# Patient Record
Sex: MALE | Race: WHITE | NOT HISPANIC OR LATINO | Employment: OTHER | URBAN - NONMETROPOLITAN AREA
[De-identification: names, ages, dates, MRNs, and addresses within clinical notes are randomized per-mention and may not be internally consistent; named-entity substitution may affect disease eponyms.]

---

## 2017-01-23 ENCOUNTER — TRANSCRIBE ORDERS (OUTPATIENT)
Dept: PHYSICAL THERAPY | Facility: HOSPITAL | Age: 62
End: 2017-01-23

## 2017-01-23 DIAGNOSIS — R13.10 DYSPHAGIA, UNSPECIFIED: Primary | ICD-10-CM

## 2017-03-14 ENCOUNTER — LAB (OUTPATIENT)
Dept: LAB | Facility: HOSPITAL | Age: 62
End: 2017-03-14

## 2017-03-14 ENCOUNTER — TRANSCRIBE ORDERS (OUTPATIENT)
Dept: ADMINISTRATIVE | Facility: HOSPITAL | Age: 62
End: 2017-03-14

## 2017-03-14 DIAGNOSIS — E78.5 HYPERLIPIDEMIA, UNSPECIFIED HYPERLIPIDEMIA TYPE: Primary | ICD-10-CM

## 2017-03-14 DIAGNOSIS — E78.5 HYPERLIPIDEMIA, UNSPECIFIED HYPERLIPIDEMIA TYPE: ICD-10-CM

## 2017-03-14 DIAGNOSIS — N18.30 CHRONIC KIDNEY DISEASE, STAGE III (MODERATE) (HCC): ICD-10-CM
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
## 2017-03-14 LAB
25(OH)D3 SERPL-MCNC: 9 NG/ML
ALBUMIN SERPL-MCNC: 4.2 G/DL (ref 3.4–4.8)
ALBUMIN UR-MCNC: 724.2 MG/L
ALBUMIN/GLOB SERPL: 1.1 G/DL (ref 1.5–2.5)
ALP SERPL-CCNC: 107 U/L (ref 40–129)
ALT SERPL W P-5'-P-CCNC: 17 U/L (ref 10–44)
ANION GAP SERPL CALCULATED.3IONS-SCNC: 8.6 MMOL/L (ref 3.6–11.2)
AST SERPL-CCNC: 37 U/L (ref 10–34)
BILIRUB SERPL-MCNC: 0.4 MG/DL (ref 0.2–1.8)
BILIRUB UR QL STRIP: NEGATIVE
BUN BLD-MCNC: 17 MG/DL (ref 7–21)
BUN/CREAT SERPL: 9.3 (ref 7–25)
CALCIUM SPEC-SCNC: 9.1 MG/DL (ref 7.7–10)
CHLORIDE SERPL-SCNC: 104 MMOL/L (ref 99–112)
CK SERPL-CCNC: 54 U/L (ref 24–204)
CLARITY UR: CLEAR
CO2 SERPL-SCNC: 21.4 MMOL/L (ref 24.3–31.9)
COLOR UR: YELLOW
CREAT BLD-MCNC: 1.82 MG/DL (ref 0.43–1.29)
CREAT UR-MCNC: 51.4 MG/DL
CREAT UR-MCNC: 51.4 MG/DL
GFR SERPL CREATININE-BSD FRML MDRD: 38 ML/MIN/1.73
GLOBULIN UR ELPH-MCNC: 3.7 GM/DL
GLUCOSE BLD-MCNC: 410 MG/DL (ref 70–110)
GLUCOSE UR STRIP-MCNC: ABNORMAL MG/DL
HGB UR QL STRIP.AUTO: NEGATIVE
KETONES UR QL STRIP: NEGATIVE
LEUKOCYTE ESTERASE UR QL STRIP.AUTO: NEGATIVE
MICROALBUMIN/CREAT UR: 1408.9 MG/G
NITRITE UR QL STRIP: NEGATIVE
OSMOLALITY SERPL CALC.SUM OF ELEC: 287.1 MOSM/KG (ref 273–305)
PH UR STRIP.AUTO: <=5 [PH] (ref 5–8)
PHOSPHATE SERPL-MCNC: 4.1 MG/DL (ref 2.7–4.5)
POTASSIUM BLD-SCNC: 4.5 MMOL/L (ref 3.5–5.3)
PROT SERPL-MCNC: 7.9 G/DL (ref 6–8)
PROT UR QL STRIP: ABNORMAL
PROT UR-MCNC: 104.2 MG/DL
PROT/CREAT UR: 2027.2 MG/G CREA (ref 0–200)
PTH-INTACT SERPL-MCNC: 81 PG/ML (ref 14–72)
SODIUM BLD-SCNC: 134 MMOL/L (ref 135–153)
SP GR UR STRIP: 1.02 (ref 1–1.03)
UROBILINOGEN UR QL STRIP: ABNORMAL

## 2017-03-14 PROCEDURE — 80053 COMPREHEN METABOLIC PANEL: CPT | Performed by: HOSPITALIST

## 2017-03-14 PROCEDURE — 82306 VITAMIN D 25 HYDROXY: CPT | Performed by: HOSPITALIST

## 2017-03-14 PROCEDURE — 82550 ASSAY OF CK (CPK): CPT | Performed by: HOSPITALIST

## 2017-03-14 PROCEDURE — 82570 ASSAY OF URINE CREATININE: CPT | Performed by: HOSPITALIST

## 2017-03-14 PROCEDURE — 84156 ASSAY OF PROTEIN URINE: CPT | Performed by: HOSPITALIST

## 2017-03-14 PROCEDURE — 83970 ASSAY OF PARATHORMONE: CPT | Performed by: HOSPITALIST

## 2017-03-14 PROCEDURE — 81003 URINALYSIS AUTO W/O SCOPE: CPT | Performed by: HOSPITALIST

## 2017-03-14 PROCEDURE — 82043 UR ALBUMIN QUANTITATIVE: CPT | Performed by: HOSPITALIST

## 2017-03-14 PROCEDURE — 84100 ASSAY OF PHOSPHORUS: CPT | Performed by: HOSPITALIST

## 2017-03-14 PROCEDURE — 36415 COLL VENOUS BLD VENIPUNCTURE: CPT

## 2017-03-21 RX ORDER — FENOFIBRATE 145 MG/1
TABLET, COATED ORAL
Qty: 15 TABLET | Refills: 2 | Status: SHIPPED | OUTPATIENT
Start: 2017-03-21 | End: 2018-07-02 | Stop reason: DRUGHIGH

## 2017-04-06 ENCOUNTER — LAB (OUTPATIENT)
Dept: LAB | Facility: HOSPITAL | Age: 62
End: 2017-04-06
Attending: INTERNAL MEDICINE

## 2017-04-06 ENCOUNTER — TRANSCRIBE ORDERS (OUTPATIENT)
Dept: GENERAL RADIOLOGY | Facility: HOSPITAL | Age: 62
End: 2017-04-06

## 2017-04-06 ENCOUNTER — OFFICE VISIT (OUTPATIENT)
Dept: CARDIOLOGY | Facility: CLINIC | Age: 62
End: 2017-04-06

## 2017-04-06 VITALS
RESPIRATION RATE: 18 BRPM | HEART RATE: 66 BPM | BODY MASS INDEX: 38.09 KG/M2 | DIASTOLIC BLOOD PRESSURE: 66 MMHG | SYSTOLIC BLOOD PRESSURE: 134 MMHG | WEIGHT: 207 LBS | HEIGHT: 62 IN

## 2017-04-06 DIAGNOSIS — E78.5 DYSLIPIDEMIA: Primary | ICD-10-CM

## 2017-04-06 DIAGNOSIS — N18.30 CHRONIC KIDNEY DISEASE, STAGE III (MODERATE) (HCC): ICD-10-CM

## 2017-04-06 DIAGNOSIS — R07.2 PRECORDIAL PAIN: Primary | ICD-10-CM

## 2017-04-06 DIAGNOSIS — E78.5 DYSLIPIDEMIA: ICD-10-CM

## 2017-04-06 LAB
25(OH)D3 SERPL-MCNC: 8 NG/ML
ALBUMIN SERPL-MCNC: 4.8 G/DL (ref 3.4–4.8)
ALBUMIN UR-MCNC: 1358.1 MG/L
ALBUMIN/GLOB SERPL: 1.5 G/DL (ref 1.5–2.5)
ALP SERPL-CCNC: 89 U/L (ref 40–129)
ALT SERPL W P-5'-P-CCNC: 16 U/L (ref 10–44)
ANION GAP SERPL CALCULATED.3IONS-SCNC: 10 MMOL/L (ref 3.6–11.2)
AST SERPL-CCNC: 33 U/L (ref 10–34)
BILIRUB SERPL-MCNC: 0.4 MG/DL (ref 0.2–1.8)
BILIRUB UR QL STRIP: NEGATIVE
BUN BLD-MCNC: 30 MG/DL (ref 7–21)
BUN/CREAT SERPL: 14.9 (ref 7–25)
CALCIUM SPEC-SCNC: 9.3 MG/DL (ref 7.7–10)
CALCIUM SPEC-SCNC: 9.5 MG/DL (ref 7.7–10)
CHLORIDE SERPL-SCNC: 103 MMOL/L (ref 99–112)
CK SERPL-CCNC: 38 U/L (ref 24–204)
CLARITY UR: CLEAR
CO2 SERPL-SCNC: 23 MMOL/L (ref 24.3–31.9)
COLOR UR: YELLOW
CREAT BLD-MCNC: 2.01 MG/DL (ref 0.43–1.29)
CREAT UR-MCNC: 83 MG/DL
CREAT UR-MCNC: 83 MG/DL
GFR SERPL CREATININE-BSD FRML MDRD: 34 ML/MIN/1.73
GLOBULIN UR ELPH-MCNC: 3.3 GM/DL
GLUCOSE BLD-MCNC: 391 MG/DL (ref 70–110)
GLUCOSE UR STRIP-MCNC: ABNORMAL MG/DL
HGB UR QL STRIP.AUTO: NEGATIVE
KETONES UR QL STRIP: NEGATIVE
LEUKOCYTE ESTERASE UR QL STRIP.AUTO: NEGATIVE
MICROALBUMIN/CREAT UR: 1636.3 MG/G
NITRITE UR QL STRIP: NEGATIVE
OSMOLALITY SERPL CALC.SUM OF ELEC: 294.4 MOSM/KG (ref 273–305)
PH UR STRIP.AUTO: <=5 [PH] (ref 5–8)
PHOSPHATE SERPL-MCNC: 3.7 MG/DL (ref 2.7–4.5)
POTASSIUM BLD-SCNC: 4 MMOL/L (ref 3.5–5.3)
PROT SERPL-MCNC: 8.1 G/DL (ref 6–8)
PROT UR QL STRIP: ABNORMAL
PROT UR-MCNC: 167 MG/DL
PROT/CREAT UR: 2012 MG/G CREA (ref 0–200)
PTH-INTACT SERPL-MCNC: 107.9 PG/ML (ref 14–72)
SODIUM BLD-SCNC: 136 MMOL/L (ref 135–153)
SP GR UR STRIP: 1.02 (ref 1–1.03)
UROBILINOGEN UR QL STRIP: ABNORMAL

## 2017-04-06 PROCEDURE — 84156 ASSAY OF PROTEIN URINE: CPT | Performed by: INTERNAL MEDICINE

## 2017-04-06 PROCEDURE — 36415 COLL VENOUS BLD VENIPUNCTURE: CPT

## 2017-04-06 PROCEDURE — 93000 ELECTROCARDIOGRAM COMPLETE: CPT | Performed by: INTERNAL MEDICINE

## 2017-04-06 PROCEDURE — 80053 COMPREHEN METABOLIC PANEL: CPT | Performed by: INTERNAL MEDICINE

## 2017-04-06 PROCEDURE — 81003 URINALYSIS AUTO W/O SCOPE: CPT | Performed by: INTERNAL MEDICINE

## 2017-04-06 PROCEDURE — 84100 ASSAY OF PHOSPHORUS: CPT | Performed by: INTERNAL MEDICINE

## 2017-04-06 PROCEDURE — 83970 ASSAY OF PARATHORMONE: CPT | Performed by: INTERNAL MEDICINE

## 2017-04-06 PROCEDURE — 82570 ASSAY OF URINE CREATININE: CPT | Performed by: INTERNAL MEDICINE

## 2017-04-06 PROCEDURE — 82550 ASSAY OF CK (CPK): CPT | Performed by: INTERNAL MEDICINE

## 2017-04-06 PROCEDURE — 82043 UR ALBUMIN QUANTITATIVE: CPT | Performed by: INTERNAL MEDICINE

## 2017-04-06 PROCEDURE — 82306 VITAMIN D 25 HYDROXY: CPT | Performed by: INTERNAL MEDICINE

## 2017-04-06 PROCEDURE — 99213 OFFICE O/P EST LOW 20 MIN: CPT | Performed by: INTERNAL MEDICINE

## 2017-04-06 RX ORDER — CARVEDILOL 3.12 MG/1
3.12 TABLET ORAL 2 TIMES DAILY WITH MEALS
COMMUNITY
End: 2017-10-30 | Stop reason: DRUGHIGH

## 2017-04-06 RX ORDER — IPRATROPIUM BROMIDE AND ALBUTEROL SULFATE 2.5; .5 MG/3ML; MG/3ML
3 SOLUTION RESPIRATORY (INHALATION) EVERY 4 HOURS PRN
COMMUNITY
End: 2018-07-02 | Stop reason: ALTCHOICE

## 2017-04-06 RX ORDER — RANITIDINE 300 MG/1
300 TABLET ORAL NIGHTLY
Status: ON HOLD | COMMUNITY
End: 2019-01-15 | Stop reason: SDUPTHER

## 2017-04-06 NOTE — PROGRESS NOTES
Eleno Chaney, MISHEL  Catarino Arvizu  1955      Patient Active Problem List   Diagnosis   • Chest pain   • Shortness of breath   • Type 2 diabetes mellitus   • Dyslipidemia   • History of poliomyelitis       Dear Eleno:    Subjective     Catarino Arvizu is a 61 y.o. male with the problems as listed above, presents for c/o chest pains in the left upper part of chest and SOA. Pt has been having some left arm numbness.       Chest Pain    Associated symptoms include abdominal pain (Intermittent in the lower part of abdomen.), a cough (with white sputum.), dizziness, headaches, nausea, palpitations, shortness of breath and syncope. Pertinent negatives include no fever, hemoptysis, irregular heartbeat or vomiting.   Palpitations    Associated symptoms include chest pain, coughing (with white sputum.), dizziness, nausea, shortness of breath and syncope. Pertinent negatives include no fever, irregular heartbeat or vomiting.   Shortness of Breath   Associated symptoms include abdominal pain (Intermittent in the lower part of abdomen.), chest pain, headaches, leg swelling, syncope and wheezing. Pertinent negatives include no fever, hemoptysis, sore throat or vomiting.   :He has some intermittent burning type of pains with shortness of breath. They come and go on their own.No relation to food.They are moderate.They are no worse than before.His recent ragadenosan sestamibi study on 8/10/16 was normal with no evidence of myocardial ischemia. He c/o epigastric pains with black stools. Has some nausea with no vomitings or hematemesis.He has no h/o PUD.   He has some sharp left upper chest pain. Which are reproducible by palpation.      No Known Allergies:      Current Outpatient Prescriptions:   •  amLODIPine (NORVASC) 10 MG tablet, Take 10 mg by mouth daily., Disp: , Rfl:   •  ASPIRIN LOW DOSE 81 MG EC tablet, TAKE 1 TABLET BY MOUTH EVERY DAY, Disp: 30 tablet, Rfl: 3  •  atorvastatin (LIPITOR) 80 MG tablet, Take 80 mg  by mouth daily., Disp: , Rfl:   •  baclofen (LIORESAL) 10 MG tablet, Take 10 mg by mouth 2 (two) times a day., Disp: , Rfl:   •  carvedilol (COREG) 3.125 MG tablet, Take 3.125 mg by mouth 2 (Two) Times a Day With Meals., Disp: , Rfl:   •  cetirizine (ZyrTEC) 10 MG tablet, Take 10 mg by mouth daily., Disp: , Rfl:   •  fenofibrate (TRICOR) 145 MG tablet, TAKE 1/2 TABLET BY MOUTH EVERY DAY., Disp: 15 tablet, Rfl: 2  •  ferrous sulfate 325 (65 FE) MG tablet, TAKE 1 TABLET ONCE DAILY WITH BREAKFAST, Disp: 30 tablet, Rfl: 3  •  fluticasone (FLONASE) 50 MCG/ACT nasal spray, 2 sprays into each nostril daily. Administer 1 spray in each nostril for each dose. , Disp: , Rfl:   •  glimepiride (AMARYL) 4 MG tablet, Take 4 mg by mouth 2 (two) times a day., Disp: , Rfl:   •  HYDROcodone-acetaminophen (NORCO) 7.5-325 MG per tablet, Take 1 tablet by mouth 2 (two) times a day as needed for moderate pain (4-6)., Disp: , Rfl:   •  ipratropium-albuterol (DUO-NEB) 0.5-2.5 mg/mL nebulizer, Take 3 mL by nebulization Every 4 (Four) Hours As Needed for Wheezing., Disp: , Rfl:   •  isosorbide mononitrate (IMDUR) 60 MG 24 hr tablet, Take 1 1/2 tablets QD, Disp: 45 tablet, Rfl: 5  •  levothyroxine (SYNTHROID, LEVOTHROID) 100 MCG tablet, Take 100 mcg by mouth daily., Disp: , Rfl:   •  nitroglycerin (NITROSTAT) 0.4 MG SL tablet, Place 0.4 mg under the tongue every 5 (five) minutes as needed for chest pain. Take no more than 3 doses in 15 minutes., Disp: , Rfl:   •  pantoprazole (PROTONIX) 40 MG EC tablet, Take 1 tablet by mouth 2 (Two) Times a Day With Meals., Disp: 60 tablet, Rfl: 3  •  raNITIdine (ZANTAC) 300 MG tablet, Take 300 mg by mouth Every Night., Disp: , Rfl:   •  sennosides-docusate sodium (SENOKOT-S) 8.6-50 MG tablet, Take 2 tablets by mouth 2 (two) times a day., Disp: , Rfl:   •  sitaGLIPtin (JANUVIA) 50 MG tablet, Take 1 tablet by mouth daily., Disp: 30 tablet, Rfl: 3  •  gabapentin (NEURONTIN) 300 MG capsule, Take 300 mg by mouth 3  "(three) times a day., Disp: , Rfl:   •  ondansetron (ZOFRAN) 4 MG tablet, Take 1 tablet PRN nausea every 4 hours., Disp: 5 tablet, Rfl: 0  •  polyethylene glycol (MIRALAX) powder, Take 17 g by mouth Daily. May take 1-4 times daily as needed for daily adequate bowel movement., Disp: 510 g, Rfl: 5  •  SUMAtriptan (IMITREX) 50 MG tablet, Take one tablet at onset of headache. May repeat dose one time in 2 hours if headache not relieved., Disp: , Rfl:       The following portions of the patient's history were reviewed and updated as appropriate: allergies, current medications, past family history, past medical history, past social history, past surgical history and problem list.    Social History   Substance Use Topics   • Smoking status: Never Smoker   • Smokeless tobacco: Current User     Types: Chew      Comment: Chewed tobacco since he was 4 yrs old.   • Alcohol use No       Review of Systems   Constitution: Positive for chills. Negative for fever.   HENT: Positive for headaches. Negative for nosebleeds and sore throat.    Cardiovascular: Positive for chest pain, leg swelling, palpitations and syncope. Negative for irregular heartbeat.   Respiratory: Positive for cough (with white sputum.), shortness of breath and wheezing. Negative for hemoptysis.    Gastrointestinal: Positive for abdominal pain (Intermittent in the lower part of abdomen.), melena (recently.) and nausea. Negative for hematemesis, hematochezia and vomiting.   Genitourinary: Positive for dysuria. Negative for hematuria.   Neurological: Positive for dizziness.       Objective   Vitals:    04/06/17 1351   BP: 134/66   Pulse: 66   Resp: 18   Weight: 207 lb (93.9 kg)   Height: 62\" (157.5 cm)     Body mass index is 37.86 kg/(m^2).    Physical Exam   Constitutional: He is oriented to person, place, and time. He appears well-developed and well-nourished.   HENT:   Mouth/Throat: Oropharynx is clear and moist.   Eyes: EOM are normal. Pupils are equal, round, " and reactive to light.   Neck: Neck supple. No JVD present. No tracheal deviation present. No thyromegaly present.   Cardiovascular: Normal rate, regular rhythm, S1 normal and S2 normal.  Exam reveals no gallop and no friction rub.    No murmur heard.  Pulmonary/Chest: Effort normal and breath sounds normal.   Abdominal: Soft. Bowel sounds are normal. He exhibits no mass. There is no tenderness.   Musculoskeletal: Normal range of motion. He exhibits no edema (1+ Edema in both legs. ).   Lymphadenopathy:     He has no cervical adenopathy.   Neurological: He is alert and oriented to person, place, and time.   Skin: Skin is warm and dry. No rash noted.   Psychiatric: He has a normal mood and affect.       Lab Results   Component Value Date     (L) 03/14/2017    K 4.5 03/14/2017     03/14/2017    CO2 21.4 (L) 03/14/2017    BUN 17 03/14/2017    CREATININE 1.82 (H) 03/14/2017    GLUCOSE 410 (H) 03/14/2017    CALCIUM 9.1 03/14/2017    AST 37 (H) 03/14/2017    ALT 17 03/14/2017    ALKPHOS 107 03/14/2017    LABIL2 1.1 (L) 03/14/2017     Lab Results   Component Value Date    CKTOTAL 54 03/14/2017     Lab Results   Component Value Date    WBC 4.39 (L) 10/07/2016    HGB 10.2 (L) 10/07/2016    HCT 31.2 (L) 10/07/2016     10/07/2016     No results found for: INR  No results found for: MG  Lab Results   Component Value Date    TSH 5.287 (H) 08/12/2016    TRIG 1214 (H) 08/09/2016    HDL 32 (L) 08/09/2016          Ragadenosan Sestamibi on 8/10/2016 revealed:  · Findings consistent with a normal ECG stress test.  · (Calculated EF = 68%).  · Myocardial perfusion imaging indicates a normal myocardial perfusion study with no evidence of ischemia.  · Impressions are consistent with a low risk study.  · There is no prior study available for comparison.      Echo-Doppler study on 8/10/2016 revealed:  · All left ventricular wall segments contract normally.  · Estimated EF = 65%.  · Left ventricular diastolic dysfunction  (grade I) consistent with impaired relaxation.  · Normal valvular echos  · There is no evidence of pericardial effusion        ECG 12 Lead  Date/Time: 4/6/2017 2:01 PM  Performed by: TREVON DEAL  Authorized by: TREVON DEAL   BPM: 65  Comments: QTC: 424          Assessment/Plan :  Catarino was seen today for rapid heart rate and follow-up.  Diagnoses and all orders for this visit:    Precordial pain, resolved  Dyslipidemia, on atorvastatin 80 mg daily  Essential hypertension, controlled  Abdominal pains with mild tenderness in epigastrium       Recommendations:  1. Continue current medication meds.   2.  Consider GI evaluation if recurrent abdominal pains.  2. Follow up in 4 months.           Return in about 4 months (around 8/6/2017) for Recheck, or sooner if needed.    As always, I appreciate very much the opportunity to participate in the cardiovascular care of your patients.      With Best Regards,    Trevon Deal MD, FACC

## 2017-04-10 RX ORDER — ISOSORBIDE MONONITRATE 60 MG/1
TABLET, EXTENDED RELEASE ORAL
Qty: 45 TABLET | Refills: 4 | Status: SHIPPED | OUTPATIENT
Start: 2017-04-10 | End: 2017-09-08 | Stop reason: SDUPTHER

## 2017-04-10 RX ORDER — ASPIRIN 81 MG/1
TABLET ORAL
Qty: 30 TABLET | Refills: 4 | Status: SHIPPED | OUTPATIENT
Start: 2017-04-10 | End: 2017-09-08 | Stop reason: SDUPTHER

## 2017-04-10 RX ORDER — FERROUS SULFATE 325(65) MG
TABLET ORAL
Qty: 30 TABLET | Refills: 4 | Status: SHIPPED | OUTPATIENT
Start: 2017-04-10 | End: 2017-09-08 | Stop reason: SDUPTHER

## 2017-05-01 ENCOUNTER — LAB (OUTPATIENT)
Dept: LAB | Facility: HOSPITAL | Age: 62
End: 2017-05-01
Attending: INTERNAL MEDICINE

## 2017-05-01 ENCOUNTER — TRANSCRIBE ORDERS (OUTPATIENT)
Dept: LAB | Facility: HOSPITAL | Age: 62
End: 2017-05-01

## 2017-05-01 DIAGNOSIS — R80.9 PROTEINURIA: ICD-10-CM

## 2017-05-01 DIAGNOSIS — R80.9 PROTEINURIA: Primary | ICD-10-CM

## 2017-05-01 LAB
ANION GAP SERPL CALCULATED.3IONS-SCNC: 11.3 MMOL/L (ref 3.6–11.2)
BUN BLD-MCNC: 15 MG/DL (ref 7–21)
BUN/CREAT SERPL: 7.9 (ref 7–25)
C3 SERPL-MCNC: 217 MG/DL (ref 90–170)
C4 SERPL-MCNC: 50.4 MG/DL (ref 12–36)
CALCIUM SPEC-SCNC: 9.1 MG/DL (ref 7.7–10)
CHLORIDE SERPL-SCNC: 109 MMOL/L (ref 99–112)
CO2 SERPL-SCNC: 19.7 MMOL/L (ref 24.3–31.9)
CREAT BLD-MCNC: 1.91 MG/DL (ref 0.43–1.29)
GFR SERPL CREATININE-BSD FRML MDRD: 36 ML/MIN/1.73
GLUCOSE BLD-MCNC: 409 MG/DL (ref 70–110)
OSMOLALITY SERPL CALC.SUM OF ELEC: 297.5 MOSM/KG (ref 273–305)
POTASSIUM BLD-SCNC: 4 MMOL/L (ref 3.5–5.3)
SODIUM BLD-SCNC: 140 MMOL/L (ref 135–153)

## 2017-05-01 PROCEDURE — 84165 PROTEIN E-PHORESIS SERUM: CPT | Performed by: INTERNAL MEDICINE

## 2017-05-01 PROCEDURE — 86038 ANTINUCLEAR ANTIBODIES: CPT | Performed by: INTERNAL MEDICINE

## 2017-05-01 PROCEDURE — 86334 IMMUNOFIX E-PHORESIS SERUM: CPT | Performed by: INTERNAL MEDICINE

## 2017-05-01 PROCEDURE — 83520 IMMUNOASSAY QUANT NOS NONAB: CPT | Performed by: INTERNAL MEDICINE

## 2017-05-01 PROCEDURE — 86256 FLUORESCENT ANTIBODY TITER: CPT | Performed by: INTERNAL MEDICINE

## 2017-05-01 PROCEDURE — 84155 ASSAY OF PROTEIN SERUM: CPT | Performed by: INTERNAL MEDICINE

## 2017-05-01 PROCEDURE — 80048 BASIC METABOLIC PNL TOTAL CA: CPT | Performed by: INTERNAL MEDICINE

## 2017-05-01 PROCEDURE — 36415 COLL VENOUS BLD VENIPUNCTURE: CPT

## 2017-05-01 PROCEDURE — 86225 DNA ANTIBODY NATIVE: CPT | Performed by: INTERNAL MEDICINE

## 2017-05-01 PROCEDURE — 86160 COMPLEMENT ANTIGEN: CPT | Performed by: INTERNAL MEDICINE

## 2017-05-02 LAB — DSDNA AB SER-ACNC: <1 IU/ML (ref 0–9)

## 2017-05-03 LAB
C-ANCA TITR SER IF: NORMAL TITER
MYELOPEROXIDASE AB SER-ACNC: <9 U/ML (ref 0–9)
P-ANCA ATYPICAL TITR SER IF: NORMAL TITER
P-ANCA TITR SER IF: NORMAL TITER
PROTEINASE3 AB SER IA-ACNC: <3.5 U/ML (ref 0–3.5)

## 2017-05-04 LAB
ALBUMIN SERPL-MCNC: 3.2 G/DL (ref 2.9–4.4)
ALBUMIN/GLOB SERPL: 0.9 {RATIO} (ref 0.7–1.7)
ALPHA1 GLOB FLD ELPH-MCNC: 0.3 G/DL (ref 0–0.4)
ALPHA2 GLOB SERPL ELPH-MCNC: 1.1 G/DL (ref 0.4–1)
B-GLOBULIN SERPL ELPH-MCNC: 1.4 G/DL (ref 0.7–1.3)
GAMMA GLOB SERPL ELPH-MCNC: 1 G/DL (ref 0.4–1.8)
GLOBULIN SER CALC-MCNC: 3.8 G/DL (ref 2.2–3.9)
IGA SERPL-MCNC: 267 MG/DL (ref 61–437)
IGG SERPL-MCNC: 985 MG/DL (ref 700–1600)
IGM SERPL-MCNC: 155 MG/DL (ref 20–172)
INTERPRETATION SERPL IEP-IMP: ABNORMAL
Lab: ABNORMAL
M-SPIKE: ABNORMAL G/DL
PROT SERPL-MCNC: 7 G/DL (ref 6–8.5)

## 2017-05-05 RX ORDER — CARVEDILOL 6.25 MG/1
TABLET ORAL
Qty: 60 TABLET | Refills: 5 | Status: SHIPPED | OUTPATIENT
Start: 2017-05-05 | End: 2018-07-02 | Stop reason: DRUGHIGH

## 2017-06-05 RX ORDER — POLYETHYLENE GLYCOL 3350 17 G/17G
POWDER, FOR SOLUTION ORAL
Refills: 5 | OUTPATIENT
Start: 2017-06-05

## 2017-06-05 NOTE — TELEPHONE ENCOUNTER
I was unsure if this should be refilled. Looks like he did not have procedure and cancelled his last office visit.

## 2017-06-06 RX ORDER — POLYETHYLENE GLYCOL 3350 17 G/17G
17 POWDER, FOR SOLUTION ORAL DAILY
Qty: 510 G | Refills: 5 | Status: SHIPPED | OUTPATIENT
Start: 2017-06-06 | End: 2018-07-02 | Stop reason: ALTCHOICE

## 2017-06-09 LAB — ANA SER QL: NORMAL

## 2017-08-02 ENCOUNTER — OFFICE VISIT (OUTPATIENT)
Dept: UROLOGY | Facility: CLINIC | Age: 62
End: 2017-08-02

## 2017-08-02 DIAGNOSIS — C61 PROSTATE CANCER (HCC): Primary | ICD-10-CM

## 2017-08-02 LAB — PSA SERPL-MCNC: 26.68 NG/ML (ref 0–4)

## 2017-08-02 PROCEDURE — 84153 ASSAY OF PSA TOTAL: CPT | Performed by: UROLOGY

## 2017-08-02 PROCEDURE — 99213 OFFICE O/P EST LOW 20 MIN: CPT | Performed by: UROLOGY

## 2017-08-02 NOTE — PROGRESS NOTES
Chief Complaint:          Chief Complaint   Patient presents with   • Elevated PSA       HPI:   62 y.o. male.    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  He has no weight loss.  He has chronic testalgia.  He gets up 4 times at night.  He then had a PSA for 6 months.  There is no other significant constitutional symptomatology.  He is obese.      Past Medical History:        Past Medical History:   Diagnosis Date   • Chest pain    • Chronic kidney disease    • Diabetes mellitus    • GERD (gastroesophageal reflux disease)    • Heart murmur    • Hyperlipidemia    • Hypertension    • Irregular heart beat    • Polio    • Prostate cancer 05/2016    Dr. Khalif Kohler MD- [place], [place]   • Shortness of breath          Current Meds:     Current Outpatient Prescriptions   Medication Sig Dispense Refill   • amLODIPine (NORVASC) 10 MG tablet Take 10 mg by mouth daily.     • ASPIR-LOW 81 MG EC tablet TAKE 1 TABLET EVERY DAY 30 tablet 4   • atorvastatin (LIPITOR) 80 MG tablet Take 80 mg by mouth daily.     • baclofen (LIORESAL) 10 MG tablet Take 10 mg by mouth 2 (two) times a day.     • carvedilol (COREG) 3.125 MG tablet Take 3.125 mg by mouth 2 (Two) Times a Day With Meals.     • carvedilol (COREG) 6.25 MG tablet TAKE 1 TABLET BY MOUTH TWICE A DAY 60 tablet 5   • cetirizine (ZyrTEC) 10 MG tablet Take 10 mg by mouth daily.     • fenofibrate (TRICOR) 145 MG tablet TAKE 1/2 TABLET BY MOUTH EVERY DAY. 15 tablet 2   • ferrous sulfate 325 (65 FE) MG tablet TAKE 1 TABLET BY MOUTH WITH BREAKFAST. 30 tablet 4   • fluticasone (FLONASE) 50 MCG/ACT nasal spray 2 sprays into each nostril daily. Administer 1 spray in each nostril for each dose.      • gabapentin (NEURONTIN) 300 MG capsule Take 300 mg by mouth 3 (three) times a day.     • glimepiride (AMARYL) 4 MG tablet Take 4 mg by mouth 2 (two) times a  day.     • HYDROcodone-acetaminophen (NORCO) 7.5-325 MG per tablet Take 1 tablet by mouth 2 (two) times a day as needed for moderate pain (4-6).     • ipratropium-albuterol (DUO-NEB) 0.5-2.5 mg/mL nebulizer Take 3 mL by nebulization Every 4 (Four) Hours As Needed for Wheezing.     • isosorbide mononitrate (IMDUR) 60 MG 24 hr tablet TAKE 1 AND 1/2 TABLETS BYMOUTH ONCE DAILY. 45 tablet 4   • levothyroxine (SYNTHROID, LEVOTHROID) 100 MCG tablet Take 100 mcg by mouth daily.     • nitroglycerin (NITROSTAT) 0.4 MG SL tablet Place 0.4 mg under the tongue every 5 (five) minutes as needed for chest pain. Take no more than 3 doses in 15 minutes.     • ondansetron (ZOFRAN) 4 MG tablet Take 1 tablet PRN nausea every 4 hours. 5 tablet 0   • pantoprazole (PROTONIX) 40 MG EC tablet Take 1 tablet by mouth 2 (Two) Times a Day With Meals. 60 tablet 3   • polyethylene glycol (MIRALAX) powder Take 17 g by mouth Daily. May take 1-4 times daily as needed for daily adequate bowel movement. 510 g 5   • raNITIdine (ZANTAC) 300 MG tablet Take 300 mg by mouth Every Night.     • sennosides-docusate sodium (SENOKOT-S) 8.6-50 MG tablet Take 2 tablets by mouth 2 (two) times a day.     • sitaGLIPtin (JANUVIA) 50 MG tablet Take 1 tablet by mouth daily. 30 tablet 3   • SUMAtriptan (IMITREX) 50 MG tablet Take one tablet at onset of headache. May repeat dose one time in 2 hours if headache not relieved.       No current facility-administered medications for this visit.         Allergies:      No Known Allergies     Past Surgical History:     Past Surgical History:   Procedure Laterality Date   • CARDIAC CATHETERIZATION  2008    50% diffuse LAD stenosis, 50% septal  branch   • COLONOSCOPY      Long time ago   • HEMORRHOIDECTOMY     • HERNIA REPAIR     • UPPER GASTROINTESTINAL ENDOSCOPY      Long time ago         Social History:     Social History     Social History   • Marital status:      Spouse name: N/A   • Number of children: N/A    • Years of education: N/A     Occupational History   • Not on file.     Social History Main Topics   • Smoking status: Never Smoker   • Smokeless tobacco: Current User     Types: Chew      Comment: Chewed tobacco since he was 4 yrs old.   • Alcohol use No   • Drug use: No   • Sexual activity: Not on file     Other Topics Concern   • Not on file     Social History Narrative       Family History:     Family History   Problem Relation Age of Onset   • Heart disease Brother    • Diabetes Brother    • Cancer Brother      not sure the kind   • Heart disease Brother    • Diabetes Brother    • Diabetes Sister    • Diabetes Daughter    • Heart disease Daughter    • Pancreatitis Daughter    • Crohn's disease Daughter    • Diabetes Son    • Heart disease Son        Review of Systems:     Review of Systems   Constitutional: Positive for fatigue.   Genitourinary: Positive for frequency, testicular pain and urgency.       Physical Exam:     Physical Exam   Constitutional: He is oriented to person, place, and time. He appears well-developed and well-nourished.   HENT:   Head: Normocephalic and atraumatic.   Eyes: Conjunctivae and EOM are normal. Pupils are equal, round, and reactive to light.   Neck: Normal range of motion.   Cardiovascular: Normal rate, regular rhythm, normal heart sounds and intact distal pulses.    Pulmonary/Chest: Effort normal and breath sounds normal.   Abdominal: Soft. Bowel sounds are normal.   Genitourinary: Rectum normal, prostate normal and penis normal.   Genitourinary Comments: Post polio syndrome.Soft nontender abdomen with no organomegaly, rigidity, or tenderness.  He has normal external genitalia and uncircumcised phallus with a freely movable foreskin bilaterally descended testes without masses there is no inguinal hernias adenopathy or abnormalities he had good rectal tone and a large smooth firm prostate.  There is no nodularity or any suspicious rectal abnormalities     Musculoskeletal:  Normal range of motion.   Neurological: He is alert and oriented to person, place, and time. He has normal reflexes.   Skin: Skin is warm and dry.   Psychiatric: He has a normal mood and affect. His behavior is normal. Judgment and thought content normal.   Nursing note and vitals reviewed.      Procedure:       Assessment:   No diagnosis found.  No orders of the defined types were placed in this encounter.      Plan:   Prostate cancer-e- assay is pending I'll await the results and then we'll basis treatment on the results the PSA is important to get his prior PSAs as well           This document has been electronically signed by CLAUDIO LEAL MD August 2, 2017 1:32 PM

## 2017-08-03 ENCOUNTER — TELEPHONE (OUTPATIENT)
Dept: UROLOGY | Facility: CLINIC | Age: 62
End: 2017-08-03

## 2017-08-03 PROBLEM — C61 PROSTATE CANCER (HCC): Status: ACTIVE | Noted: 2017-08-03

## 2017-08-03 NOTE — TELEPHONE ENCOUNTER
I CALLED THE PT AND TOLD HIM THAT HIS PSA WAS ELEVATED AND THAT DR. LEAL WANTS HIM TO COME TO [facility] ON 8/9/17 AT 1:30 PM TO GET A LUPRON INJECTION.

## 2017-08-09 ENCOUNTER — OFFICE VISIT (OUTPATIENT)
Dept: UROLOGY | Facility: CLINIC | Age: 62
End: 2017-08-09

## 2017-08-09 DIAGNOSIS — C61 PROSTATE CANCER (HCC): Primary | ICD-10-CM

## 2017-08-09 PROCEDURE — 99213 OFFICE O/P EST LOW 20 MIN: CPT | Performed by: UROLOGY

## 2017-08-09 PROCEDURE — 96402 CHEMO HORMON ANTINEOPL SQ/IM: CPT | Performed by: UROLOGY

## 2017-08-09 NOTE — PROGRESS NOTES
Chief Complaint:          Chief Complaint   Patient presents with   • Prostate Cancer       HPI:   62 y.o. male.       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  He has no weight loss.  He has chronic testalgia.  He gets up 4 times at night.  He then had a PSA for 6 months.  There is no other significant constitutional symptomatology.  He is obese.   His PSA as expected has dramatically rise.  I went ahead and gave him an additional 6 month Lupron today with the admonition of using these type medications and there are long-term effects of cardiovascular disease.  I'll see him back in a month to be sure I'm getting the appropriate drop in PSA as a expect.      Past Medical History:        Past Medical History:   Diagnosis Date   • Chest pain    • Chronic kidney disease    • Diabetes mellitus    • GERD (gastroesophageal reflux disease)    • Heart murmur    • Hyperlipidemia    • Hypertension    • Irregular heart beat    • Polio    • Prostate cancer 05/2016    Dr. Khalif Kohler MD- [place], [place]   • Shortness of breath          Current Meds:     Current Outpatient Prescriptions   Medication Sig Dispense Refill   • amLODIPine (NORVASC) 10 MG tablet Take 10 mg by mouth daily.     • ASPIR-LOW 81 MG EC tablet TAKE 1 TABLET EVERY DAY 30 tablet 4   • atorvastatin (LIPITOR) 80 MG tablet Take 80 mg by mouth daily.     • baclofen (LIORESAL) 10 MG tablet Take 10 mg by mouth 2 (two) times a day.     • carvedilol (COREG) 3.125 MG tablet Take 3.125 mg by mouth 2 (Two) Times a Day With Meals.     • carvedilol (COREG) 6.25 MG tablet TAKE 1 TABLET BY MOUTH TWICE A DAY 60 tablet 5   • cetirizine (ZyrTEC) 10 MG tablet Take 10 mg by mouth daily.     • fenofibrate (TRICOR) 145 MG tablet TAKE 1/2 TABLET BY MOUTH EVERY DAY. 15 tablet 2   • ferrous sulfate 325 (65 FE) MG tablet TAKE 1 TABLET BY MOUTH WITH  BREAKFAST. 30 tablet 4   • fluticasone (FLONASE) 50 MCG/ACT nasal spray 2 sprays into each nostril daily. Administer 1 spray in each nostril for each dose.      • gabapentin (NEURONTIN) 300 MG capsule Take 300 mg by mouth 3 (three) times a day.     • glimepiride (AMARYL) 4 MG tablet Take 4 mg by mouth 2 (two) times a day.     • HYDROcodone-acetaminophen (NORCO) 7.5-325 MG per tablet Take 1 tablet by mouth 2 (two) times a day as needed for moderate pain (4-6).     • ipratropium-albuterol (DUO-NEB) 0.5-2.5 mg/mL nebulizer Take 3 mL by nebulization Every 4 (Four) Hours As Needed for Wheezing.     • isosorbide mononitrate (IMDUR) 60 MG 24 hr tablet TAKE 1 AND 1/2 TABLETS BYMOUTH ONCE DAILY. 45 tablet 4   • levothyroxine (SYNTHROID, LEVOTHROID) 100 MCG tablet Take 100 mcg by mouth daily.     • nitroglycerin (NITROSTAT) 0.4 MG SL tablet Place 0.4 mg under the tongue every 5 (five) minutes as needed for chest pain. Take no more than 3 doses in 15 minutes.     • ondansetron (ZOFRAN) 4 MG tablet Take 1 tablet PRN nausea every 4 hours. 5 tablet 0   • pantoprazole (PROTONIX) 40 MG EC tablet Take 1 tablet by mouth 2 (Two) Times a Day With Meals. 60 tablet 3   • polyethylene glycol (MIRALAX) powder Take 17 g by mouth Daily. May take 1-4 times daily as needed for daily adequate bowel movement. 510 g 5   • raNITIdine (ZANTAC) 300 MG tablet Take 300 mg by mouth Every Night.     • sennosides-docusate sodium (SENOKOT-S) 8.6-50 MG tablet Take 2 tablets by mouth 2 (two) times a day.     • sitaGLIPtin (JANUVIA) 50 MG tablet Take 1 tablet by mouth daily. 30 tablet 3   • SUMAtriptan (IMITREX) 50 MG tablet Take one tablet at onset of headache. May repeat dose one time in 2 hours if headache not relieved.       No current facility-administered medications for this visit.         Allergies:      No Known Allergies     Past Surgical History:     Past Surgical History:   Procedure Laterality Date   • CARDIAC CATHETERIZATION  2008    50%  diffuse LAD stenosis, 50% septal  branch   • COLONOSCOPY      Long time ago   • HEMORRHOIDECTOMY     • HERNIA REPAIR     • UPPER GASTROINTESTINAL ENDOSCOPY      Long time ago         Social History:     Social History     Social History   • Marital status:      Spouse name: N/A   • Number of children: N/A   • Years of education: N/A     Occupational History   • Not on file.     Social History Main Topics   • Smoking status: Never Smoker   • Smokeless tobacco: Current User     Types: Chew      Comment: Chewed tobacco since he was 4 yrs old.   • Alcohol use No   • Drug use: No   • Sexual activity: Not on file     Other Topics Concern   • Not on file     Social History Narrative       Family History:     Family History   Problem Relation Age of Onset   • Heart disease Brother    • Diabetes Brother    • Cancer Brother      not sure the kind   • Heart disease Brother    • Diabetes Brother    • Diabetes Sister    • Diabetes Daughter    • Heart disease Daughter    • Pancreatitis Daughter    • Crohn's disease Daughter    • Diabetes Son    • Heart disease Son        Review of Systems:     Review of Systems   Constitutional: Negative.    HENT: Negative.    Eyes: Negative.    Respiratory: Negative.    Cardiovascular: Negative.    Gastrointestinal: Negative.    Endocrine: Negative.    Genitourinary: Positive for testicular pain.   Musculoskeletal: Positive for arthralgias, back pain and myalgias.   Allergic/Immunologic: Negative.    Neurological: Negative.    Hematological: Negative.    Psychiatric/Behavioral: Negative.        Physical Exam:     Physical Exam   Constitutional: He is oriented to person, place, and time. He appears well-developed and well-nourished.   HENT:   Head: Normocephalic and atraumatic.   Eyes: Conjunctivae and EOM are normal. Pupils are equal, round, and reactive to light.   Neck: Normal range of motion.   Cardiovascular: Normal rate, regular rhythm, normal heart sounds and intact distal  pulses.    Pulmonary/Chest: Effort normal and breath sounds normal.   Abdominal: Soft. Bowel sounds are normal.   Genitourinary:   Genitourinary Comments: Post polio syndrome   Musculoskeletal: Normal range of motion.   Neurological: He is alert and oriented to person, place, and time. He has normal reflexes.   Skin: Skin is warm and dry.   Psychiatric: He has a normal mood and affect. His behavior is normal. Judgment and thought content normal.   Nursing note and vitals reviewed.      Procedure:       Assessment:   No diagnosis found.  No orders of the defined types were placed in this encounter.      Plan:   Locally advanced carcinoma the prostate-administer Lupron today without complication.  We discussed the long-term side effects           This document has been electronically signed by CLAUDIO LEAL MD August 9, 2017 1:31 PM

## 2017-09-08 RX ORDER — FERROUS SULFATE 325(65) MG
TABLET ORAL
Qty: 30 TABLET | Refills: 4 | Status: SHIPPED | OUTPATIENT
Start: 2017-09-08 | End: 2018-02-03 | Stop reason: SDUPTHER

## 2017-09-08 RX ORDER — ASPIRIN 81 MG/1
TABLET ORAL
Qty: 30 TABLET | Refills: 4 | Status: SHIPPED | OUTPATIENT
Start: 2017-09-08 | End: 2018-02-03 | Stop reason: SDUPTHER

## 2017-09-08 RX ORDER — ISOSORBIDE MONONITRATE 60 MG/1
TABLET, EXTENDED RELEASE ORAL
Qty: 45 TABLET | Refills: 4 | Status: SHIPPED | OUTPATIENT
Start: 2017-09-08 | End: 2018-02-03 | Stop reason: SDUPTHER

## 2017-09-13 ENCOUNTER — OFFICE VISIT (OUTPATIENT)
Dept: UROLOGY | Facility: CLINIC | Age: 62
End: 2017-09-13

## 2017-09-13 VITALS
WEIGHT: 207 LBS | DIASTOLIC BLOOD PRESSURE: 66 MMHG | SYSTOLIC BLOOD PRESSURE: 134 MMHG | HEIGHT: 62 IN | HEART RATE: 66 BPM | BODY MASS INDEX: 38.09 KG/M2

## 2017-09-13 DIAGNOSIS — N40.1 BENIGN NON-NODULAR PROSTATIC HYPERPLASIA WITH LOWER URINARY TRACT SYMPTOMS: ICD-10-CM

## 2017-09-13 DIAGNOSIS — C61 PROSTATE CANCER (HCC): Primary | ICD-10-CM

## 2017-09-13 LAB
ANION GAP SERPL CALCULATED.3IONS-SCNC: 8.9 MMOL/L (ref 3.6–11.2)
BUN BLD-MCNC: 17 MG/DL (ref 7–21)
BUN/CREAT SERPL: 8.7 (ref 7–25)
CALCIUM SPEC-SCNC: 9 MG/DL (ref 7.7–10)
CHLORIDE SERPL-SCNC: 108 MMOL/L (ref 99–112)
CO2 SERPL-SCNC: 20.1 MMOL/L (ref 24.3–31.9)
CREAT BLD-MCNC: 1.95 MG/DL (ref 0.43–1.29)
GFR SERPL CREATININE-BSD FRML MDRD: 35 ML/MIN/1.73
GLUCOSE BLD-MCNC: 324 MG/DL (ref 70–110)
OSMOLALITY SERPL CALC.SUM OF ELEC: 287.9 MOSM/KG (ref 273–305)
POTASSIUM BLD-SCNC: 5.1 MMOL/L (ref 3.5–5.3)
PSA SERPL-MCNC: 10.13 NG/ML (ref 0–4)
SODIUM BLD-SCNC: 137 MMOL/L (ref 135–153)

## 2017-09-13 PROCEDURE — 80048 BASIC METABOLIC PNL TOTAL CA: CPT | Performed by: UROLOGY

## 2017-09-13 PROCEDURE — 99213 OFFICE O/P EST LOW 20 MIN: CPT | Performed by: UROLOGY

## 2017-09-13 PROCEDURE — 84153 ASSAY OF PSA TOTAL: CPT | Performed by: UROLOGY

## 2017-09-13 RX ORDER — TAMSULOSIN HYDROCHLORIDE 0.4 MG/1
1 CAPSULE ORAL NIGHTLY
Qty: 30 CAPSULE | Refills: 3 | Status: SHIPPED | OUTPATIENT
Start: 2017-09-13 | End: 2018-07-02 | Stop reason: ALTCHOICE

## 2017-09-13 NOTE — PROGRESS NOTES
Chief Complaint:          Chief Complaint   Patient presents with   • Prostate Cancer     1 MTH FOLLOW UP       HPI:   62 y.o. male.     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  He has no weight loss.  He has chronic testalgia.  He gets up 4 times at night.  He then had a PSA for 6 months.  There is no other significant constitutional symptomatology.  He is obese.   His PSA as expected has dramatically rise.  I went ahead and gave him an additional 6 month Lupron today with the admonition of using these type medications and there are long-term effects of cardiovascular disease.  I'll see him back in a month to be sure I'm getting the appropriate drop in PSA as a expect.  He returns today he still having significant frequency 8 times.  Going to make the addition of Flomax but I think he needs a lower tract investigation with cystoscopy he's get a PSA pending his eye was 26 he's been started on hormonal therapy I was seen was done from this standpoint I'll follow-up with him based on this.  He has nocturia, frequency, urgency, no back pain no skeletal related events and no constitutional symptomatology           Past Medical History:        Past Medical History:   Diagnosis Date   • Chest pain    • Chronic kidney disease    • Diabetes mellitus    • GERD (gastroesophageal reflux disease)    • Heart murmur    • Hyperlipidemia    • Hypertension    • Irregular heart beat    • Polio    • Prostate cancer 05/2016    Dr. Khalif Kohler MD- [place], [place]   • Shortness of breath          Current Meds:     Current Outpatient Prescriptions   Medication Sig Dispense Refill   • amLODIPine (NORVASC) 10 MG tablet Take 10 mg by mouth daily.     • ASPIR-LOW 81 MG EC tablet TAKE 1 TABLET EVERY DAY 30 tablet 4   • atorvastatin (LIPITOR) 80 MG tablet Take 80 mg by mouth daily.     • baclofen (LIORESAL)  10 MG tablet Take 10 mg by mouth 2 (two) times a day.     • carvedilol (COREG) 3.125 MG tablet Take 3.125 mg by mouth 2 (Two) Times a Day With Meals.     • carvedilol (COREG) 6.25 MG tablet TAKE 1 TABLET BY MOUTH TWICE A DAY 60 tablet 5   • cetirizine (ZyrTEC) 10 MG tablet Take 10 mg by mouth daily.     • fenofibrate (TRICOR) 145 MG tablet TAKE 1/2 TABLET BY MOUTH EVERY DAY. 15 tablet 2   • ferrous sulfate 325 (65 FE) MG tablet TAKE 1 TABLET BY MOUTH WITH BREAKFAST 30 tablet 4   • fluticasone (FLONASE) 50 MCG/ACT nasal spray 2 sprays into each nostril daily. Administer 1 spray in each nostril for each dose.      • gabapentin (NEURONTIN) 300 MG capsule Take 300 mg by mouth 3 (three) times a day.     • glimepiride (AMARYL) 4 MG tablet Take 4 mg by mouth 2 (two) times a day.     • HYDROcodone-acetaminophen (NORCO) 7.5-325 MG per tablet Take 1 tablet by mouth 2 (two) times a day as needed for moderate pain (4-6).     • ipratropium-albuterol (DUO-NEB) 0.5-2.5 mg/mL nebulizer Take 3 mL by nebulization Every 4 (Four) Hours As Needed for Wheezing.     • isosorbide mononitrate (IMDUR) 60 MG 24 hr tablet TAKE 1 AND 1/2 TABLETS BYMOUTH ONCE DAILY. 45 tablet 4   • levothyroxine (SYNTHROID, LEVOTHROID) 100 MCG tablet Take 100 mcg by mouth daily.     • nitroglycerin (NITROSTAT) 0.4 MG SL tablet Place 0.4 mg under the tongue every 5 (five) minutes as needed for chest pain. Take no more than 3 doses in 15 minutes.     • ondansetron (ZOFRAN) 4 MG tablet Take 1 tablet PRN nausea every 4 hours. 5 tablet 0   • pantoprazole (PROTONIX) 40 MG EC tablet Take 1 tablet by mouth 2 (Two) Times a Day With Meals. 60 tablet 3   • polyethylene glycol (MIRALAX) powder Take 17 g by mouth Daily. May take 1-4 times daily as needed for daily adequate bowel movement. 510 g 5   • raNITIdine (ZANTAC) 300 MG tablet Take 300 mg by mouth Every Night.     • sennosides-docusate sodium (SENOKOT-S) 8.6-50 MG tablet Take 2 tablets by mouth 2 (two) times a day.      • sitaGLIPtin (JANUVIA) 50 MG tablet Take 1 tablet by mouth daily. 30 tablet 3   • SUMAtriptan (IMITREX) 50 MG tablet Take one tablet at onset of headache. May repeat dose one time in 2 hours if headache not relieved.       No current facility-administered medications for this visit.         Allergies:      No Known Allergies     Past Surgical History:     Past Surgical History:   Procedure Laterality Date   • CARDIAC CATHETERIZATION  2008    50% diffuse LAD stenosis, 50% septal  branch   • COLONOSCOPY      Long time ago   • HEMORRHOIDECTOMY     • HERNIA REPAIR     • UPPER GASTROINTESTINAL ENDOSCOPY      Long time ago         Social History:     Social History     Social History   • Marital status:      Spouse name: N/A   • Number of children: N/A   • Years of education: N/A     Occupational History   • Not on file.     Social History Main Topics   • Smoking status: Never Smoker   • Smokeless tobacco: Current User     Types: Chew      Comment: Chewed tobacco since he was 4 yrs old.   • Alcohol use No   • Drug use: No   • Sexual activity: Not on file     Other Topics Concern   • Not on file     Social History Narrative       Family History:     Family History   Problem Relation Age of Onset   • Heart disease Brother    • Diabetes Brother    • Cancer Brother      not sure the kind   • Heart disease Brother    • Diabetes Brother    • Diabetes Sister    • Diabetes Daughter    • Heart disease Daughter    • Pancreatitis Daughter    • Crohn's disease Daughter    • Diabetes Son    • Heart disease Son        Review of Systems:     Review of Systems    Physical Exam:     Physical Exam   Constitutional:   Obese post polio syndrome no other changes   Vitals reviewed.      Procedure:       Assessment:   No diagnosis found.  No orders of the defined types were placed in this encounter.      Plan:   Prostate cancer-early undergoing a course of anti-antigen therapy but with the severe frequency I'm to set him up  for cystoscopy  BPH-1 make the addition of Flomax to his current regimen and follow-up with him based on this           This document has been electronically signed by CLAUDIO LEAL MD September 13, 2017 1:07 PM

## 2017-09-14 PROBLEM — N40.1 BENIGN NON-NODULAR PROSTATIC HYPERPLASIA WITH LOWER URINARY TRACT SYMPTOMS: Status: ACTIVE | Noted: 2017-09-14

## 2017-10-09 ENCOUNTER — PROCEDURE VISIT (OUTPATIENT)
Dept: UROLOGY | Facility: CLINIC | Age: 62
End: 2017-10-09

## 2017-10-09 DIAGNOSIS — N13.8 BPH WITH URINARY OBSTRUCTION: ICD-10-CM

## 2017-10-09 DIAGNOSIS — N40.1 BPH WITH URINARY OBSTRUCTION: ICD-10-CM

## 2017-10-09 DIAGNOSIS — N35.014 POST-TRAUMATIC MALE URETHRAL STRICTURE: ICD-10-CM

## 2017-10-09 DIAGNOSIS — C61 PROSTATE CANCER (HCC): ICD-10-CM

## 2017-10-09 DIAGNOSIS — Z48.816 SURGICAL AFTERCARE, GENITOURINARY SYSTEM: Primary | ICD-10-CM

## 2017-10-09 PROCEDURE — 96372 THER/PROPH/DIAG INJ SC/IM: CPT | Performed by: UROLOGY

## 2017-10-09 PROCEDURE — 52281 CYSTOSCOPY AND TREATMENT: CPT | Performed by: UROLOGY

## 2017-10-09 PROCEDURE — 99213 OFFICE O/P EST LOW 20 MIN: CPT | Performed by: UROLOGY

## 2017-10-09 RX ORDER — GENTAMICIN SULFATE 40 MG/ML
80 INJECTION, SOLUTION INTRAMUSCULAR; INTRAVENOUS ONCE
Status: COMPLETED | OUTPATIENT
Start: 2017-10-09 | End: 2017-10-09

## 2017-10-09 RX ADMIN — GENTAMICIN SULFATE 80 MG: 40 INJECTION, SOLUTION INTRAMUSCULAR; INTRAVENOUS at 09:35

## 2017-10-09 NOTE — PROGRESS NOTES
Chief Complaint:          Chief Complaint   Patient presents with   • Urinary Frequency       HPI:   62 y.o. male.  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  He has no weight loss.  He has chronic testalgia.  He gets up 4 times at night.  He then had a PSA for 6 months.  There is no other significant constitutional symptomatology.  He is obese.   His PSA as expected has dramatically rise.  I went ahead and gave him an additional 6 month Lupron today with the admonition of using these type medications and there are long-term effects of cardiovascular disease.  I'll see him back in a month to be sure I'm getting the appropriate drop in PSA as a expect.  He returns today he still having significant frequency 8 times.  Going to make the addition of Flomax but I think he needs a lower tract investigation with cystoscopy he's get a PSA pending his eye was 26 he's been started on hormonal therapy I was seen was done from this standpoint I'll follow-up with him based on this.  He has nocturia, frequency, urgency, no back pain no skeletal related events and no constitutional symptomatology   He is here after complete workup for gross hematuria after prep and drape in a sterile fashion I identified the problem he has meatal stenosis and a distal stricture I went ahead and dilated it without complication and a dramatically better the bladder was otherwise unremarkable        Past Medical History:        Past Medical History:   Diagnosis Date   • Chest pain    • Chronic kidney disease    • Diabetes mellitus    • GERD (gastroesophageal reflux disease)    • Heart murmur    • Hyperlipidemia    • Hypertension    • Irregular heart beat    • Polio    • Prostate cancer 05/2016    Dr. Khalif Kohler MD- [place]   • Shortness of breath          Current Meds:     Current Outpatient Prescriptions    Medication Sig Dispense Refill   • amLODIPine (NORVASC) 10 MG tablet Take 10 mg by mouth daily.     • ASPIR-LOW 81 MG EC tablet TAKE 1 TABLET EVERY DAY 30 tablet 4   • atorvastatin (LIPITOR) 80 MG tablet Take 80 mg by mouth daily.     • baclofen (LIORESAL) 10 MG tablet Take 10 mg by mouth 2 (two) times a day.     • carvedilol (COREG) 3.125 MG tablet Take 3.125 mg by mouth 2 (Two) Times a Day With Meals.     • carvedilol (COREG) 6.25 MG tablet TAKE 1 TABLET BY MOUTH TWICE A DAY 60 tablet 5   • cetirizine (ZyrTEC) 10 MG tablet Take 10 mg by mouth daily.     • fenofibrate (TRICOR) 145 MG tablet TAKE 1/2 TABLET BY MOUTH EVERY DAY. 15 tablet 2   • ferrous sulfate 325 (65 FE) MG tablet TAKE 1 TABLET BY MOUTH WITH BREAKFAST 30 tablet 4   • fluticasone (FLONASE) 50 MCG/ACT nasal spray 2 sprays into each nostril daily. Administer 1 spray in each nostril for each dose.      • gabapentin (NEURONTIN) 300 MG capsule Take 300 mg by mouth 3 (three) times a day.     • glimepiride (AMARYL) 4 MG tablet Take 4 mg by mouth 2 (two) times a day.     • HYDROcodone-acetaminophen (NORCO) 7.5-325 MG per tablet Take 1 tablet by mouth 2 (two) times a day as needed for moderate pain (4-6).     • ipratropium-albuterol (DUO-NEB) 0.5-2.5 mg/mL nebulizer Take 3 mL by nebulization Every 4 (Four) Hours As Needed for Wheezing.     • isosorbide mononitrate (IMDUR) 60 MG 24 hr tablet TAKE 1 AND 1/2 TABLETS BYMOUTH ONCE DAILY. 45 tablet 4   • levothyroxine (SYNTHROID, LEVOTHROID) 100 MCG tablet Take 100 mcg by mouth daily.     • nitroglycerin (NITROSTAT) 0.4 MG SL tablet Place 0.4 mg under the tongue every 5 (five) minutes as needed for chest pain. Take no more than 3 doses in 15 minutes.     • ondansetron (ZOFRAN) 4 MG tablet Take 1 tablet PRN nausea every 4 hours. 5 tablet 0   • pantoprazole (PROTONIX) 40 MG EC tablet Take 1 tablet by mouth 2 (Two) Times a Day With Meals. 60 tablet 3   • polyethylene glycol (MIRALAX) powder Take 17 g by mouth Daily.  May take 1-4 times daily as needed for daily adequate bowel movement. 510 g 5   • raNITIdine (ZANTAC) 300 MG tablet Take 300 mg by mouth Every Night.     • sennosides-docusate sodium (SENOKOT-S) 8.6-50 MG tablet Take 2 tablets by mouth 2 (two) times a day.     • sitaGLIPtin (JANUVIA) 50 MG tablet Take 1 tablet by mouth daily. 30 tablet 3   • SUMAtriptan (IMITREX) 50 MG tablet Take one tablet at onset of headache. May repeat dose one time in 2 hours if headache not relieved.     • tamsulosin (FLOMAX) 0.4 MG capsule 24 hr capsule Take 1 capsule by mouth Every Night. 30 capsule 3     No current facility-administered medications for this visit.         Allergies:      No Known Allergies     Past Surgical History:     Past Surgical History:   Procedure Laterality Date   • CARDIAC CATHETERIZATION  2008    50% diffuse LAD stenosis, 50% septal  branch   • COLONOSCOPY      Long time ago   • HEMORRHOIDECTOMY     • HERNIA REPAIR     • UPPER GASTROINTESTINAL ENDOSCOPY      Long time ago         Social History:     Social History     Social History   • Marital status:      Spouse name: N/A   • Number of children: N/A   • Years of education: N/A     Occupational History   • Not on file.     Social History Main Topics   • Smoking status: Never Smoker   • Smokeless tobacco: Current User     Types: Chew      Comment: Chewed tobacco since he was 4 yrs old.   • Alcohol use No   • Drug use: No   • Sexual activity: Not on file     Other Topics Concern   • Not on file     Social History Narrative       Family History:     Family History   Problem Relation Age of Onset   • Heart disease Brother    • Diabetes Brother    • Cancer Brother      not sure the kind   • Heart disease Brother    • Diabetes Brother    • Diabetes Sister    • Diabetes Daughter    • Heart disease Daughter    • Pancreatitis Daughter    • Crohn's disease Daughter    • Diabetes Son    • Heart disease Son        Review of Systems:     Review of Systems    HENT: Negative.    Eyes: Negative.    Respiratory: Negative.    Cardiovascular: Negative.    Gastrointestinal: Negative.    Endocrine: Negative.    Genitourinary: Positive for hematuria, penile pain and testicular pain.   Musculoskeletal: Negative.    Allergic/Immunologic: Negative.    Neurological: Positive for weakness and numbness.   Hematological: Negative.    Psychiatric/Behavioral: Negative.        Physical Exam:     Physical Exam   Constitutional: He is oriented to person, place, and time. He appears well-developed and well-nourished.   HENT:   Head: Normocephalic and atraumatic.   Eyes: Conjunctivae and EOM are normal. Pupils are equal, round, and reactive to light.   Neck: Normal range of motion.   Cardiovascular: Normal rate, regular rhythm, normal heart sounds and intact distal pulses.    Pulmonary/Chest: Effort normal and breath sounds normal.   Abdominal: Soft. Bowel sounds are normal.   Genitourinary:   Genitourinary Comments: Circumcised with significant urethral meatal stenosis and testalgia bilaterally.   Musculoskeletal: Normal range of motion.   Neurological: He is alert and oriented to person, place, and time. He has normal reflexes.   Skin: Skin is warm and dry.   Psychiatric: He has a normal mood and affect. His behavior is normal. Judgment and thought content normal.   Nursing note and vitals reviewed.      Procedure:   Cystoscopy:  Patient presents today for cystourethroscopy.  I went ahead and obtained an informed consent including the risk of anesthesia, bleeding, infection, etc.  After prep and drape in a sterile fashion in the low dorsal lithotomy position the urethra was gently anesthetized with 10 cc of 2% viscous Xylocaine jelly.  After an appropriate period of topical anesthesia.  He had an obvious tight meatal stenosis and I gently dilated from 12 Indian to 28 Indian I used the Olympus digital 14 [place] flexible cystoscope to examine the anterior urethra which was completely  normal, the ureteral orifices were visualized and normal in position and configuration there were no stones, tumors or foreign bodies.  The blue light was enabled and was negative allowing us to see small mucosal lesions. The patient was given 80 mg of gentamicin in an intramuscular fashion  as prophylaxis for the cystoscopy and released from the clinic.    Assessment:   No diagnosis found.  No orders of the defined types were placed in this encounter.      Plan:   Prostate cancer-continue hormonal blockade  Meatal stenosis status post cystoscopy and dilation  Post polio syndrome           This document has been electronically signed by CLAUDIO LEAL MD October 9, 2017 9:10 AM

## 2017-10-10 PROBLEM — N35.919 URETHRAL STRICTURE: Status: ACTIVE | Noted: 2017-10-10

## 2017-10-30 ENCOUNTER — OFFICE VISIT (OUTPATIENT)
Dept: CARDIOLOGY | Facility: CLINIC | Age: 62
End: 2017-10-30

## 2017-10-30 VITALS
BODY MASS INDEX: 37.13 KG/M2 | SYSTOLIC BLOOD PRESSURE: 123 MMHG | HEART RATE: 56 BPM | DIASTOLIC BLOOD PRESSURE: 70 MMHG | WEIGHT: 201.8 LBS | HEIGHT: 62 IN

## 2017-10-30 DIAGNOSIS — I10 ESSENTIAL HYPERTENSION: ICD-10-CM

## 2017-10-30 DIAGNOSIS — E11.9 TYPE 2 DIABETES MELLITUS WITHOUT COMPLICATION, WITHOUT LONG-TERM CURRENT USE OF INSULIN (HCC): ICD-10-CM

## 2017-10-30 DIAGNOSIS — R07.2 PRECORDIAL PAIN: Primary | ICD-10-CM

## 2017-10-30 DIAGNOSIS — R06.02 SHORTNESS OF BREATH: ICD-10-CM

## 2017-10-30 DIAGNOSIS — E78.5 DYSLIPIDEMIA: ICD-10-CM

## 2017-10-30 PROCEDURE — 99214 OFFICE O/P EST MOD 30 MIN: CPT | Performed by: INTERNAL MEDICINE

## 2017-10-30 RX ORDER — LISINOPRIL 10 MG/1
10 TABLET ORAL DAILY
COMMUNITY
End: 2018-07-02 | Stop reason: ALTCHOICE

## 2017-10-30 RX ORDER — LEVOTHYROXINE SODIUM 0.12 MG/1
125 TABLET ORAL DAILY
COMMUNITY
End: 2018-07-02 | Stop reason: ALTCHOICE

## 2017-10-30 RX ORDER — LOSARTAN POTASSIUM 100 MG/1
100 TABLET ORAL DAILY
COMMUNITY
End: 2018-07-02 | Stop reason: ALTCHOICE

## 2017-10-30 NOTE — PROGRESS NOTES
Eleno Chaney, MISHEL  Catarino Arvizu  1955  10/30/2017    Patient Active Problem List   Diagnosis   • Chest pain   • Shortness of breath   • Type 2 diabetes mellitus   • Dyslipidemia   • History of poliomyelitis   • Unstable angina   • Prostate cancer   • Benign non-nodular prostatic hyperplasia with lower urinary tract symptoms   • Urethral stricture   • Essential hypertension       Dear Eleno Chaney, APRN:    Subjective     Catarino Arvizu is a 62 y.o. male with the problems as listed above, presents      History of Present Illness:Mr. Edwards is a pleasant 59-year-old  male with history of hypertension, type 2 diabetes mellitus, dyslipidemia, and a strongly positive family history of premature coronary artery disease with his brother having had CABG in his 50s, presents with complaints of having intermittent chest pains which he describes as dull pains in the left upper part of his chest with radiation to the left arm and associated with shortness of breath.These pains would occur with and without exertion and usually are relieved when he sits down and rests.  His last nuclear stress test was in August 2016 which revealed no evidence of myocardial ischemia.  He has dyspnea with mild exertion with no PND, orthopnea or significant pedal edema.        No Known Allergies:      Current Outpatient Prescriptions:   •  amLODIPine (NORVASC) 10 MG tablet, Take 10 mg by mouth daily., Disp: , Rfl:   •  ASPIR-LOW 81 MG EC tablet, TAKE 1 TABLET EVERY DAY, Disp: 30 tablet, Rfl: 4  •  atorvastatin (LIPITOR) 80 MG tablet, Take 80 mg by mouth daily., Disp: , Rfl:   •  baclofen (LIORESAL) 10 MG tablet, Take 10 mg by mouth 2 (two) times a day., Disp: , Rfl:   •  carvedilol (COREG) 6.25 MG tablet, TAKE 1 TABLET BY MOUTH TWICE A DAY, Disp: 60 tablet, Rfl: 5  •  cetirizine (ZyrTEC) 10 MG tablet, Take 10 mg by mouth daily., Disp: , Rfl:   •  fenofibrate (TRICOR) 145 MG tablet, TAKE 1/2 TABLET BY MOUTH EVERY  DAY., Disp: 15 tablet, Rfl: 2  •  ferrous sulfate 325 (65 FE) MG tablet, TAKE 1 TABLET BY MOUTH WITH BREAKFAST, Disp: 30 tablet, Rfl: 4  •  fluticasone (FLONASE) 50 MCG/ACT nasal spray, 2 sprays into each nostril daily. Administer 1 spray in each nostril for each dose. , Disp: , Rfl:   •  glimepiride (AMARYL) 4 MG tablet, Take 4 mg by mouth 2 (two) times a day., Disp: , Rfl:   •  HYDROcodone-acetaminophen (NORCO) 7.5-325 MG per tablet, Take 1 tablet by mouth 2 (two) times a day as needed for moderate pain (4-6)., Disp: , Rfl:   •  ipratropium-albuterol (DUO-NEB) 0.5-2.5 mg/mL nebulizer, Take 3 mL by nebulization Every 4 (Four) Hours As Needed for Wheezing., Disp: , Rfl:   •  isosorbide mononitrate (IMDUR) 60 MG 24 hr tablet, TAKE 1 AND 1/2 TABLETS BYMOUTH ONCE DAILY., Disp: 45 tablet, Rfl: 4  •  levothyroxine (SYNTHROID, LEVOTHROID) 125 MCG tablet, Take 125 mcg by mouth Daily., Disp: , Rfl:   •  lisinopril (PRINIVIL,ZESTRIL) 10 MG tablet, Take 10 mg by mouth Daily., Disp: , Rfl:   •  losartan (COZAAR) 100 MG tablet, Take 100 mg by mouth Daily., Disp: , Rfl:   •  ondansetron (ZOFRAN) 4 MG tablet, Take 1 tablet PRN nausea every 4 hours., Disp: 5 tablet, Rfl: 0  •  pantoprazole (PROTONIX) 40 MG EC tablet, Take 1 tablet by mouth 2 (Two) Times a Day With Meals., Disp: 60 tablet, Rfl: 3  •  polyethylene glycol (MIRALAX) powder, Take 17 g by mouth Daily. May take 1-4 times daily as needed for daily adequate bowel movement., Disp: 510 g, Rfl: 5  •  raNITIdine (ZANTAC) 300 MG tablet, Take 300 mg by mouth Every Night., Disp: , Rfl:   •  sennosides-docusate sodium (SENOKOT-S) 8.6-50 MG tablet, Take 2 tablets by mouth 2 (two) times a day., Disp: , Rfl:   •  sitaGLIPtin (JANUVIA) 50 MG tablet, Take 1 tablet by mouth daily., Disp: 30 tablet, Rfl: 3  •  SUMAtriptan (IMITREX) 50 MG tablet, Take one tablet at onset of headache. May repeat dose one time in 2 hours if headache not relieved., Disp: , Rfl:   •  nitroglycerin (NITROSTAT)  "0.4 MG SL tablet, Place 0.4 mg under the tongue every 5 (five) minutes as needed for chest pain. Take no more than 3 doses in 15 minutes., Disp: , Rfl:   •  tamsulosin (FLOMAX) 0.4 MG capsule 24 hr capsule, Take 1 capsule by mouth Every Night., Disp: 30 capsule, Rfl: 3      The following portions of the patient's history were reviewed and updated as appropriate: allergies, current medications, past family history, past medical history, past social history, past surgical history and problem list.    Social History   Substance Use Topics   • Smoking status: Never Smoker   • Smokeless tobacco: Current User     Types: Chew      Comment: Chewed tobacco since he was 4 yrs old.   • Alcohol use No       Review of Systems   Constitution: Negative for chills and fever.   HENT: Negative for nosebleeds and sore throat.    Cardiovascular: Positive for chest pain (sharp, left sided), dyspnea on exertion, leg swelling (feet), orthopnea (3 pillows) and palpitations.   Respiratory: Positive for shortness of breath (daily). Negative for cough, hemoptysis and wheezing.    Gastrointestinal: Negative for abdominal pain, hematemesis, hematochezia, melena, nausea and vomiting.   Genitourinary: Negative for dysuria and hematuria.   Neurological: Positive for dizziness and light-headedness. Negative for headaches.       Objective   Vitals:    10/30/17 1315   BP: 123/70   BP Location: Right arm   Patient Position: Sitting   Cuff Size: Adult   Pulse: 56   Weight: 201 lb 12.8 oz (91.5 kg)   Height: 62\" (157.5 cm)     Body mass index is 36.91 kg/(m^2).  10/30/17 9/13/17 4/6/17    /70 134/66 134/66   Heart Rate 56 66 66   Resp   18   Weight 201 lb 12.8 oz (91.5 kg) 207 lb (93.9 kg) 207 lb (93.9 kg)   Height 62\" (157.5 cm) 62\" (157.5 cm) 62\" (157.5 cm)   BMI (Calculated) 36.9 37.9 37.9   BSA (Calculated - sq m) 1.92 sq meters 1.94 sq meters 1.94 sq meters     Physical Exam   Constitutional: He is oriented to person, place, and time. He " appears well-developed and well-nourished.   HENT:   Head: Normocephalic.   Eyes: Conjunctivae and EOM are normal.   Neck: Normal range of motion. Neck supple. No JVD present. No tracheal deviation present. No thyromegaly present.   Cardiovascular: Normal rate and regular rhythm.  Exam reveals no gallop and no friction rub.    No murmur heard.  Pulmonary/Chest: Breath sounds normal. No respiratory distress. He has no wheezes. He has no rales.   Abdominal: Soft. Bowel sounds are normal. He exhibits no mass. There is tenderness (in the epigastrium with no rebound tenderness.).   Musculoskeletal: He exhibits no edema.   Neurological: He is alert and oriented to person, place, and time. No cranial nerve deficit.   Skin: Skin is warm and dry.   Psychiatric: He has a normal mood and affect.       Lab Results   Component Value Date     09/13/2017    K 5.1 09/13/2017     09/13/2017    CO2 20.1 (L) 09/13/2017    BUN 17 09/13/2017    CREATININE 1.95 (H) 09/13/2017    GLUCOSE 324 (H) 09/13/2017    CALCIUM 9.0 09/13/2017    AST 33 04/06/2017    ALT 16 04/06/2017    ALKPHOS 89 04/06/2017    LABIL2 0.9 05/01/2017     Lab Results   Component Value Date    CKTOTAL 38 04/06/2017     Lab Results   Component Value Date    WBC 4.39 (L) 10/07/2016    HGB 10.2 (L) 10/07/2016    HCT 31.2 (L) 10/07/2016     10/07/2016     No results found for: INR  No results found for: MG  Lab Results   Component Value Date    TSH 5.287 (H) 08/12/2016    PSA 10.130 (H) 09/13/2017    TRIG 1214 (H) 08/09/2016    HDL 32 (L) 08/09/2016      Lab Results   Component Value Date    .0 (H) 08/10/2016     Echo   Lab Results   Component Value Date    ECHOEFEST 65 08/09/2016       ECG 12 Lead  Date/Time: 11/1/2017 9:26 PM  Performed by: LA DEAL  Authorized by: LA DEAL   Rhythm: sinus rhythm  Conduction: LAFB  Comments: Possible old anterior wall myocardial infarction.            Assessment/Plan :    1. Precordial pain     2.  Shortness of breath     3. Type 2 diabetes mellitus without complication, without long-term current use of insulin     4. Essential hypertension     5. Dyslipidemia          Recommendations:    1. We will evaluate his chest pains and dyspnea with a Lexiscan sestamibi study and echo Doppler study.  2. For now continue with low-dose aspirin, atorvastatin, carvedilol and Imdur as well as lisinopril.  3. Follow-up in 1-2 months or sooner if needed.       Return in about 2 months (around 12/30/2017).    As always, I appreciate very much the opportunity to participate in the cardiovascular care of your patients.      With Best Regards,    Trevon Conroy MD, FACC

## 2017-11-01 PROCEDURE — 93000 ELECTROCARDIOGRAM COMPLETE: CPT | Performed by: INTERNAL MEDICINE

## 2017-11-09 ENCOUNTER — OFFICE VISIT (OUTPATIENT)
Dept: UROLOGY | Facility: CLINIC | Age: 62
End: 2017-11-09

## 2017-11-09 DIAGNOSIS — R39.198 DIFFICULTY URINATING: Primary | ICD-10-CM

## 2017-11-09 DIAGNOSIS — N35.010 POST-TRAUMATIC MALE URETHRAL MEATAL STRICTURE: ICD-10-CM

## 2017-11-09 PROCEDURE — 53601 DILATE URETHRA STRICTURE: CPT | Performed by: UROLOGY

## 2017-11-09 PROCEDURE — 99212 OFFICE O/P EST SF 10 MIN: CPT | Performed by: UROLOGY

## 2017-11-09 PROCEDURE — 96372 THER/PROPH/DIAG INJ SC/IM: CPT | Performed by: UROLOGY

## 2017-11-09 RX ORDER — GENTAMICIN SULFATE 40 MG/ML
80 INJECTION, SOLUTION INTRAMUSCULAR; INTRAVENOUS ONCE
Status: COMPLETED | OUTPATIENT
Start: 2017-11-09 | End: 2017-11-09

## 2017-11-09 RX ADMIN — GENTAMICIN SULFATE 80 MG: 40 INJECTION, SOLUTION INTRAMUSCULAR; INTRAVENOUS at 10:14

## 2017-11-09 NOTE — PROGRESS NOTES
Chief Complaint:          Chief Complaint   Patient presents with   • Difficulty Urinating       HPI:   62 y.o. male.    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  He has no weight loss.  He has chronic testalgia.  He gets up 4 times at night.  He then had a PSA for 6 months.  There is no other significant constitutional symptomatology.  He is obese.   His PSA as expected has dramatically rise.  I went ahead and gave him an additional 6 month Lupron today with the admonition of using these type medications and there are long-term effects of cardiovascular disease.  I'll see him back in a month to be sure I'm getting the appropriate drop in PSA as a expect.  He returns today he still having significant frequency 8 times.  Going to make the addition of Flomax but I think he needs a lower tract investigation with cystoscopy he's get a PSA pending his eye was 26 he's been started on hormonal therapy I was seen was done from this standpoint I'll follow-up with him based on this.  He has nocturia, frequency, urgency, no back pain no skeletal related events and no constitutional symptomatology   He is here after complete workup for gross hematuria after prep and drape in a sterile fashion I identified the problem he has meatal stenosis and a distal stricture I went ahead and dilated it without complication and a dramatically better the bladder was otherwise unremarkable.He's back and he has significant meatal stenosis on the repeat a dilatation today            Past Medical History:        Past Medical History:   Diagnosis Date   • Chest pain    • Chronic kidney disease    • Diabetes mellitus    • GERD (gastroesophageal reflux disease)    • Heart murmur    • Hyperlipidemia    • Hypertension    • Irregular heart beat    • Polio    • Prostate cancer 05/2016    Dr. Khalif Kohler MD-  [place], [place]   • Shortness of breath          Current Meds:     Current Outpatient Prescriptions   Medication Sig Dispense Refill   • amLODIPine (NORVASC) 10 MG tablet Take 10 mg by mouth daily.     • ASPIR-LOW 81 MG EC tablet TAKE 1 TABLET EVERY DAY 30 tablet 4   • atorvastatin (LIPITOR) 80 MG tablet Take 80 mg by mouth daily.     • baclofen (LIORESAL) 10 MG tablet Take 10 mg by mouth 2 (two) times a day.     • carvedilol (COREG) 6.25 MG tablet TAKE 1 TABLET BY MOUTH TWICE A DAY 60 tablet 5   • cetirizine (ZyrTEC) 10 MG tablet Take 10 mg by mouth daily.     • fenofibrate (TRICOR) 145 MG tablet TAKE 1/2 TABLET BY MOUTH EVERY DAY. 15 tablet 2   • ferrous sulfate 325 (65 FE) MG tablet TAKE 1 TABLET BY MOUTH WITH BREAKFAST 30 tablet 4   • fluticasone (FLONASE) 50 MCG/ACT nasal spray 2 sprays into each nostril daily. Administer 1 spray in each nostril for each dose.      • glimepiride (AMARYL) 4 MG tablet Take 4 mg by mouth 2 (two) times a day.     • HYDROcodone-acetaminophen (NORCO) 7.5-325 MG per tablet Take 1 tablet by mouth 2 (two) times a day as needed for moderate pain (4-6).     • ipratropium-albuterol (DUO-NEB) 0.5-2.5 mg/mL nebulizer Take 3 mL by nebulization Every 4 (Four) Hours As Needed for Wheezing.     • isosorbide mononitrate (IMDUR) 60 MG 24 hr tablet TAKE 1 AND 1/2 TABLETS BYMOUTH ONCE DAILY. 45 tablet 4   • levothyroxine (SYNTHROID, LEVOTHROID) 125 MCG tablet Take 125 mcg by mouth Daily.     • lisinopril (PRINIVIL,ZESTRIL) 10 MG tablet Take 10 mg by mouth Daily.     • losartan (COZAAR) 100 MG tablet Take 100 mg by mouth Daily.     • nitroglycerin (NITROSTAT) 0.4 MG SL tablet Place 0.4 mg under the tongue every 5 (five) minutes as needed for chest pain. Take no more than 3 doses in 15 minutes.     • ondansetron (ZOFRAN) 4 MG tablet Take 1 tablet PRN nausea every 4 hours. 5 tablet 0   • pantoprazole (PROTONIX) 40 MG EC tablet Take 1 tablet by mouth 2 (Two) Times a Day With Meals. 60 tablet 3   •  polyethylene glycol (MIRALAX) powder Take 17 g by mouth Daily. May take 1-4 times daily as needed for daily adequate bowel movement. 510 g 5   • raNITIdine (ZANTAC) 300 MG tablet Take 300 mg by mouth Every Night.     • sennosides-docusate sodium (SENOKOT-S) 8.6-50 MG tablet Take 2 tablets by mouth 2 (two) times a day.     • sitaGLIPtin (JANUVIA) 50 MG tablet Take 1 tablet by mouth daily. 30 tablet 3   • SUMAtriptan (IMITREX) 50 MG tablet Take one tablet at onset of headache. May repeat dose one time in 2 hours if headache not relieved.     • tamsulosin (FLOMAX) 0.4 MG capsule 24 hr capsule Take 1 capsule by mouth Every Night. 30 capsule 3     No current facility-administered medications for this visit.         Allergies:      No Known Allergies     Past Surgical History:     Past Surgical History:   Procedure Laterality Date   • CARDIAC CATHETERIZATION  2008    50% diffuse LAD stenosis, 50% septal  branch   • COLONOSCOPY      Long time ago   • HEMORRHOIDECTOMY     • HERNIA REPAIR     • UPPER GASTROINTESTINAL ENDOSCOPY      Long time ago         Social History:     Social History     Social History   • Marital status:      Spouse name: N/A   • Number of children: N/A   • Years of education: N/A     Occupational History   • Not on file.     Social History Main Topics   • Smoking status: Never Smoker   • Smokeless tobacco: Current User     Types: Chew      Comment: Chewed tobacco since he was 4 yrs old.   • Alcohol use No   • Drug use: No   • Sexual activity: Not on file     Other Topics Concern   • Not on file     Social History Narrative       Family History:     Family History   Problem Relation Age of Onset   • Heart disease Brother    • Diabetes Brother    • Cancer Brother      not sure the kind   • Heart disease Brother    • Diabetes Brother    • Diabetes Sister    • Diabetes Daughter    • Heart disease Daughter    • Pancreatitis Daughter    • Crohn's disease Daughter    • Diabetes Son    • Heart  disease Son        Review of Systems:     Review of Systems    Physical Exam:     Physical Exam    Procedure:   After prep and drape in a sterile fashion I anesthetized the meatus with 10 cc of 2% viscous Xylocaine jelly and dilated to 26 [place] without complication    Assessment:   No diagnosis found.  No orders of the defined types were placed in this encounter.      Plan:   Meatal stenosis status post dilation will recheck in one month           This document has been electronically signed by CLAUDIO LEAL MD November 9, 2017 9:59 AM

## 2017-12-11 ENCOUNTER — TELEPHONE (OUTPATIENT)
Dept: UROLOGY | Facility: CLINIC | Age: 62
End: 2017-12-11

## 2017-12-11 ENCOUNTER — OFFICE VISIT (OUTPATIENT)
Dept: UROLOGY | Facility: CLINIC | Age: 62
End: 2017-12-11

## 2017-12-11 DIAGNOSIS — N35.010 POST-TRAUMATIC MALE URETHRAL MEATAL STRICTURE: Primary | ICD-10-CM

## 2017-12-11 DIAGNOSIS — C61 PROSTATE CANCER (HCC): ICD-10-CM

## 2017-12-11 PROCEDURE — 99213 OFFICE O/P EST LOW 20 MIN: CPT | Performed by: UROLOGY

## 2017-12-11 RX ORDER — POLYETHYLENE GLYCOL 3350 17 G/17G
POWDER, FOR SOLUTION ORAL
Qty: 527 G | Refills: 5 | OUTPATIENT
Start: 2017-12-11

## 2017-12-11 RX ORDER — GENTAMICIN SULFATE 80 MG/100ML
80 INJECTION, SOLUTION INTRAVENOUS ONCE
Status: CANCELLED | OUTPATIENT
Start: 2017-12-13 | End: 2017-12-11

## 2017-12-11 NOTE — TELEPHONE ENCOUNTER
I called the patient with PAT and surgery time.  I made the patient aware of not only the times, but also with instructions for the night before the procedure.

## 2017-12-11 NOTE — PROGRESS NOTES
Chief Complaint:          Chief Complaint   Patient presents with   • Prostate Cancer       HPI:   62 y.o. male.     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  He has no weight loss.  He has chronic testalgia.  He gets up 4 times at night.  He then had a PSA for 6 months.  There is no other significant constitutional symptomatology.  He is obese.   His PSA as expected has dramatically rise.  I went ahead and gave him an additional 6 month Lupron today with the admonition of using these type medications and there are long-term effects of cardiovascular disease.  I'll see him back in a month to be sure I'm getting the appropriate drop in PSA as a expect.  He returns today he still having significant frequency 8 times.  Going to make the addition of Flomax but I think he needs a lower tract investigation with cystoscopy he's get a PSA pending his eye was 26 he's been started on hormonal therapy I was seen was done from this standpoint I'll follow-up with him based on this.  He has nocturia, frequency, urgency, no back pain no skeletal related events and no constitutional symptomatology   He is here after complete workup for gross hematuria after prep and drape in a sterile fashion I identified the problem he has meatal stenosis and a distal stricture I went ahead and dilated it without complication and a dramatically better the bladder was otherwise unremarkable.He's back and he has significant meatal stenosis on the repeat a dilatation today.  Once the duodenum operating room to get the maximal dilation this is certainly reasonable also to set for this Wednesday per his request            Past Medical History:        Past Medical History:   Diagnosis Date   • Chest pain    • Chronic kidney disease    • Diabetes mellitus    • GERD (gastroesophageal reflux disease)    • Heart murmur    •  Hyperlipidemia    • Hypertension    • Irregular heart beat    • Polio    • Prostate cancer 05/2016    Dr. Khalif Kohler MD- [place], [place]   • Shortness of breath          Current Meds:     Current Outpatient Prescriptions   Medication Sig Dispense Refill   • amLODIPine (NORVASC) 10 MG tablet Take 10 mg by mouth daily.     • ASPIR-LOW 81 MG EC tablet TAKE 1 TABLET EVERY DAY 30 tablet 4   • atorvastatin (LIPITOR) 80 MG tablet Take 80 mg by mouth daily.     • baclofen (LIORESAL) 10 MG tablet Take 10 mg by mouth 2 (two) times a day.     • carvedilol (COREG) 6.25 MG tablet TAKE 1 TABLET BY MOUTH TWICE A DAY 60 tablet 5   • cetirizine (ZyrTEC) 10 MG tablet Take 10 mg by mouth daily.     • fenofibrate (TRICOR) 145 MG tablet TAKE 1/2 TABLET BY MOUTH EVERY DAY. 15 tablet 2   • ferrous sulfate 325 (65 FE) MG tablet TAKE 1 TABLET BY MOUTH WITH BREAKFAST 30 tablet 4   • fluticasone (FLONASE) 50 MCG/ACT nasal spray 2 sprays into each nostril daily. Administer 1 spray in each nostril for each dose.      • glimepiride (AMARYL) 4 MG tablet Take 4 mg by mouth 2 (two) times a day.     • HYDROcodone-acetaminophen (NORCO) 7.5-325 MG per tablet Take 1 tablet by mouth 2 (two) times a day as needed for moderate pain (4-6).     • ipratropium-albuterol (DUO-NEB) 0.5-2.5 mg/mL nebulizer Take 3 mL by nebulization Every 4 (Four) Hours As Needed for Wheezing.     • isosorbide mononitrate (IMDUR) 60 MG 24 hr tablet TAKE 1 AND 1/2 TABLETS BYMOUTH ONCE DAILY. 45 tablet 4   • levothyroxine (SYNTHROID, LEVOTHROID) 125 MCG tablet Take 125 mcg by mouth Daily.     • lisinopril (PRINIVIL,ZESTRIL) 10 MG tablet Take 10 mg by mouth Daily.     • losartan (COZAAR) 100 MG tablet Take 100 mg by mouth Daily.     • nitroglycerin (NITROSTAT) 0.4 MG SL tablet Place 0.4 mg under the tongue every 5 (five) minutes as needed for chest pain. Take no more than 3 doses in 15 minutes.     • ondansetron (ZOFRAN) 4 MG tablet Take 1 tablet PRN nausea every 4 hours. 5  tablet 0   • pantoprazole (PROTONIX) 40 MG EC tablet Take 1 tablet by mouth 2 (Two) Times a Day With Meals. 60 tablet 3   • polyethylene glycol (MIRALAX) powder Take 17 g by mouth Daily. May take 1-4 times daily as needed for daily adequate bowel movement. 510 g 5   • raNITIdine (ZANTAC) 300 MG tablet Take 300 mg by mouth Every Night.     • sennosides-docusate sodium (SENOKOT-S) 8.6-50 MG tablet Take 2 tablets by mouth 2 (two) times a day.     • sitaGLIPtin (JANUVIA) 50 MG tablet Take 1 tablet by mouth daily. 30 tablet 3   • SUMAtriptan (IMITREX) 50 MG tablet Take one tablet at onset of headache. May repeat dose one time in 2 hours if headache not relieved.     • tamsulosin (FLOMAX) 0.4 MG capsule 24 hr capsule Take 1 capsule by mouth Every Night. 30 capsule 3     No current facility-administered medications for this visit.         Allergies:      No Known Allergies     Past Surgical History:     Past Surgical History:   Procedure Laterality Date   • CARDIAC CATHETERIZATION  2008    50% diffuse LAD stenosis, 50% septal  branch   • COLONOSCOPY      Long time ago   • HEMORRHOIDECTOMY     • HERNIA REPAIR     • UPPER GASTROINTESTINAL ENDOSCOPY      Long time ago         Social History:     Social History     Social History   • Marital status:      Spouse name: N/A   • Number of children: N/A   • Years of education: N/A     Occupational History   • Not on file.     Social History Main Topics   • Smoking status: Never Smoker   • Smokeless tobacco: Current User     Types: Chew      Comment: Chewed tobacco since he was 4 yrs old.   • Alcohol use No   • Drug use: No   • Sexual activity: Not on file     Other Topics Concern   • Not on file     Social History Narrative       Family History:     Family History   Problem Relation Age of Onset   • Heart disease Brother    • Diabetes Brother    • Cancer Brother      not sure the kind   • Heart disease Brother    • Diabetes Brother    • Diabetes Sister    •  Diabetes Daughter    • Heart disease Daughter    • Pancreatitis Daughter    • Crohn's disease Daughter    • Diabetes Son    • Heart disease Son        Review of Systems:     Review of Systems   Constitutional: Negative.    HENT: Negative.    Eyes: Negative.    Respiratory: Negative.    Cardiovascular: Negative.    Gastrointestinal: Negative.    Endocrine: Negative.    Genitourinary: Positive for difficulty urinating and frequency.   Musculoskeletal: Negative.    Allergic/Immunologic: Negative.    Neurological: Negative.    Hematological: Negative.    Psychiatric/Behavioral: Negative.        Physical Exam:     Physical Exam   Constitutional: He is oriented to person, place, and time. He appears well-developed and well-nourished.   HENT:   Head: Normocephalic and atraumatic.   Eyes: Conjunctivae and EOM are normal. Pupils are equal, round, and reactive to light.   Neck: Normal range of motion.   Cardiovascular: Normal rate, regular rhythm, normal heart sounds and intact distal pulses.    Pulmonary/Chest: Effort normal and breath sounds normal.   Abdominal: Soft. Bowel sounds are normal.   Genitourinary:   Genitourinary Comments: Post polio syndrome   Musculoskeletal: Normal range of motion.   Neurological: He is alert and oriented to person, place, and time. He has normal reflexes.   Skin: Skin is warm and dry.   Psychiatric: He has a normal mood and affect. His behavior is normal. Judgment and thought content normal.   Nursing note and vitals reviewed.      Procedure:       Assessment:   No diagnosis found.  No orders of the defined types were placed in this encounter.      Plan:   Prostate cancer-currently in remission continue close observation of PSAs  Urethral stricture-for anesthetic dilation           This document has been electronically signed by CLAUDIO LEAL MD December 11, 2017 10:33 AM

## 2017-12-12 ENCOUNTER — APPOINTMENT (OUTPATIENT)
Dept: PREADMISSION TESTING | Facility: HOSPITAL | Age: 62
End: 2017-12-12

## 2017-12-12 LAB
ANION GAP SERPL CALCULATED.3IONS-SCNC: 8.5 MMOL/L (ref 3.6–11.2)
BUN BLD-MCNC: 22 MG/DL (ref 7–21)
BUN/CREAT SERPL: 8.6 (ref 7–25)
CALCIUM SPEC-SCNC: 9.2 MG/DL (ref 7.7–10)
CHLORIDE SERPL-SCNC: 107 MMOL/L (ref 99–112)
CO2 SERPL-SCNC: 19.5 MMOL/L (ref 24.3–31.9)
CREAT BLD-MCNC: 2.55 MG/DL (ref 0.43–1.29)
DEPRECATED RDW RBC AUTO: 40.8 FL (ref 37–54)
ERYTHROCYTE [DISTWIDTH] IN BLOOD BY AUTOMATED COUNT: 13 % (ref 11.5–14.5)
GFR SERPL CREATININE-BSD FRML MDRD: 26 ML/MIN/1.73
GLUCOSE BLD-MCNC: 265 MG/DL (ref 70–110)
HCT VFR BLD AUTO: 29.9 % (ref 42–52)
HGB BLD-MCNC: 9.4 G/DL (ref 14–18)
MCH RBC QN AUTO: 28.1 PG (ref 27–33)
MCHC RBC AUTO-ENTMCNC: 31.4 G/DL (ref 33–37)
MCV RBC AUTO: 89.5 FL (ref 80–94)
OSMOLALITY SERPL CALC.SUM OF ELEC: 282.7 MOSM/KG (ref 273–305)
PLATELET # BLD AUTO: 189 10*3/MM3 (ref 130–400)
PMV BLD AUTO: 11.2 FL (ref 6–10)
POTASSIUM BLD-SCNC: 5.1 MMOL/L (ref 3.5–5.3)
RBC # BLD AUTO: 3.34 10*6/MM3 (ref 4.7–6.1)
SODIUM BLD-SCNC: 135 MMOL/L (ref 135–153)
WBC NRBC COR # BLD: 4.71 10*3/MM3 (ref 4.5–12.5)

## 2017-12-12 PROCEDURE — 85027 COMPLETE CBC AUTOMATED: CPT | Performed by: UROLOGY

## 2017-12-12 PROCEDURE — 80048 BASIC METABOLIC PNL TOTAL CA: CPT | Performed by: UROLOGY

## 2017-12-12 PROCEDURE — 36415 COLL VENOUS BLD VENIPUNCTURE: CPT

## 2017-12-13 ENCOUNTER — HOSPITAL ENCOUNTER (OUTPATIENT)
Facility: HOSPITAL | Age: 62
Discharge: HOME | End: 2017-12-13
Attending: UROLOGY | Admitting: UROLOGY

## 2017-12-13 ENCOUNTER — ANESTHESIA (OUTPATIENT)
Dept: PERIOP | Facility: HOSPITAL | Age: 62
End: 2017-12-13

## 2017-12-13 ENCOUNTER — ANESTHESIA EVENT (OUTPATIENT)
Dept: PERIOP | Facility: HOSPITAL | Age: 62
End: 2017-12-13

## 2017-12-13 VITALS
HEIGHT: 62 IN | OXYGEN SATURATION: 99 % | SYSTOLIC BLOOD PRESSURE: 132 MMHG | TEMPERATURE: 98.6 F | WEIGHT: 216 LBS | RESPIRATION RATE: 16 BRPM | HEART RATE: 76 BPM | DIASTOLIC BLOOD PRESSURE: 68 MMHG | BODY MASS INDEX: 39.75 KG/M2

## 2017-12-13 DIAGNOSIS — N35.010 POST-TRAUMATIC MALE URETHRAL MEATAL STRICTURE: ICD-10-CM

## 2017-12-13 LAB — GLUCOSE BLDC GLUCOMTR-MCNC: 226 MG/DL (ref 70–130)

## 2017-12-13 PROCEDURE — 53605 DILATE URETHRA STRICTURE: CPT | Performed by: UROLOGY

## 2017-12-13 PROCEDURE — 25010000002 GENTAMICIN PER 80 MG: Performed by: UROLOGY

## 2017-12-13 PROCEDURE — 82962 GLUCOSE BLOOD TEST: CPT

## 2017-12-13 PROCEDURE — 25010000002 PROPOFOL 1000 MG/ML EMULSION: Performed by: NURSE ANESTHETIST, CERTIFIED REGISTERED

## 2017-12-13 PROCEDURE — 25010000002 FENTANYL CITRATE (PF) 100 MCG/2ML SOLUTION: Performed by: NURSE ANESTHETIST, CERTIFIED REGISTERED

## 2017-12-13 PROCEDURE — 25010000002 MIDAZOLAM PER 1 MG: Performed by: NURSE ANESTHETIST, CERTIFIED REGISTERED

## 2017-12-13 PROCEDURE — 25010000002 PROPOFOL 10 MG/ML EMULSION: Performed by: NURSE ANESTHETIST, CERTIFIED REGISTERED

## 2017-12-13 RX ORDER — MIDAZOLAM HYDROCHLORIDE 1 MG/ML
1 INJECTION INTRAMUSCULAR; INTRAVENOUS
Status: DISCONTINUED | OUTPATIENT
Start: 2017-12-13 | End: 2017-12-13 | Stop reason: HOSPADM

## 2017-12-13 RX ORDER — HYDROCODONE BITARTRATE AND ACETAMINOPHEN 10; 325 MG/1; MG/1
1 TABLET ORAL EVERY 4 HOURS PRN
Qty: 24 TABLET | Refills: 0 | Status: SHIPPED | OUTPATIENT
Start: 2017-12-13 | End: 2018-07-02 | Stop reason: DRUGHIGH

## 2017-12-13 RX ORDER — MIDAZOLAM HYDROCHLORIDE 1 MG/ML
INJECTION INTRAMUSCULAR; INTRAVENOUS AS NEEDED
Status: DISCONTINUED | OUTPATIENT
Start: 2017-12-13 | End: 2017-12-13 | Stop reason: SURG

## 2017-12-13 RX ORDER — PROPOFOL 10 MG/ML
VIAL (ML) INTRAVENOUS AS NEEDED
Status: DISCONTINUED | OUTPATIENT
Start: 2017-12-13 | End: 2017-12-13 | Stop reason: SURG

## 2017-12-13 RX ORDER — LIDOCAINE HYDROCHLORIDE 20 MG/ML
INJECTION, SOLUTION INFILTRATION; PERINEURAL AS NEEDED
Status: DISCONTINUED | OUTPATIENT
Start: 2017-12-13 | End: 2017-12-13 | Stop reason: SURG

## 2017-12-13 RX ORDER — ONDANSETRON 2 MG/ML
4 INJECTION INTRAMUSCULAR; INTRAVENOUS ONCE AS NEEDED
Status: DISCONTINUED | OUTPATIENT
Start: 2017-12-13 | End: 2017-12-13 | Stop reason: HOSPADM

## 2017-12-13 RX ORDER — GENTAMICIN SULFATE 80 MG/100ML
80 INJECTION, SOLUTION INTRAVENOUS ONCE
Status: COMPLETED | OUTPATIENT
Start: 2017-12-13 | End: 2017-12-13

## 2017-12-13 RX ORDER — ATROPA BELLADONNA AND OPIUM 16.2; 6 MG/1; MG/1
SUPPOSITORY RECTAL AS NEEDED
Status: DISCONTINUED | OUTPATIENT
Start: 2017-12-13 | End: 2017-12-13 | Stop reason: HOSPADM

## 2017-12-13 RX ORDER — MAGNESIUM HYDROXIDE 1200 MG/15ML
LIQUID ORAL AS NEEDED
Status: DISCONTINUED | OUTPATIENT
Start: 2017-12-13 | End: 2017-12-13 | Stop reason: HOSPADM

## 2017-12-13 RX ORDER — SODIUM CHLORIDE 0.9 % (FLUSH) 0.9 %
1-10 SYRINGE (ML) INJECTION AS NEEDED
Status: DISCONTINUED | OUTPATIENT
Start: 2017-12-13 | End: 2017-12-13 | Stop reason: HOSPADM

## 2017-12-13 RX ORDER — SODIUM CHLORIDE, SODIUM LACTATE, POTASSIUM CHLORIDE, CALCIUM CHLORIDE 600; 310; 30; 20 MG/100ML; MG/100ML; MG/100ML; MG/100ML
125 INJECTION, SOLUTION INTRAVENOUS CONTINUOUS
Status: DISCONTINUED | OUTPATIENT
Start: 2017-12-13 | End: 2017-12-13 | Stop reason: HOSPADM

## 2017-12-13 RX ORDER — MEPERIDINE HYDROCHLORIDE 25 MG/ML
12.5 INJECTION INTRAMUSCULAR; INTRAVENOUS; SUBCUTANEOUS
Status: DISCONTINUED | OUTPATIENT
Start: 2017-12-13 | End: 2017-12-13 | Stop reason: HOSPADM

## 2017-12-13 RX ORDER — MIDAZOLAM HYDROCHLORIDE 1 MG/ML
2 INJECTION INTRAMUSCULAR; INTRAVENOUS
Status: DISCONTINUED | OUTPATIENT
Start: 2017-12-13 | End: 2017-12-13 | Stop reason: HOSPADM

## 2017-12-13 RX ORDER — FENTANYL CITRATE 50 UG/ML
INJECTION, SOLUTION INTRAMUSCULAR; INTRAVENOUS AS NEEDED
Status: DISCONTINUED | OUTPATIENT
Start: 2017-12-13 | End: 2017-12-13 | Stop reason: SURG

## 2017-12-13 RX ORDER — IPRATROPIUM BROMIDE AND ALBUTEROL SULFATE 2.5; .5 MG/3ML; MG/3ML
3 SOLUTION RESPIRATORY (INHALATION) ONCE AS NEEDED
Status: DISCONTINUED | OUTPATIENT
Start: 2017-12-13 | End: 2017-12-13 | Stop reason: HOSPADM

## 2017-12-13 RX ORDER — FENTANYL CITRATE 50 UG/ML
50 INJECTION, SOLUTION INTRAMUSCULAR; INTRAVENOUS
Status: DISCONTINUED | OUTPATIENT
Start: 2017-12-13 | End: 2017-12-13 | Stop reason: HOSPADM

## 2017-12-13 RX ORDER — OXYCODONE HYDROCHLORIDE AND ACETAMINOPHEN 5; 325 MG/1; MG/1
1 TABLET ORAL ONCE AS NEEDED
Status: DISCONTINUED | OUTPATIENT
Start: 2017-12-13 | End: 2017-12-13 | Stop reason: HOSPADM

## 2017-12-13 RX ADMIN — MIDAZOLAM HYDROCHLORIDE 2 MG: 1 INJECTION, SOLUTION INTRAMUSCULAR; INTRAVENOUS at 13:35

## 2017-12-13 RX ADMIN — PROPOFOL 140 MCG/KG/MIN: 10 INJECTION, EMULSION INTRAVENOUS at 13:40

## 2017-12-13 RX ADMIN — FENTANYL CITRATE 100 MCG: 50 INJECTION INTRAMUSCULAR; INTRAVENOUS at 13:35

## 2017-12-13 RX ADMIN — PROPOFOL 20 MG: 10 INJECTION, EMULSION INTRAVENOUS at 13:40

## 2017-12-13 RX ADMIN — SODIUM CHLORIDE, POTASSIUM CHLORIDE, SODIUM LACTATE AND CALCIUM CHLORIDE 125 ML/HR: 600; 310; 30; 20 INJECTION, SOLUTION INTRAVENOUS at 13:27

## 2017-12-13 RX ADMIN — GENTAMICIN SULFATE 80 MG: 80 INJECTION, SOLUTION INTRAVENOUS at 13:35

## 2017-12-13 RX ADMIN — LIDOCAINE HYDROCHLORIDE 60 MG: 20 INJECTION, SOLUTION INFILTRATION; PERINEURAL at 13:40

## 2017-12-13 NOTE — ANESTHESIA PREPROCEDURE EVALUATION
Anesthesia Evaluation     no history of anesthetic complications:  NPO Solid Status: > 8 hours  NPO Liquid Status: > 8 hours     Airway   Mallampati: II  TM distance: >3 FB  Neck ROM: full  no difficulty expected  Dental    (+) edentulous    Pulmonary - normal exam   (+) shortness of breath, sleep apnea,   Cardiovascular - normal exam    (+) hypertension, valvular problems/murmurs, angina, hyperlipidemia      Neuro/Psych  GI/Hepatic/Renal/Endo    (+)  GERD, diabetes mellitus,     Musculoskeletal     Abdominal  - normal exam   Substance History      OB/GYN          Other                                        Anesthesia Plan    ASA 3     general     intravenous induction   Anesthetic plan and risks discussed with patient.

## 2017-12-13 NOTE — H&P (VIEW-ONLY)
Chief Complaint:          Chief Complaint   Patient presents with   • Prostate Cancer       HPI:   62 y.o. male.     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  He has no weight loss.  He has chronic testalgia.  He gets up 4 times at night.  He then had a PSA for 6 months.  There is no other significant constitutional symptomatology.  He is obese.   His PSA as expected has dramatically rise.  I went ahead and gave him an additional 6 month Lupron today with the admonition of using these type medications and there are long-term effects of cardiovascular disease.  I'll see him back in a month to be sure I'm getting the appropriate drop in PSA as a expect.  He returns today he still having significant frequency 8 times.  Going to make the addition of Flomax but I think he needs a lower tract investigation with cystoscopy he's get a PSA pending his eye was 26 he's been started on hormonal therapy I was seen was done from this standpoint I'll follow-up with him based on this.  He has nocturia, frequency, urgency, no back pain no skeletal related events and no constitutional symptomatology   He is here after complete workup for gross hematuria after prep and drape in a sterile fashion I identified the problem he has meatal stenosis and a distal stricture I went ahead and dilated it without complication and a dramatically better the bladder was otherwise unremarkable.He's back and he has significant meatal stenosis on the repeat a dilatation today.  Once the duodenum operating room to get the maximal dilation this is certainly reasonable also to set for this Wednesday per his request            Past Medical History:        Past Medical History:   Diagnosis Date   • Chest pain    • Chronic kidney disease    • Diabetes mellitus    • GERD (gastroesophageal reflux disease)    • Heart murmur    •  Hyperlipidemia    • Hypertension    • Irregular heart beat    • Polio    • Prostate cancer 05/2016    Dr. Khalif Kohler MD- [place], [place]   • Shortness of breath          Current Meds:     Current Outpatient Prescriptions   Medication Sig Dispense Refill   • amLODIPine (NORVASC) 10 MG tablet Take 10 mg by mouth daily.     • ASPIR-LOW 81 MG EC tablet TAKE 1 TABLET EVERY DAY 30 tablet 4   • atorvastatin (LIPITOR) 80 MG tablet Take 80 mg by mouth daily.     • baclofen (LIORESAL) 10 MG tablet Take 10 mg by mouth 2 (two) times a day.     • carvedilol (COREG) 6.25 MG tablet TAKE 1 TABLET BY MOUTH TWICE A DAY 60 tablet 5   • cetirizine (ZyrTEC) 10 MG tablet Take 10 mg by mouth daily.     • fenofibrate (TRICOR) 145 MG tablet TAKE 1/2 TABLET BY MOUTH EVERY DAY. 15 tablet 2   • ferrous sulfate 325 (65 FE) MG tablet TAKE 1 TABLET BY MOUTH WITH BREAKFAST 30 tablet 4   • fluticasone (FLONASE) 50 MCG/ACT nasal spray 2 sprays into each nostril daily. Administer 1 spray in each nostril for each dose.      • glimepiride (AMARYL) 4 MG tablet Take 4 mg by mouth 2 (two) times a day.     • HYDROcodone-acetaminophen (NORCO) 7.5-325 MG per tablet Take 1 tablet by mouth 2 (two) times a day as needed for moderate pain (4-6).     • ipratropium-albuterol (DUO-NEB) 0.5-2.5 mg/mL nebulizer Take 3 mL by nebulization Every 4 (Four) Hours As Needed for Wheezing.     • isosorbide mononitrate (IMDUR) 60 MG 24 hr tablet TAKE 1 AND 1/2 TABLETS BYMOUTH ONCE DAILY. 45 tablet 4   • levothyroxine (SYNTHROID, LEVOTHROID) 125 MCG tablet Take 125 mcg by mouth Daily.     • lisinopril (PRINIVIL,ZESTRIL) 10 MG tablet Take 10 mg by mouth Daily.     • losartan (COZAAR) 100 MG tablet Take 100 mg by mouth Daily.     • nitroglycerin (NITROSTAT) 0.4 MG SL tablet Place 0.4 mg under the tongue every 5 (five) minutes as needed for chest pain. Take no more than 3 doses in 15 minutes.     • ondansetron (ZOFRAN) 4 MG tablet Take 1 tablet PRN nausea every 4 hours. 5  tablet 0   • pantoprazole (PROTONIX) 40 MG EC tablet Take 1 tablet by mouth 2 (Two) Times a Day With Meals. 60 tablet 3   • polyethylene glycol (MIRALAX) powder Take 17 g by mouth Daily. May take 1-4 times daily as needed for daily adequate bowel movement. 510 g 5   • raNITIdine (ZANTAC) 300 MG tablet Take 300 mg by mouth Every Night.     • sennosides-docusate sodium (SENOKOT-S) 8.6-50 MG tablet Take 2 tablets by mouth 2 (two) times a day.     • sitaGLIPtin (JANUVIA) 50 MG tablet Take 1 tablet by mouth daily. 30 tablet 3   • SUMAtriptan (IMITREX) 50 MG tablet Take one tablet at onset of headache. May repeat dose one time in 2 hours if headache not relieved.     • tamsulosin (FLOMAX) 0.4 MG capsule 24 hr capsule Take 1 capsule by mouth Every Night. 30 capsule 3     No current facility-administered medications for this visit.         Allergies:      No Known Allergies     Past Surgical History:     Past Surgical History:   Procedure Laterality Date   • CARDIAC CATHETERIZATION  2008    50% diffuse LAD stenosis, 50% septal  branch   • COLONOSCOPY      Long time ago   • HEMORRHOIDECTOMY     • HERNIA REPAIR     • UPPER GASTROINTESTINAL ENDOSCOPY      Long time ago         Social History:     Social History     Social History   • Marital status:      Spouse name: N/A   • Number of children: N/A   • Years of education: N/A     Occupational History   • Not on file.     Social History Main Topics   • Smoking status: Never Smoker   • Smokeless tobacco: Current User     Types: Chew      Comment: Chewed tobacco since he was 4 yrs old.   • Alcohol use No   • Drug use: No   • Sexual activity: Not on file     Other Topics Concern   • Not on file     Social History Narrative       Family History:     Family History   Problem Relation Age of Onset   • Heart disease Brother    • Diabetes Brother    • Cancer Brother      not sure the kind   • Heart disease Brother    • Diabetes Brother    • Diabetes Sister    •  Diabetes Daughter    • Heart disease Daughter    • Pancreatitis Daughter    • Crohn's disease Daughter    • Diabetes Son    • Heart disease Son        Review of Systems:     Review of Systems   Constitutional: Negative.    HENT: Negative.    Eyes: Negative.    Respiratory: Negative.    Cardiovascular: Negative.    Gastrointestinal: Negative.    Endocrine: Negative.    Genitourinary: Positive for difficulty urinating and frequency.   Musculoskeletal: Negative.    Allergic/Immunologic: Negative.    Neurological: Negative.    Hematological: Negative.    Psychiatric/Behavioral: Negative.        Physical Exam:     Physical Exam   Constitutional: He is oriented to person, place, and time. He appears well-developed and well-nourished.   HENT:   Head: Normocephalic and atraumatic.   Eyes: Conjunctivae and EOM are normal. Pupils are equal, round, and reactive to light.   Neck: Normal range of motion.   Cardiovascular: Normal rate, regular rhythm, normal heart sounds and intact distal pulses.    Pulmonary/Chest: Effort normal and breath sounds normal.   Abdominal: Soft. Bowel sounds are normal.   Genitourinary:   Genitourinary Comments: Post polio syndrome   Musculoskeletal: Normal range of motion.   Neurological: He is alert and oriented to person, place, and time. He has normal reflexes.   Skin: Skin is warm and dry.   Psychiatric: He has a normal mood and affect. His behavior is normal. Judgment and thought content normal.   Nursing note and vitals reviewed.      Procedure:       Assessment:   No diagnosis found.  No orders of the defined types were placed in this encounter.      Plan:   Prostate cancer-currently in remission continue close observation of PSAs  Urethral stricture-for anesthetic dilation           This document has been electronically signed by CLAUDIO LEAL MD December 11, 2017 10:33 AM

## 2017-12-13 NOTE — OP NOTE
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 I went ahead and gently dilated to 30 [place] and a careful cystoscopy was otherwise completely normal my impression is that of a meatal stenosis secondary to balanitis xerotica obliterans endoscopy showed a normal bladder no stones tumors for bodies normal orifice season no other abnormalities    Procedure(s):  URETHRAL DILATATION    Surgeon(s):  Khalif Kohler MD    Anesthesia: General    Staff:   Circulator: Sp Lance RN  Scrub Person: Shannan Napier LPN; Elva Carvajal    Estimated Blood Loss: none  Urine Voided: * No values recorded between 12/13/2017  1:32 PM and 12/13/2017  1:53 PM *    Specimens:                None      Drains:           Findings: Meatal stenosis    Complications: None      Khalif Kohler MD     Date: 12/13/2017  Time: 1:58 PM

## 2018-02-05 ENCOUNTER — TELEPHONE (OUTPATIENT)
Dept: CARDIOLOGY | Facility: CLINIC | Age: 63
End: 2018-02-05

## 2018-02-05 RX ORDER — ISOSORBIDE MONONITRATE 60 MG/1
TABLET, EXTENDED RELEASE ORAL
Qty: 45 TABLET | Refills: 4 | Status: SHIPPED | OUTPATIENT
Start: 2018-02-05 | End: 2018-07-02 | Stop reason: SDUPTHER

## 2018-02-05 RX ORDER — ASPIRIN 81 MG/1
TABLET ORAL
Qty: 30 TABLET | Refills: 4 | Status: SHIPPED | OUTPATIENT
Start: 2018-02-05 | End: 2018-07-06 | Stop reason: SDUPTHER

## 2018-02-05 RX ORDER — FERROUS SULFATE 325(65) MG
TABLET ORAL
Qty: 30 TABLET | Refills: 4 | Status: SHIPPED | OUTPATIENT
Start: 2018-02-05 | End: 2018-07-06 | Stop reason: SDUPTHER

## 2018-02-05 NOTE — TELEPHONE ENCOUNTER
Since it has been so long ago, we will do the follow-up visit and decide on reordering his test if they are still necessary.

## 2018-02-05 NOTE — TELEPHONE ENCOUNTER
----- Message from Shanice Fuller MA sent at 2/5/2018  1:36 PM EST -----  Regarding: tests  I scheduled this patient for a follow up since requesting refills and noticed last ov in Oct looks like Dr. Conroy ordered an Echo and stress.  He never received a phone call for these.  Do these need to be ordered still?  I  Made him an appt for end of march.

## 2018-02-05 NOTE — TELEPHONE ENCOUNTER
Called patient to let him know the tests will be reordered the day of his next office visit if needed.

## 2018-03-26 ENCOUNTER — OFFICE VISIT (OUTPATIENT)
Dept: CARDIOLOGY | Facility: CLINIC | Age: 63
End: 2018-03-26

## 2018-03-26 VITALS
WEIGHT: 202 LBS | DIASTOLIC BLOOD PRESSURE: 73 MMHG | HEIGHT: 62 IN | RESPIRATION RATE: 16 BRPM | HEART RATE: 77 BPM | BODY MASS INDEX: 37.17 KG/M2 | SYSTOLIC BLOOD PRESSURE: 137 MMHG

## 2018-03-26 DIAGNOSIS — C61 PROSTATE CANCER (HCC): ICD-10-CM

## 2018-03-26 DIAGNOSIS — R07.2 PRECORDIAL PAIN: Primary | ICD-10-CM

## 2018-03-26 DIAGNOSIS — E78.5 DYSLIPIDEMIA: ICD-10-CM

## 2018-03-26 DIAGNOSIS — I10 ESSENTIAL HYPERTENSION: ICD-10-CM

## 2018-03-26 DIAGNOSIS — E11.9 TYPE 2 DIABETES MELLITUS WITHOUT COMPLICATION, WITHOUT LONG-TERM CURRENT USE OF INSULIN (HCC): ICD-10-CM

## 2018-03-26 PROCEDURE — 99214 OFFICE O/P EST MOD 30 MIN: CPT | Performed by: INTERNAL MEDICINE

## 2018-03-26 NOTE — PROGRESS NOTES
Eleno Chaney, MISHEL  Catarino Arvizu  1955  03/26/2018    Patient Active Problem List   Diagnosis   • Chest pain   • Shortness of breath   • Type 2 diabetes mellitus   • Dyslipidemia   • History of poliomyelitis   • Unstable angina   • Prostate cancer   • Benign non-nodular prostatic hyperplasia with lower urinary tract symptoms   • Urethral stricture   • Essential hypertension       Dear Eleno Chaney, APRN:    Subjective     Catarino Arvizu is a 62 y.o. male with the problems as listed above, presents    Chief complaint: Chest pains.    History of Present Illness:Violeta Arvizu is a pleasant 59-year-old  male with no history of known coronary artery disease but has multiple risk factors for same including hypertension, type 2 diabetes mellitus, dyslipidemia and a positive family history of premature coronary artery disease, presents with complaints of having recurrent left upper chest pains with radiation to the left arm over the last several months.  He has some ascitic shortness of breath but no sweating.  These pains would occur with no relation to exertion and relieved spontaneously.  There are moderate intensity.  He has dyspnea with the mild-to-moderate exertion with no PND, orthopnea or pedal edema.      No Known Allergies:      Current Outpatient Prescriptions:   •  amLODIPine (NORVASC) 10 MG tablet, Take 10 mg by mouth daily., Disp: , Rfl:   •  ASPIRIN ADULT LOW STRENGTH 81 MG EC tablet, TAKE 1 TABLET BY MOUTH EVERY DAY, Disp: 30 tablet, Rfl: 4  •  atorvastatin (LIPITOR) 80 MG tablet, Take 80 mg by mouth daily., Disp: , Rfl:   •  carvedilol (COREG) 6.25 MG tablet, TAKE 1 TABLET BY MOUTH TWICE A DAY, Disp: 60 tablet, Rfl: 5  •  cetirizine (ZyrTEC) 10 MG tablet, Take 10 mg by mouth daily., Disp: , Rfl:   •  fenofibrate (TRICOR) 145 MG tablet, TAKE 1/2 TABLET BY MOUTH EVERY DAY., Disp: 15 tablet, Rfl: 2  •  ferrous sulfate 325 (65 FE) MG tablet, TAKE 1 TABLET ONCE DAILY WITH BREAKFAST,  Disp: 30 tablet, Rfl: 4  •  fluticasone (FLONASE) 50 MCG/ACT nasal spray, 2 sprays into each nostril daily. Administer 1 spray in each nostril for each dose. , Disp: , Rfl:   •  glimepiride (AMARYL) 4 MG tablet, Take 4 mg by mouth 2 (two) times a day., Disp: , Rfl:   •  HYDROcodone-acetaminophen (NORCO)  MG per tablet, Take 1 tablet by mouth Every 4 (Four) Hours As Needed for Moderate Pain, Disp: 24 tablet, Rfl: 0  •  HYDROcodone-acetaminophen (NORCO) 7.5-325 MG per tablet, Take 1 tablet by mouth 2 (two) times a day as needed for moderate pain (4-6)., Disp: , Rfl:   •  ipratropium-albuterol (DUO-NEB) 0.5-2.5 mg/mL nebulizer, Take 3 mL by nebulization Every 4 (Four) Hours As Needed for Wheezing., Disp: , Rfl:   •  isosorbide mononitrate (IMDUR) 60 MG 24 hr tablet, TAKE 1 AND 1/2 TABLETS BYMOUTH ONCE DAILY., Disp: 45 tablet, Rfl: 4  •  levothyroxine (SYNTHROID, LEVOTHROID) 125 MCG tablet, Take 125 mcg by mouth Daily., Disp: , Rfl:   •  lisinopril (PRINIVIL,ZESTRIL) 10 MG tablet, Take 10 mg by mouth Daily., Disp: , Rfl:   •  losartan (COZAAR) 100 MG tablet, Take 100 mg by mouth Daily., Disp: , Rfl:   •  nitroglycerin (NITROSTAT) 0.4 MG SL tablet, Place 0.4 mg under the tongue every 5 (five) minutes as needed for chest pain. Take no more than 3 doses in 15 minutes., Disp: , Rfl:   •  ondansetron (ZOFRAN) 4 MG tablet, Take 1 tablet PRN nausea every 4 hours., Disp: 5 tablet, Rfl: 0  •  pantoprazole (PROTONIX) 40 MG EC tablet, Take 1 tablet by mouth 2 (Two) Times a Day With Meals., Disp: 60 tablet, Rfl: 3  •  polyethylene glycol (MIRALAX) powder, Take 17 g by mouth Daily. May take 1-4 times daily as needed for daily adequate bowel movement., Disp: 510 g, Rfl: 5  •  raNITIdine (ZANTAC) 300 MG tablet, Take 300 mg by mouth Every Night., Disp: , Rfl:   •  sennosides-docusate sodium (SENOKOT-S) 8.6-50 MG tablet, Take 2 tablets by mouth 2 (two) times a day., Disp: , Rfl:   •  sitaGLIPtin (JANUVIA) 50 MG tablet, Take 1  "tablet by mouth daily., Disp: 30 tablet, Rfl: 3  •  SUMAtriptan (IMITREX) 50 MG tablet, Take one tablet at onset of headache. May repeat dose one time in 2 hours if headache not relieved., Disp: , Rfl:   •  tamsulosin (FLOMAX) 0.4 MG capsule 24 hr capsule, Take 1 capsule by mouth Every Night., Disp: 30 capsule, Rfl: 3      The following portions of the patient's history were reviewed and updated as appropriate: allergies, current medications, past family history, past medical history, past social history, past surgical history and problem list.    Social History   Substance Use Topics   • Smoking status: Never Smoker   • Smokeless tobacco: Current User     Types: Chew      Comment: Chewed tobacco since he was 4 yrs old.   • Alcohol use No       Review of Systems   Constitution: Negative for chills and fever.   HENT: Negative for nosebleeds and sore throat.    Cardiovascular: Positive for chest pain, leg swelling and palpitations. Negative for syncope.   Respiratory: Positive for shortness of breath. Negative for cough, hemoptysis and wheezing.    Gastrointestinal: Negative for abdominal pain, hematemesis, hematochezia, melena, nausea and vomiting.   Genitourinary: Negative for dysuria and hematuria.   Neurological: Positive for dizziness. Negative for headaches.       Objective   Vitals:    03/26/18 1351   BP: 137/73   BP Location: Right arm   Pulse: 77   Resp: 16   Weight: 91.6 kg (202 lb)   Height: 157.5 cm (62.01\")     Body mass index is 36.94 kg/m².        Physical Exam   Constitutional: He is oriented to person, place, and time. He appears well-developed and well-nourished.   HENT:   Head: Normocephalic.   Eyes: Conjunctivae and EOM are normal.   Neck: Normal range of motion. Neck supple. No JVD present. No tracheal deviation present. No thyromegaly present.   Cardiovascular: Normal rate, regular rhythm, S1 normal and S2 normal.  Exam reveals no gallop, no S3, no S4 and no friction rub.    No murmur " heard.  Pulmonary/Chest: Breath sounds normal. No respiratory distress. He has no wheezes. He has no rales.   Abdominal: Soft. Bowel sounds are normal. He exhibits no mass. There is no tenderness.   Musculoskeletal: He exhibits no edema.   Neurological: He is alert and oriented to person, place, and time. No cranial nerve deficit.   Skin: Skin is warm and dry.   Psychiatric: He has a normal mood and affect.       Lab Results   Component Value Date     12/12/2017    K 5.1 12/12/2017     12/12/2017    CO2 19.5 (L) 12/12/2017    BUN 22 (H) 12/12/2017    CREATININE 2.55 (H) 12/12/2017    GLUCOSE 265 (H) 12/12/2017    CALCIUM 9.2 12/12/2017    AST 33 04/06/2017    ALT 16 04/06/2017    ALKPHOS 89 04/06/2017    LABIL2 0.9 05/01/2017     Lab Results   Component Value Date    CKTOTAL 38 04/06/2017     Lab Results   Component Value Date    WBC 4.71 12/12/2017    HGB 9.4 (L) 12/12/2017    HCT 29.9 (L) 12/12/2017     12/12/2017        Lab Results   Component Value Date    .0 (H) 08/10/2016     Echo   Lab Results   Component Value Date    ECHOEFEST 65 08/09/2016     Procedures    Assessment/Plan    Diagnosis Plan   1. Precordial pain  Stress Test With Myocardial Perfusion     Adult Transthoracic Echo Complete W/ Cont if Necessary Per Protocol   2. Essential hypertension     3. Prostate cancer     4. Dyslipidemia     5. Type 2 diabetes mellitus without complication, without long-term current use of insulin            Recommendations:  1. Continue with aspirin, atorvastatin, isosorbide mononitrate and carvedilol.  2. Evaluate further with Lexiscan sestamibi study and echo Doppler study.  3. Follow-up in 2-3 weeks.    Return in about 3 weeks (around 4/16/2018) for or sooner if needed.    As always, I appreciate very much the opportunity to participate in the cardiovascular care of your patients.      With Best Regards,    Trevon Conroy MD, FACC    Dragon disclaimer:  Much of this encounter note is an  electronic transcription/translation of spoken language to printed text. The electronic translation of spoken language may permit erroneous, or at times, nonsensical words or phrases to be inadvertently transcribed; Although I have reviewed the note for such errors, some may still exist.

## 2018-04-06 ENCOUNTER — OFFICE VISIT (OUTPATIENT)
Dept: UROLOGY | Facility: CLINIC | Age: 63
End: 2018-04-06

## 2018-04-06 VITALS — BODY MASS INDEX: 37.16 KG/M2 | WEIGHT: 201.94 LBS | HEIGHT: 62 IN

## 2018-04-06 DIAGNOSIS — N35.010 POST-TRAUMATIC MALE URETHRAL MEATAL STRICTURE: ICD-10-CM

## 2018-04-06 DIAGNOSIS — N42.9 DISORDER OF PROSTATE: Primary | ICD-10-CM

## 2018-04-06 DIAGNOSIS — K64.4 EXTERNAL HEMORRHOIDS: ICD-10-CM

## 2018-04-06 DIAGNOSIS — C61 PROSTATE CANCER (HCC): ICD-10-CM

## 2018-04-06 LAB — PSA SERPL-MCNC: 2.46 NG/ML (ref 0–4)

## 2018-04-06 PROCEDURE — 99214 OFFICE O/P EST MOD 30 MIN: CPT | Performed by: UROLOGY

## 2018-04-06 PROCEDURE — 84153 ASSAY OF PSA TOTAL: CPT | Performed by: UROLOGY

## 2018-04-06 NOTE — PROGRESS NOTES
Chief Complaint:          Chief Complaint   Patient presents with   • urethral stenosis     surgery follow up       HPI:   62 y.o. male.  62-year-old white male with post polio syndrome, prostate cancer responsive to androgen blockade and recent urethral dilatation after he voids now he has blood on the tissue at the head of his penis and some burning.  He has pain with bowel movements and has new hemorrhoids.  He would like to be redilated in the office.  He is a PSA pending to monitor his prostate cancer.  There is no constitutional symptomatology, skeletal related events symptomatology or significant voiding dysfunction other than the spraying and the blood at the meatus.    Past Medical History:        Past Medical History:   Diagnosis Date   • Chest pain    • Chronic kidney disease    • CPAP (continuous positive airway pressure) dependence    • Diabetes mellitus    • GERD (gastroesophageal reflux disease)    • Heart murmur    • Hyperlipidemia    • Hypertension    • Irregular heart beat    • Polio    • Prostate cancer 05/2016    Dr. Khalif Kohler MD- [place], [place]   • Shortness of breath    • Sleep apnea          Current Meds:     Current Outpatient Prescriptions   Medication Sig Dispense Refill   • amLODIPine (NORVASC) 10 MG tablet Take 10 mg by mouth daily.     • ASPIRIN ADULT LOW STRENGTH 81 MG EC tablet TAKE 1 TABLET BY MOUTH EVERY DAY 30 tablet 4   • atorvastatin (LIPITOR) 80 MG tablet Take 80 mg by mouth daily.     • carvedilol (COREG) 6.25 MG tablet TAKE 1 TABLET BY MOUTH TWICE A DAY 60 tablet 5   • cetirizine (ZyrTEC) 10 MG tablet Take 10 mg by mouth daily.     • fenofibrate (TRICOR) 145 MG tablet TAKE 1/2 TABLET BY MOUTH EVERY DAY. 15 tablet 2   • ferrous sulfate 325 (65 FE) MG tablet TAKE 1 TABLET ONCE DAILY WITH BREAKFAST 30 tablet 4   • fluticasone (FLONASE) 50 MCG/ACT nasal spray 2 sprays into each nostril daily. Administer 1 spray in each nostril for each dose.      • glimepiride (AMARYL) 4  MG tablet Take 4 mg by mouth 2 (two) times a day.     • HYDROcodone-acetaminophen (NORCO)  MG per tablet Take 1 tablet by mouth Every 4 (Four) Hours As Needed for Moderate Pain 24 tablet 0   • HYDROcodone-acetaminophen (NORCO) 7.5-325 MG per tablet Take 1 tablet by mouth 2 (two) times a day as needed for moderate pain (4-6).     • ipratropium-albuterol (DUO-NEB) 0.5-2.5 mg/mL nebulizer Take 3 mL by nebulization Every 4 (Four) Hours As Needed for Wheezing.     • isosorbide mononitrate (IMDUR) 60 MG 24 hr tablet TAKE 1 AND 1/2 TABLETS BYMOUTH ONCE DAILY. 45 tablet 4   • levothyroxine (SYNTHROID, LEVOTHROID) 125 MCG tablet Take 125 mcg by mouth Daily.     • lisinopril (PRINIVIL,ZESTRIL) 10 MG tablet Take 10 mg by mouth Daily.     • losartan (COZAAR) 100 MG tablet Take 100 mg by mouth Daily.     • nitroglycerin (NITROSTAT) 0.4 MG SL tablet Place 0.4 mg under the tongue every 5 (five) minutes as needed for chest pain. Take no more than 3 doses in 15 minutes.     • ondansetron (ZOFRAN) 4 MG tablet Take 1 tablet PRN nausea every 4 hours. 5 tablet 0   • pantoprazole (PROTONIX) 40 MG EC tablet Take 1 tablet by mouth 2 (Two) Times a Day With Meals. 60 tablet 3   • polyethylene glycol (MIRALAX) powder Take 17 g by mouth Daily. May take 1-4 times daily as needed for daily adequate bowel movement. 510 g 5   • raNITIdine (ZANTAC) 300 MG tablet Take 300 mg by mouth Every Night.     • sennosides-docusate sodium (SENOKOT-S) 8.6-50 MG tablet Take 2 tablets by mouth 2 (two) times a day.     • sitaGLIPtin (JANUVIA) 50 MG tablet Take 1 tablet by mouth daily. 30 tablet 3   • SUMAtriptan (IMITREX) 50 MG tablet Take one tablet at onset of headache. May repeat dose one time in 2 hours if headache not relieved.     • tamsulosin (FLOMAX) 0.4 MG capsule 24 hr capsule Take 1 capsule by mouth Every Night. 30 capsule 3     No current facility-administered medications for this visit.         Allergies:      No Known Allergies     Past  Surgical History:     Past Surgical History:   Procedure Laterality Date   • CARDIAC CATHETERIZATION  2008    50% diffuse LAD stenosis, 50% septal  branch   • COLONOSCOPY      Long time ago   • HEMORRHOIDECTOMY     • HERNIA REPAIR     • UPPER GASTROINTESTINAL ENDOSCOPY      Long time ago   • URETHRAL DILATATION N/A 12/13/2017    Procedure: URETHRAL DILATATION;  Surgeon: Khalif Kohler MD;  Location: [facility];  Service:          Social History:     Social History     Social History   • Marital status:      Spouse name: N/A   • Number of children: N/A   • Years of education: N/A     Occupational History   • Not on file.     Social History Main Topics   • Smoking status: Never Smoker   • Smokeless tobacco: Current User     Types: Chew      Comment: Chewed tobacco since he was 4 yrs old.   • Alcohol use No   • Drug use: No   • Sexual activity: Defer     Other Topics Concern   • Not on file     Social History Narrative   • No narrative on file       Family History:     Family History   Problem Relation Age of Onset   • Heart disease Brother    • Diabetes Brother    • Cancer Brother      not sure the kind   • Heart disease Brother    • Diabetes Brother    • Diabetes Sister    • Diabetes Daughter    • Heart disease Daughter    • Pancreatitis Daughter    • Crohn's disease Daughter    • Diabetes Son    • Heart disease Son        Review of Systems:     Review of Systems   Constitutional: Negative.    HENT: Negative.    Eyes: Negative.    Respiratory: Negative.    Cardiovascular: Negative.    Gastrointestinal: Positive for anal bleeding and constipation.   Endocrine: Negative.    Genitourinary: Positive for penile pain.   Musculoskeletal: Negative.    Allergic/Immunologic: Negative.    Neurological: Negative.    Hematological: Negative.    Psychiatric/Behavioral: Negative.        Physical Exam:     Physical Exam   Constitutional: He is oriented to person, place, and time. He appears well-developed and  well-nourished.   Post polio syndrome   HENT:   Head: Normocephalic and atraumatic.   Eyes: Conjunctivae and EOM are normal. Pupils are equal, round, and reactive to light.   Neck: Normal range of motion.   Cardiovascular: Normal rate, regular rhythm, normal heart sounds and intact distal pulses.    Pulmonary/Chest: Effort normal and breath sounds normal.   Abdominal: Soft. Bowel sounds are normal.   Musculoskeletal: Normal range of motion.   Neurological: He is alert and oriented to person, place, and time. He has normal reflexes.   Skin: Skin is warm and dry.   Psychiatric: He has a normal mood and affect. His behavior is normal. Judgment and thought content normal.   Nursing note and vitals reviewed.      I have reviewed the following portions of the patient's history: allergies, current medications, past family history, past medical history, past social history, past surgical history, problem list and ROS and confirm it's accurate.      Procedure:       Assessment/Plan:   Prostate cancer-currently on androgen blockade  Urethral meatal stenosis- for dilation  Hemorrhoids-recommend suppositories was given samples       Discussed the patient's BMI with him. BMI is above normal parameters. Follow-up plan includes:  educational material.          This document has been electronically signed by CLAUDIO LEAL MD April 6, 2018 10:13 AM

## 2018-04-12 ENCOUNTER — OFFICE VISIT (OUTPATIENT)
Dept: GASTROENTEROLOGY | Facility: CLINIC | Age: 63
End: 2018-04-12

## 2018-04-12 VITALS
DIASTOLIC BLOOD PRESSURE: 81 MMHG | OXYGEN SATURATION: 97 % | BODY MASS INDEX: 37.91 KG/M2 | SYSTOLIC BLOOD PRESSURE: 167 MMHG | HEART RATE: 86 BPM | WEIGHT: 206 LBS | HEIGHT: 62 IN

## 2018-04-12 DIAGNOSIS — K62.5 RECTAL BLEEDING: ICD-10-CM

## 2018-04-12 DIAGNOSIS — R13.10 DYSPHAGIA, UNSPECIFIED TYPE: ICD-10-CM

## 2018-04-12 DIAGNOSIS — R10.84 GENERALIZED ABDOMINAL PAIN: Primary | ICD-10-CM

## 2018-04-12 DIAGNOSIS — Z12.11 COLON CANCER SCREENING: ICD-10-CM

## 2018-04-12 PROCEDURE — 99244 OFF/OP CNSLTJ NEW/EST MOD 40: CPT | Performed by: INTERNAL MEDICINE

## 2018-04-12 NOTE — PROGRESS NOTES
Chief Complaint   Patient presents with   • Rectal Bleeding   • Abdominal Pain   • Nausea       Catarino Arvizu is a 63 y.o. male who presents to the office today for follow up appointment for Rectal Bleeding; Abdominal Pain; and Nausea  .    HPI  63-year-old white male presents with long (greater than no one year) history of recurrent generalized abdominal pain.  He cannot identify consistent precipitating or palliating factors.  He reports recurrent nausea and swallowing difficulty.  He reports recurrent rectal bleeding.  Denies recent significant change in weight.  EGD and colonoscopy were performed many years ago, but he does not recall the findings of those examinations.  Family history is negative for GI disease.        Review of Systems   Constitutional: Positive for chills and fatigue. Negative for fever.   HENT: Negative for trouble swallowing.    Eyes: Positive for visual disturbance.   Respiratory: Positive for shortness of breath.    Cardiovascular: Positive for chest pain.   Gastrointestinal: Positive for abdominal distention, abdominal pain, anal bleeding, blood in stool, nausea and rectal pain. Negative for constipation, diarrhea and vomiting.   Endocrine: Negative.    Genitourinary: Negative for difficulty urinating.   Musculoskeletal: Negative.    Skin: Negative.    Allergic/Immunologic: Negative.    Neurological: Positive for dizziness, light-headedness and headaches. Negative for syncope.   Hematological: Bruises/bleeds easily.   Psychiatric/Behavioral: Positive for sleep disturbance. The patient is not nervous/anxious.        ACTIVE PROBLEMS:   Specialty Problems     None          PAST MEDICAL HISTORY:  Past Medical History:   Diagnosis Date   • Chest pain    • Chronic kidney disease    • CPAP (continuous positive airway pressure) dependence    • Diabetes mellitus    • GERD (gastroesophageal reflux disease)    • Heart murmur    • Hyperlipidemia    • Hypertension    • Irregular heart beat    •  Polio    • Prostate cancer 05/2016    Dr. Khalif Kohler MD- [place], [place]   • Shortness of breath    • Sleep apnea        SURGICAL HISTORY:  Past Surgical History:   Procedure Laterality Date   • CARDIAC CATHETERIZATION  2008    50% diffuse LAD stenosis, 50% septal  branch   • COLONOSCOPY      Long time ago   • HEMORRHOIDECTOMY     • HERNIA REPAIR     • UPPER GASTROINTESTINAL ENDOSCOPY      Long time ago   • URETHRAL DILATATION N/A 12/13/2017    Procedure: URETHRAL DILATATION;  Surgeon: Khalif Kohler MD;  Location: [facility];  Service:        FAMILY HISTORY:  Family History   Problem Relation Age of Onset   • Heart disease Brother    • Diabetes Brother    • Cancer Brother      not sure the kind   • Heart disease Brother    • Diabetes Brother    • Diabetes Sister    • Diabetes Daughter    • Heart disease Daughter    • Pancreatitis Daughter    • Crohn's disease Daughter    • Diabetes Son    • Heart disease Son        SOCIAL HISTORY:  Social History   Substance Use Topics   • Smoking status: Never Smoker   • Smokeless tobacco: Current User     Types: Chew      Comment: Chewed tobacco since he was 4 yrs old.   • Alcohol use No       CURRENT MEDICATION:    Current Outpatient Prescriptions:   •  amLODIPine (NORVASC) 10 MG tablet, Take 10 mg by mouth daily., Disp: , Rfl:   •  ASPIRIN ADULT LOW STRENGTH 81 MG EC tablet, TAKE 1 TABLET BY MOUTH EVERY DAY, Disp: 30 tablet, Rfl: 4  •  atorvastatin (LIPITOR) 80 MG tablet, Take 80 mg by mouth daily., Disp: , Rfl:   •  carvedilol (COREG) 6.25 MG tablet, TAKE 1 TABLET BY MOUTH TWICE A DAY, Disp: 60 tablet, Rfl: 5  •  cetirizine (ZyrTEC) 10 MG tablet, Take 10 mg by mouth daily., Disp: , Rfl:   •  fenofibrate (TRICOR) 145 MG tablet, TAKE 1/2 TABLET BY MOUTH EVERY DAY., Disp: 15 tablet, Rfl: 2  •  ferrous sulfate 325 (65 FE) MG tablet, TAKE 1 TABLET ONCE DAILY WITH BREAKFAST, Disp: 30 tablet, Rfl: 4  •  fluticasone (FLONASE) 50 MCG/ACT nasal spray, 2 sprays  into each nostril daily. Administer 1 spray in each nostril for each dose. , Disp: , Rfl:   •  glimepiride (AMARYL) 4 MG tablet, Take 4 mg by mouth 2 (two) times a day., Disp: , Rfl:   •  HYDROcodone-acetaminophen (NORCO)  MG per tablet, Take 1 tablet by mouth Every 4 (Four) Hours As Needed for Moderate Pain, Disp: 24 tablet, Rfl: 0  •  HYDROcodone-acetaminophen (NORCO) 7.5-325 MG per tablet, Take 1 tablet by mouth 2 (two) times a day as needed for moderate pain (4-6)., Disp: , Rfl:   •  ipratropium-albuterol (DUO-NEB) 0.5-2.5 mg/mL nebulizer, Take 3 mL by nebulization Every 4 (Four) Hours As Needed for Wheezing., Disp: , Rfl:   •  isosorbide mononitrate (IMDUR) 60 MG 24 hr tablet, TAKE 1 AND 1/2 TABLETS BYMOUTH ONCE DAILY., Disp: 45 tablet, Rfl: 4  •  levothyroxine (SYNTHROID, LEVOTHROID) 125 MCG tablet, Take 125 mcg by mouth Daily., Disp: , Rfl:   •  lisinopril (PRINIVIL,ZESTRIL) 10 MG tablet, Take 10 mg by mouth Daily., Disp: , Rfl:   •  losartan (COZAAR) 100 MG tablet, Take 100 mg by mouth Daily., Disp: , Rfl:   •  nitroglycerin (NITROSTAT) 0.4 MG SL tablet, Place 0.4 mg under the tongue every 5 (five) minutes as needed for chest pain. Take no more than 3 doses in 15 minutes., Disp: , Rfl:   •  ondansetron (ZOFRAN) 4 MG tablet, Take 1 tablet PRN nausea every 4 hours., Disp: 5 tablet, Rfl: 0  •  pantoprazole (PROTONIX) 40 MG EC tablet, Take 1 tablet by mouth 2 (Two) Times a Day With Meals., Disp: 60 tablet, Rfl: 3  •  polyethylene glycol (MIRALAX) powder, Take 17 g by mouth Daily. May take 1-4 times daily as needed for daily adequate bowel movement., Disp: 510 g, Rfl: 5  •  raNITIdine (ZANTAC) 300 MG tablet, Take 300 mg by mouth Every Night., Disp: , Rfl:   •  sennosides-docusate sodium (SENOKOT-S) 8.6-50 MG tablet, Take 2 tablets by mouth 2 (two) times a day., Disp: , Rfl:   •  sitaGLIPtin (JANUVIA) 50 MG tablet, Take 1 tablet by mouth daily., Disp: 30 tablet, Rfl: 3  •  SUMAtriptan (IMITREX) 50 MG tablet,  "Take one tablet at onset of headache. May repeat dose one time in 2 hours if headache not relieved., Disp: , Rfl:   •  tamsulosin (FLOMAX) 0.4 MG capsule 24 hr capsule, Take 1 capsule by mouth Every Night., Disp: 30 capsule, Rfl: 3  •  polyethylene glycol (GoLYTELY) 236 g solution, Starting at 6 pm on day prior to procedure, drink 8 ounces every 15 minutes until all gone or stools are clear. May add flavor packet., Disp: 4000 mL, Rfl: 0    ALLERGIES:  Review of patient's allergies indicates no known allergies.    VISIT VITALS:  /81   Pulse 86   Ht 157.5 cm (62\")   Wt 93.4 kg (206 lb)   SpO2 97%   BMI 37.68 kg/m²     Physical Exam   Constitutional: He is oriented to person, place, and time. He appears well-developed and well-nourished.   HENT:   Head: Normocephalic and atraumatic.   Eyes: Conjunctivae and EOM are normal. Pupils are equal, round, and reactive to light.   Neck: Normal range of motion. Neck supple.   Cardiovascular: Normal rate, regular rhythm and normal heart sounds.    Pulmonary/Chest: Effort normal and breath sounds normal.   Abdominal: Soft. Bowel sounds are normal.   Musculoskeletal: Normal range of motion.   Neurological: He is alert and oriented to person, place, and time. He has normal reflexes.   Skin: Skin is warm and dry.   Psychiatric: He has a normal mood and affect. His behavior is normal.   Nursing note and vitals reviewed.      Assessment/Plan      Diagnosis Plan   1. Generalized abdominal pain  Case Request   2. Dysphagia, unspecified type  Case Request   3. Rectal bleeding  Case Request   4. Colon cancer screening  Case Request     REC  I recommended that he undergo both EGD and screening/surveillance colonoscopy for further evaluation.  He may perhaps benefit from esophageal dilation.  The procedures, benefits, risks and alternatives were explained to the patient.  I have recommended no changes in his medical treatment at this time.    Return if symptoms worsen or fail to " brenda.         Bob Mcguire III, MD

## 2018-04-13 PROBLEM — R13.10 DYSPHAGIA: Status: ACTIVE | Noted: 2018-04-13

## 2018-04-13 PROBLEM — Z12.11 COLON CANCER SCREENING: Status: ACTIVE | Noted: 2018-04-13

## 2018-04-13 PROBLEM — K62.5 RECTAL BLEEDING: Status: ACTIVE | Noted: 2018-04-13

## 2018-04-13 PROBLEM — R10.84 GENERALIZED ABDOMINAL PAIN: Status: ACTIVE | Noted: 2018-04-13

## 2018-04-16 ENCOUNTER — PROCEDURE VISIT (OUTPATIENT)
Dept: UROLOGY | Facility: CLINIC | Age: 63
End: 2018-04-16

## 2018-04-16 VITALS — HEIGHT: 62 IN | BODY MASS INDEX: 37.91 KG/M2 | WEIGHT: 206 LBS

## 2018-04-16 DIAGNOSIS — N48.0 BXO (BALANITIS XEROTICA OBLITERANS): ICD-10-CM

## 2018-04-16 DIAGNOSIS — Z48.816 SURGICAL AFTERCARE, GENITOURINARY SYSTEM: Primary | ICD-10-CM

## 2018-04-16 DIAGNOSIS — C61 PROSTATE CANCER (HCC): ICD-10-CM

## 2018-04-16 PROCEDURE — 99213 OFFICE O/P EST LOW 20 MIN: CPT | Performed by: UROLOGY

## 2018-04-16 PROCEDURE — 52281 CYSTOSCOPY AND TREATMENT: CPT | Performed by: UROLOGY

## 2018-04-16 PROCEDURE — 96372 THER/PROPH/DIAG INJ SC/IM: CPT | Performed by: UROLOGY

## 2018-04-16 RX ORDER — GENTAMICIN SULFATE 40 MG/ML
80 INJECTION, SOLUTION INTRAMUSCULAR; INTRAVENOUS ONCE
Status: COMPLETED | OUTPATIENT
Start: 2018-04-16 | End: 2018-04-16

## 2018-04-16 RX ADMIN — GENTAMICIN SULFATE 80 MG: 40 INJECTION, SOLUTION INTRAMUSCULAR; INTRAVENOUS at 14:12

## 2018-04-16 NOTE — PROGRESS NOTES
Chief Complaint:          Chief Complaint   Patient presents with   • Meatal Stenosis       HPI:   63 y.o. male.  63-year-old with prostate cancer currently in remission and severe meatal stenosis secondary to BXO.  He's here for urethral dilation    Past Medical History:        Past Medical History:   Diagnosis Date   • Chest pain    • Chronic kidney disease    • CPAP (continuous positive airway pressure) dependence    • Diabetes mellitus    • GERD (gastroesophageal reflux disease)    • Heart murmur    • Hyperlipidemia    • Hypertension    • Irregular heart beat    • Polio    • Prostate cancer 05/2016    Dr. Khalif Kohler MD- [place], [place]   • Shortness of breath    • Sleep apnea          Current Meds:     Current Outpatient Prescriptions   Medication Sig Dispense Refill   • amLODIPine (NORVASC) 10 MG tablet Take 10 mg by mouth daily.     • ASPIRIN ADULT LOW STRENGTH 81 MG EC tablet TAKE 1 TABLET BY MOUTH EVERY DAY 30 tablet 4   • atorvastatin (LIPITOR) 80 MG tablet Take 80 mg by mouth daily.     • carvedilol (COREG) 6.25 MG tablet TAKE 1 TABLET BY MOUTH TWICE A DAY 60 tablet 5   • cetirizine (ZyrTEC) 10 MG tablet Take 10 mg by mouth daily.     • fenofibrate (TRICOR) 145 MG tablet TAKE 1/2 TABLET BY MOUTH EVERY DAY. 15 tablet 2   • ferrous sulfate 325 (65 FE) MG tablet TAKE 1 TABLET ONCE DAILY WITH BREAKFAST 30 tablet 4   • fluticasone (FLONASE) 50 MCG/ACT nasal spray 2 sprays into each nostril daily. Administer 1 spray in each nostril for each dose.      • glimepiride (AMARYL) 4 MG tablet Take 4 mg by mouth 2 (two) times a day.     • HYDROcodone-acetaminophen (NORCO)  MG per tablet Take 1 tablet by mouth Every 4 (Four) Hours As Needed for Moderate Pain 24 tablet 0   • HYDROcodone-acetaminophen (NORCO) 7.5-325 MG per tablet Take 1 tablet by mouth 2 (two) times a day as needed for moderate pain (4-6).     • ipratropium-albuterol (DUO-NEB) 0.5-2.5 mg/mL nebulizer Take 3 mL by nebulization Every 4 (Four)  Hours As Needed for Wheezing.     • isosorbide mononitrate (IMDUR) 60 MG 24 hr tablet TAKE 1 AND 1/2 TABLETS BYMOUTH ONCE DAILY. 45 tablet 4   • levothyroxine (SYNTHROID, LEVOTHROID) 125 MCG tablet Take 125 mcg by mouth Daily.     • lisinopril (PRINIVIL,ZESTRIL) 10 MG tablet Take 10 mg by mouth Daily.     • losartan (COZAAR) 100 MG tablet Take 100 mg by mouth Daily.     • nitroglycerin (NITROSTAT) 0.4 MG SL tablet Place 0.4 mg under the tongue every 5 (five) minutes as needed for chest pain. Take no more than 3 doses in 15 minutes.     • ondansetron (ZOFRAN) 4 MG tablet Take 1 tablet PRN nausea every 4 hours. 5 tablet 0   • pantoprazole (PROTONIX) 40 MG EC tablet Take 1 tablet by mouth 2 (Two) Times a Day With Meals. 60 tablet 3   • polyethylene glycol (GoLYTELY) 236 g solution Starting at 6 pm on day prior to procedure, drink 8 ounces every 15 minutes until all gone or stools are clear. May add flavor packet. 4000 mL 0   • polyethylene glycol (MIRALAX) powder Take 17 g by mouth Daily. May take 1-4 times daily as needed for daily adequate bowel movement. 510 g 5   • raNITIdine (ZANTAC) 300 MG tablet Take 300 mg by mouth Every Night.     • sennosides-docusate sodium (SENOKOT-S) 8.6-50 MG tablet Take 2 tablets by mouth 2 (two) times a day.     • sitaGLIPtin (JANUVIA) 50 MG tablet Take 1 tablet by mouth daily. 30 tablet 3   • SUMAtriptan (IMITREX) 50 MG tablet Take one tablet at onset of headache. May repeat dose one time in 2 hours if headache not relieved.     • tamsulosin (FLOMAX) 0.4 MG capsule 24 hr capsule Take 1 capsule by mouth Every Night. 30 capsule 3     No current facility-administered medications for this visit.         Allergies:      No Known Allergies     Past Surgical History:     Past Surgical History:   Procedure Laterality Date   • CARDIAC CATHETERIZATION  2008    50% diffuse LAD stenosis, 50% septal  branch   • COLONOSCOPY      Long time ago   • HEMORRHOIDECTOMY     • HERNIA REPAIR     •  UPPER GASTROINTESTINAL ENDOSCOPY      Long time ago   • URETHRAL DILATATION N/A 12/13/2017    Procedure: URETHRAL DILATATION;  Surgeon: Khalif Kohler MD;  Location: [facility];  Service:          Social History:     Social History     Social History   • Marital status:      Spouse name: N/A   • Number of children: N/A   • Years of education: N/A     Occupational History   • Not on file.     Social History Main Topics   • Smoking status: Never Smoker   • Smokeless tobacco: Current User     Types: Chew      Comment: Chewed tobacco since he was 4 yrs old.   • Alcohol use No   • Drug use: No   • Sexual activity: Defer     Other Topics Concern   • Not on file     Social History Narrative   • No narrative on file       Family History:     Family History   Problem Relation Age of Onset   • Heart disease Brother    • Diabetes Brother    • Cancer Brother      not sure the kind   • Heart disease Brother    • Diabetes Brother    • Diabetes Sister    • Diabetes Daughter    • Heart disease Daughter    • Pancreatitis Daughter    • Crohn's disease Daughter    • Diabetes Son    • Heart disease Son        Review of Systems:     Review of Systems   Constitutional: Negative.    HENT: Negative.    Eyes: Negative.    Respiratory: Negative.    Cardiovascular: Negative.    Gastrointestinal: Negative.    Endocrine: Negative.    Genitourinary: Positive for difficulty urinating and penile pain.   Musculoskeletal: Positive for back pain.   Allergic/Immunologic: Negative.    Neurological: Negative.    Hematological: Negative.    Psychiatric/Behavioral: Negative.        Physical Exam:     Physical Exam   Constitutional: He is oriented to person, place, and time. He appears well-developed and well-nourished.   HENT:   Head: Normocephalic and atraumatic.   Eyes: Conjunctivae and EOM are normal. Pupils are equal, round, and reactive to light.   Neck: Normal range of motion.   Cardiovascular: Normal rate, regular rhythm, normal heart  sounds and intact distal pulses.    Pulmonary/Chest: Effort normal and breath sounds normal.   Abdominal: Soft. Bowel sounds are normal.   Genitourinary:   Genitourinary Comments: Severe meatal stenosis   Musculoskeletal: Normal range of motion.   Post polio syndrome   Neurological: He is alert and oriented to person, place, and time. He has normal reflexes.   Skin: Skin is warm and dry.   Psychiatric: He has a normal mood and affect. His behavior is normal. Judgment and thought content normal.   Nursing note and vitals reviewed.      I have reviewed the following portions of the patient's history: allergies, current medications, past family history, past medical history, past social history, past surgical history, problem list and ROS and confirm it's accurate.      Procedure:   Cystoscopy:  Patient presents today for cystourethroscopy.  I went ahead and obtained an informed consent including the risk of anesthesia, bleeding, infection, etc.  After prep and drape in a sterile fashion in the low dorsal lithotomy position the urethra was gently anesthetized with 10 cc of 2% viscous Xylocaine jelly.  After an appropriate period of topical anesthesia I used the Olympus digital 14 [place] flexible cystoscope to examine the anterior urethra which was completely normal, the ureteral orifices were visualized and normal in position and configuration there were no stones, tumors or foreign bodies.  Significant meatal stenosis  The blue light was enabled and was negative allowing us to see small mucosal lesions. The patient was given 80 mg of gentamicin in an intramuscular fashion  as prophylaxis for the cystoscopy and released from the clinic.  Cystoscopy and urethral dilation-after an appropriate informed consent the patient was brought to the procedure suite.  The urethra was gently anesthetized with 10 cc of 2% viscous Xylocaine jelly.  After an adequate period of topical anesthesia I went ahead and advanced the  cystoscope.  There was an obvious stenosis present.  The bladder was unremarkable there were no stones, tumors, foreign bodies or abnormalities.  The ureteric orifice easily normal in position and configuration.  The scope was removed and the urethra was gently anesthetized and dilated with [place] sounds from 16-30 [place] sequentially without complication the patient was given gentamicin as prophylaxis with 80 mg.  Was also given a Adjudica meatal dilator to self dilate.    Assessment/Plan:   Prostate cancer- in remission   BXO Urethral meatal stenosis-S/ post dilation     Patient's Body mass index is 37.67 kg/m². BMI is above normal parameters. Follow-up plan includes:  educational material.          This document has been electronically signed by CLAUDIO LEAL MD April 16, 2018 2:11 PM

## 2018-04-17 PROBLEM — N48.0 BXO (BALANITIS XEROTICA OBLITERANS): Status: ACTIVE | Noted: 2018-04-17

## 2018-05-07 ENCOUNTER — HOSPITAL ENCOUNTER (OUTPATIENT)
Dept: CARDIOLOGY | Facility: HOSPITAL | Age: 63
Discharge: HOME | End: 2018-05-07
Attending: INTERNAL MEDICINE

## 2018-05-07 ENCOUNTER — HOSPITAL ENCOUNTER (OUTPATIENT)
Dept: NUCLEAR MEDICINE | Facility: HOSPITAL | Age: 63
Discharge: HOME | End: 2018-05-07
Attending: INTERNAL MEDICINE

## 2018-05-07 DIAGNOSIS — R07.2 PRECORDIAL PAIN: ICD-10-CM

## 2018-05-07 PROCEDURE — 78452 HT MUSCLE IMAGE SPECT MULT: CPT | Performed by: INTERNAL MEDICINE

## 2018-05-07 PROCEDURE — 0 TECHNETIUM SESTAMIBI: Performed by: INTERNAL MEDICINE

## 2018-05-07 PROCEDURE — 93306 TTE W/DOPPLER COMPLETE: CPT | Performed by: INTERNAL MEDICINE

## 2018-05-07 PROCEDURE — 78452 HT MUSCLE IMAGE SPECT MULT: CPT

## 2018-05-07 PROCEDURE — 93306 TTE W/DOPPLER COMPLETE: CPT

## 2018-05-07 PROCEDURE — A9500 TC99M SESTAMIBI: HCPCS | Performed by: INTERNAL MEDICINE

## 2018-05-07 PROCEDURE — 93018 CV STRESS TEST I&R ONLY: CPT | Performed by: INTERNAL MEDICINE

## 2018-05-07 PROCEDURE — 93017 CV STRESS TEST TRACING ONLY: CPT

## 2018-05-07 PROCEDURE — 25010000002 REGADENOSON 0.4 MG/5ML SOLUTION: Performed by: INTERNAL MEDICINE

## 2018-05-07 RX ADMIN — TECHNETIUM TC 99M SESTAMIBI 1 DOSE: 1 INJECTION INTRAVENOUS at 08:50

## 2018-05-07 RX ADMIN — TECHNETIUM TC 99M SESTAMIBI 1 DOSE: 1 INJECTION INTRAVENOUS at 10:30

## 2018-05-07 RX ADMIN — REGADENOSON 0.4 MG: 0.08 INJECTION, SOLUTION INTRAVENOUS at 10:30

## 2018-05-08 LAB
BH CV NUCLEAR PRIOR STUDY: 3
BH CV STRESS BP STAGE 1: NORMAL
BH CV STRESS BP STAGE 2: NORMAL
BH CV STRESS COMMENTS STAGE 1: NORMAL
BH CV STRESS COMMENTS STAGE 2: NORMAL
BH CV STRESS DOSE REGADENOSON STAGE 1: 0.4
BH CV STRESS DURATION MIN STAGE 1: 0
BH CV STRESS DURATION MIN STAGE 2: 4
BH CV STRESS DURATION SEC STAGE 1: 10
BH CV STRESS DURATION SEC STAGE 2: 0
BH CV STRESS HR STAGE 1: 86
BH CV STRESS HR STAGE 2: 84
BH CV STRESS PROTOCOL 1: NORMAL
BH CV STRESS RECOVERY BP: NORMAL MMHG
BH CV STRESS RECOVERY HR: 84 BPM
BH CV STRESS STAGE 1: 1
BH CV STRESS STAGE 2: 2
LV EF NUC BP: 69 %
MAXIMAL PREDICTED HEART RATE: 156 BPM
PERCENT MAX PREDICTED HR: 55.13 %
STRESS BASELINE BP: NORMAL MMHG
STRESS BASELINE HR: 69 BPM
STRESS PERCENT HR: 65 %
STRESS POST PEAK BP: NORMAL MMHG
STRESS POST PEAK HR: 86 BPM
STRESS TARGET HR: 133 BPM

## 2018-05-09 ENCOUNTER — HOSPITAL ENCOUNTER (OUTPATIENT)
Facility: HOSPITAL | Age: 63
Setting detail: HOSPITAL OUTPATIENT SURGERY
Discharge: HOME | End: 2018-05-09
Attending: INTERNAL MEDICINE | Admitting: INTERNAL MEDICINE

## 2018-05-09 ENCOUNTER — ANESTHESIA (OUTPATIENT)
Dept: PERIOP | Facility: HOSPITAL | Age: 63
End: 2018-05-09

## 2018-05-09 ENCOUNTER — ANESTHESIA EVENT (OUTPATIENT)
Dept: PERIOP | Facility: HOSPITAL | Age: 63
End: 2018-05-09

## 2018-05-09 VITALS
WEIGHT: 216 LBS | SYSTOLIC BLOOD PRESSURE: 158 MMHG | BODY MASS INDEX: 39.75 KG/M2 | RESPIRATION RATE: 20 BRPM | DIASTOLIC BLOOD PRESSURE: 83 MMHG | HEIGHT: 62 IN | HEART RATE: 64 BPM | OXYGEN SATURATION: 100 % | TEMPERATURE: 98.2 F

## 2018-05-09 DIAGNOSIS — Z12.11 COLON CANCER SCREENING: ICD-10-CM

## 2018-05-09 DIAGNOSIS — R13.10 DYSPHAGIA, UNSPECIFIED TYPE: ICD-10-CM

## 2018-05-09 DIAGNOSIS — K62.5 RECTAL BLEEDING: ICD-10-CM

## 2018-05-09 DIAGNOSIS — R10.84 GENERALIZED ABDOMINAL PAIN: ICD-10-CM

## 2018-05-09 LAB — GLUCOSE BLDC GLUCOMTR-MCNC: 175 MG/DL (ref 70–130)

## 2018-05-09 PROCEDURE — 25010000002 PROPOFOL 10 MG/ML EMULSION: Performed by: NURSE ANESTHETIST, CERTIFIED REGISTERED

## 2018-05-09 PROCEDURE — 45378 DIAGNOSTIC COLONOSCOPY: CPT | Performed by: INTERNAL MEDICINE

## 2018-05-09 PROCEDURE — 43248 EGD GUIDE WIRE INSERTION: CPT | Performed by: INTERNAL MEDICINE

## 2018-05-09 PROCEDURE — 43239 EGD BIOPSY SINGLE/MULTIPLE: CPT | Performed by: INTERNAL MEDICINE

## 2018-05-09 PROCEDURE — 82962 GLUCOSE BLOOD TEST: CPT

## 2018-05-09 PROCEDURE — 25010000002 PROPOFOL 1000 MG/ML EMULSION: Performed by: NURSE ANESTHETIST, CERTIFIED REGISTERED

## 2018-05-09 RX ORDER — MEPERIDINE HYDROCHLORIDE 50 MG/ML
12.5 INJECTION INTRAMUSCULAR; INTRAVENOUS; SUBCUTANEOUS
Status: DISCONTINUED | OUTPATIENT
Start: 2018-05-09 | End: 2018-05-09 | Stop reason: HOSPADM

## 2018-05-09 RX ORDER — SODIUM CHLORIDE 0.9 % (FLUSH) 0.9 %
1-10 SYRINGE (ML) INJECTION AS NEEDED
Status: DISCONTINUED | OUTPATIENT
Start: 2018-05-09 | End: 2018-05-09 | Stop reason: HOSPADM

## 2018-05-09 RX ORDER — PROPOFOL 10 MG/ML
VIAL (ML) INTRAVENOUS AS NEEDED
Status: DISCONTINUED | OUTPATIENT
Start: 2018-05-09 | End: 2018-05-09 | Stop reason: SURG

## 2018-05-09 RX ORDER — IPRATROPIUM BROMIDE AND ALBUTEROL SULFATE 2.5; .5 MG/3ML; MG/3ML
3 SOLUTION RESPIRATORY (INHALATION) ONCE AS NEEDED
Status: DISCONTINUED | OUTPATIENT
Start: 2018-05-09 | End: 2018-05-09 | Stop reason: HOSPADM

## 2018-05-09 RX ORDER — FENTANYL CITRATE 50 UG/ML
50 INJECTION, SOLUTION INTRAMUSCULAR; INTRAVENOUS
Status: DISCONTINUED | OUTPATIENT
Start: 2018-05-09 | End: 2018-05-09 | Stop reason: HOSPADM

## 2018-05-09 RX ORDER — ONDANSETRON 2 MG/ML
4 INJECTION INTRAMUSCULAR; INTRAVENOUS ONCE AS NEEDED
Status: DISCONTINUED | OUTPATIENT
Start: 2018-05-09 | End: 2018-05-09 | Stop reason: HOSPADM

## 2018-05-09 RX ORDER — SODIUM CHLORIDE, SODIUM LACTATE, POTASSIUM CHLORIDE, CALCIUM CHLORIDE 600; 310; 30; 20 MG/100ML; MG/100ML; MG/100ML; MG/100ML
125 INJECTION, SOLUTION INTRAVENOUS CONTINUOUS
Status: DISCONTINUED | OUTPATIENT
Start: 2018-05-09 | End: 2018-05-09 | Stop reason: HOSPADM

## 2018-05-09 RX ORDER — OXYCODONE HYDROCHLORIDE AND ACETAMINOPHEN 5; 325 MG/1; MG/1
1 TABLET ORAL ONCE AS NEEDED
Status: DISCONTINUED | OUTPATIENT
Start: 2018-05-09 | End: 2018-05-09 | Stop reason: HOSPADM

## 2018-05-09 RX ADMIN — PROPOFOL 180 MCG/KG/MIN: 10 INJECTION, EMULSION INTRAVENOUS at 09:55

## 2018-05-09 RX ADMIN — PROPOFOL 40 MG: 10 INJECTION, EMULSION INTRAVENOUS at 09:55

## 2018-05-09 NOTE — H&P (VIEW-ONLY)
Chief Complaint   Patient presents with   • Rectal Bleeding   • Abdominal Pain   • Nausea       Catarino Arvizu is a 63 y.o. male who presents to the office today for follow up appointment for Rectal Bleeding; Abdominal Pain; and Nausea  .    HPI  63-year-old white male presents with long (greater than no one year) history of recurrent generalized abdominal pain.  He cannot identify consistent precipitating or palliating factors.  He reports recurrent nausea and swallowing difficulty.  He reports recurrent rectal bleeding.  Denies recent significant change in weight.  EGD and colonoscopy were performed many years ago, but he does not recall the findings of those examinations.  Family history is negative for GI disease.        Review of Systems   Constitutional: Positive for chills and fatigue. Negative for fever.   HENT: Negative for trouble swallowing.    Eyes: Positive for visual disturbance.   Respiratory: Positive for shortness of breath.    Cardiovascular: Positive for chest pain.   Gastrointestinal: Positive for abdominal distention, abdominal pain, anal bleeding, blood in stool, nausea and rectal pain. Negative for constipation, diarrhea and vomiting.   Endocrine: Negative.    Genitourinary: Negative for difficulty urinating.   Musculoskeletal: Negative.    Skin: Negative.    Allergic/Immunologic: Negative.    Neurological: Positive for dizziness, light-headedness and headaches. Negative for syncope.   Hematological: Bruises/bleeds easily.   Psychiatric/Behavioral: Positive for sleep disturbance. The patient is not nervous/anxious.        ACTIVE PROBLEMS:   Specialty Problems     None          PAST MEDICAL HISTORY:  Past Medical History:   Diagnosis Date   • Chest pain    • Chronic kidney disease    • CPAP (continuous positive airway pressure) dependence    • Diabetes mellitus    • GERD (gastroesophageal reflux disease)    • Heart murmur    • Hyperlipidemia    • Hypertension    • Irregular heart beat    •  Polio    • Prostate cancer 05/2016    Dr. Khalif Kohler MD- [place], [place]   • Shortness of breath    • Sleep apnea        SURGICAL HISTORY:  Past Surgical History:   Procedure Laterality Date   • CARDIAC CATHETERIZATION  2008    50% diffuse LAD stenosis, 50% septal  branch   • COLONOSCOPY      Long time ago   • HEMORRHOIDECTOMY     • HERNIA REPAIR     • UPPER GASTROINTESTINAL ENDOSCOPY      Long time ago   • URETHRAL DILATATION N/A 12/13/2017    Procedure: URETHRAL DILATATION;  Surgeon: Khalif Kohler MD;  Location: [facility];  Service:        FAMILY HISTORY:  Family History   Problem Relation Age of Onset   • Heart disease Brother    • Diabetes Brother    • Cancer Brother      not sure the kind   • Heart disease Brother    • Diabetes Brother    • Diabetes Sister    • Diabetes Daughter    • Heart disease Daughter    • Pancreatitis Daughter    • Crohn's disease Daughter    • Diabetes Son    • Heart disease Son        SOCIAL HISTORY:  Social History   Substance Use Topics   • Smoking status: Never Smoker   • Smokeless tobacco: Current User     Types: Chew      Comment: Chewed tobacco since he was 4 yrs old.   • Alcohol use No       CURRENT MEDICATION:    Current Outpatient Prescriptions:   •  amLODIPine (NORVASC) 10 MG tablet, Take 10 mg by mouth daily., Disp: , Rfl:   •  ASPIRIN ADULT LOW STRENGTH 81 MG EC tablet, TAKE 1 TABLET BY MOUTH EVERY DAY, Disp: 30 tablet, Rfl: 4  •  atorvastatin (LIPITOR) 80 MG tablet, Take 80 mg by mouth daily., Disp: , Rfl:   •  carvedilol (COREG) 6.25 MG tablet, TAKE 1 TABLET BY MOUTH TWICE A DAY, Disp: 60 tablet, Rfl: 5  •  cetirizine (ZyrTEC) 10 MG tablet, Take 10 mg by mouth daily., Disp: , Rfl:   •  fenofibrate (TRICOR) 145 MG tablet, TAKE 1/2 TABLET BY MOUTH EVERY DAY., Disp: 15 tablet, Rfl: 2  •  ferrous sulfate 325 (65 FE) MG tablet, TAKE 1 TABLET ONCE DAILY WITH BREAKFAST, Disp: 30 tablet, Rfl: 4  •  fluticasone (FLONASE) 50 MCG/ACT nasal spray, 2 sprays  into each nostril daily. Administer 1 spray in each nostril for each dose. , Disp: , Rfl:   •  glimepiride (AMARYL) 4 MG tablet, Take 4 mg by mouth 2 (two) times a day., Disp: , Rfl:   •  HYDROcodone-acetaminophen (NORCO)  MG per tablet, Take 1 tablet by mouth Every 4 (Four) Hours As Needed for Moderate Pain, Disp: 24 tablet, Rfl: 0  •  HYDROcodone-acetaminophen (NORCO) 7.5-325 MG per tablet, Take 1 tablet by mouth 2 (two) times a day as needed for moderate pain (4-6)., Disp: , Rfl:   •  ipratropium-albuterol (DUO-NEB) 0.5-2.5 mg/mL nebulizer, Take 3 mL by nebulization Every 4 (Four) Hours As Needed for Wheezing., Disp: , Rfl:   •  isosorbide mononitrate (IMDUR) 60 MG 24 hr tablet, TAKE 1 AND 1/2 TABLETS BYMOUTH ONCE DAILY., Disp: 45 tablet, Rfl: 4  •  levothyroxine (SYNTHROID, LEVOTHROID) 125 MCG tablet, Take 125 mcg by mouth Daily., Disp: , Rfl:   •  lisinopril (PRINIVIL,ZESTRIL) 10 MG tablet, Take 10 mg by mouth Daily., Disp: , Rfl:   •  losartan (COZAAR) 100 MG tablet, Take 100 mg by mouth Daily., Disp: , Rfl:   •  nitroglycerin (NITROSTAT) 0.4 MG SL tablet, Place 0.4 mg under the tongue every 5 (five) minutes as needed for chest pain. Take no more than 3 doses in 15 minutes., Disp: , Rfl:   •  ondansetron (ZOFRAN) 4 MG tablet, Take 1 tablet PRN nausea every 4 hours., Disp: 5 tablet, Rfl: 0  •  pantoprazole (PROTONIX) 40 MG EC tablet, Take 1 tablet by mouth 2 (Two) Times a Day With Meals., Disp: 60 tablet, Rfl: 3  •  polyethylene glycol (MIRALAX) powder, Take 17 g by mouth Daily. May take 1-4 times daily as needed for daily adequate bowel movement., Disp: 510 g, Rfl: 5  •  raNITIdine (ZANTAC) 300 MG tablet, Take 300 mg by mouth Every Night., Disp: , Rfl:   •  sennosides-docusate sodium (SENOKOT-S) 8.6-50 MG tablet, Take 2 tablets by mouth 2 (two) times a day., Disp: , Rfl:   •  sitaGLIPtin (JANUVIA) 50 MG tablet, Take 1 tablet by mouth daily., Disp: 30 tablet, Rfl: 3  •  SUMAtriptan (IMITREX) 50 MG tablet,  "Take one tablet at onset of headache. May repeat dose one time in 2 hours if headache not relieved., Disp: , Rfl:   •  tamsulosin (FLOMAX) 0.4 MG capsule 24 hr capsule, Take 1 capsule by mouth Every Night., Disp: 30 capsule, Rfl: 3  •  polyethylene glycol (GoLYTELY) 236 g solution, Starting at 6 pm on day prior to procedure, drink 8 ounces every 15 minutes until all gone or stools are clear. May add flavor packet., Disp: 4000 mL, Rfl: 0    ALLERGIES:  Review of patient's allergies indicates no known allergies.    VISIT VITALS:  /81   Pulse 86   Ht 157.5 cm (62\")   Wt 93.4 kg (206 lb)   SpO2 97%   BMI 37.68 kg/m²     Physical Exam   Constitutional: He is oriented to person, place, and time. He appears well-developed and well-nourished.   HENT:   Head: Normocephalic and atraumatic.   Eyes: Conjunctivae and EOM are normal. Pupils are equal, round, and reactive to light.   Neck: Normal range of motion. Neck supple.   Cardiovascular: Normal rate, regular rhythm and normal heart sounds.    Pulmonary/Chest: Effort normal and breath sounds normal.   Abdominal: Soft. Bowel sounds are normal.   Musculoskeletal: Normal range of motion.   Neurological: He is alert and oriented to person, place, and time. He has normal reflexes.   Skin: Skin is warm and dry.   Psychiatric: He has a normal mood and affect. His behavior is normal.   Nursing note and vitals reviewed.      Assessment/Plan      Diagnosis Plan   1. Generalized abdominal pain  Case Request   2. Dysphagia, unspecified type  Case Request   3. Rectal bleeding  Case Request   4. Colon cancer screening  Case Request     REC  I recommended that he undergo both EGD and screening/surveillance colonoscopy for further evaluation.  He may perhaps benefit from esophageal dilation.  The procedures, benefits, risks and alternatives were explained to the patient.  I have recommended no changes in his medical treatment at this time.    Return if symptoms worsen or fail to " brenda.         Bob Mcguire III, MD

## 2018-05-09 NOTE — ANESTHESIA PREPROCEDURE EVALUATION
Anesthesia Evaluation     Patient summary reviewed and Nursing notes reviewed   no history of anesthetic complications:  NPO Solid Status: > 8 hours  NPO Liquid Status: > 8 hours           Airway   Mallampati: II  TM distance: >3 FB  Neck ROM: full  no difficulty expected  Dental    (+) edentulous    Pulmonary - normal exam   (+) shortness of breath, sleep apnea on CPAP,   (-) not a smoker  Cardiovascular - normal exam  Exercise tolerance: good (4-7 METS)    NYHA Classification: II  Patient on routine beta blocker and Beta blocker given within 24 hours of surgery    (+) hypertension, valvular problems/murmurs, dysrhythmias, angina, hyperlipidemia,   (-) past MI      Neuro/Psych  (+) neuromuscular disease, headaches,     (-) seizures, CVA  GI/Hepatic/Renal/Endo    (+) obesity,  GERD, GI bleeding, renal disease, diabetes mellitus,     Musculoskeletal     (+) gait problem (uses cane),   Abdominal  - normal exam   Substance History - negative use     OB/GYN negative ob/gyn ROS         Other      history of cancer                      Anesthesia Plan    ASA 3     general     intravenous induction   Anesthetic plan and risks discussed with patient.  Use of blood products discussed with patient  Consented to blood products.

## 2018-05-09 NOTE — OP NOTE
05/09/18    ESOPHAGOGASTRODUODENOSCOPY WITH ESOPHAGEAL DILATION AND BIOPSY, COLONOSCOPY  Procedure Note    Catarino Arvizu  5/9/2018    Dr. Mcguire      Pre-op Diagnosis: Abdominal pain, nausea, dysphagia, rectal bleeding, colon cancer screening, date of last colonoscopy is unknown      Post-op Diagnosis:   -Normal EGD  -Normal colonoscopy (marginal bowel prep)        Anesthesia: Per Anesthesia service, general anesthesia      Estimated Blood Loss: Negligible      Findings: EGD performed with careful inspection of the esophagus, stomach, duodenum to the fourth portion.  All areas appeared normal.  Biopsies were taken randomly from the esophagus and gastric antrum.  The esophagus was dilated using a 48 [place] Savary dilator.  Reexamination of the esophagus, using the endoscope, revealed no injury to the esophagus.    Normal rectal examination.  Colonoscopy was performed to the cecum, confirmed by identification of typical cecal anatomy.  Bowel preparation was fair.  All areas were examined carefully.  The scope was retroflexed within the rectum.  No abnormality was seen in the colon.    He tolerated the procedures well and there were no complications.  He left the OR in stable and satisfactory condition.          Specimens: Esophagus, gastric antrum      Grafts/Implants: N/A      Complications: None      Recommendations:   Findings were discussed with the patient  Await biopsy findings  Repeat screening/surveillance colonoscopy in about 5 years (sooner if clinically indicated)        Bob Mcguire III, MD     Date: 5/9/2018  Time: 10:14 AM

## 2018-05-09 NOTE — ANESTHESIA POSTPROCEDURE EVALUATION
Patient: Catarino Arvizu    Procedure Summary     Date:  05/09/18 Room / Location:  [facility] OR [address] [place] [address] COR OR    Anesthesia Start:  0953 Anesthesia Stop:  1015    Procedures:       ESOPHAGOGASTRODUODENOSCOPY WITH BIOPSY  CPTCODE:52886 (N/A Esophagus)      COLONOSCOPY  CPTCODE:81824 (N/A ) Diagnosis:       Rectal bleeding      Generalized abdominal pain      Colon cancer screening      Dysphagia, unspecified type      (Rectal bleeding [K62.5])      (Generalized abdominal pain [R10.84])      (Colon cancer screening [Z12.11])      (Dysphagia, unspecified type [R13.10])    Surgeon:  Bob Mcguire III, MD Provider:  Serjio Perrin MD    Anesthesia Type:  general ASA Status:  3          Anesthesia Type: general  Last vitals  BP   166/74 (05/09/18 0833)   Temp   97.1 °F (36.2 °C) (05/09/18 0833)   Pulse   71 (05/09/18 0833)   Resp   20 (05/09/18 0833)     SpO2   98 % (05/09/18 0833)     Post Anesthesia Care and Evaluation    Patient location during evaluation: PHASE II  Patient participation: complete - patient participated  Level of consciousness: awake and alert  Pain score: 1  Pain management: adequate  Airway patency: patent  Anesthetic complications: No anesthetic complications  PONV Status: controlled  Cardiovascular status: acceptable  Respiratory status: acceptable  Hydration status: acceptable

## 2018-05-10 LAB
BH CV ECHO MEAS - % IVS THICK: -3.7 %
BH CV ECHO MEAS - % LVPW THICK: 13.5 %
BH CV ECHO MEAS - ACS: 1.9 CM
BH CV ECHO MEAS - AO MAX PG: 18.6 MMHG
BH CV ECHO MEAS - AO MEAN PG: 7.3 MMHG
BH CV ECHO MEAS - AO ROOT AREA (BSA CORRECTED): 1.6
BH CV ECHO MEAS - AO ROOT AREA: 7.3 CM^2
BH CV ECHO MEAS - AO ROOT DIAM: 3 CM
BH CV ECHO MEAS - AO V2 MAX: 215.8 CM/SEC
BH CV ECHO MEAS - AO V2 MEAN: 121.4 CM/SEC
BH CV ECHO MEAS - AO V2 VTI: 50.9 CM
BH CV ECHO MEAS - BSA(HAYCOCK): 2.1 M^2
BH CV ECHO MEAS - BSA: 1.9 M^2
BH CV ECHO MEAS - BZI_BMI: 37.7 KILOGRAMS/M^2
BH CV ECHO MEAS - BZI_METRIC_HEIGHT: 157.5 CM
BH CV ECHO MEAS - BZI_METRIC_WEIGHT: 93.4 KG
BH CV ECHO MEAS - CONTRAST EF 4CH: 68.1 ML/M^2
BH CV ECHO MEAS - EDV(CUBED): 75 ML
BH CV ECHO MEAS - EDV(MOD-SP4): 69 ML
BH CV ECHO MEAS - EDV(TEICH): 79.3 ML
BH CV ECHO MEAS - EF(CUBED): 66.7 %
BH CV ECHO MEAS - EF(MOD-SP4): 68.1 %
BH CV ECHO MEAS - EF(TEICH): 58.7 %
BH CV ECHO MEAS - ESV(CUBED): 24.9 ML
BH CV ECHO MEAS - ESV(MOD-SP4): 22 ML
BH CV ECHO MEAS - ESV(TEICH): 32.8 ML
BH CV ECHO MEAS - FS: 30.7 %
BH CV ECHO MEAS - IVS/LVPW: 0.98
BH CV ECHO MEAS - IVSD: 1.1 CM
BH CV ECHO MEAS - IVSS: 1.1 CM
BH CV ECHO MEAS - LA DIMENSION: 3.4 CM
BH CV ECHO MEAS - LA/AO: 1.1
BH CV ECHO MEAS - LV DIASTOLIC VOL/BSA (35-75): 35.6 ML/M^2
BH CV ECHO MEAS - LV MASS(C)D: 161.7 GRAMS
BH CV ECHO MEAS - LV MASS(C)DI: 83.5 GRAMS/M^2
BH CV ECHO MEAS - LV MASS(C)S: 101.5 GRAMS
BH CV ECHO MEAS - LV MASS(C)SI: 52.4 GRAMS/M^2
BH CV ECHO MEAS - LV SYSTOLIC VOL/BSA (12-30): 11.4 ML/M^2
BH CV ECHO MEAS - LVIDD: 4.2 CM
BH CV ECHO MEAS - LVIDS: 2.9 CM
BH CV ECHO MEAS - LVLD AP4: 7.9 CM
BH CV ECHO MEAS - LVLS AP4: 5.9 CM
BH CV ECHO MEAS - LVOT AREA (M): 3.1 CM^2
BH CV ECHO MEAS - LVOT AREA: 3.2 CM^2
BH CV ECHO MEAS - LVOT DIAM: 2 CM
BH CV ECHO MEAS - LVPWD: 1.1 CM
BH CV ECHO MEAS - LVPWS: 1.3 CM
BH CV ECHO MEAS - MV A MAX VEL: 91.8 CM/SEC
BH CV ECHO MEAS - MV E MAX VEL: 72.1 CM/SEC
BH CV ECHO MEAS - MV E/A: 0.78
BH CV ECHO MEAS - PA ACC SLOPE: 1568 CM/SEC^2
BH CV ECHO MEAS - PA ACC TIME: 0.11 SEC
BH CV ECHO MEAS - PA PR(ACCEL): 31.5 MMHG
BH CV ECHO MEAS - RVDD: 1.2 CM
BH CV ECHO MEAS - SI(AO): 191.1 ML/M^2
BH CV ECHO MEAS - SI(CUBED): 25.8 ML/M^2
BH CV ECHO MEAS - SI(MOD-SP4): 24.3 ML/M^2
BH CV ECHO MEAS - SI(TEICH): 24 ML/M^2
BH CV ECHO MEAS - SV(AO): 369.9 ML
BH CV ECHO MEAS - SV(CUBED): 50 ML
BH CV ECHO MEAS - SV(MOD-SP4): 47 ML
BH CV ECHO MEAS - SV(TEICH): 46.5 ML
LAB AP CASE REPORT: NORMAL
Lab: NORMAL
PATH REPORT.FINAL DX SPEC: NORMAL

## 2018-06-18 ENCOUNTER — OFFICE VISIT (OUTPATIENT)
Dept: UROLOGY | Facility: CLINIC | Age: 63
End: 2018-06-18

## 2018-06-18 VITALS — WEIGHT: 216 LBS | BODY MASS INDEX: 39.75 KG/M2 | HEIGHT: 62 IN

## 2018-06-18 DIAGNOSIS — N35.119 POSTINFECTIVE URETHRAL STRICTURE IN MALE: ICD-10-CM

## 2018-06-18 DIAGNOSIS — C61 PROSTATE CANCER (HCC): Primary | ICD-10-CM

## 2018-06-18 LAB — PSA SERPL-MCNC: 1.45 NG/ML (ref 0–4)

## 2018-06-18 PROCEDURE — 84153 ASSAY OF PSA TOTAL: CPT | Performed by: UROLOGY

## 2018-06-18 PROCEDURE — 99214 OFFICE O/P EST MOD 30 MIN: CPT | Performed by: UROLOGY

## 2018-06-18 NOTE — PROGRESS NOTES
Chief Complaint:          Chief Complaint   Patient presents with   • Meatal Stenosis       HPI:   63 y.o. male.  63-year-old white male prostate cancer on intermittent Lupron with significant meatal stenosis.  Since starting with the CleverSet meatal dilator he is now asymptomatic.  He has no burning, blood, discharge, decreased force of stream is prostate cancer seems to be stable.  He complains of itchiness to the scrotal sac.    Past Medical History:        Past Medical History:   Diagnosis Date   • Chest pain    • Chronic kidney disease    • CPAP (continuous positive airway pressure) dependence    • Diabetes mellitus    • Elevated cholesterol    • GERD (gastroesophageal reflux disease)    • Heart murmur    • Hyperlipidemia    • Hypertension    • Irregular heart beat    • Polio    • Prostate cancer 05/2016    Dr. Khalif Kohler MD- [place], [place]   • Shortness of breath    • Sleep apnea          Current Meds:     Current Outpatient Prescriptions   Medication Sig Dispense Refill   • amLODIPine (NORVASC) 10 MG tablet Take 10 mg by mouth daily.     • ASPIRIN ADULT LOW STRENGTH 81 MG EC tablet TAKE 1 TABLET BY MOUTH EVERY DAY 30 tablet 4   • atorvastatin (LIPITOR) 80 MG tablet Take 80 mg by mouth daily.     • carvedilol (COREG) 6.25 MG tablet TAKE 1 TABLET BY MOUTH TWICE A DAY 60 tablet 5   • cetirizine (ZyrTEC) 10 MG tablet Take 10 mg by mouth daily.     • fenofibrate (TRICOR) 145 MG tablet TAKE 1/2 TABLET BY MOUTH EVERY DAY. 15 tablet 2   • ferrous sulfate 325 (65 FE) MG tablet TAKE 1 TABLET ONCE DAILY WITH BREAKFAST 30 tablet 4   • fluticasone (FLONASE) 50 MCG/ACT nasal spray 2 sprays into each nostril daily. Administer 1 spray in each nostril for each dose.      • glimepiride (AMARYL) 4 MG tablet Take 4 mg by mouth 2 (two) times a day.     • HYDROcodone-acetaminophen (NORCO)  MG per tablet Take 1 tablet by mouth Every 4 (Four) Hours As Needed for Moderate Pain 24 tablet 0   • HYDROcodone-acetaminophen  (NORCO) 7.5-325 MG per tablet Take 1 tablet by mouth 2 (two) times a day as needed for moderate pain (4-6).     • ipratropium-albuterol (DUO-NEB) 0.5-2.5 mg/mL nebulizer Take 3 mL by nebulization Every 4 (Four) Hours As Needed for Wheezing.     • isosorbide mononitrate (IMDUR) 60 MG 24 hr tablet TAKE 1 AND 1/2 TABLETS BYMOUTH ONCE DAILY. 45 tablet 4   • levothyroxine (SYNTHROID, LEVOTHROID) 125 MCG tablet Take 125 mcg by mouth Daily.     • lisinopril (PRINIVIL,ZESTRIL) 10 MG tablet Take 10 mg by mouth Daily.     • losartan (COZAAR) 100 MG tablet Take 100 mg by mouth Daily.     • nitroglycerin (NITROSTAT) 0.4 MG SL tablet Place 0.4 mg under the tongue every 5 (five) minutes as needed for chest pain. Take no more than 3 doses in 15 minutes.     • ondansetron (ZOFRAN) 4 MG tablet Take 1 tablet PRN nausea every 4 hours. 5 tablet 0   • pantoprazole (PROTONIX) 40 MG EC tablet Take 1 tablet by mouth 2 (Two) Times a Day With Meals. 60 tablet 3   • polyethylene glycol (GoLYTELY) 236 g solution Starting at 6 pm on day prior to procedure, drink 8 ounces every 15 minutes until all gone or stools are clear. May add flavor packet. 4000 mL 0   • polyethylene glycol (MIRALAX) powder Take 17 g by mouth Daily. May take 1-4 times daily as needed for daily adequate bowel movement. 510 g 5   • raNITIdine (ZANTAC) 300 MG tablet Take 300 mg by mouth Every Night.     • sennosides-docusate sodium (SENOKOT-S) 8.6-50 MG tablet Take 2 tablets by mouth 2 (two) times a day.     • sitaGLIPtin (JANUVIA) 50 MG tablet Take 1 tablet by mouth daily. 30 tablet 3   • SUMAtriptan (IMITREX) 50 MG tablet Take one tablet at onset of headache. May repeat dose one time in 2 hours if headache not relieved.     • tamsulosin (FLOMAX) 0.4 MG capsule 24 hr capsule Take 1 capsule by mouth Every Night. 30 capsule 3     No current facility-administered medications for this visit.         Allergies:      No Known Allergies     Past Surgical History:     Past Surgical  History:   Procedure Laterality Date   • CARDIAC CATHETERIZATION  2008    50% diffuse LAD stenosis, 50% septal  branch   • COLONOSCOPY      Long time ago   • COLONOSCOPY N/A 5/9/2018    Procedure: COLONOSCOPY  CPTCODE:97435;  Surgeon: Bob Mcguire III, MD;  Location: [facility];  Service: Gastroenterology   • ENDOSCOPY N/A 5/9/2018    Procedure: ESOPHAGOGASTRODUODENOSCOPY WITH BIOPSY  CPTCODE:49460;  Surgeon: Bob Mcguire III, MD;  Location: [facility];  Service: Gastroenterology   • HEMORRHOIDECTOMY     • HERNIA REPAIR     • UPPER GASTROINTESTINAL ENDOSCOPY      Long time ago   • URETHRAL DILATATION N/A 12/13/2017    Procedure: URETHRAL DILATATION;  Surgeon: Khalif Kohler MD;  Location: [facility];  Service:          Social History:     Social History     Social History   • Marital status:      Spouse name: N/A   • Number of children: N/A   • Years of education: N/A     Occupational History   • Not on file.     Social History Main Topics   • Smoking status: Never Smoker   • Smokeless tobacco: Current User     Types: Chew      Comment: Chewed tobacco since he was 4 yrs old.   • Alcohol use No   • Drug use: No   • Sexual activity: Defer     Other Topics Concern   • Not on file     Social History Narrative   • No narrative on file       Family History:     Family History   Problem Relation Age of Onset   • Heart disease Brother    • Diabetes Brother    • Cancer Brother         not sure the kind   • Heart disease Brother    • Diabetes Brother    • Diabetes Sister    • Diabetes Daughter    • Heart disease Daughter    • Pancreatitis Daughter    • Crohn's disease Daughter    • Diabetes Son    • Heart disease Son        Review of Systems:     Review of Systems   Constitutional: Negative.    HENT: Negative.    Eyes: Negative.    Respiratory: Negative.    Cardiovascular: Negative.    Gastrointestinal: Negative.    Endocrine: Negative.    Musculoskeletal: Negative.    Allergic/Immunologic:  Negative.    Neurological: Negative.    Hematological: Negative.    Psychiatric/Behavioral: Negative.        Physical Exam:     Physical Exam   Constitutional: He is oriented to person, place, and time. He appears well-developed and well-nourished.   HENT:   Head: Normocephalic and atraumatic.   Eyes: Conjunctivae and EOM are normal. Pupils are equal, round, and reactive to light.   Neck: Normal range of motion.   Cardiovascular: Normal rate, regular rhythm, normal heart sounds and intact distal pulses.    Pulmonary/Chest: Effort normal and breath sounds normal.   Abdominal: Soft. Bowel sounds are normal.   Genitourinary: Penis normal.   Genitourinary Comments: Normal scrotal sac without irritation or erythema   Musculoskeletal: Normal range of motion.   Neurological: He is alert and oriented to person, place, and time. He has normal reflexes.   Skin: Skin is warm and dry.   Psychiatric: He has a normal mood and affect. His behavior is normal. Judgment and thought content normal.   Nursing note and vitals reviewed.      I have reviewed the following portions of the patient's history: allergies, current medications, past family history, past medical history, past social history, past surgical history, problem list and ROS and confirm it's accurate.      Procedure:       Assessment/Plan:   Prostate cancer:  He returns today status post biopsy for extensive discussion of prostate cancer.  We discussed staging, and grading of the disease.  I described with a Sanju system being from a 2-10 scale with the most common low-grade pattern being a [place] 6.  I discussed the staging workup including a total body bone scan and CT scan especially with a PSA is greater than 10.  We discussed the various options at length I discussed a radical retropubic prostatectomy done in the traditional fashion and using the robotic technique.  I then discussed radiation treatment both seed therapy and external beam therapy using Gold  fiduciary markers.  We talked about some of the other alternatives such as a cryosurgical ablation at high intensity focused ultrasound as being viable alternatives but not recommended at this time.  He focused on aggressive watchful waiting and explained that currently low literature it's been discovered that a lot of men can actually observe it especially with numerous other comorbidities cannot have problems from the cancer by itself.  Talked about hormonal ablation.  In the side effects of creation of insulin resistance.  Overall, the patient was given appropriate literature is going to think about it and I'm going to revisit the topic with them after her next office visit  Prostate cancer seems to be doing much better his PSAs of been castrate   Meatal stenosis-continue intermittent dilation  Patient's Body mass index is 39.5 kg/m². BMI is above normal parameters. Recommendations include: educational material.          This document has been electronically signed by CLAUDIO LEAL MD June 18, 2018 12:51 PM

## 2018-06-19 ENCOUNTER — TELEPHONE (OUTPATIENT)
Dept: UROLOGY | Facility: CLINIC | Age: 63
End: 2018-06-19

## 2018-07-02 ENCOUNTER — OFFICE VISIT (OUTPATIENT)
Dept: CARDIOLOGY | Facility: CLINIC | Age: 63
End: 2018-07-02

## 2018-07-02 ENCOUNTER — HOSPITAL ENCOUNTER (OUTPATIENT)
Dept: RESPIRATORY THERAPY | Facility: HOSPITAL | Age: 63
Discharge: HOME | End: 2018-07-02
Admitting: PHYSICIAN ASSISTANT

## 2018-07-02 VITALS
SYSTOLIC BLOOD PRESSURE: 145 MMHG | BODY MASS INDEX: 38.87 KG/M2 | OXYGEN SATURATION: 98 % | HEIGHT: 62 IN | WEIGHT: 211.2 LBS | DIASTOLIC BLOOD PRESSURE: 76 MMHG | HEART RATE: 72 BPM

## 2018-07-02 DIAGNOSIS — R00.2 PALPITATIONS: Primary | ICD-10-CM

## 2018-07-02 DIAGNOSIS — R42 DIZZINESS: ICD-10-CM

## 2018-07-02 DIAGNOSIS — R07.2 PRECORDIAL PAIN: ICD-10-CM

## 2018-07-02 DIAGNOSIS — I10 ESSENTIAL HYPERTENSION: ICD-10-CM

## 2018-07-02 DIAGNOSIS — R00.2 PALPITATIONS: ICD-10-CM

## 2018-07-02 PROCEDURE — 93000 ELECTROCARDIOGRAM COMPLETE: CPT | Performed by: PHYSICIAN ASSISTANT

## 2018-07-02 PROCEDURE — 93225 XTRNL ECG REC<48 HRS REC: CPT

## 2018-07-02 PROCEDURE — 93226 XTRNL ECG REC<48 HR SCAN A/R: CPT

## 2018-07-02 PROCEDURE — 99214 OFFICE O/P EST MOD 30 MIN: CPT | Performed by: PHYSICIAN ASSISTANT

## 2018-07-02 RX ORDER — ISOSORBIDE MONONITRATE 120 MG/1
120 TABLET, EXTENDED RELEASE ORAL DAILY
Qty: 30 TABLET | Refills: 5 | Status: SHIPPED | OUTPATIENT
Start: 2018-07-02 | End: 2018-12-19 | Stop reason: SDUPTHER

## 2018-07-02 RX ORDER — HYDROXYZINE PAMOATE 25 MG/1
25 CAPSULE ORAL EVERY 6 HOURS PRN
COMMUNITY

## 2018-07-02 RX ORDER — CYCLOBENZAPRINE HCL 10 MG
10 TABLET ORAL 3 TIMES DAILY PRN
Status: ON HOLD | COMMUNITY
End: 2018-11-13

## 2018-07-02 RX ORDER — INSULIN GLARGINE 100 [IU]/ML
60 INJECTION, SOLUTION SUBCUTANEOUS NIGHTLY
Status: ON HOLD | COMMUNITY
End: 2019-01-15 | Stop reason: SDUPTHER

## 2018-07-02 RX ORDER — CEPHALEXIN 500 MG/1
500 CAPSULE ORAL 2 TIMES DAILY
Status: ON HOLD | COMMUNITY
End: 2018-11-13

## 2018-07-02 RX ORDER — FENOFIBRATE 145 MG/1
145 TABLET, COATED ORAL DAILY
COMMUNITY
End: 2019-01-15 | Stop reason: HOSPADM

## 2018-07-02 RX ORDER — CARVEDILOL 6.25 MG/1
6.25 TABLET ORAL 2 TIMES DAILY WITH MEALS
Qty: 60 TABLET | Refills: 5 | Status: SHIPPED | OUTPATIENT
Start: 2018-07-02 | End: 2018-07-02 | Stop reason: SDUPTHER

## 2018-07-02 RX ORDER — CARVEDILOL 12.5 MG/1
TABLET ORAL
Qty: 90 TABLET | Refills: 5 | OUTPATIENT
Start: 2018-07-02 | End: 2018-07-16

## 2018-07-02 RX ORDER — CARVEDILOL 3.12 MG/1
3.12 TABLET ORAL 2 TIMES DAILY WITH MEALS
COMMUNITY
End: 2018-07-02 | Stop reason: SDUPTHER

## 2018-07-02 NOTE — PROGRESS NOTES
Eleno Chaney, MISHEL  Catarino Arvizu  1955  07/02/2018    Patient Active Problem List   Diagnosis   • Chest pain   • Shortness of breath   • Type 2 diabetes mellitus   • Dyslipidemia   • History of poliomyelitis   • Unstable angina (CMS/HCC)   • Prostate cancer (CMS/HCC)   • Benign non-nodular prostatic hyperplasia with lower urinary tract symptoms   • Urethral stricture   • Essential hypertension   • External hemorrhoids   • Rectal bleeding   • Generalized abdominal pain   • Colon cancer screening   • Dysphagia   • BXO (balanitis xerotica obliterans)     Dear Eleno Chaney, MISHEL:    Chief Complaint   Patient presents with   • Follow-up   • Med Management     Med list.    • Results     Stess and Echo   • Chest Pain   • Shortness of Breath   • Dizziness   • Palpitations     Fast     Subjective     Catarino Arvizu is a 63 y.o. male with a past medical history significant for hypertension, dyslipidemia, IDDM, and a history of polio with deformities of the bilateral lower extremities. He presents to the office today for a follow up visit regarding recent chest pains. He was evaluated further with a nuclear stress test which was negative for evidence of myocardial ischemia and over all consistent with a low risk study. He also had a transthoracic echocardiogram revealing a normal EF at 60-65%. There was diastolic dysfunction noted. He reports continued intermittent chest pains and shortness of breath with no improvement compared to his last visit. He also reports palpitations which occur daily and are associated with dizziness. No near syncope or actual syncopal episodes.       Current Outpatient Prescriptions:   •  amLODIPine (NORVASC) 10 MG tablet, Take 10 mg by mouth daily., Disp: , Rfl:   •  ASPIRIN ADULT LOW STRENGTH 81 MG EC tablet, TAKE 1 TABLET BY MOUTH EVERY DAY, Disp: 30 tablet, Rfl: 4  •  atorvastatin (LIPITOR) 80 MG tablet, Take 80 mg by mouth daily., Disp: , Rfl:   •  carvedilol (COREG)  6.25 MG tablet, Take 1 tablet by mouth 2 (Two) Times a Day With Meals., Disp: 60 tablet, Rfl: 5  •  cephalexin (KEFLEX) 500 MG capsule, Take 500 mg by mouth 2 (Two) Times a Day., Disp: , Rfl:   •  cetirizine (ZyrTEC) 10 MG tablet, Take 10 mg by mouth daily., Disp: , Rfl:   •  cyclobenzaprine (FLEXERIL) 10 MG tablet, Take 10 mg by mouth 3 (Three) Times a Day As Needed for Muscle Spasms., Disp: , Rfl:   •  fenofibrate (TRICOR) 145 MG tablet, Take 145 mg by mouth Daily., Disp: , Rfl:   •  ferrous sulfate 325 (65 FE) MG tablet, TAKE 1 TABLET ONCE DAILY WITH BREAKFAST, Disp: 30 tablet, Rfl: 4  •  glimepiride (AMARYL) 4 MG tablet, Take 4 mg by mouth 2 (two) times a day., Disp: , Rfl:   •  HYDROcodone-acetaminophen (NORCO) 7.5-325 MG per tablet, Take 1 tablet by mouth 2 (two) times a day as needed for moderate pain (4-6)., Disp: , Rfl:   •  hydrOXYzine (VISTARIL) 25 MG capsule, Take 25 mg by mouth 3 (Three) Times a Day As Needed for Itching., Disp: , Rfl:   •  Insulin Glargine (BASAGLAR KWIKPEN) 100 UNIT/ML injection pen, Inject  under the skin., Disp: , Rfl:   •  isosorbide mononitrate (IMDUR) 120 MG 24 hr tablet, Take 1 tablet by mouth Daily., Disp: 30 tablet, Rfl: 5  •  nitroglycerin (NITROSTAT) 0.4 MG SL tablet, Place 0.4 mg under the tongue every 5 (five) minutes as needed for chest pain. Take no more than 3 doses in 15 minutes., Disp: , Rfl:   •  pantoprazole (PROTONIX) 40 MG EC tablet, Take 1 tablet by mouth 2 (Two) Times a Day With Meals., Disp: 60 tablet, Rfl: 3  •  raNITIdine (ZANTAC) 300 MG tablet, Take 300 mg by mouth Every Night., Disp: , Rfl:   •  sennosides-docusate sodium (SENOKOT-S) 8.6-50 MG tablet, Take 2 tablets by mouth 2 (two) times a day., Disp: , Rfl:   •  sitaGLIPtin (JANUVIA) 50 MG tablet, Take 1 tablet by mouth daily., Disp: 30 tablet, Rfl: 3  •  triamcinolone (KENALOG) 0.1 % ointment, Apply  topically 2 (Two) Times a Day., Disp: , Rfl:     The following portions of the patient's history were  "reviewed and updated as appropriate: allergies, current medications, past family history, past medical history, past social history, past surgical history and problem list.    Social History     Social History   • Marital status:      Spouse name: N/A   • Number of children: N/A   • Years of education: N/A     Occupational History   • Not on file.     Social History Main Topics   • Smoking status: Never Smoker   • Smokeless tobacco: Current User     Types: Chew      Comment: Chewed tobacco since he was 4 yrs old.   • Alcohol use No   • Drug use: No   • Sexual activity: Defer     Other Topics Concern   • Not on file     Social History Narrative   • No narrative on file     Review of Systems   Cardiovascular: Positive for chest pain, leg swelling and palpitations. Negative for syncope.   Respiratory: Positive for shortness of breath.    Neurological: Positive for dizziness.     Objective   Blood pressure 145/76, pulse 72, height 157.5 cm (62\"), weight 95.8 kg (211 lb 3.2 oz), SpO2 98 %.  Body mass index is 38.63 kg/m².    Physical Exam   Constitutional: He is oriented to person, place, and time. He appears well-developed and well-nourished. No distress.   HENT:   Head: Normocephalic and atraumatic.   Eyes: Conjunctivae are normal. Right eye exhibits no discharge. Left eye exhibits no discharge.   Neck: Normal range of motion. Neck supple. Carotid bruit is not present.   Cardiovascular: Normal rate and regular rhythm.  Exam reveals no gallop and no friction rub.    Murmur (Soft systolic murmur left lower sternal border. 2/6.) heard.  Pulmonary/Chest: Effort normal and breath sounds normal. No respiratory distress. He has no wheezes. He has no rales. He exhibits no tenderness.   Musculoskeletal: Normal range of motion. He exhibits no edema.   Deformities noted bilateral lower extremities.    Neurological: He is alert and oriented to person, place, and time.   Skin: Skin is warm and dry. No rash noted. He is not " diaphoretic. No erythema. No pallor.   Psychiatric: He has a normal mood and affect. His behavior is normal.   Nursing note and vitals reviewed.      ECG 12 Lead  Date/Time: 7/2/2018 1:26 PM  Performed by: JET CELESTIN  Authorized by: JET CELESTIN   Comparison: compared with previous ECG   Rhythm: sinus rhythm  Rate: normal  BPM: 72  Conduction: LAFB  ST Depression: V5 and V6  T flattening: aVL  QRS axis: left  Other findings: PRWP  Clinical impression: non-specific ECG  Comments: Sinus rhythm at a rate of 72 bpm with a LAFB and PRWP. Chronic nonspecific ST/T abnormalities with no significant change compared to previous EKG. QTc 396ms.         Nuclear Stress Test 05/07/18  · Findings consistent with a normal ECG stress test.  · Myocardial perfusion imaging indicates a normal myocardial perfusion study with no evidence of ischemia.  · TID:1.29.  · Normal LV cavity size. Normal LV wall motion noted.  · Left ventricular ejection fraction is normal (Calculated EF = 69%).  · Impressions are consistent with a low risk study.     Transthoracic Echocardiogram 05/07/18  · Normal left ventricular cavity size and wall thickness noted.  · Left ventricular systolic function is normal.Estimated EF appears to be in the range of 61 - 65%.  · Left ventricular diastolic dysfunction (grade I) consistent with impaired relaxation.  · No significant valvular heart disease  · Trace aortic valve regurgitation is present. No aortic valve stenosis is present.  · There is no evidence of pericardial effusion.  Assessment:        Diagnosis Plan   1. Palpitations  Holter Monitor - 24 Hour   2. Precordial pain     3. Dizziness  Holter Monitor - 24 Hour   4. Essential hypertension          Plan:       1. I have reviewed the results of his stress test and echocardiogram with him.   2. Continue low dose aspirin and atorvastatin.   3. Increase imdur to 120mg daily.   4. Increase carvedilol to 6.25mg BID.   5. Will evaluate his  palpitations further with a 24 hour holter monitor and adjust therapy as needed.   6. If he has continued or increasing chest discomfort/shortness of breath despite advancing medical therapy, may consider left heart catheterization.   7. Follow up in 3-4 weeks or sooner if needed.       Addendum: His pharmacy called and states he has actually been taking the carvedilol 12.5mg BID. Hence, will increase to 18.75mg BID.     No Follow-up on file.    I appreciate the opportunity to participate in this patient's cardiovascular care.    Best Regards,    Sarai Skinner PA-C

## 2018-07-04 PROCEDURE — 93227 XTRNL ECG REC<48 HR R&I: CPT | Performed by: INTERNAL MEDICINE

## 2018-07-06 RX ORDER — ASPIRIN 81 MG
TABLET, DELAYED RELEASE (ENTERIC COATED) ORAL
Qty: 30 TABLET | Refills: 2 | Status: SHIPPED | OUTPATIENT
Start: 2018-07-06 | End: 2018-10-25 | Stop reason: SDUPTHER

## 2018-07-06 RX ORDER — FERROUS SULFATE 325(65) MG
TABLET ORAL
Qty: 30 TABLET | Refills: 2 | Status: SHIPPED | OUTPATIENT
Start: 2018-07-06 | End: 2018-10-25 | Stop reason: SDUPTHER

## 2018-07-09 ENCOUNTER — TELEPHONE (OUTPATIENT)
Dept: CARDIOLOGY | Facility: CLINIC | Age: 63
End: 2018-07-09

## 2018-07-09 DIAGNOSIS — R07.9 CHEST PAIN, UNSPECIFIED TYPE: Primary | ICD-10-CM

## 2018-07-09 NOTE — TELEPHONE ENCOUNTER
Spoke with patients daughter and stated he is in the bed resting. He has been c/o cp's.  I advised her he needs to be seen in ER if symptoms continue.  Also advised patient to get labs performed now.  She voiced understanding.

## 2018-07-09 NOTE — TELEPHONE ENCOUNTER
----- Message from KARLEE Herrmann sent at 7/6/2018  9:41 PM EDT -----  Occasional extra beats. Short runs of fast heart beat noted. Avoid caffeine. Drink plenty of water. Beta blocker recently adjusted. Check BMP, TSH, and Magnesium.

## 2018-07-10 NOTE — TELEPHONE ENCOUNTER
Yes I would advise patient to go to the ER if he is having chest pains.  And to get the blood work ASAP

## 2018-07-11 ENCOUNTER — LAB (OUTPATIENT)
Dept: LAB | Facility: HOSPITAL | Age: 63
End: 2018-07-11

## 2018-07-11 ENCOUNTER — TELEPHONE (OUTPATIENT)
Dept: CARDIOLOGY | Facility: CLINIC | Age: 63
End: 2018-07-11

## 2018-07-11 DIAGNOSIS — R07.9 CHEST PAIN, UNSPECIFIED TYPE: ICD-10-CM

## 2018-07-11 LAB
ANION GAP SERPL CALCULATED.3IONS-SCNC: 9.8 MMOL/L (ref 3.6–11.2)
BUN BLD-MCNC: 32 MG/DL (ref 7–21)
BUN/CREAT SERPL: 11.9 (ref 7–25)
CALCIUM SPEC-SCNC: 8.9 MG/DL (ref 7.7–10)
CHLORIDE SERPL-SCNC: 111 MMOL/L (ref 99–112)
CO2 SERPL-SCNC: 19.2 MMOL/L (ref 24.3–31.9)
CREAT BLD-MCNC: 2.69 MG/DL (ref 0.43–1.29)
GFR SERPL CREATININE-BSD FRML MDRD: 24 ML/MIN/1.73
GLUCOSE BLD-MCNC: 194 MG/DL (ref 70–110)
MAGNESIUM SERPL-MCNC: 2.3 MG/DL (ref 1.7–2.6)
OSMOLALITY SERPL CALC.SUM OF ELEC: 291.6 MOSM/KG (ref 273–305)
POTASSIUM BLD-SCNC: 4.8 MMOL/L (ref 3.5–5.3)
SODIUM BLD-SCNC: 140 MMOL/L (ref 135–153)
TSH SERPL DL<=0.05 MIU/L-ACNC: 1.83 MIU/ML (ref 0.55–4.78)

## 2018-07-11 PROCEDURE — 84443 ASSAY THYROID STIM HORMONE: CPT

## 2018-07-11 PROCEDURE — 36415 COLL VENOUS BLD VENIPUNCTURE: CPT

## 2018-07-11 PROCEDURE — 80048 BASIC METABOLIC PNL TOTAL CA: CPT

## 2018-07-11 PROCEDURE — 83735 ASSAY OF MAGNESIUM: CPT

## 2018-07-11 NOTE — TELEPHONE ENCOUNTER
----- Message from Burt Floyd PA-C sent at 7/11/2018  2:19 PM EDT -----  Advise patietn to drink plenty of water and ask if he sees a kidney doctor and tell him that he needs to try to keep his apointment on the 16th with us.

## 2018-07-11 NOTE — TELEPHONE ENCOUNTER
Spoke with Catarino and given instructions per Burt Floyd PA-C. He does have a F/U with Dr. Daniel aug 2nd.   He voiced understanding.

## 2018-07-16 ENCOUNTER — OFFICE VISIT (OUTPATIENT)
Dept: CARDIOLOGY | Facility: CLINIC | Age: 63
End: 2018-07-16

## 2018-07-16 VITALS
BODY MASS INDEX: 38.46 KG/M2 | WEIGHT: 209 LBS | OXYGEN SATURATION: 98 % | HEART RATE: 70 BPM | SYSTOLIC BLOOD PRESSURE: 165 MMHG | HEIGHT: 62 IN | DIASTOLIC BLOOD PRESSURE: 77 MMHG

## 2018-07-16 DIAGNOSIS — I10 ESSENTIAL HYPERTENSION: Primary | ICD-10-CM

## 2018-07-16 DIAGNOSIS — E78.5 DYSLIPIDEMIA: ICD-10-CM

## 2018-07-16 DIAGNOSIS — R07.2 PRECORDIAL PAIN: ICD-10-CM

## 2018-07-16 DIAGNOSIS — I20.0 UNSTABLE ANGINA (HCC): ICD-10-CM

## 2018-07-16 DIAGNOSIS — E11.9 TYPE 2 DIABETES MELLITUS WITHOUT COMPLICATION, WITHOUT LONG-TERM CURRENT USE OF INSULIN (HCC): ICD-10-CM

## 2018-07-16 DIAGNOSIS — Z86.12 HISTORY OF POLIOMYELITIS: ICD-10-CM

## 2018-07-16 DIAGNOSIS — R06.02 SHORTNESS OF BREATH: ICD-10-CM

## 2018-07-16 PROCEDURE — 99213 OFFICE O/P EST LOW 20 MIN: CPT | Performed by: PHYSICIAN ASSISTANT

## 2018-07-16 RX ORDER — CARVEDILOL 25 MG/1
25 TABLET ORAL 2 TIMES DAILY
Qty: 180 TABLET | Refills: 3 | Status: ON HOLD | OUTPATIENT
Start: 2018-07-16 | End: 2021-01-01

## 2018-07-16 NOTE — PROGRESS NOTES
MISHEL Retana  Catarino Arvizu  1955  07/16/2018    Patient Active Problem List   Diagnosis   • Chest pain   • Shortness of breath   • Type 2 diabetes mellitus (CMS/HCC)   • Dyslipidemia   • History of poliomyelitis   • Unstable angina (CMS/HCC)   • Prostate cancer (CMS/HCC)   • Benign non-nodular prostatic hyperplasia with lower urinary tract symptoms   • Urethral stricture   • Essential hypertension   • External hemorrhoids   • Rectal bleeding   • Generalized abdominal pain   • Colon cancer screening   • Dysphagia   • BXO (balanitis xerotica obliterans)       Dear MISHEL Retana:    Subjective       History of Present Illness:    Catarino Arvizu is a pleasant 63 y.o. male with a past medical history significant for  hypertension, dyslipidemia, IDDM, and a history of polio with deformities of the bilateral lower extremities. He presents to the office today for a follow up with regards to his risk for monitor.  I went over the results of his Holter that showed normal sinus rhythm with occasional PACs and multiple runs of SVT in 3-4 beats with a maximal rate of 120.  He does state that he is still having chest pain he states that it comes on as an ache in his chest and radiates down to his left arm.  He states that he gets significantly worse with any exertion or it is brought on by exertion.  He does state the chest pain comes on he gets diaphoretic and short of breath from his baseline.  He states the chest pain is an 8 out of 10 on the pain scale and is only relieved with rest.  He has recently undergone a nuclear stress test that showed normal myocardial perfusion with no signs of ischemia.  He also had a transthoracic echocardiogram that showed normal EF of 61-65% with grade 1 diastolic dysfunction consistent with impaired relaxation with no significant valvular disease.  He had a stress test on 5/8/2018 and he had a transthoracic echocardiogram on 5/10/2018.  He denies any tobacco  abuse nor any history of it.  He does state that his diabetes monitored by his primary.  He also states that his primary total he can stop his aspirin and only take it a couple times a week due to nonsignificant bleeding/bruising easily when he bumped his arm.  He also states he has to sleep with 3 pillows at night because he suffocate slain flat.  He denies increased swelling in his feet.      No Known Allergies:      Current Outpatient Prescriptions:   •  amLODIPine (NORVASC) 10 MG tablet, Take 10 mg by mouth daily., Disp: , Rfl:   •  ASPIRIN LOW DOSE 81 MG EC tablet, TAKE 1 TABLET BY MOUTH EVERY DAY, Disp: 30 tablet, Rfl: 2  •  atorvastatin (LIPITOR) 80 MG tablet, Take 80 mg by mouth daily., Disp: , Rfl:   •  cephalexin (KEFLEX) 500 MG capsule, Take 500 mg by mouth 2 (Two) Times a Day., Disp: , Rfl:   •  cetirizine (ZyrTEC) 10 MG tablet, Take 10 mg by mouth daily., Disp: , Rfl:   •  cyclobenzaprine (FLEXERIL) 10 MG tablet, Take 10 mg by mouth 3 (Three) Times a Day As Needed for Muscle Spasms., Disp: , Rfl:   •  fenofibrate (TRICOR) 145 MG tablet, Take 145 mg by mouth Daily., Disp: , Rfl:   •  ferrous sulfate 325 (65 FE) MG tablet, TAKE 1 TABLET DAILY WITH BREAKFAST, Disp: 30 tablet, Rfl: 2  •  glimepiride (AMARYL) 4 MG tablet, Take 4 mg by mouth 2 (two) times a day., Disp: , Rfl:   •  HYDROcodone-acetaminophen (NORCO) 7.5-325 MG per tablet, Take 1 tablet by mouth 2 (two) times a day as needed for moderate pain (4-6)., Disp: , Rfl:   •  hydrOXYzine (VISTARIL) 25 MG capsule, Take 25 mg by mouth 3 (Three) Times a Day As Needed for Itching., Disp: , Rfl:   •  Insulin Glargine (BASAGLAR KWIKPEN) 100 UNIT/ML injection pen, Inject  under the skin., Disp: , Rfl:   •  isosorbide mononitrate (IMDUR) 120 MG 24 hr tablet, Take 1 tablet by mouth Daily., Disp: 30 tablet, Rfl: 5  •  nitroglycerin (NITROSTAT) 0.4 MG SL tablet, Place 0.4 mg under the tongue every 5 (five) minutes as needed for chest pain. Take no more than 3  "doses in 15 minutes., Disp: , Rfl:   •  pantoprazole (PROTONIX) 40 MG EC tablet, Take 1 tablet by mouth 2 (Two) Times a Day With Meals., Disp: 60 tablet, Rfl: 3  •  raNITIdine (ZANTAC) 300 MG tablet, Take 300 mg by mouth Every Night., Disp: , Rfl:   •  sennosides-docusate sodium (SENOKOT-S) 8.6-50 MG tablet, Take 2 tablets by mouth 2 (two) times a day., Disp: , Rfl:   •  sitaGLIPtin (JANUVIA) 50 MG tablet, Take 1 tablet by mouth daily., Disp: 30 tablet, Rfl: 3  •  triamcinolone (KENALOG) 0.1 % ointment, Apply  topically 2 (Two) Times a Day., Disp: , Rfl:   •  carvedilol (COREG) 25 MG tablet, Take 1 tablet by mouth 2 (Two) Times a Day., Disp: 180 tablet, Rfl: 3      The following portions of the patient's history were reviewed and updated as appropriate: allergies, current medications, past family history, past medical history, past social history, past surgical history and problem list.    Social History   Substance Use Topics   • Smoking status: Never Smoker   • Smokeless tobacco: Current User     Types: Chew      Comment: Chewed tobacco since he was 4 yrs old.   • Alcohol use No       Review of Systems   Cardiovascular: Positive for chest pain, near-syncope and palpitations. Negative for leg swelling and syncope.   Respiratory: Positive for shortness of breath.    Neurological: Positive for dizziness.       Objective   Vitals:    07/16/18 1140   BP: 165/77   BP Location: Left arm   Patient Position: Sitting   Cuff Size: Adult   Pulse: 70   SpO2: 98%   Weight: 94.8 kg (209 lb)   Height: 157.5 cm (62\")     Body mass index is 38.23 kg/m².        Physical Exam   Constitutional: He is oriented to person, place, and time. He appears well-developed and well-nourished. No distress.   Cardiovascular: Normal rate, regular rhythm and normal heart sounds.  Exam reveals no gallop and no friction rub.    No murmur heard.  Pulmonary/Chest: Effort normal and breath sounds normal.   Abdominal: Soft. Bowel sounds are normal. "   Musculoskeletal: He exhibits no edema.   Neurological: He is alert and oriented to person, place, and time.   Skin: He is not diaphoretic.       Lab Results   Component Value Date     07/11/2018    K 4.8 07/11/2018     07/11/2018    CO2 19.2 (L) 07/11/2018    BUN 32 (H) 07/11/2018    CREATININE 2.69 (H) 07/11/2018    GLUCOSE 194 (H) 07/11/2018    CALCIUM 8.9 07/11/2018    AST 33 04/06/2017    ALT 16 04/06/2017    ALKPHOS 89 04/06/2017    LABIL2 0.9 05/01/2017     Lab Results   Component Value Date    CKTOTAL 38 04/06/2017     Lab Results   Component Value Date    WBC 4.71 12/12/2017    HGB 9.4 (L) 12/12/2017    HCT 29.9 (L) 12/12/2017     12/12/2017     No results found for: INR  Lab Results   Component Value Date    MG 2.3 07/11/2018     Lab Results   Component Value Date    TSH 1.835 07/11/2018    PSA 1.450 06/18/2018    TRIG 1,214 (H) 08/09/2016    HDL 32 (L) 08/09/2016    LDL  08/09/2016      Comment:      Unable to calculate      Lab Results   Component Value Date    .0 (H) 08/10/2016     Holter monitor 7/4/2018  Interpretation Summary   · The predominant rhythm noted during the testing period was sinus rhythm.  · Premature atrial contractions occured occasionally.  · Has multiple short runs of SVT ranging from 3-6 beats at a rate of about 100-120 bpm.  · Sinoatrial node conduction was normal. No atrioventricular block noted.  · Patient diary was not submitted.     Transthoracic echocardiogram on 5/10/2018  Interpretation Summary   · Normal left ventricular cavity size and wall thickness noted.  · Left ventricular systolic function is normal.Estimated EF appears to be in the range of 61 - 65%.  · Left ventricular diastolic dysfunction (grade I) consistent with impaired relaxation.  · No significant valvular heart disease  · Trace aortic valve regurgitation is present. No aortic valve stenosis is present.  · There is no evidence of pericardial effusion.     Stress test  5/8/2018  Interpretation Summary   · Findings consistent with a normal ECG stress test.  · Myocardial perfusion imaging indicates a normal myocardial perfusion study with no evidence of ischemia.  · TID:1.29.  · Normal LV cavity size. Normal LV wall motion noted.  · Left ventricular ejection fraction is normal (Calculated EF = 69%).  · Impressions are consistent with a low risk study.     EKG on 7/2/2018        During this visit the following were done:  Labs Reviewed []    Labs Ordered []    Radiology Reports Reviewed []    Radiology Ordered []    PCP Records Reviewed []    Referring Provider Records Reviewed []    ER Records Reviewed []    Hospital Records Reviewed []    History Obtained From Family []    Radiology Images Reviewed []    Other Reviewed []    Records Requested []      Procedures      Assessment/Plan    Diagnosis Plan   1. Essential hypertension     2. Dyslipidemia     3. History of poliomyelitis     4. Precordial pain     5. Type 2 diabetes mellitus without complication, without long-term current use of insulin (CMS/[facility])     6. Shortness of breath     7. Unstable angina (CMS/[facility])                  Recommendations:  1. Since patient's most recent stress test showed normal myocardial perfusion and the patient has chronic disease with most recent creatinine of 2.69, we will continue to maximize therapy by increasing his carvedilol to 25 mg twice a day.   2. I did explain the risk of a left heart catheterization specifically the chances of him possibly needing dialysis after heart catheter due to his chronic kidney disease and the importance of trying to avoid the left heart catheterization at this time.  3. I did strongly encourage him to continue to keep his appointment with his nephrologist which is in August 2  4. Follow-up in 3-4 weeks      Return in about 4 weeks (around 8/13/2018).    As always, I appreciate very much the opportunity to participate in the cardiovascular care of your  patients.      With Best Regards,    LILI Barron disclaimer:  Much of this encounter note is an electronic transcription/translation of spoken language to printed text. The electronic translation of spoken language may permit erroneous, or at times, nonsensical words or phrases to be inadvertently transcribed; Although I have reviewed the note for such errors, some may still exist.

## 2018-09-28 ENCOUNTER — TRANSCRIBE ORDERS (OUTPATIENT)
Dept: ADMINISTRATIVE | Facility: HOSPITAL | Age: 63
End: 2018-09-28

## 2018-09-28 DIAGNOSIS — M79.662 PAIN OF LEFT CALF: Primary | ICD-10-CM

## 2018-10-01 ENCOUNTER — HOSPITAL ENCOUNTER (OUTPATIENT)
Dept: CARDIOLOGY | Facility: HOSPITAL | Age: 63
Discharge: HOME | End: 2018-10-01
Admitting: NURSE PRACTITIONER

## 2018-10-01 DIAGNOSIS — M79.662 PAIN OF LEFT CALF: ICD-10-CM

## 2018-10-01 PROCEDURE — 93971 EXTREMITY STUDY: CPT

## 2018-10-01 PROCEDURE — 93971 EXTREMITY STUDY: CPT | Performed by: RADIOLOGY

## 2018-10-26 RX ORDER — ASPIRIN 81 MG
TABLET, DELAYED RELEASE (ENTERIC COATED) ORAL
Qty: 30 TABLET | Refills: 2 | Status: SHIPPED | OUTPATIENT
Start: 2018-10-26 | End: 2019-01-10

## 2018-10-26 RX ORDER — PNV NO.95/FERROUS FUM/FOLIC AC 28MG-0.8MG
TABLET ORAL
Qty: 30 TABLET | Refills: 2 | Status: SHIPPED | OUTPATIENT
Start: 2018-10-26 | End: 2019-01-10

## 2018-10-26 NOTE — TELEPHONE ENCOUNTER
Called pt and spoke with his wife (on [employer]) I advised her that Catarino had no showed his apt in August and that he will need to make another apt to receive refills. I made him an apt on 12/10/18 at 10:40am. Pt's wife expressed understanding. I will send in refills to do him til his next apt.

## 2018-10-29 DIAGNOSIS — M25.562 LEFT KNEE PAIN, UNSPECIFIED CHRONICITY: Primary | ICD-10-CM

## 2018-10-31 ENCOUNTER — APPOINTMENT (OUTPATIENT)
Dept: GENERAL RADIOLOGY | Facility: HOSPITAL | Age: 63
End: 2018-10-31
Attending: ORTHOPAEDIC SURGERY

## 2018-11-06 ENCOUNTER — OFFICE VISIT (OUTPATIENT)
Dept: UROLOGY | Facility: CLINIC | Age: 63
End: 2018-11-06

## 2018-11-06 VITALS — HEIGHT: 62 IN | BODY MASS INDEX: 42.33 KG/M2 | WEIGHT: 230 LBS

## 2018-11-06 DIAGNOSIS — R33.9 RETENTION OF URINE: Primary | ICD-10-CM

## 2018-11-06 DIAGNOSIS — C61 PROSTATE CANCER (HCC): ICD-10-CM

## 2018-11-06 DIAGNOSIS — Z85.46 H/O PROSTATE CANCER: ICD-10-CM

## 2018-11-06 DIAGNOSIS — N99.115 POSTPROCEDURAL MALE FOSSA NAVICULARIS URETHRAL STRICTURE: ICD-10-CM

## 2018-11-06 LAB — PSA SERPL-MCNC: 3.1 NG/ML (ref 0–4)

## 2018-11-06 PROCEDURE — 99213 OFFICE O/P EST LOW 20 MIN: CPT | Performed by: UROLOGY

## 2018-11-06 PROCEDURE — 51798 US URINE CAPACITY MEASURE: CPT | Performed by: UROLOGY

## 2018-11-06 PROCEDURE — 84153 ASSAY OF PSA TOTAL: CPT | Performed by: UROLOGY

## 2018-11-06 PROCEDURE — 87086 URINE CULTURE/COLONY COUNT: CPT | Performed by: UROLOGY

## 2018-11-06 NOTE — PROGRESS NOTES
Chief Complaint:          Chief Complaint   Patient presents with   • Prostate Cancer       HPI:   63 y.o. male.  63-year-old with prostate cancer on intermittent Lupron doing much better his meatal stenosis resolved he's having no complaints or problems I have a urine culture pending his postvoid residual was 12 cc.  There is no burning, blood, discharge, fevers chills or any other symptoms have a PSA pending at the time of this dictation his examination is entirely unremarkable    Past Medical History:        Past Medical History:   Diagnosis Date   • Chest pain    • Chronic kidney disease    • CPAP (continuous positive airway pressure) dependence    • Diabetes mellitus (CMS/[facility])    • Elevated cholesterol    • GERD (gastroesophageal reflux disease)    • Heart murmur    • Hyperlipidemia    • Hypertension    • Irregular heart beat    • Polio    • Prostate cancer (CMS/[facility]) 05/2016    Dr. Khalif Kohler MD- [place], [place]   • Shortness of breath    • Sleep apnea          Current Meds:     Current Outpatient Prescriptions   Medication Sig Dispense Refill   • amLODIPine (NORVASC) 10 MG tablet Take 10 mg by mouth daily.     • ASPIRIN LOW DOSE 81 MG EC tablet TAKE 1 TABLET EVERY DAY 30 tablet 2   • atorvastatin (LIPITOR) 80 MG tablet Take 80 mg by mouth daily.     • carvedilol (COREG) 25 MG tablet Take 1 tablet by mouth 2 (Two) Times a Day. 180 tablet 3   • cephalexin (KEFLEX) 500 MG capsule Take 500 mg by mouth 2 (Two) Times a Day.     • cetirizine (ZyrTEC) 10 MG tablet Take 10 mg by mouth daily.     • cyclobenzaprine (FLEXERIL) 10 MG tablet Take 10 mg by mouth 3 (Three) Times a Day As Needed for Muscle Spasms.     • fenofibrate (TRICOR) 145 MG tablet Take 145 mg by mouth Daily.     • Ferrous Sulfate (IRON) 325 (65 Fe) MG tablet TAKE 1 TABLET WITH BREAKFAST 30 tablet 2   • glimepiride (AMARYL) 4 MG tablet Take 4 mg by mouth 2 (two) times a day.     • HYDROcodone-acetaminophen (NORCO) 7.5-325 MG per tablet Take 1  tablet by mouth 2 (two) times a day as needed for moderate pain (4-6).     • hydrOXYzine (VISTARIL) 25 MG capsule Take 25 mg by mouth 3 (Three) Times a Day As Needed for Itching.     • Insulin Glargine (BASAGLAR KWIKPEN) 100 UNIT/ML injection pen Inject  under the skin.     • isosorbide mononitrate (IMDUR) 120 MG 24 hr tablet Take 1 tablet by mouth Daily. 30 tablet 5   • nitroglycerin (NITROSTAT) 0.4 MG SL tablet Place 0.4 mg under the tongue every 5 (five) minutes as needed for chest pain. Take no more than 3 doses in 15 minutes.     • pantoprazole (PROTONIX) 40 MG EC tablet Take 1 tablet by mouth 2 (Two) Times a Day With Meals. 60 tablet 3   • raNITIdine (ZANTAC) 300 MG tablet Take 300 mg by mouth Every Night.     • sennosides-docusate sodium (SENOKOT-S) 8.6-50 MG tablet Take 2 tablets by mouth 2 (two) times a day.     • sitaGLIPtin (JANUVIA) 50 MG tablet Take 1 tablet by mouth daily. 30 tablet 3   • triamcinolone (KENALOG) 0.1 % ointment Apply  topically 2 (Two) Times a Day.       No current facility-administered medications for this visit.         Allergies:      No Known Allergies     Past Surgical History:     Past Surgical History:   Procedure Laterality Date   • CARDIAC CATHETERIZATION  2008    50% diffuse LAD stenosis, 50% septal  branch   • COLONOSCOPY      Long time ago   • COLONOSCOPY N/A 5/9/2018    Procedure: COLONOSCOPY  CPTCODE:91062;  Surgeon: Bob Mcguire III, MD;  Location: [facility] OR;  Service: Gastroenterology   • ENDOSCOPY N/A 5/9/2018    Procedure: ESOPHAGOGASTRODUODENOSCOPY WITH BIOPSY  CPTCODE:56744;  Surgeon: Bob Mcguire III, MD;  Location: [facility] OR;  Service: Gastroenterology   • HEMORRHOIDECTOMY     • HERNIA REPAIR     • UPPER GASTROINTESTINAL ENDOSCOPY      Long time ago   • URETHRAL DILATATION N/A 12/13/2017    Procedure: URETHRAL DILATATION;  Surgeon: Khalif Kohler MD;  Location: [facility] OR;  Service:          Social History:     Social History      Social History   • Marital status:      Spouse name: N/A   • Number of children: N/A   • Years of education: N/A     Occupational History   • Not on file.     Social History Main Topics   • Smoking status: Never Smoker   • Smokeless tobacco: Current User     Types: Chew      Comment: Chewed tobacco since he was 4 yrs old.   • Alcohol use No   • Drug use: No   • Sexual activity: Defer     Other Topics Concern   • Not on file     Social History Narrative   • No narrative on file       Family History:     Family History   Problem Relation Age of Onset   • Heart disease Brother    • Diabetes Brother    • Cancer Brother         not sure the kind   • Heart disease Brother    • Diabetes Brother    • Diabetes Sister    • Diabetes Daughter    • Heart disease Daughter    • Pancreatitis Daughter    • Crohn's disease Daughter    • Diabetes Son    • Heart disease Son        Review of Systems:     Review of Systems   Constitutional: Negative.    HENT: Negative.    Eyes: Negative.    Respiratory: Negative.    Cardiovascular: Negative.    Gastrointestinal: Negative.    Endocrine: Negative.    Musculoskeletal: Negative.    Allergic/Immunologic: Negative.    Neurological: Negative.    Hematological: Negative.    Psychiatric/Behavioral: Negative.        Physical Exam:     Physical Exam   Constitutional: He is oriented to person, place, and time. He appears well-developed and well-nourished.   HENT:   Head: Normocephalic and atraumatic.   Eyes: Pupils are equal, round, and reactive to light. Conjunctivae and EOM are normal.   Neck: Normal range of motion.   Cardiovascular: Normal rate, regular rhythm, normal heart sounds and intact distal pulses.    Pulmonary/Chest: Effort normal and breath sounds normal.   Abdominal: Soft. Bowel sounds are normal.   Genitourinary:   Genitourinary Comments: Post polio syndrome with a normal meatus and a castrate prostate   Musculoskeletal: Normal range of motion.   Neurological: He is alert  and oriented to person, place, and time. He has normal reflexes.   Skin: Skin is warm and dry.   Psychiatric: He has a normal mood and affect. His behavior is normal. Judgment and thought content normal.   Nursing note and vitals reviewed.      I have reviewed the following portions of the patient's history: allergies, current medications, past family history, past medical history, past social history, past surgical history, problem list and ROS and confirm it's accurate.      Procedure:       Assessment/Plan:   Prostate cancer:  He returns today status post biopsy for extensive discussion of prostate cancer.  We discussed staging, and grading of the disease.  I described with a [place]ju system being from a 2-10 scale with the most common low-grade pattern being a [place] 6.  I discussed the staging workup including a total body bone scan and CT scan especially with a PSA is greater than 10.  We discussed the various options at length I discussed a radical retropubic prostatectomy done in the traditional fashion and using the robotic technique.  I then discussed radiation treatment both seed therapy and external beam therapy using Gold fiduciary markers.  We talked about some of the other alternatives such as a cryosurgical ablation at high intensity focused ultrasound as being viable alternatives but not recommended at this time.  He focused on aggressive watchful waiting and explained that currently low literature it's been discovered that a lot of men can actually observe it especially with numerous other comorbidities cannot have problems from the cancer by itself.  Talked about hormonal ablation.  In the side effects of creation of insulin resistance.  Overall, the patient was given appropriate literature is going to think about it and I'm going to revisit the topic with them after her next office visit  Continues intermittent hormone therapy  Meatal stenosis-doing well with intermittent dilation with a Cook  dilator     Patient's Body mass index is 42.07 kg/m². BMI is above normal parameters. Recommendations include: educational material.          This document has been electronically signed by CLAUDIO LEAL MD November 6, 2018 10:20 AM

## 2018-11-07 PROBLEM — N99.115 POSTPROCEDURAL MALE FOSSA NAVICULARIS URETHRAL STRICTURE: Status: ACTIVE | Noted: 2017-10-10

## 2018-11-08 LAB — BACTERIA SPEC AEROBE CULT: NO GROWTH

## 2018-11-12 ENCOUNTER — HOSPITAL ENCOUNTER (OUTPATIENT)
Facility: HOSPITAL | Age: 63
Setting detail: OBSERVATION
Discharge: HOME | End: 2018-11-14
Attending: FAMILY MEDICINE | Admitting: INTERNAL MEDICINE

## 2018-11-12 ENCOUNTER — APPOINTMENT (OUTPATIENT)
Dept: GENERAL RADIOLOGY | Facility: HOSPITAL | Age: 63
End: 2018-11-12

## 2018-11-12 DIAGNOSIS — N18.9: ICD-10-CM

## 2018-11-12 DIAGNOSIS — R06.02 SHORTNESS OF BREATH: ICD-10-CM

## 2018-11-12 DIAGNOSIS — R07.9 CHEST PAIN IN ADULT: Primary | ICD-10-CM

## 2018-11-12 LAB
ALBUMIN SERPL-MCNC: 4.2 G/DL (ref 3.4–4.8)
ALBUMIN/GLOB SERPL: 1.4 G/DL (ref 1.5–2.5)
ALP SERPL-CCNC: 71 U/L (ref 40–129)
ALT SERPL W P-5'-P-CCNC: 29 U/L (ref 10–44)
ANION GAP SERPL CALCULATED.3IONS-SCNC: 6.9 MMOL/L (ref 3.6–11.2)
AST SERPL-CCNC: 31 U/L (ref 10–34)
BASOPHILS # BLD AUTO: 0.03 10*3/MM3 (ref 0–0.3)
BASOPHILS NFR BLD AUTO: 0.6 % (ref 0–2)
BILIRUB SERPL-MCNC: 0.3 MG/DL (ref 0.2–1.8)
BNP SERPL-MCNC: 303 PG/ML (ref 0–100)
BUN BLD-MCNC: 34 MG/DL (ref 7–21)
BUN/CREAT SERPL: 11 (ref 7–25)
CALCIUM SPEC-SCNC: 8.5 MG/DL (ref 7.7–10)
CHLORIDE SERPL-SCNC: 112 MMOL/L (ref 99–112)
CO2 SERPL-SCNC: 20.1 MMOL/L (ref 24.3–31.9)
CREAT BLD-MCNC: 3.1 MG/DL (ref 0.43–1.29)
DEPRECATED RDW RBC AUTO: 47.4 FL (ref 37–54)
EOSINOPHIL # BLD AUTO: 0.17 10*3/MM3 (ref 0–0.7)
EOSINOPHIL NFR BLD AUTO: 3.4 % (ref 0–5)
ERYTHROCYTE [DISTWIDTH] IN BLOOD BY AUTOMATED COUNT: 14.2 % (ref 11.5–14.5)
GFR SERPL CREATININE-BSD FRML MDRD: 20 ML/MIN/1.73
GLOBULIN UR ELPH-MCNC: 2.9 GM/DL
GLUCOSE BLD-MCNC: 210 MG/DL (ref 70–110)
HCT VFR BLD AUTO: 25.3 % (ref 42–52)
HGB BLD-MCNC: 7.8 G/DL (ref 14–18)
HOLD SPECIMEN: NORMAL
HOLD SPECIMEN: NORMAL
IMM GRANULOCYTES # BLD: 0.01 10*3/MM3 (ref 0–0.03)
IMM GRANULOCYTES NFR BLD: 0.2 % (ref 0–0.5)
LYMPHOCYTES # BLD AUTO: 1.8 10*3/MM3 (ref 1–3)
LYMPHOCYTES NFR BLD AUTO: 35.8 % (ref 21–51)
MCH RBC QN AUTO: 28.1 PG (ref 27–33)
MCHC RBC AUTO-ENTMCNC: 30.8 G/DL (ref 33–37)
MCV RBC AUTO: 91 FL (ref 80–94)
MONOCYTES # BLD AUTO: 0.28 10*3/MM3 (ref 0.1–0.9)
MONOCYTES NFR BLD AUTO: 5.6 % (ref 0–10)
NEUTROPHILS # BLD AUTO: 2.74 10*3/MM3 (ref 1.4–6.5)
NEUTROPHILS NFR BLD AUTO: 54.4 % (ref 30–70)
OSMOLALITY SERPL CALC.SUM OF ELEC: 291.3 MOSM/KG (ref 273–305)
PLATELET # BLD AUTO: 194 10*3/MM3 (ref 130–400)
PMV BLD AUTO: 11.6 FL (ref 6–10)
POTASSIUM BLD-SCNC: 5.1 MMOL/L (ref 3.5–5.3)
PROT SERPL-MCNC: 7.1 G/DL (ref 6–8)
RBC # BLD AUTO: 2.78 10*6/MM3 (ref 4.7–6.1)
SODIUM BLD-SCNC: 139 MMOL/L (ref 135–153)
TROPONIN I SERPL-MCNC: 0.01 NG/ML
TROPONIN I SERPL-MCNC: 0.02 NG/ML
TROPONIN I SERPL-MCNC: 0.02 NG/ML
WBC NRBC COR # BLD: 5.03 10*3/MM3 (ref 4.5–12.5)
WHOLE BLOOD HOLD SPECIMEN: NORMAL
WHOLE BLOOD HOLD SPECIMEN: NORMAL

## 2018-11-12 PROCEDURE — 84484 ASSAY OF TROPONIN QUANT: CPT | Performed by: FAMILY MEDICINE

## 2018-11-12 PROCEDURE — 85025 COMPLETE CBC W/AUTO DIFF WBC: CPT | Performed by: FAMILY MEDICINE

## 2018-11-12 PROCEDURE — 36415 COLL VENOUS BLD VENIPUNCTURE: CPT

## 2018-11-12 PROCEDURE — G0378 HOSPITAL OBSERVATION PER HR: HCPCS

## 2018-11-12 PROCEDURE — 84484 ASSAY OF TROPONIN QUANT: CPT | Performed by: INTERNAL MEDICINE

## 2018-11-12 PROCEDURE — 71045 X-RAY EXAM CHEST 1 VIEW: CPT | Performed by: RADIOLOGY

## 2018-11-12 PROCEDURE — 93010 ELECTROCARDIOGRAM REPORT: CPT | Performed by: INTERNAL MEDICINE

## 2018-11-12 PROCEDURE — 94640 AIRWAY INHALATION TREATMENT: CPT

## 2018-11-12 PROCEDURE — 94799 UNLISTED PULMONARY SVC/PX: CPT

## 2018-11-12 PROCEDURE — 71045 X-RAY EXAM CHEST 1 VIEW: CPT

## 2018-11-12 PROCEDURE — 80053 COMPREHEN METABOLIC PANEL: CPT | Performed by: FAMILY MEDICINE

## 2018-11-12 PROCEDURE — 93005 ELECTROCARDIOGRAM TRACING: CPT | Performed by: FAMILY MEDICINE

## 2018-11-12 PROCEDURE — 99285 EMERGENCY DEPT VISIT HI MDM: CPT

## 2018-11-12 PROCEDURE — 83880 ASSAY OF NATRIURETIC PEPTIDE: CPT | Performed by: FAMILY MEDICINE

## 2018-11-12 RX ORDER — ASPIRIN 325 MG
325 TABLET ORAL ONCE
Status: COMPLETED | OUTPATIENT
Start: 2018-11-12 | End: 2018-11-12

## 2018-11-12 RX ORDER — NALOXONE HCL 0.4 MG/ML
0.4 VIAL (ML) INJECTION
Status: DISCONTINUED | OUTPATIENT
Start: 2018-11-12 | End: 2018-11-14 | Stop reason: HOSPADM

## 2018-11-12 RX ORDER — ALUMINA, MAGNESIA, AND SIMETHICONE 2400; 2400; 240 MG/30ML; MG/30ML; MG/30ML
15 SUSPENSION ORAL EVERY 6 HOURS PRN
Status: DISCONTINUED | OUTPATIENT
Start: 2018-11-12 | End: 2018-11-14 | Stop reason: HOSPADM

## 2018-11-12 RX ORDER — SODIUM CHLORIDE 0.9 % (FLUSH) 0.9 %
10 SYRINGE (ML) INJECTION AS NEEDED
Status: DISCONTINUED | OUTPATIENT
Start: 2018-11-12 | End: 2018-11-14 | Stop reason: HOSPADM

## 2018-11-12 RX ORDER — SODIUM CHLORIDE 0.9 % (FLUSH) 0.9 %
3-10 SYRINGE (ML) INJECTION AS NEEDED
Status: DISCONTINUED | OUTPATIENT
Start: 2018-11-12 | End: 2018-11-14 | Stop reason: HOSPADM

## 2018-11-12 RX ORDER — ONDANSETRON 4 MG/1
4 TABLET, FILM COATED ORAL EVERY 6 HOURS PRN
Status: DISCONTINUED | OUTPATIENT
Start: 2018-11-12 | End: 2018-11-14 | Stop reason: HOSPADM

## 2018-11-12 RX ORDER — HYDRALAZINE HYDROCHLORIDE 20 MG/ML
10 INJECTION INTRAMUSCULAR; INTRAVENOUS EVERY 6 HOURS PRN
Status: DISCONTINUED | OUTPATIENT
Start: 2018-11-12 | End: 2018-11-14 | Stop reason: HOSPADM

## 2018-11-12 RX ORDER — ONDANSETRON 4 MG/1
4 TABLET, ORALLY DISINTEGRATING ORAL EVERY 6 HOURS PRN
Status: DISCONTINUED | OUTPATIENT
Start: 2018-11-12 | End: 2018-11-14 | Stop reason: HOSPADM

## 2018-11-12 RX ORDER — SODIUM CHLORIDE 0.9 % (FLUSH) 0.9 %
3 SYRINGE (ML) INJECTION EVERY 12 HOURS SCHEDULED
Status: DISCONTINUED | OUTPATIENT
Start: 2018-11-12 | End: 2018-11-14 | Stop reason: HOSPADM

## 2018-11-12 RX ORDER — ONDANSETRON 2 MG/ML
4 INJECTION INTRAMUSCULAR; INTRAVENOUS EVERY 6 HOURS PRN
Status: DISCONTINUED | OUTPATIENT
Start: 2018-11-12 | End: 2018-11-14 | Stop reason: HOSPADM

## 2018-11-12 RX ORDER — MORPHINE SULFATE 2 MG/ML
2 INJECTION, SOLUTION INTRAMUSCULAR; INTRAVENOUS EVERY 4 HOURS PRN
Status: DISCONTINUED | OUTPATIENT
Start: 2018-11-12 | End: 2018-11-14 | Stop reason: HOSPADM

## 2018-11-12 RX ORDER — NITROGLYCERIN 0.4 MG/1
0.4 TABLET SUBLINGUAL
Status: DISCONTINUED | OUTPATIENT
Start: 2018-11-12 | End: 2018-11-14 | Stop reason: HOSPADM

## 2018-11-12 RX ORDER — IPRATROPIUM BROMIDE AND ALBUTEROL SULFATE 2.5; .5 MG/3ML; MG/3ML
3 SOLUTION RESPIRATORY (INHALATION) ONCE
Status: COMPLETED | OUTPATIENT
Start: 2018-11-12 | End: 2018-11-12

## 2018-11-12 RX ORDER — ACETAMINOPHEN 325 MG/1
650 TABLET ORAL EVERY 4 HOURS PRN
Status: DISCONTINUED | OUTPATIENT
Start: 2018-11-12 | End: 2018-11-14 | Stop reason: HOSPADM

## 2018-11-12 RX ADMIN — ASPIRIN 325 MG: 325 TABLET ORAL at 16:29

## 2018-11-12 RX ADMIN — SODIUM CHLORIDE 500 ML: 9 INJECTION, SOLUTION INTRAVENOUS at 20:09

## 2018-11-12 RX ADMIN — IPRATROPIUM BROMIDE AND ALBUTEROL SULFATE 3 ML: .5; 3 SOLUTION RESPIRATORY (INHALATION) at 20:25

## 2018-11-12 NOTE — ED PROVIDER NOTES
"Subjective   History of Present Illness  64 y/o M here w/ one week of substernal CP and SOB. Pt states that the CP and SOB are exacerbated by exertion and relieved by rest. CP is nonradiating and sharp in nature. No N/V/D. No swelling of lower ext. No h/o COPD or CHF per pt. Pt states he has not taken any medications to relieve symptoms. Pt has h/o CKD but is not on HD. Per records, pt had EGD and colonoscopy in May 2018 that were both negative for any abnormalities. Additionally, pt had a stress test in March 2018 that was c/w a \"low risk study\" and had ECHO around the same time was significant for an EF 61-65%.  Review of Systems   Constitutional: Negative for chills, fatigue and fever.   Eyes: Negative for photophobia and visual disturbance.   Respiratory: Positive for cough, shortness of breath and wheezing. Negative for chest tightness.    Cardiovascular: Positive for chest pain. Negative for palpitations and leg swelling.   Gastrointestinal: Negative for abdominal distention, abdominal pain, constipation, diarrhea, nausea and vomiting.   Genitourinary: Positive for difficulty urinating. Negative for dysuria, frequency and urgency.   Musculoskeletal: Negative for back pain.   Skin: Negative for color change and pallor.   Neurological: Negative for headaches.   Hematological: Does not bruise/bleed easily.   All other systems reviewed and are negative.      Past Medical History:   Diagnosis Date   • Chest pain    • Chronic kidney disease    • CPAP (continuous positive airway pressure) dependence    • Diabetes mellitus (CMS/[facility])    • Elevated cholesterol    • GERD (gastroesophageal reflux disease)    • Heart murmur    • Hyperlipidemia    • Hypertension    • Irregular heart beat    • Polio    • Prostate cancer (CMS/[facility]) 05/2016    Dr. Khalif Kohler MD- [place], [place]   • Shortness of breath    • Sleep apnea        No Known Allergies    Past Surgical History:   Procedure Laterality Date   • CARDIAC " CATHETERIZATION  2008    50% diffuse LAD stenosis, 50% septal  branch   • COLONOSCOPY      Long time ago   • HEMORRHOIDECTOMY     • HERNIA REPAIR     • UPPER GASTROINTESTINAL ENDOSCOPY      Long time ago       Family History   Problem Relation Age of Onset   • Heart disease Brother    • Diabetes Brother    • Cancer Brother         not sure the kind   • Heart disease Brother    • Diabetes Brother    • Diabetes Sister    • Diabetes Daughter    • Heart disease Daughter    • Pancreatitis Daughter    • Crohn's disease Daughter    • Diabetes Son    • Heart disease Son        Social History     Socioeconomic History   • Marital status:      Spouse name: Not on file   • Number of children: Not on file   • Years of education: Not on file   • Highest education level: Not on file   Tobacco Use   • Smoking status: Never Smoker   • Smokeless tobacco: Current User     Types: Chew   • Tobacco comment: Chewed tobacco since he was 4 yrs old.   Substance and Sexual Activity   • Alcohol use: No   • Drug use: No   • Sexual activity: Defer           Objective   Physical Exam   Constitutional: He is oriented to person, place, and time. He appears well-developed and well-nourished. He is active.   HENT:   Head: Normocephalic and atraumatic.   Right Ear: Hearing, external ear and ear canal normal.   Left Ear: Hearing, external ear and ear canal normal.   Nose: Nose normal.   Mouth/Throat: Uvula is midline, oropharynx is clear and moist and mucous membranes are normal.   Eyes: Conjunctivae, EOM and lids are normal. Pupils are equal, round, and reactive to light.   Neck: Trachea normal, normal range of motion, full passive range of motion without pain and phonation normal. Neck supple.   Cardiovascular: Normal rate, regular rhythm and normal heart sounds.   Pulmonary/Chest: Effort normal and breath sounds normal.   Abdominal: Normal appearance.   Neurological: He is alert and oriented to person, place, and time. GCS eye  subscore is 4. GCS verbal subscore is 5. GCS motor subscore is 6.   Skin: Skin is warm, dry and intact. Capillary refill takes less than 2 seconds.   Psychiatric: He has a normal mood and affect. His speech is normal and behavior is normal. Cognition and memory are normal.   Nursing note and vitals reviewed.      Procedures           ED Course  ED Course as of Nov 12 2017   Mon Nov 12, 2018   1711 NSR, 64 bpm, QTc 414ms. No ST segment abnormalities concerning for STEMI. No pathologic blocks or dysrhythmias.  ECG 12 Lead [BR]      ED Course User Index  [BR] Mihai Coleman MD      1720-Pt has h/o chronic anemia. EGD/Colonoscopy earlier this yr were negative for acute bleed.  2000-HEART score of 4, pt admitted for obs. Pt with normal stress test earlier this yr.            MDM  Number of Diagnoses or Management Options  Chest pain in adult: new and requires workup  Chronic kidney disease with symptom management only, unspecified CKD stage: new and requires workup  Shortness of breath: new and requires workup     Amount and/or Complexity of Data Reviewed  Clinical lab tests: reviewed and ordered  Tests in the radiology section of CPT®: reviewed and ordered  Tests in the medicine section of CPT®: reviewed and ordered  Decide to obtain previous medical records or to obtain history from someone other than the patient: yes  Discuss the patient with other providers: yes  Independent visualization of images, tracings, or specimens: yes    Risk of Complications, Morbidity, and/or Mortality  Presenting problems: high  Diagnostic procedures: high  Management options: high    Patient Progress  Patient progress: stable        Final diagnoses:   Chest pain in adult   Shortness of breath   Chronic kidney disease with symptom management only, unspecified CKD stage            Mihai Coleman MD  11/12/18 2017

## 2018-11-13 ENCOUNTER — APPOINTMENT (OUTPATIENT)
Dept: ULTRASOUND IMAGING | Facility: HOSPITAL | Age: 63
End: 2018-11-13

## 2018-11-13 LAB
ANION GAP SERPL CALCULATED.3IONS-SCNC: 10.7 MMOL/L (ref 3.6–11.2)
BACTERIA UR QL AUTO: NORMAL /HPF
BASOPHILS # BLD AUTO: 0.02 10*3/MM3 (ref 0–0.3)
BASOPHILS NFR BLD AUTO: 0.4 % (ref 0–2)
BILIRUB UR QL STRIP: NEGATIVE
BUN BLD-MCNC: 31 MG/DL (ref 7–21)
BUN/CREAT SERPL: 10.4 (ref 7–25)
CALCIUM SPEC-SCNC: 8.3 MG/DL (ref 7.7–10)
CHLORIDE SERPL-SCNC: 113 MMOL/L (ref 99–112)
CLARITY UR: CLEAR
CO2 SERPL-SCNC: 18.3 MMOL/L (ref 24.3–31.9)
COLOR UR: YELLOW
CREAT BLD-MCNC: 2.97 MG/DL (ref 0.43–1.29)
CREAT UR-MCNC: 65.9 MG/DL
CREAT UR-MCNC: 65.9 MG/DL
DEPRECATED RDW RBC AUTO: 45.5 FL (ref 37–54)
EOSINOPHIL # BLD AUTO: 0.22 10*3/MM3 (ref 0–0.7)
EOSINOPHIL NFR BLD AUTO: 4.1 % (ref 0–5)
ERYTHROCYTE [DISTWIDTH] IN BLOOD BY AUTOMATED COUNT: 13.9 % (ref 11.5–14.5)
GFR SERPL CREATININE-BSD FRML MDRD: 21 ML/MIN/1.73
GLUCOSE BLD-MCNC: 155 MG/DL (ref 70–110)
GLUCOSE BLDC GLUCOMTR-MCNC: 149 MG/DL (ref 70–130)
GLUCOSE BLDC GLUCOMTR-MCNC: 151 MG/DL (ref 70–130)
GLUCOSE BLDC GLUCOMTR-MCNC: 273 MG/DL (ref 70–130)
GLUCOSE BLDC GLUCOMTR-MCNC: 298 MG/DL (ref 70–130)
GLUCOSE BLDC GLUCOMTR-MCNC: 313 MG/DL (ref 70–130)
GLUCOSE UR STRIP-MCNC: ABNORMAL MG/DL
HBA1C MFR BLD: 8 % (ref 4.5–5.7)
HCT VFR BLD AUTO: 24.9 % (ref 42–52)
HGB BLD-MCNC: 7.3 G/DL (ref 14–18)
HGB UR QL STRIP.AUTO: NEGATIVE
HYALINE CASTS UR QL AUTO: NORMAL /LPF
IMM GRANULOCYTES # BLD: 0.02 10*3/MM3 (ref 0–0.03)
IMM GRANULOCYTES NFR BLD: 0.4 % (ref 0–0.5)
KETONES UR QL STRIP: NEGATIVE
LEUKOCYTE ESTERASE UR QL STRIP.AUTO: NEGATIVE
LYMPHOCYTES # BLD AUTO: 1.97 10*3/MM3 (ref 1–3)
LYMPHOCYTES NFR BLD AUTO: 37 % (ref 21–51)
MCH RBC QN AUTO: 28.4 PG (ref 27–33)
MCHC RBC AUTO-ENTMCNC: 29.3 G/DL (ref 33–37)
MCV RBC AUTO: 96.9 FL (ref 80–94)
MONOCYTES # BLD AUTO: 0.36 10*3/MM3 (ref 0.1–0.9)
MONOCYTES NFR BLD AUTO: 6.8 % (ref 0–10)
NEUTROPHILS # BLD AUTO: 2.74 10*3/MM3 (ref 1.4–6.5)
NEUTROPHILS NFR BLD AUTO: 51.3 % (ref 30–70)
NITRITE UR QL STRIP: NEGATIVE
OSMOLALITY SERPL CALC.SUM OF ELEC: 292.8 MOSM/KG (ref 273–305)
PH UR STRIP.AUTO: 5.5 [PH] (ref 5–8)
PLATELET # BLD AUTO: 187 10*3/MM3 (ref 130–400)
PMV BLD AUTO: 11.8 FL (ref 6–10)
POTASSIUM BLD-SCNC: 5 MMOL/L (ref 3.5–5.3)
PROT UR QL STRIP: ABNORMAL
PROT UR-MCNC: 57.9 MG/DL
PROT/CREAT UR: 878.6 MG/G CREA (ref 0–200)
RBC # BLD AUTO: 2.57 10*6/MM3 (ref 4.7–6.1)
RBC # UR: NORMAL /HPF
REF LAB TEST METHOD: NORMAL
SODIUM BLD-SCNC: 142 MMOL/L (ref 135–153)
SODIUM UR-SCNC: 87 MMOL/L
SP GR UR STRIP: 1.02 (ref 1–1.03)
SQUAMOUS #/AREA URNS HPF: NORMAL /HPF
TROPONIN I SERPL-MCNC: 0.01 NG/ML
TROPONIN I SERPL-MCNC: 0.01 NG/ML
UROBILINOGEN UR QL STRIP: ABNORMAL
WBC NRBC COR # BLD: 5.33 10*3/MM3 (ref 4.5–12.5)
WBC UR QL AUTO: NORMAL /HPF

## 2018-11-13 PROCEDURE — 81001 URINALYSIS AUTO W/SCOPE: CPT | Performed by: INTERNAL MEDICINE

## 2018-11-13 PROCEDURE — 84484 ASSAY OF TROPONIN QUANT: CPT | Performed by: INTERNAL MEDICINE

## 2018-11-13 PROCEDURE — 96372 THER/PROPH/DIAG INJ SC/IM: CPT

## 2018-11-13 PROCEDURE — 25010000002 ENOXAPARIN PER 10 MG: Performed by: INTERNAL MEDICINE

## 2018-11-13 PROCEDURE — 99214 OFFICE O/P EST MOD 30 MIN: CPT | Performed by: NURSE PRACTITIONER

## 2018-11-13 PROCEDURE — 82962 GLUCOSE BLOOD TEST: CPT

## 2018-11-13 PROCEDURE — 76775 US EXAM ABDO BACK WALL LIM: CPT

## 2018-11-13 PROCEDURE — 84300 ASSAY OF URINE SODIUM: CPT | Performed by: INTERNAL MEDICINE

## 2018-11-13 PROCEDURE — 80048 BASIC METABOLIC PNL TOTAL CA: CPT | Performed by: INTERNAL MEDICINE

## 2018-11-13 PROCEDURE — 94799 UNLISTED PULMONARY SVC/PX: CPT

## 2018-11-13 PROCEDURE — 63710000001 INSULIN DETEMIR PER 5 UNITS: Performed by: INTERNAL MEDICINE

## 2018-11-13 PROCEDURE — 76775 US EXAM ABDO BACK WALL LIM: CPT | Performed by: RADIOLOGY

## 2018-11-13 PROCEDURE — 84156 ASSAY OF PROTEIN URINE: CPT | Performed by: INTERNAL MEDICINE

## 2018-11-13 PROCEDURE — 63710000001 INSULIN ASPART PER 5 UNITS: Performed by: NURSE PRACTITIONER

## 2018-11-13 PROCEDURE — G0378 HOSPITAL OBSERVATION PER HR: HCPCS

## 2018-11-13 PROCEDURE — 85025 COMPLETE CBC W/AUTO DIFF WBC: CPT | Performed by: INTERNAL MEDICINE

## 2018-11-13 PROCEDURE — 83036 HEMOGLOBIN GLYCOSYLATED A1C: CPT | Performed by: INTERNAL MEDICINE

## 2018-11-13 PROCEDURE — 82570 ASSAY OF URINE CREATININE: CPT | Performed by: INTERNAL MEDICINE

## 2018-11-13 RX ORDER — NITROGLYCERIN 0.4 MG/1
0.4 TABLET SUBLINGUAL
Status: CANCELLED | OUTPATIENT
Start: 2018-11-13

## 2018-11-13 RX ORDER — AMOXICILLIN 250 MG
2 CAPSULE ORAL NIGHTLY
Status: ON HOLD | COMMUNITY
End: 2021-01-01

## 2018-11-13 RX ORDER — HYDROCODONE BITARTRATE AND ACETAMINOPHEN 7.5; 325 MG/1; MG/1
1 TABLET ORAL 2 TIMES DAILY PRN
Status: DISCONTINUED | OUTPATIENT
Start: 2018-11-13 | End: 2018-11-14 | Stop reason: HOSPADM

## 2018-11-13 RX ORDER — AMLODIPINE BESYLATE 5 MG/1
5 TABLET ORAL
Status: DISCONTINUED | OUTPATIENT
Start: 2018-11-13 | End: 2018-11-14 | Stop reason: HOSPADM

## 2018-11-13 RX ORDER — LOSARTAN POTASSIUM 100 MG/1
100 TABLET ORAL DAILY
Status: ON HOLD | COMMUNITY
End: 2019-01-10

## 2018-11-13 RX ORDER — CARVEDILOL 25 MG/1
25 TABLET ORAL 2 TIMES DAILY WITH MEALS
Status: DISCONTINUED | OUTPATIENT
Start: 2018-11-13 | End: 2018-11-14 | Stop reason: HOSPADM

## 2018-11-13 RX ORDER — TAMSULOSIN HYDROCHLORIDE 0.4 MG/1
1 CAPSULE ORAL NIGHTLY
COMMUNITY
End: 2019-05-03 | Stop reason: SDUPTHER

## 2018-11-13 RX ORDER — HYDRALAZINE HYDROCHLORIDE 25 MG/1
25 TABLET, FILM COATED ORAL 3 TIMES DAILY
Status: ON HOLD | COMMUNITY
End: 2019-01-15 | Stop reason: SDUPTHER

## 2018-11-13 RX ORDER — HYDROXYZINE HYDROCHLORIDE 25 MG/1
25 TABLET, FILM COATED ORAL EVERY 6 HOURS PRN
Status: DISCONTINUED | OUTPATIENT
Start: 2018-11-13 | End: 2018-11-14 | Stop reason: HOSPADM

## 2018-11-13 RX ORDER — NICOTINE POLACRILEX 4 MG
15 LOZENGE BUCCAL
Status: DISCONTINUED | OUTPATIENT
Start: 2018-11-13 | End: 2018-11-14 | Stop reason: HOSPADM

## 2018-11-13 RX ORDER — CETIRIZINE HYDROCHLORIDE 10 MG/1
5 TABLET ORAL DAILY
Status: DISCONTINUED | OUTPATIENT
Start: 2018-11-13 | End: 2018-11-14 | Stop reason: HOSPADM

## 2018-11-13 RX ORDER — DEXTROSE MONOHYDRATE 25 G/50ML
25 INJECTION, SOLUTION INTRAVENOUS
Status: DISCONTINUED | OUTPATIENT
Start: 2018-11-13 | End: 2018-11-14 | Stop reason: HOSPADM

## 2018-11-13 RX ORDER — PANTOPRAZOLE SODIUM 40 MG/1
40 TABLET, DELAYED RELEASE ORAL
Status: DISCONTINUED | OUTPATIENT
Start: 2018-11-13 | End: 2018-11-14 | Stop reason: HOSPADM

## 2018-11-13 RX ORDER — SENNA AND DOCUSATE SODIUM 50; 8.6 MG/1; MG/1
2 TABLET, FILM COATED ORAL NIGHTLY
Status: DISCONTINUED | OUTPATIENT
Start: 2018-11-13 | End: 2018-11-14 | Stop reason: HOSPADM

## 2018-11-13 RX ORDER — TAMSULOSIN HYDROCHLORIDE 0.4 MG/1
0.4 CAPSULE ORAL NIGHTLY
Status: DISCONTINUED | OUTPATIENT
Start: 2018-11-13 | End: 2018-11-14 | Stop reason: HOSPADM

## 2018-11-13 RX ORDER — ASPIRIN 81 MG/1
81 TABLET ORAL DAILY
Status: DISCONTINUED | OUTPATIENT
Start: 2018-11-13 | End: 2018-11-14 | Stop reason: HOSPADM

## 2018-11-13 RX ORDER — SODIUM CHLORIDE 0.9 % (FLUSH) 0.9 %
5 SYRINGE (ML) INJECTION AS NEEDED
Status: DISCONTINUED | OUTPATIENT
Start: 2018-11-13 | End: 2018-11-14 | Stop reason: HOSPADM

## 2018-11-13 RX ORDER — FAMOTIDINE 20 MG/1
40 TABLET, FILM COATED ORAL DAILY
Status: CANCELLED | OUTPATIENT
Start: 2018-11-13

## 2018-11-13 RX ORDER — LEVOTHYROXINE SODIUM 137 UG/1
137 TABLET ORAL DAILY
COMMUNITY

## 2018-11-13 RX ORDER — LOSARTAN POTASSIUM 50 MG/1
100 TABLET ORAL DAILY
Status: CANCELLED | OUTPATIENT
Start: 2018-11-13

## 2018-11-13 RX ORDER — GLIPIZIDE 5 MG/1
10 TABLET ORAL
Status: DISCONTINUED | OUTPATIENT
Start: 2018-11-14 | End: 2018-11-14 | Stop reason: HOSPADM

## 2018-11-13 RX ORDER — SODIUM BICARBONATE 650 MG/1
1300 TABLET ORAL 3 TIMES DAILY
Status: DISCONTINUED | OUTPATIENT
Start: 2018-11-13 | End: 2018-11-14 | Stop reason: HOSPADM

## 2018-11-13 RX ORDER — FERROUS SULFATE 325(65) MG
325 TABLET ORAL
Status: DISCONTINUED | OUTPATIENT
Start: 2018-11-13 | End: 2018-11-14

## 2018-11-13 RX ORDER — SODIUM CHLORIDE 0.9 % (FLUSH) 0.9 %
3 SYRINGE (ML) INJECTION EVERY 12 HOURS SCHEDULED
Status: DISCONTINUED | OUTPATIENT
Start: 2018-11-13 | End: 2018-11-14 | Stop reason: HOSPADM

## 2018-11-13 RX ORDER — ATORVASTATIN CALCIUM 40 MG/1
80 TABLET, FILM COATED ORAL DAILY
Status: DISCONTINUED | OUTPATIENT
Start: 2018-11-13 | End: 2018-11-14 | Stop reason: HOSPADM

## 2018-11-13 RX ORDER — ISOSORBIDE MONONITRATE 60 MG/1
120 TABLET, EXTENDED RELEASE ORAL DAILY
Status: DISCONTINUED | OUTPATIENT
Start: 2018-11-13 | End: 2018-11-14 | Stop reason: HOSPADM

## 2018-11-13 RX ORDER — FUROSEMIDE 40 MG/1
40 TABLET ORAL TAKE AS DIRECTED
Status: ON HOLD | COMMUNITY
End: 2021-01-01

## 2018-11-13 RX ORDER — HYDRALAZINE HYDROCHLORIDE 25 MG/1
25 TABLET, FILM COATED ORAL 3 TIMES DAILY
Status: DISCONTINUED | OUTPATIENT
Start: 2018-11-13 | End: 2018-11-14 | Stop reason: HOSPADM

## 2018-11-13 RX ORDER — FUROSEMIDE 40 MG/1
40 TABLET ORAL TAKE AS DIRECTED
Status: CANCELLED | OUTPATIENT
Start: 2018-11-13

## 2018-11-13 RX ADMIN — CETIRIZINE HYDROCHLORIDE 5 MG: 10 TABLET, FILM COATED ORAL at 12:22

## 2018-11-13 RX ADMIN — INSULIN ASPART 5 UNITS: 100 INJECTION, SOLUTION INTRAVENOUS; SUBCUTANEOUS at 16:58

## 2018-11-13 RX ADMIN — Medication 3 ML: at 01:24

## 2018-11-13 RX ADMIN — Medication 325 MG: at 12:22

## 2018-11-13 RX ADMIN — LEVOTHYROXINE SODIUM 137.5 MCG: 125 TABLET ORAL at 12:22

## 2018-11-13 RX ADMIN — SODIUM BICARBONATE TAB 650 MG 1300 MG: 650 TAB at 22:00

## 2018-11-13 RX ADMIN — INSULIN ASPART 4 UNITS: 100 INJECTION, SOLUTION INTRAVENOUS; SUBCUTANEOUS at 21:59

## 2018-11-13 RX ADMIN — LINAGLIPTIN 5 MG: 5 TABLET, FILM COATED ORAL at 12:22

## 2018-11-13 RX ADMIN — Medication 3 ML: at 22:01

## 2018-11-13 RX ADMIN — INSULIN ASPART 4 UNITS: 100 INJECTION, SOLUTION INTRAVENOUS; SUBCUTANEOUS at 12:22

## 2018-11-13 RX ADMIN — HYDRALAZINE HYDROCHLORIDE 25 MG: 25 TABLET ORAL at 16:48

## 2018-11-13 RX ADMIN — Medication 3 ML: at 08:55

## 2018-11-13 RX ADMIN — ISOSORBIDE MONONITRATE 120 MG: 60 TABLET, EXTENDED RELEASE ORAL at 12:23

## 2018-11-13 RX ADMIN — CARVEDILOL 25 MG: 25 TABLET, FILM COATED ORAL at 12:22

## 2018-11-13 RX ADMIN — SODIUM BICARBONATE TAB 650 MG 1300 MG: 650 TAB at 16:48

## 2018-11-13 RX ADMIN — INSULIN DETEMIR 65 UNITS: 100 INJECTION, SOLUTION SUBCUTANEOUS at 22:03

## 2018-11-13 RX ADMIN — HYDRALAZINE HYDROCHLORIDE 25 MG: 25 TABLET ORAL at 22:00

## 2018-11-13 RX ADMIN — ASPIRIN 81 MG: 81 TABLET ORAL at 12:22

## 2018-11-13 RX ADMIN — AMLODIPINE BESYLATE 5 MG: 5 TABLET ORAL at 16:48

## 2018-11-13 RX ADMIN — ATORVASTATIN CALCIUM 80 MG: 40 TABLET, FILM COATED ORAL at 12:23

## 2018-11-13 RX ADMIN — TAMSULOSIN HYDROCHLORIDE 0.4 MG: 0.4 CAPSULE ORAL at 22:00

## 2018-11-13 RX ADMIN — ENOXAPARIN SODIUM 30 MG: 30 INJECTION SUBCUTANEOUS at 08:55

## 2018-11-13 RX ADMIN — Medication 10 ML: at 16:58

## 2018-11-13 RX ADMIN — PANTOPRAZOLE SODIUM 40 MG: 40 TABLET, DELAYED RELEASE ORAL at 16:48

## 2018-11-13 NOTE — CONSULTS
Consults  Date of Admit: 11/12/2018  Date of Consult: 11/13/18  No ref. provider found  Catarino Arvizu  1955    Consulting Physician: Vitor Orr MD    Cardiology consultation    Reason for consultation: chest pain    Assessment:  1. Chest pain, typical and atypical features, with no acute ischemic changes on EKG, negative troponins and nuclear stress test May 2018 which was consistent with a low risk study, in a patient with  multiple risk factors for coronary artery disease.    2. Anemia, with a hemoglobin of 7.3.  3. Chronic kidney disease, with acute kidney injury, followed by nephrology  4. Hypertension  5. Hyperlipidemia  6. Diabetes  7. Chronic kidney disease  8. Tobacco use (chews)      Recommendations:  1. Will keep patient NPO after midnight.  If nephrology agrees, we will proceed with cardiac catheterization in am.        History of Present Illness    Subjective     Chief Complaint   Patient presents with   • Chest Pain       Catarino Arvizu is a 63 y.o. male with past medical history significant for hypertension, hyperlipidemia, diabetes mellitus, chronic kidney disease, sleep apnea, CPAP dependent, polio, history of prostate cancer and tobacco use.  He reports that over the last 2 weeks he has had ongoing difficulties with chest pain.  He presented to his PCP on 11/12/2018 and was told to go to the emergency room.  He presented to the ED with reports of substernal chest pain and worsening shortness of breath.  He stated that he cannot move around without experiencing chest pain.  He had been seen by cardiologist, Dr. Conroy, in March 2018 with complaints of chest pain as well.  Nuclear stress test and transthoracic echocardiogram were unremarkable.  He was admitted to observation for further evaluation and management.  Cardiology was consulted.      Cardiac risk factors:diabetes mellitus, hypercholesterolemia, hypertension, Sedentary life style, Obesity and age and sex    Last Echo:  5/10/2018    Interpretation Summary     · Normal left ventricular cavity size and wall thickness noted.  · Left ventricular systolic function is normal.Estimated EF appears to be in the range of 61 - 65%.  · Left ventricular diastolic dysfunction (grade I) consistent with impaired relaxation.  · No significant valvular heart disease  · Trace aortic valve regurgitation is present. No aortic valve stenosis is present.  · There is no evidence of pericardial effusion.     Last Stress: 5/7/2018    Interpretation Summary     · Findings consistent with a normal ECG stress test.  · Myocardial perfusion imaging indicates a normal myocardial perfusion study with no evidence of ischemia.  · TID:1.29.  · Normal LV cavity size. Normal LV wall motion noted.  · Left ventricular ejection fraction is normal (Calculated EF = 69%).  · Impressions are consistent with a low risk study.       Past Medical History:   Diagnosis Date   • Chest pain    • Chronic kidney disease    • CPAP (continuous positive airway pressure) dependence    • Diabetes mellitus (CMS/[facility])    • Elevated cholesterol    • GERD (gastroesophageal reflux disease)    • Heart murmur    • Hyperlipidemia    • Hypertension    • Irregular heart beat    • Polio    • Prostate cancer (CMS/[facility]) 05[place]2016    [place]   • Shortness of breath    • Sleep apnea      Past Surgical History:   Procedure Laterality Date   • CARDIAC CATHETERIZATION  2008    50% diffuse LAD stenosis, 50% septal  branch   • COLONOSCOPY      Long time ago   • HEMORRHOIDECTOMY     • HERNIA REPAIR     • UPPER GASTROINTESTINAL ENDOSCOPY      Long time ago     Family History   Problem Relation Age of Onset   • Heart disease Brother    • Diabetes Brother    • Cancer Brother         not sure the kind   • Heart disease Brother    • Diabetes Brother    • Diabetes Sister    • Diabetes Daughter    • Heart disease Daughter    • Pancreatitis Daughter    • Crohn's disease Daughter     • Diabetes Son    • Heart disease Son      Social History     Tobacco Use   • Smoking status: Never Smoker   • Smokeless tobacco: Current User     Types: Chew   • Tobacco comment: Chewed tobacco since he was 4 yrs old.   Substance Use Topics   • Alcohol use: No   • Drug use: No     Medications Prior to Admission   Medication Sig Dispense Refill Last Dose   • ASPIRIN LOW DOSE 81 MG EC tablet TAKE 1 TABLET EVERY DAY 30 tablet 2 11/12/2018 at 0600   • carvedilol (COREG) 25 MG tablet Take 1 tablet by mouth 2 (Two) Times a Day. 180 tablet 3 11/12/2018 at 0600   • cetirizine (ZyrTEC) 10 MG tablet Take 10 mg by mouth daily.   11/12/2018 at Unknown time   • fenofibrate (TRICOR) 145 MG tablet Take 145 mg by mouth Daily.   11/12/2018 at 0600   • Ferrous Sulfate (IRON) 325 (65 Fe) MG tablet TAKE 1 TABLET WITH BREAKFAST 30 tablet 2 11/12/2018 at 0600   • furosemide (LASIX) 40 MG tablet Take 40 mg by mouth Take As Directed. Prior to [facility] Admission, Patient was on: Take 1 tablet every day except Wednesday and Sunday 11/12/2018 at 0600   • glimepiride (AMARYL) 4 MG tablet Take 4 mg by mouth 2 (two) times a day.   11/12/2018 at 0600   • hydrALAZINE (APRESOLINE) 25 MG tablet Take 25 mg by mouth 3 (Three) Times a Day.   11/12/2018 at 0600   • hydrOXYzine (VISTARIL) 25 MG capsule Take 25 mg by mouth Every 6 (Six) Hours As Needed for Itching.   Past Week at Unknown time   • isosorbide mononitrate (IMDUR) 120 MG 24 hr tablet Take 1 tablet by mouth Daily. 30 tablet 5 11/12/2018 at 0600   • levothyroxine (SYNTHROID, LEVOTHROID) 137 MCG tablet Take 137 mcg by mouth Daily.   11/12/2018 at 0600   • losartan (COZAAR) 100 MG tablet Take 100 mg by mouth Daily.   11/12/2018 at 0600   • pantoprazole (PROTONIX) 40 MG EC tablet Take 1 tablet by mouth 2 (Two) Times a Day With Meals. 60 tablet 3 11/12/2018 at 0600   • senna-docusate (SENEXON-S) 8.6-50 MG per tablet Take 2 tablets by mouth Every Night.   11/11/2018 at Unknown time   •  sitaGLIPtin (JANUVIA) 50 MG tablet Take 1 tablet by mouth daily. 30 tablet 3 11/12/2018 at 0600   • tamsulosin (FLOMAX) 0.4 MG capsule 24 hr capsule Take 1 capsule by mouth Every Night.   11/11/2018   • atorvastatin (LIPITOR) 80 MG tablet Take 80 mg by mouth daily.   11/11/2018   • HYDROcodone-acetaminophen (NORCO) 7.5-325 MG per tablet Take 1 tablet by mouth 2 (two) times a day as needed for moderate pain (4-6).   11/11/2018   • Insulin Glargine (BASAGLAR KWIKPEN) 100 UNIT/ML injection pen Inject 65 Units under the skin into the appropriate area as directed Every Night.   11/11/2018   • nitroglycerin (NITROSTAT) 0.4 MG SL tablet Place 0.4 mg under the tongue every 5 (five) minutes as needed for chest pain. Take no more than 3 doses in 15 minutes.   Unknown at Unknown time   • raNITIdine (ZANTAC) 300 MG tablet Take 300 mg by mouth Every Night.   11/11/2018     Allergies:  Patient has no known allergies.    Review of Systems   Constitutional: Negative for fatigue.   Respiratory: Negative for shortness of breath.    Cardiovascular: Negative for chest pain, palpitations and leg swelling.   Gastrointestinal: Negative for blood in stool.   Neurological: Negative for dizziness, syncope, weakness and light-headedness.   Hematological: Does not bruise/bleed easily.         Objective      Vital Signs  Temp:  [96.6 °F (35.9 °C)-98.8 °F (37.1 °C)] 98.5 °F (36.9 °C)  Heart Rate:  [55-69] 55  Resp:  [18-24] 20  BP: (139-180)/(62-90) 180/77  Body mass index is 40.28 kg/m².    Intake/Output Summary (Last 24 hours) at 11/13/2018 1515  Last data filed at 11/13/2018 1300  Gross per 24 hour   Intake 600 ml   Output --   Net 600 ml       Physical Exam   Constitutional: He is oriented to person, place, and time. He appears well-developed and well-nourished.   HENT:   Head: Normocephalic and atraumatic.   Eyes: Pupils are equal, round, and reactive to light.   Neck: No JVD present.   Cardiovascular: Normal rate, regular rhythm and intact  distal pulses. Exam reveals no gallop and no friction rub.   No murmur heard.  Pulmonary/Chest: Effort normal and breath sounds normal. No respiratory distress. He has no wheezes. He has no rales.   Abdominal: Soft. He exhibits no mass. There is no tenderness. No hernia.   Neurological: He is alert and oriented to person, place, and time.   Skin: Skin is warm and dry.   Psychiatric: He has a normal mood and affect.   Vitals reviewed.      Results Review:   I reviewed the patient's new clinical results.  Results from last 7 days   Lab Units  11/13/18   1018  11/13/18   0326  11/12/18   2240  11/12/18   1810  11/12/18   1614   TROPONIN I ng/mL  0.008  0.008  0.018  0.019  0.012     Results from last 7 days   Lab Units  11/13/18   0326  11/12/18   1614   WBC 10*3/mm3  5.33  5.03   HEMOGLOBIN g/dL  7.3*  7.8*   PLATELETS 10*3/mm3  187  194     Results from last 7 days   Lab Units  11/13/18   0326  11/12/18   1614   SODIUM mmol/L  142  139   POTASSIUM mmol/L  5.0  5.1   CHLORIDE mmol/L  113*  112   CO2 mmol/L  18.3*  20.1*   BUN mg/dL  31*  34*   CREATININE mg/dL  2.97*  3.10*   CALCIUM mg/dL  8.3  8.5   GLUCOSE mg/dL  155*  210*   ALT (SGPT) U/L   --   29   AST (SGOT) U/L   --   31     No results found for: INR  Lab Results   Component Value Date    MG 2.3 07/11/2018     Lab Results   Component Value Date    TSH 1.835 07/11/2018    PSA 3.100 11/06/2018    TRIG 1,214 (H) 08/09/2016    HDL 32 (L) 08/09/2016    LDL  08/09/2016      Comment:      Unable to calculate      Lab Results   Component Value Date    .0 (H) 11/12/2018    .0 (H) 08/10/2016        EKG:           Imaging Results (last 72 hours)     Procedure Component Value Units Date/Time    XR Chest 1 View [[id number]] Collected:  11/12/18 1654     Updated:  11/12/18 1711    Narrative:       XR CHEST 1 VW-     CLINICAL INDICATION: Chest Pain triage protocol          COMPARISON: 4/14/2013      TECHNIQUE: Single frontal view of the chest.     FINDINGS:      There is no focal alveolar infiltrate or effusion.  The cardiac silhouette is normal. The pulmonary vasculature is  unremarkable.  There is no evidence of an acute osseous abnormality.   There are no suspicious-appearing parenchymal soft tissue nodules.            Impression:       No evidence of active or acute cardiopulmonary disease on today's chest  radiograph.         This report was finalized on 11/12/2018 4:54 PM by Dr. Marcelino Bruner MD.                Thank you very much for asking us to be involved in this patient's care.  We will follow along with you.    Lay Quiñones, APRN   11/13/18  3:15 PM

## 2018-11-13 NOTE — CONSULTS
"Pt sitting up in chair alert and oriented.  Pt states he has been a diabetic for 20 years.  He states he currently checks his blood sugar 2 to 3 times per day..  Pt states he does suffer from diabetic neuropathy.  Pt tells me, \" I eat a lot of gravy and biscuits.\"  Informed pt on importance of changing his diet to have better control of blood sugar.  Taught pt what carbs are and the effect they have on blood sugar.  Informed pt that high blood sugar can compromise wound healing and lead to heart disease and worsen neuropathy.  Encouraged pt to attend diabetes smart classes left handouts with my name and number will continue to monitor pt as needed  "

## 2018-11-13 NOTE — PLAN OF CARE
Problem: Patient Care Overview  Goal: Plan of Care Review  Outcome: Ongoing (interventions implemented as appropriate)      Problem: Fall Risk (Adult)  Goal: Identify Related Risk Factors and Signs and Symptoms  Outcome: Ongoing (interventions implemented as appropriate)    Goal: Absence of Fall  Outcome: Ongoing (interventions implemented as appropriate)      Problem: Pain, Chronic (Pediatric,NICU)  Goal: Identify Related Risk Factors and Signs and Symptoms  Outcome: Ongoing (interventions implemented as appropriate)    Goal: Acceptable Pain/Comfort/Activity Level  Outcome: Ongoing (interventions implemented as appropriate)      Problem: Diabetes, Type 2 (Adult)  Goal: Signs and Symptoms of Listed Potential Problems Will be Absent, Minimized or Managed (Diabetes, Type 2)  Outcome: Ongoing (interventions implemented as appropriate)

## 2018-11-13 NOTE — CONSULTS
Nephrology Consult Note    Referring Provider: Dr Torres  Reason for Consultation: HANNAH on CKD 3    Subjective       History of present illness:  Catarino Arvizu is a 63 y.o. male who presented to [facility] emergency department with chief complaint of retrosternal chest pain radiating to left arm associated with exertion and it improves with rest. He also has associated shortness of breath. He has Type 2 Dm and HTn for 20 yrs. DM has been uncontrolled. He has not seen ophthalmology for long time and is not aware of diabetic retinopathy. He has CKD stage 3 which was worsening outpatient and losartan was stopped on last nephrology visit but he misunderstood and has continued to take it. He has chronic leg edema and adits to eat excessive salt and eats lot of canned food and sea food. He lm nausea, vomiting or diarrhea. He has hemorrhoids and also c/o intermittent epigastric pain.  no Chronic NSAIDS use. Patient denies hematuria, dysuria, difficulty passing urine. No prior history of renal stones . No family history of renal disease    Acute Kidney Injury Risk Factors :    Medications : losartan  Contrast : no  NSAIDS : no  Volume depletion : no  Hemodynamic Instability : no    History  Past Medical History:   Diagnosis Date   • Chest pain    • Chronic kidney disease    • CPAP (continuous positive airway pressure) dependence    • Diabetes mellitus (CMS/[facility])    • Elevated cholesterol    • GERD (gastroesophageal reflux disease)    • Heart murmur    • Hyperlipidemia    • Hypertension    • Irregular heart beat    • Polio    • Prostate cancer (CMS/[facility]) 05/2016    Dr. Khalif Kohler MD- [place]   • Shortness of breath    • Sleep apnea    , Past Surgical History:   Procedure Laterality Date   • CARDIAC CATHETERIZATION  2008    50% diffuse LAD stenosis, 50% septal  branch   • COLONOSCOPY      Long time ago   • HEMORRHOIDECTOMY     • HERNIA REPAIR     • UPPER GASTROINTESTINAL ENDOSCOPY       Long time ago   , Family History   Problem Relation Age of Onset   • Heart disease Brother    • Diabetes Brother    • Cancer Brother         not sure the kind   • Heart disease Brother    • Diabetes Brother    • Diabetes Sister    • Diabetes Daughter    • Heart disease Daughter    • Pancreatitis Daughter    • Crohn's disease Daughter    • Diabetes Son    • Heart disease Son    , Social History     Tobacco Use   • Smoking status: Never Smoker   • Smokeless tobacco: Current User     Types: Chew   • Tobacco comment: Chewed tobacco since he was 4 yrs old.   Substance Use Topics   • Alcohol use: No   • Drug use: No   , Medications Prior to Admission   Medication Sig Dispense Refill Last Dose   • ASPIRIN LOW DOSE 81 MG EC tablet TAKE 1 TABLET EVERY DAY 30 tablet 2 11/12/2018 at 0600   • carvedilol (COREG) 25 MG tablet Take 1 tablet by mouth 2 (Two) Times a Day. 180 tablet 3 11/12/2018 at 0600   • cetirizine (ZyrTEC) 10 MG tablet Take 10 mg by mouth daily.   11/12/2018 at Unknown time   • fenofibrate (TRICOR) 145 MG tablet Take 145 mg by mouth Daily.   11/12/2018 at 0600   • Ferrous Sulfate (IRON) 325 (65 Fe) MG tablet TAKE 1 TABLET WITH BREAKFAST 30 tablet 2 11/12/2018 at 0600   • furosemide (LASIX) 40 MG tablet Take 40 mg by mouth Take As Directed. Prior to [facility] Admission, Patient was on: Take 1 tablet every day except Wednesday and Sunday 11/12/2018 at 0600   • glimepiride (AMARYL) 4 MG tablet Take 4 mg by mouth 2 (two) times a day.   11/12/2018 at 0600   • hydrALAZINE (APRESOLINE) 25 MG tablet Take 25 mg by mouth 3 (Three) Times a Day.   11/12/2018 at 0600   • hydrOXYzine (VISTARIL) 25 MG capsule Take 25 mg by mouth Every 6 (Six) Hours As Needed for Itching.   Past Week at Unknown time   • isosorbide mononitrate (IMDUR) 120 MG 24 hr tablet Take 1 tablet by mouth Daily. 30 tablet 5 11/12/2018 at 0600   • levothyroxine (SYNTHROID, LEVOTHROID) 137 MCG tablet Take 137 mcg by mouth Daily.   11/12/2018 at 0600   •  losartan (COZAAR) 100 MG tablet Take 100 mg by mouth Daily.   11/12/2018 at 0600   • pantoprazole (PROTONIX) 40 MG EC tablet Take 1 tablet by mouth 2 (Two) Times a Day With Meals. 60 tablet 3 11/12/2018 at 0600   • senna-docusate (SENEXON-S) 8.6-50 MG per tablet Take 2 tablets by mouth Every Night.   11/11/2018 at Unknown time   • sitaGLIPtin (JANUVIA) 50 MG tablet Take 1 tablet by mouth daily. 30 tablet 3 11/12/2018 at 0600   • tamsulosin (FLOMAX) 0.4 MG capsule 24 hr capsule Take 1 capsule by mouth Every Night.   11/11/2018   • atorvastatin (LIPITOR) 80 MG tablet Take 80 mg by mouth daily.   11/11/2018   • HYDROcodone-acetaminophen (NORCO) 7.5-325 MG per tablet Take 1 tablet by mouth 2 (two) times a day as needed for moderate pain (4-6).   11/11/2018   • Insulin Glargine (BASAGLAR KWIKPEN) 100 UNIT/ML injection pen Inject 65 Units under the skin into the appropriate area as directed Every Night.   11/11/2018   • nitroglycerin (NITROSTAT) 0.4 MG SL tablet Place 0.4 mg under the tongue every 5 (five) minutes as needed for chest pain. Take no more than 3 doses in 15 minutes.   Unknown at Unknown time   • raNITIdine (ZANTAC) 300 MG tablet Take 300 mg by mouth Every Night.   11/11/2018   , Scheduled Meds:    aspirin 81 mg Oral Daily   atorvastatin 80 mg Oral Daily   carvedilol 25 mg Oral BID With Meals   cetirizine 5 mg Oral Daily   enoxaparin 30 mg Subcutaneous Q24H   ferrous sulfate 325 mg Oral Daily With Breakfast   [START ON 11/14/2018] glipiZIDE 10 mg Oral QAM AC   hydrALAZINE 25 mg Oral TID   insulin aspart 0-7 Units Subcutaneous 4x Daily AC & at Bedtime   insulin detemir 65 Units Subcutaneous Nightly   isosorbide mononitrate 120 mg Oral Daily   levothyroxine 137.5 mcg Oral Q AM   linagliptin 5 mg Oral Daily   pantoprazole 40 mg Oral BID AC   sennosides-docusate sodium 2 tablet Oral Nightly   sodium chloride 3 mL Intravenous Q12H   sodium chloride 3 mL Intravenous Q12H   tamsulosin 0.4 mg Oral Nightly   ,  Continuous Infusions:   , PRN Meds:  •  acetaminophen  •  aluminum-magnesium hydroxide-simethicone  •  dextrose  •  dextrose  •  glucagon (human recombinant)  •  hydrALAZINE  •  HYDROcodone-acetaminophen  •  hydrOXYzine  •  Morphine **AND** naloxone  •  nitroglycerin  •  ondansetron **OR** ondansetron ODT **OR** ondansetron  •  sodium chloride  •  sodium chloride  •  sodium chloride and Allergies:  Patient has no known allergies.    Review of Systems  More than 10 point review of systems was done. Pertinent items are noted in HPI, all other systems reviewed and negative    Objective     Vital Signs  Temp:  [96.6 °F (35.9 °C)-98.8 °F (37.1 °C)] 98.5 °F (36.9 °C)  Heart Rate:  [55-69] 55  Resp:  [18-24] 20  BP: (139-180)/(62-90) 180/77    I/O this shift:  In: 240 [P.O.:240]  Out: -   No intake/output data recorded.    Physical Examination:    General Appearance : alert, appears stated age, cooperative and no distres  Head : normocephalic, without obvious abnormality and atraumatic  Eyes : conjunctivae and sclerae normal, no icterus, + pallor and PERRLA  Throat : oral mucosa moist  Neck: no adenopathy, suppple, no carotid bruit and no JVD  Lungs : clear to auscultation, respirations regular and unlabored  Heart : regular rhythm & normal rate, normal S1, S2, systolic murmur, no joaquin, no rub Abdomen :  normal bowel sounds, no masses and soft non-tender  Rectal : Deferred  Extremities : moves extremities well, no redness and 1+ edema  Pulses :  palpable and equal bilaterally  Skin : no bleeding, bruising or rash  Neurologic : orientated to person, place, time, grossly no focal deficitis    Laboratory Data :      WBC WBC   Date Value Ref Range Status   11/13/2018 5.33 4.50 - 12.50 10*3/mm3 Final   11/12/2018 5.03 4.50 - 12.50 10*3/mm3 Final      HGB Hemoglobin   Date Value Ref Range Status   11/13/2018 7.3 (L) 14.0 - 18.0 g/dL Final   11/12/2018 7.8 (L) 14.0 - 18.0 g/dL Final      HCT Hematocrit   Date Value Ref Range  Status   11/13/2018 24.9 (L) 42.0 - 52.0 % Final   11/12/2018 25.3 (L) 42.0 - 52.0 % Final      Platlets No results found for: LABPLAT   MCV MCV   Date Value Ref Range Status   11/13/2018 96.9 (H) 80.0 - 94.0 fL Final   11/12/2018 91.0 80.0 - 94.0 fL Final          Sodium Sodium   Date Value Ref Range Status   11/13/2018 142 135 - 153 mmol/L Final   11/12/2018 139 135 - 153 mmol/L Final      Potassium Potassium   Date Value Ref Range Status   11/13/2018 5.0 3.5 - 5.3 mmol/L Final   11/12/2018 5.1 3.5 - 5.3 mmol/L Final      Chloride Chloride   Date Value Ref Range Status   11/13/2018 113 (H) 99 - 112 mmol/L Final   11/12/2018 112 99 - 112 mmol/L Final      CO2 CO2   Date Value Ref Range Status   11/13/2018 18.3 (L) 24.3 - 31.9 mmol/L Final   11/12/2018 20.1 (L) 24.3 - 31.9 mmol/L Final      BUN BUN   Date Value Ref Range Status   11/13/2018 31 (H) 7 - 21 mg/dL Final   11/12/2018 34 (H) 7 - 21 mg/dL Final      Creatinine Creatinine   Date Value Ref Range Status   11/13/2018 2.97 (H) 0.43 - 1.29 mg/dL Final   11/12/2018 3.10 (H) 0.43 - 1.29 mg/dL Final      Calcium Calcium   Date Value Ref Range Status   11/13/2018 8.3 7.7 - 10.0 mg/dL Final   11/12/2018 8.5 7.7 - 10.0 mg/dL Final      PO4 No results found for: CAPO4   Albumin Albumin   Date Value Ref Range Status   11/12/2018 4.20 3.40 - 4.80 g/dL Final      Magnesium No results found for: MG   Uric Acid No results found for: URICACID     Radiology results :     Imaging Results (last 72 hours)     Procedure Component Value Units Date/Time    XR Chest 1 View [[id number]] Collected:  11/12/18 1654     Updated:  11/12/18 1711    Narrative:       XR CHEST 1 VW-     CLINICAL INDICATION: Chest Pain triage protocol          COMPARISON: 4/14/2013      TECHNIQUE: Single frontal view of the chest.     FINDINGS:     There is no focal alveolar infiltrate or effusion.  The cardiac silhouette is normal. The pulmonary vasculature is  unremarkable.  There is no evidence of an acute  osseous abnormality.   There are no suspicious-appearing parenchymal soft tissue nodules.            Impression:       No evidence of active or acute cardiopulmonary disease on today's chest  radiograph.         This report was finalized on 11/12/2018 4:54 PM by Dr. Marcelino Bruner MD.               Medications:        aspirin 81 mg Oral Daily   atorvastatin 80 mg Oral Daily   carvedilol 25 mg Oral BID With Meals   cetirizine 5 mg Oral Daily   enoxaparin 30 mg Subcutaneous Q24H   ferrous sulfate 325 mg Oral Daily With Breakfast   [START ON 11/14/2018] glipiZIDE 10 mg Oral QAM AC   hydrALAZINE 25 mg Oral TID   insulin aspart 0-7 Units Subcutaneous 4x Daily AC & at Bedtime   insulin detemir 65 Units Subcutaneous Nightly   isosorbide mononitrate 120 mg Oral Daily   levothyroxine 137.5 mcg Oral Q AM   linagliptin 5 mg Oral Daily   pantoprazole 40 mg Oral BID AC   sennosides-docusate sodium 2 tablet Oral Nightly   sodium chloride 3 mL Intravenous Q12H   sodium chloride 3 mL Intravenous Q12H   tamsulosin 0.4 mg Oral Nightly          Assessment/Plan       Chest pain in adult      1. HANNAH on CKD stage 3: his baseline creatinine is 2 and it was elevated at 2.8 on his last visit with Dr Verduzco in august 2018. Losartan was stopped but he was still taking it. His creatinine was 3.1 yesterday and is 2.9 today. Agree with holding losartan. Will not give IVF as he is not volume depleted and is edematous. Watch with po hydration. Will check UA , urine protein/creatinine and renal US. Avoid nephrotoxins    2. Anemia : check iron profile and stool for occult blood    3. Metabolic acidosis start sodium bicarbonate    4. Proteinuria : check    5. Type 2 DM stable    6. Diastolic CHF : educated on low Na diet    7. HTN : BP elevated, add norvasc    Thanks Dr Torres for the consult. We will follow with you.  I discussed the patient's findings and my recommendations with patient, family and nursing staff    Pablo Silveira MD  11/13/18  1:12  PM

## 2018-11-13 NOTE — PROGRESS NOTES
Would an alternative to norvasc be helpful in this case due to its propensity to cause peripheral edema ?

## 2018-11-13 NOTE — PLAN OF CARE
Problem: Patient Care Overview  Goal: Plan of Care Review  Outcome: Outcome(s) achieved Date Met: 11/13/18    Goal: Individualization and Mutuality  Outcome: Ongoing (interventions implemented as appropriate)    Goal: Discharge Needs Assessment  Outcome: Ongoing (interventions implemented as appropriate)    Goal: Interprofessional Rounds/Family Conf  Outcome: Ongoing (interventions implemented as appropriate)      Problem: Fall Risk (Adult)  Goal: Identify Related Risk Factors and Signs and Symptoms  Outcome: Ongoing (interventions implemented as appropriate)    Goal: Absence of Fall  Outcome: Ongoing (interventions implemented as appropriate)      Problem: Pain, Chronic (Pediatric,NICU)  Goal: Identify Related Risk Factors and Signs and Symptoms  Outcome: Ongoing (interventions implemented as appropriate)    Goal: Acceptable Pain/Comfort/Activity Level  Outcome: Ongoing (interventions implemented as appropriate)      Problem: Diabetes, Type 2 (Adult)  Goal: Signs and Symptoms of Listed Potential Problems Will be Absent, Minimized or Managed (Diabetes, Type 2)  Outcome: Ongoing (interventions implemented as appropriate)

## 2018-11-14 VITALS
DIASTOLIC BLOOD PRESSURE: 68 MMHG | RESPIRATION RATE: 18 BRPM | HEART RATE: 62 BPM | WEIGHT: 220.25 LBS | TEMPERATURE: 97.9 F | BODY MASS INDEX: 40.53 KG/M2 | SYSTOLIC BLOOD PRESSURE: 131 MMHG | OXYGEN SATURATION: 95 % | HEIGHT: 62 IN

## 2018-11-14 LAB
25(OH)D3 SERPL-MCNC: 10 NG/ML
ANION GAP SERPL CALCULATED.3IONS-SCNC: 10 MMOL/L (ref 3.6–11.2)
ARTERIAL PATENCY WRIST A: ABNORMAL
ATMOSPHERIC PRESS: 736 MMHG
BASE EXCESS BLDA CALC-SCNC: 4.9 MMOL/L
BDY SITE: ABNORMAL
BODY TEMPERATURE: 98.6 C
BUN BLD-MCNC: 41 MG/DL (ref 7–21)
BUN/CREAT SERPL: 14 (ref 7–25)
CALCIUM SPEC-SCNC: 8.1 MG/DL (ref 7.7–10)
CHLORIDE SERPL-SCNC: 116 MMOL/L (ref 99–112)
CO2 SERPL-SCNC: 16 MMOL/L (ref 24.3–31.9)
COHGB MFR BLD: 0.6 % (ref 0–5)
CREAT BLD-MCNC: 2.93 MG/DL (ref 0.43–1.29)
FERRITIN SERPL-MCNC: 95 NG/ML (ref 21.9–321.7)
GFR SERPL CREATININE-BSD FRML MDRD: 22 ML/MIN/1.73
GLUCOSE BLD-MCNC: 169 MG/DL (ref 70–110)
GLUCOSE BLDC GLUCOMTR-MCNC: 113 MG/DL (ref 70–130)
GLUCOSE BLDC GLUCOMTR-MCNC: 168 MG/DL (ref 70–130)
GLUCOSE BLDC GLUCOMTR-MCNC: 280 MG/DL (ref 70–130)
HCO3 BLDA-SCNC: 21.1 MMOL/L (ref 22–26)
HGB BLDA-MCNC: 8 G/DL (ref 12–16)
HOROWITZ INDEX BLD+IHG-RTO: 28 %
IRON 24H UR-MRATE: 50 MCG/DL (ref 53–167)
IRON SATN MFR SERPL: 15 % (ref 20–50)
METHGB BLD QL: 1.5 % (ref 0–3)
MODALITY: ABNORMAL
OSMOLALITY SERPL CALC.SUM OF ELEC: 297.2 MOSM/KG (ref 273–305)
OXYHGB MFR BLDV: 28.4 % (ref 85–100)
PCO2 BLDA: 43.2 MM HG (ref 35–45)
PH BLDA: 7.3 PH UNITS (ref 7.35–7.45)
PO2 BLDA: 22.5 MM HG (ref 80–100)
POTASSIUM BLD-SCNC: 5.3 MMOL/L (ref 3.5–5.3)
PTH-INTACT SERPL-MCNC: 227.5 PG/ML (ref 14–72)
SAO2 % BLDCOA: 29 % (ref 90–100)
SODIUM BLD-SCNC: 142 MMOL/L (ref 135–153)
TIBC SERPL-MCNC: 332 MCG/DL (ref 241–421)

## 2018-11-14 PROCEDURE — 80048 BASIC METABOLIC PNL TOTAL CA: CPT | Performed by: INTERNAL MEDICINE

## 2018-11-14 PROCEDURE — 99214 OFFICE O/P EST MOD 30 MIN: CPT | Performed by: INTERNAL MEDICINE

## 2018-11-14 PROCEDURE — 96366 THER/PROPH/DIAG IV INF ADDON: CPT

## 2018-11-14 PROCEDURE — G0378 HOSPITAL OBSERVATION PER HR: HCPCS

## 2018-11-14 PROCEDURE — 83050 HGB METHEMOGLOBIN QUAN: CPT | Performed by: INTERNAL MEDICINE

## 2018-11-14 PROCEDURE — 83970 ASSAY OF PARATHORMONE: CPT | Performed by: INTERNAL MEDICINE

## 2018-11-14 PROCEDURE — 25010000002 ENOXAPARIN PER 10 MG: Performed by: INTERNAL MEDICINE

## 2018-11-14 PROCEDURE — 25010000002 IRON SUCROSE PER 1 MG: Performed by: INTERNAL MEDICINE

## 2018-11-14 PROCEDURE — 83540 ASSAY OF IRON: CPT | Performed by: INTERNAL MEDICINE

## 2018-11-14 PROCEDURE — 94799 UNLISTED PULMONARY SVC/PX: CPT

## 2018-11-14 PROCEDURE — 96365 THER/PROPH/DIAG IV INF INIT: CPT

## 2018-11-14 PROCEDURE — 82805 BLOOD GASES W/O2 SATURATION: CPT | Performed by: INTERNAL MEDICINE

## 2018-11-14 PROCEDURE — 82962 GLUCOSE BLOOD TEST: CPT

## 2018-11-14 PROCEDURE — 36600 WITHDRAWAL OF ARTERIAL BLOOD: CPT | Performed by: INTERNAL MEDICINE

## 2018-11-14 PROCEDURE — 96372 THER/PROPH/DIAG INJ SC/IM: CPT

## 2018-11-14 PROCEDURE — 82306 VITAMIN D 25 HYDROXY: CPT | Performed by: INTERNAL MEDICINE

## 2018-11-14 PROCEDURE — 82375 ASSAY CARBOXYHB QUANT: CPT | Performed by: INTERNAL MEDICINE

## 2018-11-14 PROCEDURE — 82728 ASSAY OF FERRITIN: CPT | Performed by: INTERNAL MEDICINE

## 2018-11-14 PROCEDURE — 83550 IRON BINDING TEST: CPT | Performed by: INTERNAL MEDICINE

## 2018-11-14 RX ADMIN — SODIUM BICARBONATE TAB 650 MG 1300 MG: 650 TAB at 16:05

## 2018-11-14 RX ADMIN — IRON SUCROSE 300 MG: 20 INJECTION, SOLUTION INTRAVENOUS at 11:15

## 2018-11-14 RX ADMIN — CETIRIZINE HYDROCHLORIDE 5 MG: 10 TABLET, FILM COATED ORAL at 09:37

## 2018-11-14 RX ADMIN — CARVEDILOL 25 MG: 25 TABLET, FILM COATED ORAL at 09:37

## 2018-11-14 RX ADMIN — ENOXAPARIN SODIUM 30 MG: 30 INJECTION SUBCUTANEOUS at 09:38

## 2018-11-14 RX ADMIN — SODIUM BICARBONATE: 84 INJECTION, SOLUTION INTRAVENOUS at 16:04

## 2018-11-14 RX ADMIN — HYDRALAZINE HYDROCHLORIDE 25 MG: 25 TABLET ORAL at 16:05

## 2018-11-14 RX ADMIN — ASPIRIN 81 MG: 81 TABLET ORAL at 09:37

## 2018-11-14 RX ADMIN — LEVOTHYROXINE SODIUM 137.5 MCG: 125 TABLET ORAL at 09:36

## 2018-11-14 RX ADMIN — AMLODIPINE BESYLATE 5 MG: 5 TABLET ORAL at 09:38

## 2018-11-14 RX ADMIN — PANTOPRAZOLE SODIUM 40 MG: 40 TABLET, DELAYED RELEASE ORAL at 09:35

## 2018-11-14 RX ADMIN — Medication 3 ML: at 08:30

## 2018-11-14 RX ADMIN — ATORVASTATIN CALCIUM 80 MG: 40 TABLET, FILM COATED ORAL at 09:36

## 2018-11-14 RX ADMIN — LINAGLIPTIN 5 MG: 5 TABLET, FILM COATED ORAL at 09:35

## 2018-11-14 RX ADMIN — OFLOXACIN 50000 UNITS: 300 TABLET, COATED ORAL at 09:36

## 2018-11-14 RX ADMIN — SODIUM BICARBONATE TAB 650 MG 1300 MG: 650 TAB at 09:35

## 2018-11-14 NOTE — PROGRESS NOTES
PROGRESS NOTE         Patient Identification:  Name:  Catarino Arvizu  Age:  63 y.o.  Sex:  male  :  1955  MRN:  [id number]  Visit Number:  [id number]  Primary Care Provider:  Eleno Chaney APRN      ----------------------------------------------------------------------------------------------------------------------  Subjective       Chief Complaints:    Chest Pain        Interval History:      He denied chest pain this morning, but however with ambulation he gets short of breath and has chest pressure. Cardiology has seen him and is taking him to the cath lab this morning for symptomatic chest pain.     Review of Systems:    Constitutional: no fever, chills and night sweats. No appetite change or unexpected weight change. No fatigue.  Eyes: no eye drainage, itching or redness.  HEENT: no mouth sores, dysphagia or nose bleed.  Respiratory: no for shortness of breath, cough or production of sputum.  Cardiovascular: no chest pain, no palpitations, no orthopnea.  Gastrointestinal: no nausea, vomiting or diarrhea. No abdominal pain, hematemesis or rectal bleeding.  Genitourinary: no dysuria or polyuria.  Hematologic/lymphatic: no lymph node abnormalities, no easy bruising or easy bleeding.  Musculoskeletal: no muscle or joint pain.  Skin: No rash and no itching.  Neurological: no loss of consciousness, no seizure, no headache.  Behavioral/Psych: no depression or suicidal ideation.  Endocrine: no hot flashes.  Immunologic: negative.  ----------------------------------------------------------------------------------------------------------------------      Objective       Current [facility] Meds:    amLODIPine 5 mg Oral Q24H   aspirin 81 mg Oral Daily   atorvastatin 80 mg Oral Daily   carvedilol 25 mg Oral BID With Meals   cetirizine 5 mg Oral Daily   cholecalciferol 50,000 Units Oral Weekly   enoxaparin 30 mg Subcutaneous Q24H   glipiZIDE 10 mg Oral QAM AC   hydrALAZINE 25 mg Oral TID   insulin  aspart 0-7 Units Subcutaneous 4x Daily AC & at Bedtime   insulin detemir 65 Units Subcutaneous Nightly   iron sucrose (VENOFER) IVPB 300 mg Intravenous Q24H   isosorbide mononitrate 120 mg Oral Daily   levothyroxine 137.5 mcg Oral Q AM   linagliptin 5 mg Oral Daily   pantoprazole 40 mg Oral BID AC   sennosides-docusate sodium 2 tablet Oral Nightly   sodium bicarbonate 1,300 mg Oral TID   sodium chloride 3 mL Intravenous Q12H   sodium chloride 3 mL Intravenous Q12H   tamsulosin 0.4 mg Oral Nightly        ----------------------------------------------------------------------------------------------------------------------    Vital Signs:  Temp:  [98 °F (36.7 °C)-98.6 °F (37 °C)] 98.3 °F (36.8 °C)  Heart Rate:  [55-71] 64  Resp:  [18-20] 18  BP: (133-180)/(64-77) 133/67  Mean Arterial Pressure (Non-Invasive) for the past 24 hrs (Last 3 readings):   Noninvasive MAP (mmHg)   11/14/18 0420 83   11/14/18 0000 96   11/13/18 1934 121     SpO2 Percentage    11/13/18 2014 11/14/18 0000 11/14/18 0420   SpO2: 97% 97% 98%     SpO2:  [97 %-98 %] 98 %  on  Flow (L/min):  [2] 2;   Device (Oxygen Therapy): nasal cannula    Body mass index is 40.28 kg/m².  Wt Readings from Last 3 Encounters:   11/13/18 99.9 kg (220 lb 4 oz)   11/06/18 104 kg (230 lb)   07/16/18 94.8 kg (209 lb)        Intake/Output Summary (Last 24 hours) at 11/14/2018 0852  Last data filed at 11/13/2018 1300  Gross per 24 hour   Intake 360 ml   Output --   Net 360 ml     NPO Diet  ----------------------------------------------------------------------------------------------------------------------    Physical exam:    Constitutional:  Well-developed and well-nourished.  No respiratory distress.      HENT:  Head:  Normocephalic and atraumatic.  Mouth:  Moist mucous membranes.    Eyes:  Conjunctivae and EOM are normal.  No scleral icterus.    Neck:  Neck supple.  No JVD present.    Cardiovascular:  Normal rate, regular rhythm and normal heart sounds with no murmur. No  edema.  Pulmonary/Chest:  No respiratory distress, no wheezes, no crackles, with normal breath sounds and good air movement.  Abdominal:  Soft.  Bowel sounds are normal.  No distension and no tenderness.   Musculoskeletal:  No edema, no tenderness, and no deformity.  No red or swollen joints anywhere.    Neurological:  Alert and oriented to person, place, and time. No facial droop.  No slurred speech.   Skin:  Skin is warm and dry. No rash noted. No pallor.     ----------------------------------------------------------------------------------------------------------------------  I have personally reviewed the EKG/Telemetry strips   ----------------------------------------------------------------------------------------------------------------------  Results from last 7 days   Lab Units  11/13/18   1018  11/13/18   0326  11/12/18   2240   TROPONIN I ng/mL  0.008  0.008  0.018           Results from last 7 days   Lab Units  11/13/18   0326  11/12/18   1614   WBC 10*3/mm3  5.33  5.03   HEMOGLOBIN g/dL  7.3*  7.8*   HEMATOCRIT %  24.9*  25.3*   MCV fL  96.9*  91.0   MCHC g/dL  29.3*  30.8*   PLATELETS 10*3/mm3  187  194     Results from last 7 days   Lab Units  11/14/18   0158  11/13/18   0326  11/12/18   1614   SODIUM mmol/L  142  142  139   POTASSIUM mmol/L  5.3  5.0  5.1   CHLORIDE mmol/L  116*  113*  112   CO2 mmol/L  16.0*  18.3*  20.1*   BUN mg/dL  41*  31*  34*   CREATININE mg/dL  2.93*  2.97*  3.10*   EGFR IF NONAFRICN AM mL/min/1.73  22*  21*  20*   CALCIUM mg/dL  8.1  8.3  8.5   GLUCOSE mg/dL  169*  155*  210*   ALBUMIN g/dL   --    --   4.20   BILIRUBIN mg/dL   --    --   0.3   ALK PHOS U/L   --    --   71   AST (SGOT) U/L   --    --   31   ALT (SGPT) U/L   --    --   29   Estimated Creatinine Clearance: 26.2 mL/min (A) (by C-G formula based on SCr of 2.93 mg/dL (H)).  No results found for: AMMONIA    Hemoglobin A1C   Date/Time Value Ref Range Status   11/13/2018 0326 8.00 (H) 4.50 - 5.70 % Final     Glucose    Date/Time Value Ref Range Status   11/13/2018 2109 273 (H) 70 - 130 mg/dL Final   11/13/2018 1603 313 (H) 70 - 130 mg/dL Final   11/13/2018 1133 298 (H) 70 - 130 mg/dL Final   11/13/2018 0629 149 (H) 70 - 130 mg/dL Final   11/13/2018 0130 151 (H) 70 - 130 mg/dL Final     Lab Results   Component Value Date    HGBA1C 8.00 (H) 11/13/2018     Lab Results   Component Value Date    TSH 1.835 07/11/2018    FREET4 1.03 08/12/2016                      Pain Management Panel     Pain Management Panel Latest Ref Rng & Units 11/13/2018 11/13/2018    CREATININE UR mg/dL 65.9 65.9          I have personally reviewed the above laboratory results.   ----------------------------------------------------------------------------------------------------------------------  Imaging Results (last 24 hours)     Procedure Component Value Units Date/Time    US Renal Bilateral [[id number]] Collected:  11/14/18 0724     Updated:  11/14/18 0800    Narrative:       US RENAL BILATERAL-      CLINICAL INDICATION: HANNAH; R07.9-Chest pain, unspecified;  R06.02-Shortness of breath; N18.9-Chronic kidney disease, unspecified.          COMPARISON: 11/29/2016.     FINDINGS: Sonographic imaging of the kidneys show the right kidney  measuring 9.27 cm x 6.02 cm.     Left kidney is 9.02 cm x 5.94 cm. There is no hydronephrosis or solid  renal mass.       Impression:       Unremarkable sonographic appearance of the kidneys.      This report was finalized on 11/14/2018 7:58 AM by Dr. Marcelino Bruner MD.           I have personally reviewed the above radiology results.   ----------------------------------------------------------------------------------------------------------------------    Assessment/Plan       Assessment/Plan     ASSESSMENT:    1. Chest pain    PLAN:    He denied chest pain this morning, but however with ambulation he gets short of breath and has chest pressure. Cardiology has seen him and is taking him to the cath lab this morning for symptomatic  chest pain.Troponin has been 0.008.     This is a 64 y/o male patient with history of chest pain,shortness of breath, CKD, Sleep apnea, CPAP dependent, DM, GERD, hypertension heart arrhythmia, Polio, and history of prostate cancer. He presented to the ED with substernal chest pain and worsening shortness of breath. Apparently he had a stress test March 2018 reported as a low risk study. He states that his chest pain is worse with ambulation. He is sitting up in bedside chair and denies chest pain or shortness of breath at this time. 02 NC 2L in use. He is an established patient of Dr. Conroy.     Troponin 0.008, BUN 31 Creat 2.97. Normal white normal. NO fever or diarrhea.      Telemetry monitoring, electrolyte protocol, VTE prophylaxis.      Cardiology consult.      Renal team consulted.    Code Status:   Code Status and Medical Interventions:   Ordered at: 11/12/18 2023     Code Status:    CPR     Medical Interventions (Level of Support Prior to Arrest):    Full       MISHEL Romo  11/14/18  8:52 AM

## 2018-11-14 NOTE — PROGRESS NOTES
Nephrology Note      Subjective       He is sitting comfortably in chair. Denies chest pain or SOB but has GAO.   Objective     Vital Signs  Temp:  [97.9 °F (36.6 °C)-98.6 °F (37 °C)] 97.9 °F (36.6 °C)  Heart Rate:  [62-71] 62  Resp:  [18-20] 18  BP: (131-150)/(64-75) 131/68    I/O this shift:  In: 100 [IV Piggyback:100]  Out: -   I/O last 3 completed shifts:  In: 600 [P.O.:600]  Out: -     Physical Examination:    General Appearance : alert, appears stated age, cooperative and no distres  Head : normocephalic  Eyes :  + pallor   Throat : oral mucosa moist  Neck: n no JVD  Lungs : clear to auscultation  Heart : regular rhythm & normal rate, normal S1, S2, systolic murmur  Abdomen :   soft non-tender  Extremities :  1+ edema  Pulses :  palpable and equal bilaterally  Neurologic : orientated to person, place, time, grossly no focal deficitis    Laboratory Data :      WBC WBC   Date Value Ref Range Status   11/13/2018 5.33 4.50 - 12.50 10*3/mm3 Final   11/12/2018 5.03 4.50 - 12.50 10*3/mm3 Final      HGB Hemoglobin   Date Value Ref Range Status   11/13/2018 7.3 (L) 14.0 - 18.0 g/dL Final   11/12/2018 7.8 (L) 14.0 - 18.0 g/dL Final      HCT Hematocrit   Date Value Ref Range Status   11/13/2018 24.9 (L) 42.0 - 52.0 % Final   11/12/2018 25.3 (L) 42.0 - 52.0 % Final      Platlets No results found for: LABPLAT   MCV MCV   Date Value Ref Range Status   11/13/2018 96.9 (H) 80.0 - 94.0 fL Final   11/12/2018 91.0 80.0 - 94.0 fL Final          Sodium Sodium   Date Value Ref Range Status   11/14/2018 142 135 - 153 mmol/L Final   11/13/2018 142 135 - 153 mmol/L Final   11/12/2018 139 135 - 153 mmol/L Final      Potassium Potassium   Date Value Ref Range Status   11/14/2018 5.3 3.5 - 5.3 mmol/L Final   11/13/2018 5.0 3.5 - 5.3 mmol/L Final   11/12/2018 5.1 3.5 - 5.3 mmol/L Final      Chloride Chloride   Date Value Ref Range Status   11/14/2018 116 (H) 99 - 112 mmol/L Final   11/13/2018 113 (H) 99 - 112 mmol/L Final   11/12/2018 112  99 - 112 mmol/L Final      CO2 CO2   Date Value Ref Range Status   11/14/2018 16.0 (L) 24.3 - 31.9 mmol/L Final   11/13/2018 18.3 (L) 24.3 - 31.9 mmol/L Final   11/12/2018 20.1 (L) 24.3 - 31.9 mmol/L Final      BUN BUN   Date Value Ref Range Status   11/14/2018 41 (H) 7 - 21 mg/dL Final   11/13/2018 31 (H) 7 - 21 mg/dL Final   11/12/2018 34 (H) 7 - 21 mg/dL Final      Creatinine Creatinine   Date Value Ref Range Status   11/14/2018 2.93 (H) 0.43 - 1.29 mg/dL Final   11/13/2018 2.97 (H) 0.43 - 1.29 mg/dL Final   11/12/2018 3.10 (H) 0.43 - 1.29 mg/dL Final      Calcium Calcium   Date Value Ref Range Status   11/14/2018 8.1 7.7 - 10.0 mg/dL Final   11/13/2018 8.3 7.7 - 10.0 mg/dL Final   11/12/2018 8.5 7.7 - 10.0 mg/dL Final      PO4 No results found for: CAPO4   Albumin Albumin   Date Value Ref Range Status   11/12/2018 4.20 3.40 - 4.80 g/dL Final      Magnesium No results found for: MG   Uric Acid No results found for: URICACID     Radiology results :     Imaging Results (last 24 hours)     Procedure Component Value Units Date/Time    US Renal Bilateral [[id number]] Collected:  11/14/18 0724     Updated:  11/14/18 0800    Narrative:       US RENAL BILATERAL-      CLINICAL INDICATION: HANNAH; R07.9-Chest pain, unspecified;  R06.02-Shortness of breath; N18.9-Chronic kidney disease, unspecified.          COMPARISON: 11/29/2016.     FINDINGS: Sonographic imaging of the kidneys show the right kidney  measuring 9.27 cm x 6.02 cm.     Left kidney is 9.02 cm x 5.94 cm. There is no hydronephrosis or solid  renal mass.       Impression:       Unremarkable sonographic appearance of the kidneys.      This report was finalized on 11/14/2018 7:58 AM by Dr. Marcelino Bruner MD.               Medications:        amLODIPine 5 mg Oral Q24H   aspirin 81 mg Oral Daily   atorvastatin 80 mg Oral Daily   carvedilol 25 mg Oral BID With Meals   cetirizine 5 mg Oral Daily   cholecalciferol 50,000 Units Oral Weekly   enoxaparin 30 mg Subcutaneous  Q24H   glipiZIDE 10 mg Oral QAM AC   hydrALAZINE 25 mg Oral TID   insulin aspart 0-7 Units Subcutaneous 4x Daily AC & at Bedtime   insulin detemir 65 Units Subcutaneous Nightly   iron sucrose (VENOFER) IVPB 300 mg Intravenous Q24H   isosorbide mononitrate 120 mg Oral Daily   levothyroxine 137.5 mcg Oral Q AM   linagliptin 5 mg Oral Daily   pantoprazole 40 mg Oral BID AC   sennosides-docusate sodium 2 tablet Oral Nightly   sodium bicarbonate 1,300 mg Oral TID   sodium chloride 3 mL Intravenous Q12H   sodium chloride 3 mL Intravenous Q12H   tamsulosin 0.4 mg Oral Nightly       custom IV infusion builder        Assessment/Plan       Chest pain in adult      1. HANNAH on CKD stage 3: his baseline creatinine is 2 and it was elevated at 2.8 on his last visit with Dr Verduzco in august 2018. Losartan was stopped but he was still taking it.   His creatinine is stable at 2.9 today.   Continue to hold losartan.   renal US is normal. Avoid nephrotoxins    2. Anemia : iron saturations low so will give once dose of venofer. No epogen due to history of prostate cancer,.stool for occult blood pending    3. Metabolic acidosis continue sodium bicarbonate    4. Proteinuria : urine protein/creatinine is better at 878 mg/gm from 2 gm/gm in past    5. Type 2 DM stable    6. Diastolic CHF :     7. HTN : norvasc added yesterday, monitor    8. CAD : Dr Orr plan for cardiac cath with minimal IV dye. D/w patient about 20 -30% risk of contrast nephropathy and 2-3 % risk of needing HD post Contrast. He states he will think about it. Will start on 1/2 NS + 75 meq na bicarbonate at 75 cc/hr if cardiac cath planned      I discussed the patient's findings and my recommendations with patient, family and nursing staff, Dr Kirby Silveira MD  11/14/18  2:06 PM

## 2018-11-14 NOTE — PLAN OF CARE
Problem: Patient Care Overview  Goal: Individualization and Mutuality  Outcome: Ongoing (interventions implemented as appropriate)      Problem: Fall Risk (Adult)  Goal: Identify Related Risk Factors and Signs and Symptoms  Outcome: Ongoing (interventions implemented as appropriate)    Goal: Absence of Fall  Outcome: Ongoing (interventions implemented as appropriate)      Problem: Pain, Chronic (Pediatric,NICU)  Goal: Identify Related Risk Factors and Signs and Symptoms  Outcome: Ongoing (interventions implemented as appropriate)    Goal: Acceptable Pain/Comfort/Activity Level  Outcome: Ongoing (interventions implemented as appropriate)      Problem: Diabetes, Type 2 (Adult)  Goal: Signs and Symptoms of Listed Potential Problems Will be Absent, Minimized or Managed (Diabetes, Type 2)  Outcome: Ongoing (interventions implemented as appropriate)

## 2018-11-14 NOTE — PLAN OF CARE
Problem: Fall Risk (Adult)  Goal: Identify Related Risk Factors and Signs and Symptoms  Outcome: Ongoing (interventions implemented as appropriate)   11/14/18 1030   Fall Risk (Adult)   Related Risk Factors (Fall Risk) age-related changes     Goal: Absence of Fall  Outcome: Ongoing (interventions implemented as appropriate)   11/14/18 1030   Fall Risk (Adult)   Absence of Fall making progress toward outcome       Problem: Pain, Chronic (Pediatric,NICU)  Goal: Identify Related Risk Factors and Signs and Symptoms  Outcome: Ongoing (interventions implemented as appropriate)    Goal: Acceptable Pain/Comfort/Activity Level  Outcome: Ongoing (interventions implemented as appropriate)   11/14/18 1030   Pain, Chronic (Pediatric,NICU)   Acceptable Pain/Comfort/Activity Level making progress toward outcome       Problem: Diabetes, Type 2 (Adult)  Goal: Signs and Symptoms of Listed Potential Problems Will be Absent, Minimized or Managed (Diabetes, Type 2)  Outcome: Ongoing (interventions implemented as appropriate)   11/14/18 1030   Goal/Outcome Evaluation   Problems Assessed (Type 2 Diabetes) all   Problems Present (Type 2 Diabetes) none

## 2018-11-14 NOTE — DISCHARGE SUMMARY
DISCHARGE SUMMARY        Patient Identification:  Name:  Catarino Arvizu  Age:  63 y.o.  Sex:  male  :  1955  MRN:  [id number]  Visit Number:  [id number]    Date of Admission: 2018  Date of Discharge:      PCP: Eleno Chaney APRN    Discharging Provider: MISHEL Romo      Discharge Diagnoses     Chest Pain      Consults/Procedures     Consults:   Consults     Date and Time Order Name Status Description    2018 1013 Inpatient Nephrology Consult Completed     2018 0930 Inpatient Cardiology Consult      2018 IP General Consult (Use specialty-specific consult if known)            Procedures Performed:  Procedure(s):  Left Heart Cath       History of Presenting Illness     Patient is a 63 y.o. male presented to [facility] complaining of chest pain.  Please see the admitting history and physical for further details.      Hospital Course      This is a 62 y/o male patient with history of chest pain,shortness of breath, CKD, Sleep apnea, CPAP dependent, DM, GERD, hypertension heart arrhythmia, Polio, and history of prostate cancer. He presented to the ED with substernal chest pain and worsening shortness of breath. Apparently he had a stress test 2018 reported as a low risk study. He states that his chest pain is worse with ambulation. He is sitting up in bedside chair and denies chest pain or shortness of breath at this time. 02 NC 2L in use. He is an established patient of Dr. Conroy.     Troponin 0.008, BUN 31 Creat 2.97. Normal white normal. NO fever or diarrhea.      Telemetry monitoring, electrolyte protocol, VTE prophylaxis.      Cardiology recommended heart cath in the setting of symptomatic chest pain however, renal team explained to the patient is at very high risk for heart cath at this time. Dr. Conroy recommended to medically manage for now.     He was discharged home with 1 week follow up appointments with Cardiology, nephrology and  primary care.      Discharge Vitals/Physical Examination     Vital Signs:  Temp:  [97.9 °F (36.6 °C)-98.6 °F (37 °C)] 97.9 °F (36.6 °C)  Heart Rate:  [62-71] 62  Resp:  [18] 18  BP: (131-150)/(67-75) 131/68  Mean Arterial Pressure (Non-Invasive) for the past 24 hrs (Last 3 readings):   Noninvasive MAP (mmHg)   11/14/18 0420 83   11/14/18 0000 96   11/13/18 1934 121     SpO2 Percentage    11/14/18 0932 11/14/18 1153 11/14/18 1459   SpO2: 99% 99% 95%     SpO2:  [95 %-99 %] 95 %  on  Flow (L/min):  [2] 2;   Device (Oxygen Therapy): nasal cannula    Body mass index is 40.28 kg/m².  Wt Readings from Last 3 Encounters:   11/13/18 99.9 kg (220 lb 4 oz)   11/06/18 104 kg (230 lb)   07/16/18 94.8 kg (209 lb)         Physical Exam:    Constitutional:  Well-developed and well-nourished.  No respiratory distress.      HENT:  Head: Normocephalic and atraumatic.  Mouth:  Moist mucous membranes.    Eyes:  Conjunctivae and EOM are normal.  No scleral icterus.  Neck:  Neck supple.  No JVD present.    Cardiovascular:  Normal rate, regular rhythm and normal heart sounds with no murmur. No edema.  Pulmonary/Chest:  No respiratory distress, no wheezes, no crackles, with normal breath sounds and good air movement.  Abdominal:  Soft.  Bowel sounds are normal.  No distension and no tenderness.   Musculoskeletal:  No edema, no tenderness, and no deformity.  No swelling or redness of joints.  Neurological:  Alert and oriented to person, place, and time.  No facial droop.  No slurred speech.   Skin:  Skin is warm and dry.  No rash noted.  No pallor.   Psychiatric:  Normal mood and affect.  Behavior is normal.      Pertinent Laboratory/Radiology Results     Pertinent Laboratory Results:    Results from last 7 days   Lab Units  11/13/18   1018  11/13/18   0326  11/12/18   2240   TROPONIN I ng/mL  0.008  0.008  0.018         Results from last 7 days   Lab Units  11/14/18   0919   PH, ARTERIAL pH units  7.300*   PO2 ART mm Hg  22.5*   PCO2,  ARTERIAL mm Hg  43.2   HCO3 ART mmol/L  21.1*     Results from last 7 days   Lab Units  11/13/18   0326  11/12/18   1614   WBC 10*3/mm3  5.33  5.03   HEMOGLOBIN g/dL  7.3*  7.8*   HEMATOCRIT %  24.9*  25.3*   MCV fL  96.9*  91.0   MCHC g/dL  29.3*  30.8*   PLATELETS 10*3/mm3  187  194     Results from last 7 days   Lab Units  11/14/18   0158  11/13/18   0326  11/12/18   1614   SODIUM mmol/L  142  142  139   POTASSIUM mmol/L  5.3  5.0  5.1   CHLORIDE mmol/L  116*  113*  112   CO2 mmol/L  16.0*  18.3*  20.1*   BUN mg/dL  41*  31*  34*   CREATININE mg/dL  2.93*  2.97*  3.10*   EGFR IF NONAFRICN AM mL/min/1.73  22*  21*  20*   CALCIUM mg/dL  8.1  8.3  8.5   GLUCOSE mg/dL  169*  155*  210*   ALBUMIN g/dL   --    --   4.20   BILIRUBIN mg/dL   --    --   0.3   ALK PHOS U/L   --    --   71   AST (SGOT) U/L   --    --   31   ALT (SGPT) U/L   --    --   29   Estimated Creatinine Clearance: 26.2 mL/min (A) (by C-G formula based on SCr of 2.93 mg/dL (H)).  No results found for: AMMONIA    Hemoglobin A1C   Date/Time Value Ref Range Status   11/13/2018 0326 8.00 (H) 4.50 - 5.70 % Final     Glucose   Date/Time Value Ref Range Status   11/14/2018 1150 168 (H) 70 - 130 mg/dL Final   11/14/2018 0754 113 70 - 130 mg/dL Final   11/13/2018 2109 273 (H) 70 - 130 mg/dL Final   11/13/2018 1603 313 (H) 70 - 130 mg/dL Final   11/13/2018 1133 298 (H) 70 - 130 mg/dL Final   11/13/2018 0629 149 (H) 70 - 130 mg/dL Final   11/13/2018 0130 151 (H) 70 - 130 mg/dL Final     Lab Results   Component Value Date    HGBA1C 8.00 (H) 11/13/2018     Lab Results   Component Value Date    TSH 1.835 07/11/2018    FREET4 1.03 08/12/2016                      Pain Management Panel     Pain Management Panel Latest Ref Rng & Units 11/13/2018 11/13/2018    CREATININE UR mg/dL 65.9 65.9          Pertinent Radiology Results:  Imaging Results (all)     Procedure Component Value Units Date/Time    US Renal Bilateral [[id number]] Collected:  11/14/18 0724     Updated:   11/14/18 0800    Narrative:       US RENAL BILATERAL-      CLINICAL INDICATION: HANNAH; R07.9-Chest pain, unspecified;  R06.02-Shortness of breath; N18.9-Chronic kidney disease, unspecified.          COMPARISON: 11/29/2016.     FINDINGS: Sonographic imaging of the kidneys show the right kidney  measuring 9.27 cm x 6.02 cm.     Left kidney is 9.02 cm x 5.94 cm. There is no hydronephrosis or solid  renal mass.       Impression:       Unremarkable sonographic appearance of the kidneys.      This report was finalized on 11/14/2018 7:58 AM by Dr. Marcelino Bruner MD.       XR Chest 1 View [[id number]] Collected:  11/12/18 1654     Updated:  11/12/18 1711    Narrative:       XR CHEST 1 VW-     CLINICAL INDICATION: Chest Pain triage protocol          COMPARISON: 4/14/2013      TECHNIQUE: Single frontal view of the chest.     FINDINGS:     There is no focal alveolar infiltrate or effusion.  The cardiac silhouette is normal. The pulmonary vasculature is  unremarkable.  There is no evidence of an acute osseous abnormality.   There are no suspicious-appearing parenchymal soft tissue nodules.            Impression:       No evidence of active or acute cardiopulmonary disease on today's chest  radiograph.         This report was finalized on 11/12/2018 4:54 PM by Dr. Marcelino Bruner MD.             Test Results Pending at Discharge:  [unfilled]    Discharge Disposition/Discharge Medications/Discharge Appointments     Discharge Disposition:   Home or Self Care    Condition at Discharge:  Stable, much improved with no issues today.     Code Status While Inpatient:  Code Status and Medical Interventions:   Ordered at: 11/12/18 2023     Code Status:    CPR     Medical Interventions (Level of Support Prior to Arrest):    Full       Discharge Medications:     Discharge Medications      Continue These Medications      Instructions Start Date   ASPIRIN LOW DOSE 81 MG EC tablet  Generic drug:  aspirin   TAKE 1 TABLET EVERY DAY      atorvastatin  80 MG tablet  Commonly known as:  LIPITOR   80 mg, Oral, Daily      carvedilol 25 MG tablet  Commonly known as:  COREG   25 mg, Oral, 2 Times Daily      cetirizine 10 MG tablet  Commonly known as:  zyrTEC   10 mg, Oral, Daily      COZAAR 100 MG tablet  Generic drug:  losartan   100 mg, Oral, Daily      fenofibrate 145 MG tablet  Commonly known as:  TRICOR   145 mg, Oral, Daily      furosemide 40 MG tablet  Commonly known as:  LASIX   40 mg, Oral, Take As Directed, Prior to [facility] Admission, Patient was on: Take 1 tablet every day except Wednesday and Sunday       glimepiride 4 MG tablet  Commonly known as:  AMARYL   4 mg, Oral, 2 Times Daily      hydrALAZINE 25 MG tablet  Commonly known as:  APRESOLINE   25 mg, Oral, 3 Times Daily      HYDROcodone-acetaminophen 7.5-325 MG per tablet  Commonly known as:  NORCO   1 tablet, Oral, 2 Times Daily PRN      hydrOXYzine 25 MG capsule  Commonly known as:  VISTARIL   25 mg, Oral, Every 6 Hours PRN      Insulin Glargine 100 UNIT/ML injection pen  Commonly known as:  BASAGLAR KWIKPEN   65 Units, Subcutaneous, Nightly      Iron 325 (65 Fe) MG tablet   TAKE 1 TABLET WITH BREAKFAST      isosorbide mononitrate 120 MG 24 hr tablet  Commonly known as:  IMDUR   120 mg, Oral, Daily      levothyroxine 137 MCG tablet  Commonly known as:  SYNTHROID, LEVOTHROID   137 mcg, Oral, Daily      nitroglycerin 0.4 MG SL tablet  Commonly known as:  NITROSTAT   0.4 mg, Sublingual, Every 5 Minutes PRN, Take no more than 3 doses in 15 minutes.       pantoprazole 40 MG EC tablet  Commonly known as:  PROTONIX   40 mg, Oral, 2 Times Daily With Meals      SENEXON-S 8.6-50 MG per tablet  Generic drug:  senna-docusate   2 tablets, Oral, Nightly      SITagliptin 50 MG tablet  Commonly known as:  JANUVIA   50 mg, Oral, Daily      tamsulosin 0.4 MG capsule 24 hr capsule  Commonly known as:  FLOMAX   1 capsule, Oral, Nightly      ZANTAC 300 MG tablet  Generic drug:  raNITIdine   300 mg, Oral, Nightly              Discharge Diet:  As tolerated    Discharge Activity:    As tolerated  Discharge Appointments:  Your Scheduled Appointments    Nov 16, 2018 10:00 AM EST  New Patient with Emmanuel Cornejo MD  [facility] ORTHOPEDICS (--) [address]  [place] [id number] [phone]   Please bring a list of current medication and arrive 30 minutes early to allow for a longer registration and triage process.   Dec 10, 2018 10:40 AM EST  Follow Up with Burt Floyd PA-C  [facility] CARDIOLOGY (--) [address] [address] [phone]   Arrive 15 minutes prior to appointment.   Jan 07, 2019 10:30 AM EST  Follow Up with Khalif Kohler MD  [facility] UROLOGY (--) [address] [address] [phone]   Arrive 15 minutes prior to appointment.          Follow-up Information     DR SHERRIE SANTOS MD OFFICE Follow up in 1 week(s).    Contact information:  [address] [place], [place] [id number] [phone]           Trevon Conroy MD Follow up in 1 week(s).    Specialty:  Cardiology  Contact information:  [address] [address]  [phone]             Eleno Chaney APRN Follow up in 1 week(s).    Specialty:  Family Medicine  Contact information:  [address] [place] [address]  [phone]                   [unfilled]        MISHEL Romo  11/14/18  4:20 PM          Please note that this discharge summary required more than 30 minutes to complete.

## 2018-11-14 NOTE — PROGRESS NOTES
LOS: 0 days     Name: Catarino Arvizu  Age/Sex: 63 y.o. male  :  1955        PCP: Eleno Chaney APRN    Active Problems:    Chest pain in adult      Admission Information: Catarino Arvizu is a 63 y.o. male with past medical history significant for hypertension, hyperlipidemia, diabetes mellitus, chronic kidney disease, sleep apnea, CPAP dependent, polio, history of prostate cancer and tobacco use.  He reports that over the last 2 weeks he has had ongoing difficulties with chest pain.  He presented to his PCP on 2018 and was told to go to the emergency room.  He presented to the ED with reports of substernal chest pain and worsening shortness of breath.  He stated that he cannot move around without experiencing chest pain. The chest pain resolves with rest. He had been seen by cardiologist, Dr. Conroy, in 2018 with complaints of chest pain as well.  Nuclear stress test and transthoracic echocardiogram were unremarkable.  He was admitted to observation for further evaluation and management.  Cardiology was consulted.      Following for: chest pain    Telemetry Monitoring: sinus rhythm in the 60's    Interval history: The patient is resting in a chair today. He does not want to pursue cardiac catheterization at this time due to his worsening renal function and anemia. He currently denies chest pain and shortness of breath.    Subjective     ROS    Vital Signs  Vitals:    18 2200 18 0000 18 0420 18 0932   BP: 150/70 141/70 133/67 149/75   BP Location:   Right arm Right arm   Patient Position:   Lying Lying   Pulse: 65 71 64 67   Resp:     Temp:  98 °F (36.7 °C) 98.3 °F (36.8 °C) 98.5 °F (36.9 °C)   TempSrc:  Oral Oral Oral   SpO2:  97% 98% 99%   Weight:       Height:         Body mass index is 40.28 kg/m².      Intake/Output Summary (Last 24 hours) at 2018 1113  Last data filed at 2018 1300  Gross per 24 hour   Intake 360 ml   Output --   Net 360  ml       Physical Exam   Constitutional: He is oriented to person, place, and time. He appears well-developed and well-nourished.   HENT:   Head: Normocephalic and atraumatic.   Cardiovascular: Normal rate, regular rhythm and normal heart sounds.   No murmur heard.  Pulmonary/Chest: Effort normal and breath sounds normal. He has no wheezes. He has no rales.   Abdominal: Soft. Bowel sounds are normal.   Musculoskeletal: He exhibits edema (1+ edema BLE).   Neurological: He is alert and oriented to person, place, and time.   Skin: Skin is warm and dry.   Psychiatric: He has a normal mood and affect. His behavior is normal.              Results Review:     Results from last 7 days   Lab Units  11/13/18   0326  11/12/18   1614   WBC 10*3/mm3  5.33  5.03   HEMOGLOBIN g/dL  7.3*  7.8*   PLATELETS 10*3/mm3  187  194     Results from last 7 days   Lab Units  11/14/18   0158  11/13/18   0326  11/12/18   1614   SODIUM mmol/L  142  142  139   POTASSIUM mmol/L  5.3  5.0  5.1   CHLORIDE mmol/L  116*  113*  112   CO2 mmol/L  16.0*  18.3*  20.1*   BUN mg/dL  41*  31*  34*   CREATININE mg/dL  2.93*  2.97*  3.10*   CALCIUM mg/dL  8.1  8.3  8.5   GLUCOSE mg/dL  169*  155*  210*     Results from last 7 days   Lab Units  11/13/18   1018  11/13/18   0326  11/12/18   2240  11/12/18   1810  11/12/18   1614   TROPONIN I ng/mL  0.008  0.008  0.018  0.019  0.012             I reviewed the patient's new clinical results.  I reviewed the patient's new imaging results and agree with the interpretation.  I personally viewed and interpreted the patient's EKG/Telemetry data      Medication Review:     amLODIPine 5 mg Oral Q24H   aspirin 81 mg Oral Daily   atorvastatin 80 mg Oral Daily   carvedilol 25 mg Oral BID With Meals   cetirizine 5 mg Oral Daily   cholecalciferol 50,000 Units Oral Weekly   enoxaparin 30 mg Subcutaneous Q24H   glipiZIDE 10 mg Oral QAM AC   hydrALAZINE 25 mg Oral TID   insulin aspart 0-7 Units Subcutaneous 4x Daily AC & at  Bedtime   insulin detemir 65 Units Subcutaneous Nightly   iron sucrose (VENOFER) IVPB 300 mg Intravenous Q24H   isosorbide mononitrate 120 mg Oral Daily   levothyroxine 137.5 mcg Oral Q AM   linagliptin 5 mg Oral Daily   pantoprazole 40 mg Oral BID AC   sennosides-docusate sodium 2 tablet Oral Nightly   sodium bicarbonate 1,300 mg Oral TID   sodium chloride 3 mL Intravenous Q12H   sodium chloride 3 mL Intravenous Q12H   tamsulosin 0.4 mg Oral Nightly          Assessment:  1. Chest pain with typical and atypical feature for angina with no acute ischemic changes on EKG, normal troponin, and recent normal stress test  2. Anemia with most recent hemoglobin 7.3  3. Chronic kidney disease with acute kidney injury most recent creatinine 2.93  4. Hypertension, uncontrolled  5. Dyslipidemia  6. Diabetes Mellitus Type 2  7. Tobacco abuse    Recommendations:  1. Since he has worsening renal function and anemia with normal troponin and no acute EKG changes, will treat medically for now. I have discussed this at length with the patient and he is agreeable.   2. Continue low dose aspirin, atorvastatin, carvedilol, hydralazine, and Imdur  3. Will start Ranexa 500 mg PO BID  4. We will follow up with him in our office in 1-2 weeks.  5. If and when his kidney function improves adequately and if he continues to have significant recurrent anginal symptoms then may consider cardiac catheterization at that time.    I discussed the patients findings and my recommendations with patient and family      MISHEL Kirk, acting as scribe for Dr. Trevon Conroy MD, FACC.  11/14/18  11:13 AM    Addendum: I, Trevon Conroy MD, FACC, personally performed the services described in this documentation as scribed by the above named individual in my presence, and it is both accurate and complete.     Trevon Conory MD, FACC  11/14/18  11:13 AM

## 2018-11-15 ENCOUNTER — READMISSION MANAGEMENT (OUTPATIENT)
Dept: CALL CENTER | Facility: HOSPITAL | Age: 63
End: 2018-11-15

## 2018-11-15 DIAGNOSIS — M25.562 LEFT KNEE PAIN, UNSPECIFIED CHRONICITY: Primary | ICD-10-CM

## 2018-11-15 NOTE — OUTREACH NOTE
Prep Survey      Responses   Facility patient discharged from?  [place]   Is patient eligible?  Yes   Discharge diagnosis  Chest pain,   Does the patient have one of the following disease processes/diagnoses(primary or secondary)?  Other   Does the patient have Home health ordered?  No   Is there a DME ordered?  No   Comments regarding appointments  See AVS   Prep survey completed?  Yes          Suyapa Bailey RN

## 2018-11-16 ENCOUNTER — APPOINTMENT (OUTPATIENT)
Dept: GENERAL RADIOLOGY | Facility: HOSPITAL | Age: 63
End: 2018-11-16
Attending: ORTHOPAEDIC SURGERY

## 2018-11-16 ENCOUNTER — READMISSION MANAGEMENT (OUTPATIENT)
Dept: CALL CENTER | Facility: HOSPITAL | Age: 63
End: 2018-11-16

## 2018-11-16 NOTE — OUTREACH NOTE
Medical Week 1 Survey      Responses   Facility patient discharged from?  [facility]   Does the patient have one of the following disease processes/diagnoses(primary or secondary)?  Other   Is there a successful TCM telephone encounter documented?  No   Week 1 attempt successful?  No   Unsuccessful attempts  Attempt 1          Deisi Richter RN

## 2018-11-19 ENCOUNTER — READMISSION MANAGEMENT (OUTPATIENT)
Dept: CALL CENTER | Facility: HOSPITAL | Age: 63
End: 2018-11-19

## 2018-11-19 NOTE — OUTREACH NOTE
Medical Week 1 Survey      Responses   Facility patient discharged from?  [facility]   Does the patient have one of the following disease processes/diagnoses(primary or secondary)?  Other   Is there a successful TCM telephone encounter documented?  No   Week 1 attempt successful?  No   Unsuccessful attempts  Attempt 2          Malini Fuentes RN

## 2018-11-26 ENCOUNTER — READMISSION MANAGEMENT (OUTPATIENT)
Dept: CALL CENTER | Facility: HOSPITAL | Age: 63
End: 2018-11-26

## 2018-11-26 NOTE — OUTREACH NOTE
Medical Week 2 Survey      Responses   Facility patient discharged from?  [facility]   Does the patient have one of the following disease processes/diagnoses(primary or secondary)?  Other   Week 2 attempt successful?  No   Unsuccessful attempts  Attempt 1          Roseanne Balderas RN

## 2018-11-27 ENCOUNTER — READMISSION MANAGEMENT (OUTPATIENT)
Dept: CALL CENTER | Facility: HOSPITAL | Age: 63
End: 2018-11-27

## 2018-11-27 NOTE — OUTREACH NOTE
Medical Week 2 Survey      Responses   Facility patient discharged from?  [facility]   Does the patient have one of the following disease processes/diagnoses(primary or secondary)?  Other   Week 2 attempt successful?  No   Unsuccessful attempts  Attempt 2          Chuyita Gillette RN

## 2018-12-19 RX ORDER — ISOSORBIDE MONONITRATE 120 MG/1
TABLET, EXTENDED RELEASE ORAL
Qty: 30 TABLET | Refills: 0 | Status: SHIPPED | OUTPATIENT
Start: 2018-12-19 | End: 2019-01-10

## 2018-12-20 ENCOUNTER — OFFICE VISIT (OUTPATIENT)
Dept: CARDIOLOGY | Facility: CLINIC | Age: 63
End: 2018-12-20

## 2018-12-20 VITALS
BODY MASS INDEX: 43.24 KG/M2 | DIASTOLIC BLOOD PRESSURE: 76 MMHG | HEIGHT: 62 IN | OXYGEN SATURATION: 98 % | RESPIRATION RATE: 16 BRPM | SYSTOLIC BLOOD PRESSURE: 175 MMHG | WEIGHT: 235 LBS | HEART RATE: 74 BPM

## 2018-12-20 DIAGNOSIS — E78.5 DYSLIPIDEMIA: ICD-10-CM

## 2018-12-20 DIAGNOSIS — E11.9 TYPE 2 DIABETES MELLITUS WITHOUT COMPLICATION, WITHOUT LONG-TERM CURRENT USE OF INSULIN (HCC): ICD-10-CM

## 2018-12-20 DIAGNOSIS — I10 ESSENTIAL HYPERTENSION: Primary | ICD-10-CM

## 2018-12-20 PROCEDURE — 93000 ELECTROCARDIOGRAM COMPLETE: CPT | Performed by: PHYSICIAN ASSISTANT

## 2018-12-20 PROCEDURE — 99214 OFFICE O/P EST MOD 30 MIN: CPT | Performed by: PHYSICIAN ASSISTANT

## 2018-12-20 RX ORDER — NITROGLYCERIN 0.4 MG/1
0.4 TABLET SUBLINGUAL
Qty: 25 TABLET | Refills: 0 | Status: SHIPPED | OUTPATIENT
Start: 2018-12-20

## 2018-12-20 RX ORDER — RANOLAZINE 500 MG/1
500 TABLET, EXTENDED RELEASE ORAL 2 TIMES DAILY
Qty: 60 TABLET | Refills: 6 | Status: ON HOLD | OUTPATIENT
Start: 2018-12-20 | End: 2019-01-10

## 2018-12-20 NOTE — PROGRESS NOTES
"Eleno Chaney, MISHEL  Catarino Arvizu  1955  12/20/2018    Patient Active Problem List   Diagnosis   • Chest pain   • Shortness of breath   • Type 2 diabetes mellitus (CMS/HCC)   • Dyslipidemia   • History of poliomyelitis   • Unstable angina (CMS/HCC)   • Prostate cancer (CMS/HCC)   • Benign non-nodular prostatic hyperplasia with lower urinary tract symptoms   • Postprocedural male fossa navicularis urethral stricture   • Essential hypertension   • External hemorrhoids   • Rectal bleeding   • Generalized abdominal pain   • Colon cancer screening   • Dysphagia   • BXO (balanitis xerotica obliterans)   • Chest pain in adult       Dear Eleno Chaney, MISHEL:    Subjective       History of Present Illness:    Chief Complaint   Patient presents with   • Follow-up     hosp  stay   • Shortness of Breath     extreme   • Chest Pain     \"with shortness of breath\"   • Med Management     verbal   • Palpitations       Catarino Arvizu is a pleasant 63 y.o. male with a past medical history significant for essential hypertension, dyslipidemia, IDDM, and a history of polio with deformities of the bilateral lower extremities. He presents to the office today for a follow up.    Patient was recently hospitalized due to chest pains was because of class 3-4 angina during that time he had normal serial cardiac enzymes and no changes in his EKG given his severe chronic kidney disease there is a high concern for the patient needing dialysis if we would've proceed further with a left heart catheterization when this was explained with patient and he opted to try medical therapy first.    Patient states since being discharged from the hospital he's been about the same state of health with regards to his symptoms.  He still admits to chest pains every day despite medical therapy he describes these pains as a pressure/burning in the center of his chest that radiates down his left arm that causes shortness of breath and " diaphoresis.  He states that these pains are very easily brought on with extremely minimal exertion to the point where he stays in a wheelchair to avoid standing up and walking.  He does state so long as he is sitting in the chair does not have any chest pains.    Patient does state that he is having active chest pain a office but does also state he essentially has chest pain 24/7 he states that this pain is no different than once mentioned above and rated it a 4 out of 10 on the pain scale.     No Known Allergies:      Current Outpatient Medications:   •  ASPIRIN LOW DOSE 81 MG EC tablet, TAKE 1 TABLET EVERY DAY, Disp: 30 tablet, Rfl: 2  •  atorvastatin (LIPITOR) 80 MG tablet, Take 80 mg by mouth daily., Disp: , Rfl:   •  carvedilol (COREG) 25 MG tablet, Take 1 tablet by mouth 2 (Two) Times a Day., Disp: 180 tablet, Rfl: 3  •  cetirizine (ZyrTEC) 10 MG tablet, Take 10 mg by mouth daily., Disp: , Rfl:   •  fenofibrate (TRICOR) 145 MG tablet, Take 145 mg by mouth Daily., Disp: , Rfl:   •  Ferrous Sulfate (IRON) 325 (65 Fe) MG tablet, TAKE 1 TABLET WITH BREAKFAST, Disp: 30 tablet, Rfl: 2  •  furosemide (LASIX) 40 MG tablet, Take 40 mg by mouth Take As Directed. Prior to [facility] Admission, Patient was on: Take 1 tablet every day except Wednesday and Sunday, Disp: , Rfl:   •  glimepiride (AMARYL) 4 MG tablet, Take 4 mg by mouth 2 (two) times a day., Disp: , Rfl:   •  hydrALAZINE (APRESOLINE) 25 MG tablet, Take 25 mg by mouth 3 (Three) Times a Day., Disp: , Rfl:   •  HYDROcodone-acetaminophen (NORCO) 7.5-325 MG per tablet, Take 1 tablet by mouth 2 (two) times a day as needed for moderate pain (4-6)., Disp: , Rfl:   •  hydrOXYzine (VISTARIL) 25 MG capsule, Take 25 mg by mouth Every 6 (Six) Hours As Needed for Itching., Disp: , Rfl:   •  Insulin Glargine (BASAGLAR KWIKPEN) 100 UNIT/ML injection pen, Inject 65 Units under the skin into the appropriate area as directed Every Night., Disp: , Rfl:   •  isosorbide mononitrate  (IMDUR) 120 MG 24 hr tablet, TAKE 1 TABLET EVERY DAY, Disp: 30 tablet, Rfl: 0  •  levothyroxine (SYNTHROID, LEVOTHROID) 137 MCG tablet, Take 137 mcg by mouth Daily., Disp: , Rfl:   •  losartan (COZAAR) 100 MG tablet, Take 100 mg by mouth Daily., Disp: , Rfl:   •  nitroglycerin (NITROSTAT) 0.4 MG SL tablet, Place 1 tablet under the tongue Every 5 (Five) Minutes As Needed for Chest Pain. Take no more than 3 doses in 15 minutes., Disp: 25 tablet, Rfl: 0  •  pantoprazole (PROTONIX) 40 MG EC tablet, Take 1 tablet by mouth 2 (Two) Times a Day With Meals., Disp: 60 tablet, Rfl: 3  •  raNITIdine (ZANTAC) 300 MG tablet, Take 300 mg by mouth Every Night., Disp: , Rfl:   •  senna-docusate (SENEXON-S) 8.6-50 MG per tablet, Take 2 tablets by mouth Every Night., Disp: , Rfl:   •  sitaGLIPtin (JANUVIA) 50 MG tablet, Take 1 tablet by mouth daily., Disp: 30 tablet, Rfl: 3  •  tamsulosin (FLOMAX) 0.4 MG capsule 24 hr capsule, Take 1 capsule by mouth Every Night., Disp: , Rfl:   •  ranolazine (RANEXA) 500 MG 12 hr tablet, Take 1 tablet by mouth 2 (Two) Times a Day., Disp: 60 tablet, Rfl: 6      The following portions of the patient's history were reviewed and updated as appropriate: allergies, current medications, past family history, past medical history, past social history, past surgical history and problem list.    Social History     Tobacco Use   • Smoking status: Never Smoker   • Smokeless tobacco: Current User     Types: Chew   • Tobacco comment: Chewed tobacco since he was 4 yrs old.   Substance Use Topics   • Alcohol use: No   • Drug use: No       Review of Systems   Constitution: Negative for weakness and malaise/fatigue.   Cardiovascular: Positive for chest pain, dyspnea on exertion, leg swelling, orthopnea and palpitations. Negative for irregular heartbeat.   Respiratory: Positive for shortness of breath. Negative for cough.    Hematologic/Lymphatic: Negative for bleeding problem. Does not bruise/bleed easily.  "  Gastrointestinal: Negative for nausea and vomiting.       Objective   Vitals:    12/20/18 1122   BP: 175/76   Pulse: 74   Resp: 16   SpO2: 98%   Weight: 107 kg (235 lb)   Height: 157.5 cm (62\")     Body mass index is 42.98 kg/m².        Physical Exam   Constitutional: He is oriented to person, place, and time. He appears well-developed and well-nourished. No distress.   HENT:   Head: Normocephalic and atraumatic.   Cardiovascular: Normal rate, regular rhythm, normal heart sounds and intact distal pulses.   Patient exhibiting Marroquin sign during interview   Pulmonary/Chest: Effort normal and breath sounds normal. No respiratory distress.   Musculoskeletal: He exhibits edema (1+ pedal edema bilaterally).   Neurological: He is alert and oriented to person, place, and time.   Skin: He is not diaphoretic.     Lab Results   Component Value Date     11/14/2018    K 5.3 11/14/2018     (H) 11/14/2018    CO2 16.0 (L) 11/14/2018    BUN 41 (H) 11/14/2018    CREATININE 2.93 (H) 11/14/2018    GLUCOSE 169 (H) 11/14/2018    CALCIUM 8.1 11/14/2018    AST 31 11/12/2018    ALT 29 11/12/2018    ALKPHOS 71 11/12/2018    LABIL2 0.9 05/01/2017     Lab Results   Component Value Date    CKTOTAL 38 04/06/2017     Lab Results   Component Value Date    WBC 5.33 11/13/2018    HGB 7.3 (L) 11/13/2018    HCT 24.9 (L) 11/13/2018     11/13/2018     No results found for: INR  Lab Results   Component Value Date    MG 2.3 07/11/2018     Lab Results   Component Value Date    TSH 1.835 07/11/2018    PSA 3.100 11/06/2018    TRIG 1,214 (H) 08/09/2016    HDL 32 (L) 08/09/2016    LDL  08/09/2016      Comment:      Unable to calculate      Lab Results   Component Value Date    .0 (H) 11/12/2018       During this visit the following were done:  Labs Reviewed [x]    Labs Ordered []    Radiology Reports Reviewed [x]    Radiology Ordered []    PCP Records Reviewed []    Referring Provider Records Reviewed []    ER Records Reviewed []  "   Hospital Records Reviewed []    History Obtained From Family []    Radiology Images Reviewed []    Other Reviewed []    Records Requested []         ECG 12 Lead  Date/Time: 12/20/2018 1:02 PM  Performed by: Burt Floyd PA-C  Authorized by: Burt Floyd PA-C   Rhythm: sinus rhythm  Conduction: conduction normal  ST Segments: ST segments normal  T depression: V1  Clinical impression: abnormal ECG and non-specific ECG  Comments: Patient with chronic Q waves in V1, V2, V3, V4, and lead 3.               Assessment/Plan    Diagnosis Plan   1. Essential hypertension     2. Type 2 diabetes mellitus without complication, without long-term current use of insulin (CMS/[facility])     3. Dyslipidemia                  Recommendations:  1. I discussed the case with Dr. San who also reviewed patient's EKG today.  2. I did offer for the patient to go to the emergency room as I explained to him one EKG does not rule out possible myocardial infarction and since he is having chest pressure in the office today the only way to rule this out as serial blood draws of his cardiac isoenzymes.  He expressed understanding but declined to go to the ER stating that he's been having this chest pain on and off for about a month and does not think he's having a heart attack.  I explained the risks of work and happen if he is having untreated heart attack such as death.  He expressed understanding but states that he doesn't want to go.   3. I will prescribe the patient Ranexa as it appears he was never sent this to his pharmacy when he is discharged from the hospital at 500 mg twice a day.  4. Continue aspirin, Lipitor, carvedilol, Lasix, hydralazine, Imdur, losartan, and Ranexa.  5. Follow-up in 3-4 weeks.    Return in about 4 weeks (around 1/17/2019).    As always, I appreciate very much the opportunity to participate in the cardiovascular care of your patients.      With Best Regards,    LILI Barron disclaimer:  Much  of this encounter note is an electronic transcription/translation of spoken language to printed text. The electronic translation of spoken language may permit erroneous, or at times, nonsensical words or phrases to be inadvertently transcribed; Although I have reviewed the note for such errors, some may still exist.

## 2018-12-26 ENCOUNTER — TRANSCRIBE ORDERS (OUTPATIENT)
Dept: ADMINISTRATIVE | Facility: HOSPITAL | Age: 63
End: 2018-12-26

## 2018-12-26 DIAGNOSIS — R18.8 OTHER ASCITES: Primary | ICD-10-CM

## 2019-01-08 ENCOUNTER — TRANSCRIBE ORDERS (OUTPATIENT)
Dept: ADMINISTRATIVE | Facility: HOSPITAL | Age: 64
End: 2019-01-08

## 2019-01-08 ENCOUNTER — LAB (OUTPATIENT)
Dept: LAB | Facility: HOSPITAL | Age: 64
End: 2019-01-08
Attending: INTERNAL MEDICINE

## 2019-01-08 ENCOUNTER — HOSPITAL ENCOUNTER (OUTPATIENT)
Dept: ULTRASOUND IMAGING | Facility: HOSPITAL | Age: 64
Discharge: HOME | End: 2019-01-08
Attending: INTERNAL MEDICINE | Admitting: INTERNAL MEDICINE

## 2019-01-08 DIAGNOSIS — R18.8 OTHER ASCITES: ICD-10-CM

## 2019-01-08 DIAGNOSIS — R80.9 PROTEINURIA, UNSPECIFIED TYPE: ICD-10-CM

## 2019-01-08 DIAGNOSIS — N18.30 CHRONIC KIDNEY DISEASE, STAGE III (MODERATE) (HCC): ICD-10-CM

## 2019-01-08 DIAGNOSIS — N25.81 SECONDARY HYPERPARATHYROIDISM OF RENAL ORIGIN (HCC): ICD-10-CM

## 2019-01-08 DIAGNOSIS — R80.9 PROTEINURIA, UNSPECIFIED TYPE: Primary | ICD-10-CM

## 2019-01-08 LAB
25(OH)D3 SERPL-MCNC: 13 NG/ML
ALBUMIN SERPL-MCNC: 4.3 G/DL (ref 3.4–4.8)
ALBUMIN/GLOB SERPL: 1.4 G/DL (ref 1.5–2.5)
ALP SERPL-CCNC: 52 U/L (ref 40–129)
ALT SERPL W P-5'-P-CCNC: 18 U/L (ref 10–44)
ANION GAP SERPL CALCULATED.3IONS-SCNC: 8.6 MMOL/L (ref 3.6–11.2)
AST SERPL-CCNC: 20 U/L (ref 10–34)
BASOPHILS # BLD AUTO: 0.01 10*3/MM3 (ref 0–0.3)
BASOPHILS NFR BLD AUTO: 0.2 % (ref 0–2)
BILIRUB SERPL-MCNC: 0.3 MG/DL (ref 0.2–1.8)
BUN BLD-MCNC: 44 MG/DL (ref 7–21)
BUN/CREAT SERPL: 13.9 (ref 7–25)
C3 SERPL-MCNC: 146.6 MG/DL (ref 90–170)
CALCIUM SPEC-SCNC: 8.7 MG/DL (ref 7.7–10)
CHLORIDE SERPL-SCNC: 106 MMOL/L (ref 99–112)
CO2 SERPL-SCNC: 22.4 MMOL/L (ref 24.3–31.9)
CREAT BLD-MCNC: 3.17 MG/DL (ref 0.43–1.29)
DEPRECATED RDW RBC AUTO: 48.8 FL (ref 37–54)
EOSINOPHIL # BLD AUTO: 0.14 10*3/MM3 (ref 0–0.7)
EOSINOPHIL NFR BLD AUTO: 3.4 % (ref 0–5)
ERYTHROCYTE [DISTWIDTH] IN BLOOD BY AUTOMATED COUNT: 14.6 % (ref 11.5–14.5)
FERRITIN SERPL-MCNC: 92 NG/ML (ref 21.9–321.7)
GFR SERPL CREATININE-BSD FRML MDRD: 20 ML/MIN/1.73
GLOBULIN UR ELPH-MCNC: 3.1 GM/DL
GLUCOSE BLD-MCNC: 222 MG/DL (ref 70–110)
HCT VFR BLD AUTO: 24.4 % (ref 42–52)
HGB BLD-MCNC: 7.6 G/DL (ref 14–18)
IMM GRANULOCYTES # BLD AUTO: 0.01 10*3/MM3 (ref 0–0.03)
IMM GRANULOCYTES NFR BLD AUTO: 0.2 % (ref 0–0.5)
IRON 24H UR-MRATE: 42 MCG/DL (ref 53–167)
IRON SATN MFR SERPL: 11 % (ref 20–50)
LYMPHOCYTES # BLD AUTO: 1.08 10*3/MM3 (ref 1–3)
LYMPHOCYTES NFR BLD AUTO: 26.5 % (ref 21–51)
MCH RBC QN AUTO: 28.6 PG (ref 27–33)
MCHC RBC AUTO-ENTMCNC: 31.1 G/DL (ref 33–37)
MCV RBC AUTO: 91.7 FL (ref 80–94)
MONOCYTES # BLD AUTO: 0.33 10*3/MM3 (ref 0.1–0.9)
MONOCYTES NFR BLD AUTO: 8.1 % (ref 0–10)
NEUTROPHILS # BLD AUTO: 2.5 10*3/MM3 (ref 1.4–6.5)
NEUTROPHILS NFR BLD AUTO: 61.6 % (ref 30–70)
OSMOLALITY SERPL CALC.SUM OF ELEC: 291.9 MOSM/KG (ref 273–305)
PHOSPHATE SERPL-MCNC: 4.7 MG/DL (ref 2.7–4.5)
PLATELET # BLD AUTO: 184 10*3/MM3 (ref 130–400)
PMV BLD AUTO: 11.7 FL (ref 6–10)
POTASSIUM BLD-SCNC: 4.8 MMOL/L (ref 3.5–5.3)
PROT SERPL-MCNC: 7.4 G/DL (ref 6–8)
PTH-INTACT SERPL-MCNC: 224.3 PG/ML (ref 14–72)
RBC # BLD AUTO: 2.66 10*6/MM3 (ref 4.7–6.1)
SODIUM BLD-SCNC: 137 MMOL/L (ref 135–153)
TIBC SERPL-MCNC: 374 MCG/DL (ref 241–421)
WBC NRBC COR # BLD: 4.07 10*3/MM3 (ref 4.5–12.5)

## 2019-01-08 PROCEDURE — 86225 DNA ANTIBODY NATIVE: CPT

## 2019-01-08 PROCEDURE — 80053 COMPREHEN METABOLIC PANEL: CPT

## 2019-01-08 PROCEDURE — 83520 IMMUNOASSAY QUANT NOS NONAB: CPT

## 2019-01-08 PROCEDURE — 86334 IMMUNOFIX E-PHORESIS SERUM: CPT

## 2019-01-08 PROCEDURE — 86160 COMPLEMENT ANTIGEN: CPT

## 2019-01-08 PROCEDURE — 85025 COMPLETE CBC W/AUTO DIFF WBC: CPT

## 2019-01-08 PROCEDURE — 76705 ECHO EXAM OF ABDOMEN: CPT | Performed by: RADIOLOGY

## 2019-01-08 PROCEDURE — 83550 IRON BINDING TEST: CPT

## 2019-01-08 PROCEDURE — 84100 ASSAY OF PHOSPHORUS: CPT

## 2019-01-08 PROCEDURE — 84165 PROTEIN E-PHORESIS SERUM: CPT

## 2019-01-08 PROCEDURE — 86256 FLUORESCENT ANTIBODY TITER: CPT

## 2019-01-08 PROCEDURE — 86038 ANTINUCLEAR ANTIBODIES: CPT

## 2019-01-08 PROCEDURE — 83540 ASSAY OF IRON: CPT

## 2019-01-08 PROCEDURE — 82306 VITAMIN D 25 HYDROXY: CPT

## 2019-01-08 PROCEDURE — 83970 ASSAY OF PARATHORMONE: CPT

## 2019-01-08 PROCEDURE — 82728 ASSAY OF FERRITIN: CPT

## 2019-01-08 PROCEDURE — 76700 US EXAM ABDOM COMPLETE: CPT

## 2019-01-08 PROCEDURE — 36415 COLL VENOUS BLD VENIPUNCTURE: CPT

## 2019-01-08 PROCEDURE — 82784 ASSAY IGA/IGD/IGG/IGM EACH: CPT

## 2019-01-09 ENCOUNTER — TRANSCRIBE ORDERS (OUTPATIENT)
Dept: INFUSION THERAPY | Facility: HOSPITAL | Age: 64
End: 2019-01-09

## 2019-01-09 DIAGNOSIS — N18.4 CHRONIC KIDNEY DISEASE, STAGE IV (SEVERE) (HCC): Primary | ICD-10-CM

## 2019-01-09 DIAGNOSIS — D50.9 IRON DEFICIENCY ANEMIA, UNSPECIFIED IRON DEFICIENCY ANEMIA TYPE: ICD-10-CM

## 2019-01-09 LAB
ALBUMIN SERPL-MCNC: 3.3 G/DL (ref 2.9–4.4)
ALBUMIN SERPL-MCNC: 3.3 G/DL (ref 2.9–4.4)
ALBUMIN/GLOB SERPL: 0.9 {RATIO} (ref 0.7–1.7)
ALBUMIN/GLOB SERPL: 1 {RATIO} (ref 0.7–1.7)
ALPHA1 GLOB FLD ELPH-MCNC: 0.3 G/DL (ref 0–0.4)
ALPHA1 GLOB FLD ELPH-MCNC: 0.3 G/DL (ref 0–0.4)
ALPHA2 GLOB SERPL ELPH-MCNC: 0.9 G/DL (ref 0.4–1)
ALPHA2 GLOB SERPL ELPH-MCNC: 0.9 G/DL (ref 0.4–1)
ANA SER QL: NEGATIVE
B-GLOBULIN SERPL ELPH-MCNC: 1.2 G/DL (ref 0.7–1.3)
B-GLOBULIN SERPL ELPH-MCNC: 1.3 G/DL (ref 0.7–1.3)
DSDNA AB SER-ACNC: 1 IU/ML (ref 0–9)
GAMMA GLOB SERPL ELPH-MCNC: 1.1 G/DL (ref 0.4–1.8)
GAMMA GLOB SERPL ELPH-MCNC: 1.1 G/DL (ref 0.4–1.8)
GLOBULIN SER CALC-MCNC: 3.5 G/DL (ref 2.2–3.9)
GLOBULIN SER CALC-MCNC: 3.6 G/DL (ref 2.2–3.9)
IGA SERPL-MCNC: 283 MG/DL (ref 61–437)
IGG SERPL-MCNC: 961 MG/DL (ref 700–1600)
IGM SERPL-MCNC: 122 MG/DL (ref 20–172)
INTERPRETATION SERPL IEP-IMP: NORMAL
Lab: NORMAL
Lab: NORMAL
M-SPIKE: NORMAL G/DL
M-SPIKE: NORMAL G/DL
PROT SERPL-MCNC: 6.8 G/DL (ref 6–8.5)
PROT SERPL-MCNC: 6.9 G/DL (ref 6–8.5)

## 2019-01-10 ENCOUNTER — APPOINTMENT (OUTPATIENT)
Dept: NUCLEAR MEDICINE | Facility: HOSPITAL | Age: 64
End: 2019-01-10

## 2019-01-10 ENCOUNTER — APPOINTMENT (OUTPATIENT)
Dept: ULTRASOUND IMAGING | Facility: HOSPITAL | Age: 64
End: 2019-01-10

## 2019-01-10 ENCOUNTER — APPOINTMENT (OUTPATIENT)
Dept: CT IMAGING | Facility: HOSPITAL | Age: 64
End: 2019-01-10

## 2019-01-10 ENCOUNTER — APPOINTMENT (OUTPATIENT)
Dept: GENERAL RADIOLOGY | Facility: HOSPITAL | Age: 64
End: 2019-01-10

## 2019-01-10 ENCOUNTER — HOSPITAL ENCOUNTER (INPATIENT)
Facility: HOSPITAL | Age: 64
LOS: 5 days | Discharge: HOME | End: 2019-01-15
Attending: FAMILY MEDICINE | Admitting: INTERNAL MEDICINE

## 2019-01-10 DIAGNOSIS — D50.9 IRON DEFICIENCY ANEMIA, UNSPECIFIED IRON DEFICIENCY ANEMIA TYPE: ICD-10-CM

## 2019-01-10 DIAGNOSIS — N18.4 STAGE 4 CHRONIC KIDNEY DISEASE (HCC): ICD-10-CM

## 2019-01-10 DIAGNOSIS — E87.5 HYPERKALEMIA: Primary | ICD-10-CM

## 2019-01-10 DIAGNOSIS — I50.9 CONGESTIVE HEART FAILURE, UNSPECIFIED HF CHRONICITY, UNSPECIFIED HEART FAILURE TYPE (HCC): ICD-10-CM

## 2019-01-10 LAB
A-A DO2: 26.2 MMHG (ref 0–300)
ALBUMIN SERPL-MCNC: 4.1 G/DL (ref 3.4–4.8)
ALBUMIN/GLOB SERPL: 1.4 G/DL (ref 1.5–2.5)
ALP SERPL-CCNC: 47 U/L (ref 40–129)
ALT SERPL W P-5'-P-CCNC: 17 U/L (ref 10–44)
ANION GAP SERPL CALCULATED.3IONS-SCNC: 8.1 MMOL/L (ref 3.6–11.2)
ARTERIAL PATENCY WRIST A: ABNORMAL
AST SERPL-CCNC: 27 U/L (ref 10–34)
ATMOSPHERIC PRESS: 734 MMHG
BASE EXCESS BLDA CALC-SCNC: -6.2 MMOL/L
BASOPHILS # BLD AUTO: 0.03 10*3/MM3 (ref 0–0.3)
BASOPHILS NFR BLD AUTO: 0.7 % (ref 0–2)
BDY SITE: ABNORMAL
BILIRUB SERPL-MCNC: 0.3 MG/DL (ref 0.2–1.8)
BILIRUB UR QL STRIP: NEGATIVE
BNP SERPL-MCNC: 386 PG/ML (ref 0–100)
BODY TEMPERATURE: 98.6 C
BUN BLD-MCNC: 49 MG/DL (ref 7–21)
BUN/CREAT SERPL: 14.8 (ref 7–25)
C-ANCA TITR SER IF: NORMAL TITER
CALCIUM SPEC-SCNC: 8.8 MG/DL (ref 7.7–10)
CHLORIDE SERPL-SCNC: 109 MMOL/L (ref 99–112)
CK MB SERPL-CCNC: 1.18 NG/ML (ref 0–5)
CK MB SERPL-CCNC: 1.23 NG/ML (ref 0–5)
CK MB SERPL-RTO: 3 % (ref 0–3)
CK MB SERPL-RTO: 3 % (ref 0–3)
CK SERPL-CCNC: 40 U/L (ref 24–204)
CK SERPL-CCNC: 41 U/L (ref 24–204)
CLARITY UR: CLEAR
CO2 SERPL-SCNC: 20.9 MMOL/L (ref 24.3–31.9)
COHGB MFR BLD: 2.4 % (ref 0–5)
COLOR UR: YELLOW
CREAT BLD-MCNC: 3.31 MG/DL (ref 0.43–1.29)
D DIMER PPP FEU-MCNC: 2.01 MCGFEU/ML (ref 0–0.5)
DEPRECATED RDW RBC AUTO: 49.4 FL (ref 37–54)
EOSINOPHIL # BLD AUTO: 0.12 10*3/MM3 (ref 0–0.7)
EOSINOPHIL NFR BLD AUTO: 2.9 % (ref 0–5)
ERYTHROCYTE [DISTWIDTH] IN BLOOD BY AUTOMATED COUNT: 14.7 % (ref 11.5–14.5)
GFR SERPL CREATININE-BSD FRML MDRD: 19 ML/MIN/1.73
GLOBULIN UR ELPH-MCNC: 3 GM/DL
GLUCOSE BLD-MCNC: 223 MG/DL (ref 70–110)
GLUCOSE BLDC GLUCOMTR-MCNC: 219 MG/DL (ref 70–130)
GLUCOSE UR STRIP-MCNC: NEGATIVE MG/DL
HBA1C MFR BLD: 8.2 % (ref 4.5–5.7)
HCO3 BLDA-SCNC: 18.5 MMOL/L (ref 22–26)
HCT VFR BLD AUTO: 24 % (ref 42–52)
HCT VFR BLD CALC: 22 % (ref 42–52)
HGB BLD-MCNC: 7.4 G/DL (ref 14–18)
HGB BLDA-MCNC: 7.5 G/DL (ref 12–16)
HGB UR QL STRIP.AUTO: NEGATIVE
HOROWITZ INDEX BLD+IHG-RTO: 21 %
IMM GRANULOCYTES # BLD AUTO: 0.02 10*3/MM3 (ref 0–0.03)
IMM GRANULOCYTES NFR BLD AUTO: 0.5 % (ref 0–0.5)
KETONES UR QL STRIP: NEGATIVE
LEUKOCYTE ESTERASE UR QL STRIP.AUTO: NEGATIVE
LYMPHOCYTES # BLD AUTO: 0.95 10*3/MM3 (ref 1–3)
LYMPHOCYTES NFR BLD AUTO: 22.6 % (ref 21–51)
MCH RBC QN AUTO: 28.5 PG (ref 27–33)
MCHC RBC AUTO-ENTMCNC: 30.8 G/DL (ref 33–37)
MCV RBC AUTO: 92.3 FL (ref 80–94)
METHGB BLD QL: 0.6 % (ref 0–3)
MODALITY: ABNORMAL
MONOCYTES # BLD AUTO: 0.34 10*3/MM3 (ref 0.1–0.9)
MONOCYTES NFR BLD AUTO: 8.1 % (ref 0–10)
MYELOPEROXIDASE AB SER-ACNC: <9 U/ML (ref 0–9)
MYOGLOBIN SERPL-MCNC: 96 NG/ML (ref 0–109)
MYOGLOBIN SERPL-MCNC: 97 NG/ML (ref 0–109)
NEUTROPHILS # BLD AUTO: 2.75 10*3/MM3 (ref 1.4–6.5)
NEUTROPHILS NFR BLD AUTO: 65.2 % (ref 30–70)
NITRITE UR QL STRIP: NEGATIVE
OSMOLALITY SERPL CALC.SUM OF ELEC: 295.6 MOSM/KG (ref 273–305)
OXYHGB MFR BLDV: 92.1 % (ref 85–100)
P-ANCA ATYPICAL TITR SER IF: NORMAL TITER
P-ANCA TITR SER IF: NORMAL TITER
PCO2 BLDA: 33.2 MM HG (ref 35–45)
PH BLDA: 7.37 PH UNITS (ref 7.35–7.45)
PH UR STRIP.AUTO: <=5 [PH] (ref 5–8)
PLATELET # BLD AUTO: 182 10*3/MM3 (ref 130–400)
PMV BLD AUTO: 12.2 FL (ref 6–10)
PO2 BLDA: 78.3 MM HG (ref 80–100)
POTASSIUM BLD-SCNC: 5.3 MMOL/L (ref 3.5–5.3)
POTASSIUM BLD-SCNC: 5.7 MMOL/L (ref 3.5–5.3)
PROT SERPL-MCNC: 7.1 G/DL (ref 6–8)
PROT UR QL STRIP: NEGATIVE
PROTEINASE3 AB SER IA-ACNC: <3.5 U/ML (ref 0–3.5)
RBC # BLD AUTO: 2.6 10*6/MM3 (ref 4.7–6.1)
SAO2 % BLDCOA: 94.9 % (ref 90–100)
SODIUM BLD-SCNC: 138 MMOL/L (ref 135–153)
SP GR UR STRIP: 1.02 (ref 1–1.03)
TROPONIN I SERPL-MCNC: 0.01 NG/ML
TSH SERPL DL<=0.05 MIU/L-ACNC: 3.99 MIU/ML (ref 0.55–4.78)
UROBILINOGEN UR QL STRIP: NORMAL
WBC NRBC COR # BLD: 4.21 10*3/MM3 (ref 4.5–12.5)

## 2019-01-10 PROCEDURE — 85379 FIBRIN DEGRADATION QUANT: CPT | Performed by: PHYSICIAN ASSISTANT

## 2019-01-10 PROCEDURE — 74176 CT ABD & PELVIS W/O CONTRAST: CPT | Performed by: RADIOLOGY

## 2019-01-10 PROCEDURE — 84132 ASSAY OF SERUM POTASSIUM: CPT | Performed by: PHYSICIAN ASSISTANT

## 2019-01-10 PROCEDURE — 71250 CT THORAX DX C-: CPT

## 2019-01-10 PROCEDURE — 83874 ASSAY OF MYOGLOBIN: CPT | Performed by: NURSE PRACTITIONER

## 2019-01-10 PROCEDURE — 74176 CT ABD & PELVIS W/O CONTRAST: CPT

## 2019-01-10 PROCEDURE — 78580 LUNG PERFUSION IMAGING: CPT | Performed by: RADIOLOGY

## 2019-01-10 PROCEDURE — 93010 ELECTROCARDIOGRAM REPORT: CPT | Performed by: INTERNAL MEDICINE

## 2019-01-10 PROCEDURE — 82550 ASSAY OF CK (CPK): CPT | Performed by: NURSE PRACTITIONER

## 2019-01-10 PROCEDURE — 94799 UNLISTED PULMONARY SVC/PX: CPT

## 2019-01-10 PROCEDURE — 81003 URINALYSIS AUTO W/O SCOPE: CPT | Performed by: PHYSICIAN ASSISTANT

## 2019-01-10 PROCEDURE — 80050 GENERAL HEALTH PANEL: CPT | Performed by: PHYSICIAN ASSISTANT

## 2019-01-10 PROCEDURE — 83036 HEMOGLOBIN GLYCOSYLATED A1C: CPT | Performed by: NURSE PRACTITIONER

## 2019-01-10 PROCEDURE — 71046 X-RAY EXAM CHEST 2 VIEWS: CPT | Performed by: RADIOLOGY

## 2019-01-10 PROCEDURE — 82805 BLOOD GASES W/O2 SATURATION: CPT | Performed by: PHYSICIAN ASSISTANT

## 2019-01-10 PROCEDURE — 78580 LUNG PERFUSION IMAGING: CPT

## 2019-01-10 PROCEDURE — 93970 EXTREMITY STUDY: CPT | Performed by: RADIOLOGY

## 2019-01-10 PROCEDURE — 93970 EXTREMITY STUDY: CPT

## 2019-01-10 PROCEDURE — 25010000002 FUROSEMIDE PER 20 MG: Performed by: INTERNAL MEDICINE

## 2019-01-10 PROCEDURE — 36600 WITHDRAWAL OF ARTERIAL BLOOD: CPT | Performed by: PHYSICIAN ASSISTANT

## 2019-01-10 PROCEDURE — 82553 CREATINE MB FRACTION: CPT | Performed by: NURSE PRACTITIONER

## 2019-01-10 PROCEDURE — A9540 TC99M MAA: HCPCS | Performed by: FAMILY MEDICINE

## 2019-01-10 PROCEDURE — 0 TECHNETIUM ALBUMIN AGGREGATED: Performed by: FAMILY MEDICINE

## 2019-01-10 PROCEDURE — 63710000001 INSULIN DETEMIR PER 5 UNITS: Performed by: INTERNAL MEDICINE

## 2019-01-10 PROCEDURE — 25010000002 FUROSEMIDE PER 20 MG: Performed by: PHYSICIAN ASSISTANT

## 2019-01-10 PROCEDURE — 94640 AIRWAY INHALATION TREATMENT: CPT

## 2019-01-10 PROCEDURE — 71046 X-RAY EXAM CHEST 2 VIEWS: CPT

## 2019-01-10 PROCEDURE — 71250 CT THORAX DX C-: CPT | Performed by: RADIOLOGY

## 2019-01-10 PROCEDURE — 93005 ELECTROCARDIOGRAM TRACING: CPT | Performed by: PHYSICIAN ASSISTANT

## 2019-01-10 PROCEDURE — 63710000001 INSULIN ASPART PER 5 UNITS: Performed by: NURSE PRACTITIONER

## 2019-01-10 PROCEDURE — 82962 GLUCOSE BLOOD TEST: CPT

## 2019-01-10 PROCEDURE — 83880 ASSAY OF NATRIURETIC PEPTIDE: CPT | Performed by: PHYSICIAN ASSISTANT

## 2019-01-10 PROCEDURE — 84484 ASSAY OF TROPONIN QUANT: CPT | Performed by: NURSE PRACTITIONER

## 2019-01-10 PROCEDURE — 82375 ASSAY CARBOXYHB QUANT: CPT | Performed by: PHYSICIAN ASSISTANT

## 2019-01-10 PROCEDURE — 83050 HGB METHEMOGLOBIN QUAN: CPT | Performed by: PHYSICIAN ASSISTANT

## 2019-01-10 PROCEDURE — 84484 ASSAY OF TROPONIN QUANT: CPT | Performed by: PHYSICIAN ASSISTANT

## 2019-01-10 PROCEDURE — 25010000002 IRON SUCROSE PER 1 MG: Performed by: INTERNAL MEDICINE

## 2019-01-10 PROCEDURE — 99223 1ST HOSP IP/OBS HIGH 75: CPT | Performed by: INTERNAL MEDICINE

## 2019-01-10 PROCEDURE — 99284 EMERGENCY DEPT VISIT MOD MDM: CPT

## 2019-01-10 PROCEDURE — 36415 COLL VENOUS BLD VENIPUNCTURE: CPT | Performed by: NURSE PRACTITIONER

## 2019-01-10 RX ORDER — FERROUS SULFATE 325(65) MG
325 TABLET ORAL
Status: DISCONTINUED | OUTPATIENT
Start: 2019-01-11 | End: 2019-01-10

## 2019-01-10 RX ORDER — FAMOTIDINE 20 MG/1
40 TABLET, FILM COATED ORAL NIGHTLY
Status: CANCELLED | OUTPATIENT
Start: 2019-01-10

## 2019-01-10 RX ORDER — NITROGLYCERIN 0.4 MG/1
0.4 TABLET SUBLINGUAL
Status: DISCONTINUED | OUTPATIENT
Start: 2019-01-10 | End: 2019-01-15 | Stop reason: HOSPADM

## 2019-01-10 RX ORDER — AMLODIPINE BESYLATE 10 MG/1
10 TABLET ORAL DAILY
COMMUNITY
End: 2019-01-15 | Stop reason: HOSPADM

## 2019-01-10 RX ORDER — TAMSULOSIN HYDROCHLORIDE 0.4 MG/1
0.4 CAPSULE ORAL NIGHTLY
Status: DISCONTINUED | OUTPATIENT
Start: 2019-01-10 | End: 2019-01-15 | Stop reason: HOSPADM

## 2019-01-10 RX ORDER — SODIUM BICARBONATE 650 MG/1
650 TABLET ORAL 3 TIMES DAILY
Status: ON HOLD | COMMUNITY
End: 2021-01-01

## 2019-01-10 RX ORDER — ALBUTEROL SULFATE 2.5 MG/3ML
2.5 SOLUTION RESPIRATORY (INHALATION) ONCE
Status: COMPLETED | OUTPATIENT
Start: 2019-01-10 | End: 2019-01-10

## 2019-01-10 RX ORDER — FUROSEMIDE 40 MG/1
40 TABLET ORAL TAKE AS DIRECTED
Status: CANCELLED | OUTPATIENT
Start: 2019-01-10

## 2019-01-10 RX ORDER — TRIAMCINOLONE ACETONIDE 1 MG/G
1 CREAM TOPICAL EVERY 12 HOURS SCHEDULED
Status: DISCONTINUED | OUTPATIENT
Start: 2019-01-10 | End: 2019-01-15 | Stop reason: HOSPADM

## 2019-01-10 RX ORDER — ISOSORBIDE MONONITRATE 60 MG/1
120 TABLET, EXTENDED RELEASE ORAL DAILY
Status: DISCONTINUED | OUTPATIENT
Start: 2019-01-11 | End: 2019-01-15 | Stop reason: HOSPADM

## 2019-01-10 RX ORDER — TRIAMCINOLONE ACETONIDE 1 MG/G
1 CREAM TOPICAL 2 TIMES DAILY
Status: CANCELLED | OUTPATIENT
Start: 2019-01-10

## 2019-01-10 RX ORDER — HYDRALAZINE HYDROCHLORIDE 25 MG/1
25 TABLET, FILM COATED ORAL 3 TIMES DAILY
Status: CANCELLED | OUTPATIENT
Start: 2019-01-10

## 2019-01-10 RX ORDER — FERROUS SULFATE 325(65) MG
325 TABLET ORAL
Status: DISCONTINUED | OUTPATIENT
Start: 2019-01-11 | End: 2019-01-11

## 2019-01-10 RX ORDER — CYCLOBENZAPRINE HCL 10 MG
10 TABLET ORAL 3 TIMES DAILY PRN
COMMUNITY

## 2019-01-10 RX ORDER — NICOTINE POLACRILEX 4 MG
15 LOZENGE BUCCAL
Status: DISCONTINUED | OUTPATIENT
Start: 2019-01-10 | End: 2019-01-15 | Stop reason: HOSPADM

## 2019-01-10 RX ORDER — HYDRALAZINE HYDROCHLORIDE 25 MG/1
25 TABLET, FILM COATED ORAL 3 TIMES DAILY
Status: DISCONTINUED | OUTPATIENT
Start: 2019-01-10 | End: 2019-01-11

## 2019-01-10 RX ORDER — PANTOPRAZOLE SODIUM 40 MG/1
40 TABLET, DELAYED RELEASE ORAL DAILY
COMMUNITY

## 2019-01-10 RX ORDER — CYCLOBENZAPRINE HCL 10 MG
10 TABLET ORAL DAILY
Status: DISCONTINUED | OUTPATIENT
Start: 2019-01-11 | End: 2019-01-15 | Stop reason: HOSPADM

## 2019-01-10 RX ORDER — NITROGLYCERIN 0.4 MG/1
0.4 TABLET SUBLINGUAL
Status: CANCELLED | OUTPATIENT
Start: 2019-01-10

## 2019-01-10 RX ORDER — CETIRIZINE HYDROCHLORIDE 10 MG/1
10 TABLET ORAL DAILY
Status: CANCELLED | OUTPATIENT
Start: 2019-01-11

## 2019-01-10 RX ORDER — CARVEDILOL 25 MG/1
25 TABLET ORAL 2 TIMES DAILY
Status: CANCELLED | OUTPATIENT
Start: 2019-01-10

## 2019-01-10 RX ORDER — SODIUM CHLORIDE 0.9 % (FLUSH) 0.9 %
3-10 SYRINGE (ML) INJECTION AS NEEDED
Status: DISCONTINUED | OUTPATIENT
Start: 2019-01-10 | End: 2019-01-15 | Stop reason: HOSPADM

## 2019-01-10 RX ORDER — ATORVASTATIN CALCIUM 40 MG/1
80 TABLET, FILM COATED ORAL DAILY
Status: DISCONTINUED | OUTPATIENT
Start: 2019-01-11 | End: 2019-01-15 | Stop reason: HOSPADM

## 2019-01-10 RX ORDER — HYDROCODONE BITARTRATE AND ACETAMINOPHEN 7.5; 325 MG/1; MG/1
1 TABLET ORAL 2 TIMES DAILY PRN
Status: DISCONTINUED | OUTPATIENT
Start: 2019-01-10 | End: 2019-01-15 | Stop reason: HOSPADM

## 2019-01-10 RX ORDER — CETIRIZINE HYDROCHLORIDE 10 MG/1
5 TABLET ORAL DAILY
Status: DISCONTINUED | OUTPATIENT
Start: 2019-01-11 | End: 2019-01-15 | Stop reason: HOSPADM

## 2019-01-10 RX ORDER — AMLODIPINE BESYLATE 10 MG/1
10 TABLET ORAL DAILY
Status: CANCELLED | OUTPATIENT
Start: 2019-01-11

## 2019-01-10 RX ORDER — SODIUM CHLORIDE 0.9 % (FLUSH) 0.9 %
10 SYRINGE (ML) INJECTION AS NEEDED
Status: DISCONTINUED | OUTPATIENT
Start: 2019-01-10 | End: 2019-01-15 | Stop reason: HOSPADM

## 2019-01-10 RX ORDER — ASPIRIN 81 MG/1
81 TABLET ORAL DAILY
Status: DISCONTINUED | OUTPATIENT
Start: 2019-01-11 | End: 2019-01-15 | Stop reason: HOSPADM

## 2019-01-10 RX ORDER — ASPIRIN 81 MG/1
81 TABLET ORAL DAILY
Status: CANCELLED | OUTPATIENT
Start: 2019-01-11

## 2019-01-10 RX ORDER — FUROSEMIDE 10 MG/ML
40 INJECTION INTRAMUSCULAR; INTRAVENOUS EVERY 12 HOURS SCHEDULED
Status: DISCONTINUED | OUTPATIENT
Start: 2019-01-10 | End: 2019-01-11

## 2019-01-10 RX ORDER — SENNA AND DOCUSATE SODIUM 50; 8.6 MG/1; MG/1
2 TABLET, FILM COATED ORAL NIGHTLY
Status: DISCONTINUED | OUTPATIENT
Start: 2019-01-10 | End: 2019-01-15 | Stop reason: HOSPADM

## 2019-01-10 RX ORDER — ASPIRIN 81 MG/1
81 TABLET ORAL DAILY
COMMUNITY
End: 2019-01-16 | Stop reason: SDUPTHER

## 2019-01-10 RX ORDER — FERROUS SULFATE 325(65) MG
325 TABLET ORAL
COMMUNITY
End: 2019-01-16 | Stop reason: SDUPTHER

## 2019-01-10 RX ORDER — DEXTROSE MONOHYDRATE 25 G/50ML
25 INJECTION, SOLUTION INTRAVENOUS
Status: DISCONTINUED | OUTPATIENT
Start: 2019-01-10 | End: 2019-01-15 | Stop reason: HOSPADM

## 2019-01-10 RX ORDER — ISOSORBIDE MONONITRATE 60 MG/1
120 TABLET, EXTENDED RELEASE ORAL DAILY
Status: CANCELLED | OUTPATIENT
Start: 2019-01-11

## 2019-01-10 RX ORDER — FERROUS SULFATE 325(65) MG
325 TABLET ORAL
Status: CANCELLED | OUTPATIENT
Start: 2019-01-11

## 2019-01-10 RX ORDER — CARVEDILOL 25 MG/1
25 TABLET ORAL 2 TIMES DAILY WITH MEALS
Status: DISCONTINUED | OUTPATIENT
Start: 2019-01-10 | End: 2019-01-15 | Stop reason: HOSPADM

## 2019-01-10 RX ORDER — SODIUM CHLORIDE 0.9 % (FLUSH) 0.9 %
3 SYRINGE (ML) INJECTION EVERY 12 HOURS SCHEDULED
Status: DISCONTINUED | OUTPATIENT
Start: 2019-01-10 | End: 2019-01-15 | Stop reason: HOSPADM

## 2019-01-10 RX ORDER — AMLODIPINE BESYLATE 10 MG/1
10 TABLET ORAL DAILY
Status: DISCONTINUED | OUTPATIENT
Start: 2019-01-11 | End: 2019-01-11

## 2019-01-10 RX ORDER — PANTOPRAZOLE SODIUM 40 MG/1
40 TABLET, DELAYED RELEASE ORAL EVERY MORNING
Status: DISCONTINUED | OUTPATIENT
Start: 2019-01-11 | End: 2019-01-15 | Stop reason: HOSPADM

## 2019-01-10 RX ORDER — ISOSORBIDE MONONITRATE 120 MG/1
120 TABLET, EXTENDED RELEASE ORAL DAILY
COMMUNITY
End: 2019-01-17 | Stop reason: SDUPTHER

## 2019-01-10 RX ORDER — METOLAZONE 2.5 MG/1
5 TABLET ORAL DAILY
Status: DISCONTINUED | OUTPATIENT
Start: 2019-01-10 | End: 2019-01-11

## 2019-01-10 RX ORDER — ATORVASTATIN CALCIUM 40 MG/1
80 TABLET, FILM COATED ORAL DAILY
Status: CANCELLED | OUTPATIENT
Start: 2019-01-11

## 2019-01-10 RX ORDER — CYCLOBENZAPRINE HCL 10 MG
10 TABLET ORAL DAILY
Status: CANCELLED | OUTPATIENT
Start: 2019-01-11

## 2019-01-10 RX ORDER — TRIAMCINOLONE ACETONIDE 1 MG/G
1 CREAM TOPICAL 2 TIMES DAILY
Status: ON HOLD | COMMUNITY
End: 2021-01-01

## 2019-01-10 RX ORDER — SODIUM POLYSTYRENE SULFONATE 4.1 MEQ/G
30 POWDER, FOR SUSPENSION ORAL; RECTAL ONCE
Status: COMPLETED | OUTPATIENT
Start: 2019-01-10 | End: 2019-01-10

## 2019-01-10 RX ORDER — AMOXICILLIN 250 MG
2 CAPSULE ORAL NIGHTLY
Status: CANCELLED | OUTPATIENT
Start: 2019-01-10

## 2019-01-10 RX ORDER — IPRATROPIUM BROMIDE AND ALBUTEROL SULFATE 2.5; .5 MG/3ML; MG/3ML
3 SOLUTION RESPIRATORY (INHALATION) EVERY 6 HOURS PRN
Status: DISCONTINUED | OUTPATIENT
Start: 2019-01-10 | End: 2019-01-15 | Stop reason: HOSPADM

## 2019-01-10 RX ORDER — HYDROXYZINE HYDROCHLORIDE 25 MG/1
25 TABLET, FILM COATED ORAL EVERY 6 HOURS PRN
Status: CANCELLED | OUTPATIENT
Start: 2019-01-10

## 2019-01-10 RX ORDER — TAMSULOSIN HYDROCHLORIDE 0.4 MG/1
0.4 CAPSULE ORAL NIGHTLY
Status: CANCELLED | OUTPATIENT
Start: 2019-01-10

## 2019-01-10 RX ORDER — FUROSEMIDE 10 MG/ML
40 INJECTION INTRAMUSCULAR; INTRAVENOUS ONCE
Status: COMPLETED | OUTPATIENT
Start: 2019-01-10 | End: 2019-01-10

## 2019-01-10 RX ORDER — SODIUM BICARBONATE 650 MG/1
650 TABLET ORAL 3 TIMES DAILY
Status: CANCELLED | OUTPATIENT
Start: 2019-01-10

## 2019-01-10 RX ORDER — GLIPIZIDE 5 MG/1
10 TABLET ORAL
Status: DISCONTINUED | OUTPATIENT
Start: 2019-01-10 | End: 2019-01-14

## 2019-01-10 RX ORDER — SODIUM BICARBONATE 650 MG/1
650 TABLET ORAL 3 TIMES DAILY
Status: DISCONTINUED | OUTPATIENT
Start: 2019-01-10 | End: 2019-01-15 | Stop reason: HOSPADM

## 2019-01-10 RX ORDER — HYDROXYZINE HYDROCHLORIDE 25 MG/1
25 TABLET, FILM COATED ORAL 3 TIMES DAILY PRN
Status: DISCONTINUED | OUTPATIENT
Start: 2019-01-10 | End: 2019-01-15 | Stop reason: HOSPADM

## 2019-01-10 RX ORDER — HYDROCODONE BITARTRATE AND ACETAMINOPHEN 7.5; 325 MG/1; MG/1
1 TABLET ORAL 2 TIMES DAILY PRN
Status: CANCELLED | OUTPATIENT
Start: 2019-01-10

## 2019-01-10 RX ORDER — PANTOPRAZOLE SODIUM 40 MG/1
40 TABLET, DELAYED RELEASE ORAL DAILY
Status: CANCELLED | OUTPATIENT
Start: 2019-01-11

## 2019-01-10 RX ORDER — GLIPIZIDE 5 MG/1
10 TABLET ORAL
Status: CANCELLED | OUTPATIENT
Start: 2019-01-10

## 2019-01-10 RX ADMIN — IPRATROPIUM BROMIDE AND ALBUTEROL SULFATE 3 ML: .5; 3 SOLUTION RESPIRATORY (INHALATION) at 18:49

## 2019-01-10 RX ADMIN — METOLAZONE 5 MG: 2.5 TABLET ORAL at 21:54

## 2019-01-10 RX ADMIN — IRON SUCROSE 200 MG: 20 INJECTION, SOLUTION INTRAVENOUS at 21:55

## 2019-01-10 RX ADMIN — ALBUTEROL SULFATE 2.5 MG: 2.5 SOLUTION RESPIRATORY (INHALATION) at 11:51

## 2019-01-10 RX ADMIN — TRIAMCINOLONE ACETONIDE 1 APPLICATION: 1 CREAM TOPICAL at 21:55

## 2019-01-10 RX ADMIN — SODIUM CHLORIDE, PRESERVATIVE FREE 3 ML: 5 INJECTION INTRAVENOUS at 21:03

## 2019-01-10 RX ADMIN — FUROSEMIDE 40 MG: 10 INJECTION, SOLUTION INTRAMUSCULAR; INTRAVENOUS at 14:59

## 2019-01-10 RX ADMIN — FUROSEMIDE 40 MG: 10 INJECTION, SOLUTION INTRAMUSCULAR; INTRAVENOUS at 21:02

## 2019-01-10 RX ADMIN — CARVEDILOL 25 MG: 25 TABLET, FILM COATED ORAL at 18:54

## 2019-01-10 RX ADMIN — TAMSULOSIN HYDROCHLORIDE 0.4 MG: 0.4 CAPSULE ORAL at 21:54

## 2019-01-10 RX ADMIN — Medication 1 DOSE: at 12:23

## 2019-01-10 RX ADMIN — SODIUM BICARBONATE TAB 650 MG 650 MG: 650 TAB at 18:54

## 2019-01-10 RX ADMIN — INSULIN ASPART 3 UNITS: 100 INJECTION, SOLUTION INTRAVENOUS; SUBCUTANEOUS at 21:01

## 2019-01-10 RX ADMIN — INSULIN DETEMIR 60 UNITS: 100 INJECTION, SOLUTION SUBCUTANEOUS at 21:55

## 2019-01-10 RX ADMIN — GLIPIZIDE 10 MG: 5 TABLET ORAL at 18:54

## 2019-01-10 RX ADMIN — SODIUM POLYSTYRENE SULFONATE 30 G: 1 POWDER ORAL; RECTAL at 14:59

## 2019-01-10 RX ADMIN — HYDRALAZINE HYDROCHLORIDE 25 MG: 25 TABLET ORAL at 18:54

## 2019-01-10 NOTE — H&P
"  [facility] Medicine Services  History & Physical          Patient Identification:  Name:  Catarino Arvizu  Age:  63 y.o.  Sex:  male  :  1955  MRN:  [id number]   Visit Number:  [id number]  Primary Care Physician:  Eleno Chaney APRN         Subjective     Chief complaint: \"smothering\"    History of presenting illness:    Mr. Arvizu is a 63 year old male who presented to [facility] ED on 1/10/19 with smothering. He states this has been going on 1 month.   He states he has shortness of breath with activity. He does have a dry cough for around 1 week but no fever.  He does admit to swelling in his legs, but he says they are at his baseline. He uses 3 pillows to sleep but this is also his baseline. He states he have chest pain across his chest into his left shoulder, this pain occurs when he gets up and gets short of breath, he says this pain resolves with rest, describes it as pressure.     He denies any nausea, has been vomiting for 1 week mostly occurring after he eats. He does have some generalized abdominal pain which he states has been going on \"a long time\". Colonoscopy and EGD within the last year, he was told these were normal. Last bowel movement was this am and normal. He does not do daily weights. He follows with Dr. Daniel, he saw him yesterday and Tuesday. He wanted him to come to the ED to be further evaluated for his shortness of breath. He denies missing any medications.     It is of note he was recently admitted in our observation unit 18-18 for chest pain, per the [place] note is was stated that cardiology recommended a heart cath at the time but in the setting if his renal disease, Dr. Conroy recommended medical management for the time being.     His work up in the ED shows a blood gas of pH 7.365, C02 33.2, pa02 of 783 and HCO of 18.5 on room air, troponin is 0.009, BNP is 386, glucose 223, sodium 138, Potassium 5.7 and repeat of 5.3, HCO 20.9, creatinine 3.31, BUN " 49, D-Dimer 2.01, WBC 4.21, H/H 7..4/24.0, . Chest xray today does not show any Pneumonia, CT of the chest shows tiny bilateral pleural effusions, bilateral ground glass attentuations and probably represents atelectasis or sequela of CHF. CT of the abdomen also done, no acute findings. VQ scan showed Low suspicion of PE. He was given lasix 40mg IV in the ED. He was also given kayexalate for his hyperkalemia.     His PMH consist of heart failure, SOHAIL, GERD, HTN, Polio, Prostate Cancer, and obesity. He denies any history of having heart stents placed. He uses oxygen at night. Does not wear CPAP. He does not smoke, he does chew tobacco. He denies ETOH use.   ---------------------------------------------------------------------------------------------------------------------   Review of Systems   Constitutional: Negative for chills, diaphoresis, fatigue and fever.   HENT: Negative for congestion and rhinorrhea.    Eyes: Negative for photophobia and visual disturbance.   Respiratory: Positive for cough (dry, non-productive), chest tightness and shortness of breath. Negative for wheezing.    Cardiovascular: Positive for leg swelling. Negative for chest pain.   Gastrointestinal: Positive for abdominal distention and abdominal pain (generalized chronic). Negative for constipation, diarrhea and vomiting.   Endocrine: Negative for cold intolerance and heat intolerance.   Genitourinary: Negative for difficulty urinating.   Musculoskeletal: Negative for arthralgias and myalgias.   Skin: Negative for color change.   Allergic/Immunologic: Negative for environmental allergies.   Neurological: Negative for dizziness, weakness and light-headedness.   Psychiatric/Behavioral: Negative for agitation, behavioral problems and confusion.      ---------------------------------------------------------------------------------------------------------------------   Past Medical History:   Diagnosis Date   • Chest pain    • CHF  (congestive heart failure) (CMS/[facility]) 1/10/2019   • Chronic kidney disease    • CPAP (continuous positive airway pressure) dependence    • Diabetes mellitus (CMS/[facility])    • Elevated cholesterol    • GERD (gastroesophageal reflux disease)    • Heart murmur    • Hyperlipidemia    • Hypertension    • Irregular heart beat    • Polio    • Prostate cancer (CMS/[facility]) 05/2016    Dr. Khalif Kohler MD- [place], [place]   • Shortness of breath    • Sleep apnea      Past Surgical History:   Procedure Laterality Date   • CARDIAC CATHETERIZATION  2008    50% diffuse LAD stenosis, 50% septal  branch   • COLONOSCOPY      Long time ago   • COLONOSCOPY N/A 5/9/2018    Procedure: COLONOSCOPY  CPTCODE:66099;  Surgeon: Bob Mcguire III, MD;  Location: [facility] OR;  Service: Gastroenterology   • ENDOSCOPY N/A 5/9/2018    Procedure: ESOPHAGOGASTRODUODENOSCOPY WITH BIOPSY  CPTCODE:85513;  Surgeon: Bob Mcguire III, MD;  Location: [facility] OR;  Service: Gastroenterology   • HEMORRHOIDECTOMY     • HERNIA REPAIR     • UPPER GASTROINTESTINAL ENDOSCOPY      Long time ago   • URETHRAL DILATATION N/A 12/13/2017    Procedure: URETHRAL DILATATION;  Surgeon: Khalif Kohler MD;  Location: [facility] OR;  Service:      Family History   Problem Relation Age of Onset   • Heart disease Brother    • Diabetes Brother    • Cancer Brother         not sure the kind   • Heart disease Brother    • Diabetes Brother    • Diabetes Sister    • Diabetes Daughter    • Heart disease Daughter    • Pancreatitis Daughter    • Crohn's disease Daughter    • Diabetes Son    • Heart disease Son      Social History     Socioeconomic History   • Marital status:      Spouse name: Not on file   • Number of children: Not on file   • Years of education: Not on file   • Highest education level: Not on file   Tobacco Use   • Smoking status: Never Smoker   • Smokeless tobacco: Current User     Types: Chew   • Tobacco comment: Chewed tobacco since he was  4 yrs old.   Substance and Sexual Activity   • Alcohol use: No   • Drug use: No   • Sexual activity: Defer     ---------------------------------------------------------------------------------------------------------------------   Allergies:  Patient has no known allergies.  ---------------------------------------------------------------------------------------------------------------------     Medications below are reported home medications pulling from within the system; at this time, these medications have not been reconciled unless otherwise specified and are in the verification process for further verifcation as current home medications.    Prior to Admission Medications     Prescriptions Last Dose Informant Patient Reported? Taking?    ASPIRIN LOW DOSE 81 MG EC tablet  Pharmacy No No    TAKE 1 TABLET EVERY DAY    atorvastatin (LIPITOR) 80 MG tablet  Pharmacy Yes No    Take 80 mg by mouth daily.    carvedilol (COREG) 25 MG tablet  Pharmacy No No    Take 1 tablet by mouth 2 (Two) Times a Day.    cetirizine (ZyrTEC) 10 MG tablet  Pharmacy Yes No    Take 10 mg by mouth daily.    fenofibrate (TRICOR) 145 MG tablet  Pharmacy Yes No    Take 145 mg by mouth Daily.    Ferrous Sulfate (IRON) 325 (65 Fe) MG tablet  Pharmacy No No    TAKE 1 TABLET WITH BREAKFAST    furosemide (LASIX) 40 MG tablet  Pharmacy Yes No    Take 40 mg by mouth Take As Directed. Prior to [facility] Admission, Patient was on: Take 1 tablet every day except Wednesday and Sunday    glimepiride (AMARYL) 4 MG tablet  Pharmacy Yes No    Take 4 mg by mouth 2 (two) times a day.    hydrALAZINE (APRESOLINE) 25 MG tablet  Pharmacy Yes No    Take 25 mg by mouth 3 (Three) Times a Day.    HYDROcodone-acetaminophen (NORCO) 7.5-325 MG per tablet  Pharmacy Yes No    Take 1 tablet by mouth 2 (two) times a day as needed for moderate pain (4-6).    hydrOXYzine (VISTARIL) 25 MG capsule  Pharmacy Yes No    Take 25 mg by mouth Every 6 (Six) Hours As Needed for Itching.     Insulin Glargine (BASAGLAR KWIKPEN) 100 UNIT/ML injection pen  Pharmacy Yes No    Inject 65 Units under the skin into the appropriate area as directed Every Night.    isosorbide mononitrate (IMDUR) 120 MG 24 hr tablet   No No    TAKE 1 TABLET EVERY DAY    levothyroxine (SYNTHROID, LEVOTHROID) 137 MCG tablet  Pharmacy Yes No    Take 137 mcg by mouth Daily.    losartan (COZAAR) 100 MG tablet  Pharmacy Yes No    Take 100 mg by mouth Daily.    nitroglycerin (NITROSTAT) 0.4 MG SL tablet   No No    Place 1 tablet under the tongue Every 5 (Five) Minutes As Needed for Chest Pain. Take no more than 3 doses in 15 minutes.    pantoprazole (PROTONIX) 40 MG EC tablet  Pharmacy Yes No    Take 1 tablet by mouth 2 (Two) Times a Day With Meals.    raNITIdine (ZANTAC) 300 MG tablet  Pharmacy Yes No    Take 300 mg by mouth Every Night.    ranolazine (RANEXA) 500 MG 12 hr tablet   No No    Take 1 tablet by mouth 2 (Two) Times a Day.    senna-docusate (SENEXON-S) 8.6-50 MG per tablet  Pharmacy Yes No    Take 2 tablets by mouth Every Night.    sitaGLIPtin (JANUVIA) 50 MG tablet  Pharmacy No No    Take 1 tablet by mouth daily.    tamsulosin (FLOMAX) 0.4 MG capsule 24 hr capsule  Pharmacy Yes No    Take 1 capsule by mouth Every Night.        Hospital Scheduled Meds:    furosemide 40 mg Intravenous Once   sodium polystyrene 30 g Oral Once        ---------------------------------------------------------------------------------------------------------------------   Objective       Vital Signs:  Temp:  [97.9 °F (36.6 °C)] 97.9 °F (36.6 °C)  Heart Rate:  [57-65] 57  Resp:  [18-24] 24  BP: (118-121)/(50-63) 118/63      01/10/19  0959   Weight: 104 kg (230 lb)     Body mass index is 42.07 kg/m².  ---------------------------------------------------------------------------------------------------------------------       Physical Exam:  Constitutional:  Well-developed and well-nourished.     HENT:  Head: Normocephalic and atraumatic.  Mouth:  Moist  mucous membranes.    Eyes:  Pupils are equal, round, and reactive to light.  No scleral icterus.  Neck:  Neck supple.  No JVD present.    Cardiovascular:  Normal rate, regular rhythm.  With systolic murmur.  Pulmonary/Chest:  Pt in mild to moderate respiratory distress, no wheezes, but fine crackles bilateral lower lobes.  Abdominal: Distended and tender especially over the left upper quadrant area. Soft.  Bowel sounds are present.  Musculoskeletal: 3+ bilateral lower ext. edema, no tenderness, and no deformity.  No red or swollen joints anywhere.    Neurological:  Alert and oriented to person, place, and time.    No tongue deviation.  No facial droop.  No slurred speech.   Skin:  Skin is warm and dry.  No rash noted.  No pallor.   Psychiatric:  Normal mood and affect.  Behavior is normal.  Judgment and thought content normal.   Peripheral vascular:  strong pulses on all 4 extremities.  ---------------------------------------------------------------------------------------------------------------------  EKG:        Telemetry:    I have personally looked at both the EKG and the telemetry strips.  ---------------------------------------------------------------------------------------------------------------------   Results from last 7 days   Lab Units  01/10/19   1018  01/08/19   1007   WBC 10*3/mm3  4.21*  4.07*   HEMOGLOBIN g/dL  7.4*  7.6*   HEMATOCRIT %  24.0*  24.4*   MCV fL  92.3  91.7   MCHC g/dL  30.8*  31.1*   PLATELETS 10*3/mm3  182  184     Results from last 7 days   Lab Units  01/10/19   1020   PH, ARTERIAL pH units  7.365   PO2 ART mm Hg  78.3*   PCO2, ARTERIAL mm Hg  33.2*   HCO3 ART mmol/L  18.5*     Results from last 7 days   Lab Units  01/10/19   1018  01/08/19   1007   SODIUM mmol/L  138  137   POTASSIUM mmol/L  5.7*  4.8   CHLORIDE mmol/L  109  106   CO2 mmol/L  20.9*  22.4*   BUN mg/dL  49*  44*   CREATININE mg/dL  3.31*  3.17*   EGFR IF NONAFRICN AM mL/min/1.73  19*  20*   CALCIUM mg/dL  8.8  8.7    GLUCOSE mg/dL  223*  222*   ALBUMIN g/dL  4.10  4.30  3.3  3.3   BILIRUBIN mg/dL  0.3  0.3   ALK PHOS U/L  47  52   AST (SGOT) U/L  27  20   ALT (SGPT) U/L  17  18   Estimated Creatinine Clearance: 23.7 mL/min (A) (by C-G formula based on SCr of 3.31 mg/dL (H)).  No results found for: AMMONIA  Results from last 7 days   Lab Units  01/10/19   1018   TROPONIN I ng/mL  0.009         Lab Results   Component Value Date    HGBA1C 8.00 (H) 11/13/2018     Lab Results   Component Value Date    TSH 1.835 07/11/2018    FREET4 1.03 08/12/2016     No results found for: PREGTESTUR, PREGSERUM, HCG, HCGQUANT  Pain Management Panel     Pain Management Panel Latest Ref Rng & Units 11/13/2018 11/13/2018    CREATININE UR mg/dL 65.9 65.9                        ---------------------------------------------------------------------------------------------------------------------  Imaging Results (last 7 days)     Procedure Component Value Units Date/Time    NM Lung Scan Perfusion Particulate [[id number]] Collected:  01/10/19 1326     Updated:  01/10/19 1330    Narrative:       EXAMINATION: NM LUNG SCAN PERFUSION PARTICULATE-      CLINICAL INDICATION: SOB and Pain      TECHNIQUE:  4.3 mCi of Tc-99m MAA were administered IV for a perfusion  lung scan. Ventilation could not be performed.     COMPARISON: Chest XRAY performed on January 10, 2019.     FINDINGS: Multiple perfusion images demonstrate a relatively homogeneous  pattern of pulmonary capillary tracer distribution throughout the lungs.   Specifically, there are no segmental or sub-segmental defects to  suggest pulmonary embolism.        Impression:       Low suspicion of PE.         This report was finalized on 1/10/2019 1:26 PM by Dr. Tate Subramanian MD.       CT Abdomen Pelvis Without Contrast [[id number]] Collected:  01/10/19 1058     Updated:  01/10/19 1104    Narrative:          CT ABDOMEN PELVIS WO CONTRAST-        TECHNIQUE: Multiple axial CT images were obtained from lung  bases  through pubic symphysis without administration of IV contrast.  Reformatted images in the coronal and/or sagittal plane(s) were  generated from the axial data set to facilitate diagnostic accuracy  and/or surgical planning.  Oral Contrast:NONE.     Radiation dose reduction techniques were utilized per ALARA protocol.  Automated exposure control was initiated through either or [employer] or  [employer] software packages by  protocol.       [phone] mGy.cm     Clinical information  swelling      Comparison  NONE.     Findings  LOWER THORAX: Clear. No effusions.     ABDOMEN:        LIVER: Homogeneous. No focal hepatic mass or ductal dilatation.        GALLBLADDER: No dilation or stone identified.        PANCREAS: Unremarkable. No mass or ductal dilatation.        SPLEEN: Homogeneous. No splenomegaly.        ADRENALS: No mass.        KIDNEYS: No mass. No obstructive uropathy.  No evidence of  urolithiasis.        GI TRACT: Non-dilated. No definite wall thickening.        PERITONEUM: No free air. No free fluid or loculated fluid  collections.        MESENTERY: Unremarkable.        LYMPH NODES: No lymphadenopathy.        VASCULATURE: No evidence of aneurysm.        ABDOMINAL WALL: No focal hernia or mass.           OTHER: None.     PELVIS:        BLADDER: No focal mass or significant wall thickening        REPRODUCTIVE: Unremarkable as visualized.        APPENDIX: Nondistended. No surrounding inflammation.     BONES: No acute bony abnormality.       Impression:       Impression:  1. Unremarkable exam.  No source for the patient symptoms.  2. Other incidental/non-acute findings as above.     This report was finalized on 1/10/2019 11:00 AM by Dr. Tate Subramanian MD.       CT Chest Without Contrast [[id number]] Collected:  01/10/19 1100     Updated:  01/10/19 1104    Narrative:       CT CHEST WO CONTRAST-        TECHNIQUE: Multiple axial CT images were obtained from lung apex through  upper abdomen without  administration of IV contrast. Reformatted images  in the coronal and/or sagittal plane(s) were generated from the axial  data set to facilitate diagnostic accuracy and/or surgical planning.  Oral Contrast:NONE.     Radiation dose reduction techniques were utilized per ALARA protocol.  Automated exposure control was initiated through either or [employer] or  [employer] software packages by  protocol.          [id number] mGy.cm  Clinical information  sob      Comparison  NONE.     Findings  LUNGS: BILATERAL GROUNDGLASS ATTENUATION AND PROBABLY REPRESENTS  ATELECTASIS OR SEQUELA OF CONGESTIVE FAILURE.     HEART: Unremarkable.     PERICARDIUM: No effusion.     MEDIASTINUM: No masses. No enlarged lymph nodes.  No fluid collections.     PLEURA: TINY BILATERAL PLEURAL EFFUSIONS.     MAJOR AIRWAYS: Clear. No intrinsic mass.     VASCULATURE: No evidence of aneurysm.     VISUALIZED UPPER ABDOMEN:        LIVER: Homogeneous. No focal hepatic mass or ductal dilatation.        SPLEEN: Homogeneous. No splenomegaly.        ADRENALS: No mass.        KIDNEYS: No mass. No obstructive uropathy.  No evidence of  urolithiasis.        GI TRACT: Non-dilated. No definite wall thickening.        PERITONEUM: No free air. No free fluid or loculated fluid  collections.           ABDOMINAL WALL: No focal hernia or mass.           OTHER: None.     BONES: No acute bony abnormality.       Impression:       Impression:  Tiny bilateral pleural effusions. Bilateral groundglass attenuation and  probably represents atelectasis or sequela of congestive failure.     This report was finalized on 1/10/2019 11:01 AM by Dr. Tate Subramanian MD.       XR Chest 2 View [[id number]] Collected:  01/10/19 1058     Updated:  01/10/19 1104    Narrative:       EXAMINATION: XR CHEST 2 VW-      CLINICAL INDICATION:     sob     TECHNIQUE:  XR CHEST 2 VW-      COMPARISON: NONE      FINDINGS:   The lungs remain aerated.  Heart and mediastinum contours are  unremarkable.  No pleural effusion.  No pneumothorax.   Bony and soft tissue structures are unremarkable.       Impression:       No radiographic evidence of acute cardiac or pulmonary  disease.     This report was finalized on 1/10/2019 10:58 AM by Dr. Tate Subramanian MD.             Cultures: none     I have personally reviewed the radiology images and read the final radiology report.  ---------------------------------------------------------------------------------------------------------------------  Assessment / Plan       Assessment and Plan:    Dyspnea   Acute On chronic diastolic heart failure in exacerbation  Chest pain  HANNAH on CKD stage IV,  Baseline creatinine 1.8-2.5  Hyperkalemia due to CK-MB  Elevated D-Dimer   Normocytic Anemia   Chronic Abdominal Pain  DM type 2, insulin dependent   Essential HTN  Hypothyroidism   SOHAIL  Obesity, BMI 42.07  Tobacco use (dips)      Acute on chronic Diastolic Heart failure, : BNP is 386, diuretics given,  Add metolazone,ocnt loop diuretics  Repeat BNP in the am. Echo ordered. He does have chest pain with exertion, relieved with rest. Will monitor on telemetry, trend cardiac enzymes. He is on ASA, ACE, statin and coreg. consulted cardiology as when he was inpatient in november they did feel he needed a heart cath, if recommended would have to discuss risk vs. Benefits with his current renal status.    Dyspnea: likely r/t to fluid overload from heart failure and CKD. Lasix was given in the ED, will monitor his response. VQ scan low probability of PE, no pneumonia appreciated on CT, monitor closely.     Chest pain in pt almost already optimised on medical Rx  Cardio consult ,cont all outpt cardiac meds    HANNAH on CKD stage IV: prerenal from cardiorenal vs morena-cardiac syndrome  creatinine today is 3.31, from 2.93 k 5.3  avoid nephrotoxic agents,  Renal cardiac diet,low K diet  nephrology consulted,  Monitor strict input/ urine output, daily RFP    Elevated D-Dimer: VQ scan  shows low probability, Bilateral lower ext. Doppler ordered and pending.     Chronic Abdominal Pain: this has been going on for some time, CT of the abdomen today shows no acute findings. He did have a EGD and colonoscopy done by Dr. Chavez on 5/9/2018, which showed normal results. Last bowel movement today, will monitor.     DM Type 2, insulin dependent: A1c ordered, hypoglycemia protocol initiated, accu checks ac/hs and prn, diabetic diet, low dose sliding scale ordered.     Hypothyroidism: TSH ordered, will resume home synthroid when available by pharmacy.     Normocytic Anemia of chronic dixsease most likely of CKD:   tsat 11% ferritin 92,start venofer 200mg x 5doses,  Nephro to consider starting ADITI  H/H is 7.4/24.0, most recent blood levels are in this range since November 2018. He is on Iron PO. Will monitor closely, repeat in the AM.     SOHAIL: wears oxygen at HS, monitor.     DVT prophylaxis: SCDs for now due to anemia (Hgb 7.4)  GI prophylaxis: continue home Protonix   Activity: bedrest with BSC   Precautions: falls   Diet: Cardiac, consistent carb, renal, regular    Code status: Full     MISHEL Toro  01/10/19  1:50 PM  ---------------------------------------------------------------------------------------------------------------------   I have seen the patient in conjunction with MISHEL Mei and I agree with the following statements:    I have amended the note to reflect my assessment and treatment plan

## 2019-01-10 NOTE — ED PROVIDER NOTES
Subjective   63-year-old white male presents secondary to shortness of breath.  He states this is been ongoing over the past month and progressively worsened admitted to the hospital.  He does report some exertional chest discomfort.  He denies fever area and he has had productive cough.  He has had increased swelling.  He is in chronic renal failure and is followed by Dr. Daniel.  He wears oxygen at night only.            Review of Systems   Constitutional: Negative.  Negative for fever.   HENT: Negative.    Respiratory: Negative.    Cardiovascular: Negative.  Negative for chest pain.   Gastrointestinal: Negative.  Negative for abdominal pain.   Endocrine: Negative.    Genitourinary: Negative.  Negative for dysuria.   Skin: Negative.    Neurological: Negative.    Psychiatric/Behavioral: Negative.    All other systems reviewed and are negative.      Past Medical History:   Diagnosis Date   • Chest pain    • CHF (congestive heart failure) (CMS/[facility]) 1/10/2019   • Chronic kidney disease    • CPAP (continuous positive airway pressure) dependence    • Diabetes mellitus (CMS/[facility])    • Elevated cholesterol    • GERD (gastroesophageal reflux disease)    • Heart murmur    • Hyperlipidemia    • Hypertension    • Irregular heart beat    • Polio    • Prostate cancer (CMS/[facility]) 05/2016    [place]   • Shortness of breath    • Sleep apnea        No Known Allergies    Past Surgical History:   Procedure Laterality Date   • CARDIAC CATHETERIZATION  2008    50% diffuse LAD stenosis, 50% septal  branch   • COLONOSCOPY      Long time ago   • COLONOSCOPY N/A 5/9/2018    Procedure: COLONOSCOPY  CPTCODE:76817;  Surgeon: Bob Mcguire III, MD;  Location: [facility];  Service: Gastroenterology   • ENDOSCOPY N/A 5/9/2018    Procedure: ESOPHAGOGASTRODUODENOSCOPY WITH BIOPSY  CPTCODE:91174;  Surgeon: Bob Mcguire III, MD;  Location: [facility] OR;  Service: Gastroenterology   • HEMORRHOIDECTOMY      • HERNIA REPAIR     • UPPER GASTROINTESTINAL ENDOSCOPY      Long time ago   • URETHRAL DILATATION N/A 12/13/2017    Procedure: URETHRAL DILATATION;  Surgeon: Khalif Kohler MD;  Location: [facility];  Service:        Family History   Problem Relation Age of Onset   • Heart disease Brother    • Diabetes Brother    • Cancer Brother         not sure the kind   • Heart disease Brother    • Diabetes Brother    • Diabetes Sister    • Diabetes Daughter    • Heart disease Daughter    • Pancreatitis Daughter    • Crohn's disease Daughter    • Diabetes Son    • Heart disease Son        Social History     Socioeconomic History   • Marital status:      Spouse name: Not on file   • Number of children: Not on file   • Years of education: Not on file   • Highest education level: Not on file   Tobacco Use   • Smoking status: Never Smoker   • Smokeless tobacco: Current User     Types: Chew   • Tobacco comment: Chewed tobacco since he was 4 yrs old.   Substance and Sexual Activity   • Alcohol use: No   • Drug use: No   • Sexual activity: Defer           Objective   Physical Exam   Constitutional: He is oriented to person, place, and time. He appears well-developed and well-nourished. No distress.   HENT:   Head: Normocephalic and atraumatic.   Right Ear: External ear normal.   Left Ear: External ear normal.   Nose: Nose normal.   Eyes: Conjunctivae and EOM are normal. Pupils are equal, round, and reactive to light.   Neck: Normal range of motion. Neck supple. No JVD present. No tracheal deviation present.   Cardiovascular: Normal rate, regular rhythm and normal heart sounds.   No murmur heard.  Pulmonary/Chest: Effort normal and breath sounds normal. No respiratory distress. He has no wheezes.   Scattered rhonchi   Abdominal: Soft. Bowel sounds are normal. There is no tenderness.   Musculoskeletal: Normal range of motion. He exhibits no edema or deformity.   Neurological: He is alert and oriented to person, place, and  time. No cranial nerve deficit.   Skin: Skin is warm and dry. No rash noted. He is not diaphoretic. No erythema. No pallor.   Psychiatric: He has a normal mood and affect. His behavior is normal. Thought content normal.   Nursing note and vitals reviewed.      Procedures           ED Course  ED Course as of Ramy 10 1625   Thu Ramy 10, 2019   1334 D/w Dr Silveira. Lasix 40 IV x1. Paged hospitalist.  [JI]      ED Course User Index  [JI] Aakash Schofield PA                  MDM  Number of Diagnoses or Management Options  Congestive heart failure, unspecified HF chronicity, unspecified heart failure type (CMS/HCC): new and requires workup  Hyperkalemia: new and requires workup  Stage 4 chronic kidney disease (CMS/HCC): established and worsening     Amount and/or Complexity of Data Reviewed  Clinical lab tests: reviewed and ordered  Tests in the radiology section of CPT®: reviewed and ordered  Tests in the medicine section of CPT®: reviewed and ordered  Decide to obtain previous medical records or to obtain history from someone other than the patient: yes  Discuss the patient with other providers: yes  Independent visualization of images, tracings, or specimens: yes          Final diagnoses:   Hyperkalemia   Congestive heart failure, unspecified HF chronicity, unspecified heart failure type (CMS/HCC)   Stage 4 chronic kidney disease (CMS/HCC)            Aakash Schofield PA  01/10/19 [id number]

## 2019-01-10 NOTE — PLAN OF CARE
Problem: Skin Injury Risk (Adult)  Goal: Identify Related Risk Factors and Signs and Symptoms  Outcome: Ongoing (interventions implemented as appropriate)    Goal: Skin Health and Integrity  Outcome: Ongoing (interventions implemented as appropriate)      Problem: Fall Risk (Adult)  Goal: Identify Related Risk Factors and Signs and Symptoms  Outcome: Ongoing (interventions implemented as appropriate)    Goal: Absence of Fall  Outcome: Ongoing (interventions implemented as appropriate)      Problem: Diabetes, Type 2 (Adult)  Goal: Signs and Symptoms of Listed Potential Problems Will be Absent, Minimized or Managed (Diabetes, Type 2)  Outcome: Ongoing (interventions implemented as appropriate)      Problem: Fluid Volume Excess (Adult)  Goal: Identify Related Risk Factors and Signs and Symptoms  Outcome: Ongoing (interventions implemented as appropriate)    Goal: Optimal Fluid Balance  Outcome: Ongoing (interventions implemented as appropriate)

## 2019-01-11 ENCOUNTER — APPOINTMENT (OUTPATIENT)
Dept: CARDIOLOGY | Facility: HOSPITAL | Age: 64
End: 2019-01-11

## 2019-01-11 LAB
ALBUMIN SERPL-MCNC: 4.1 G/DL (ref 3.4–4.8)
ALBUMIN/GLOB SERPL: 1.3 G/DL (ref 1.5–2.5)
ALP SERPL-CCNC: 48 U/L (ref 40–129)
ALT SERPL W P-5'-P-CCNC: 14 U/L (ref 10–44)
ANION GAP SERPL CALCULATED.3IONS-SCNC: 9.9 MMOL/L (ref 3.6–11.2)
AST SERPL-CCNC: 21 U/L (ref 10–34)
BASOPHILS # BLD AUTO: 0.03 10*3/MM3 (ref 0–0.3)
BASOPHILS NFR BLD AUTO: 0.6 % (ref 0–2)
BH CV ECHO MEAS - ACS: 1.5 CM
BH CV ECHO MEAS - AO MAX PG (FULL): 6 MMHG
BH CV ECHO MEAS - AO MAX PG: 13.2 MMHG
BH CV ECHO MEAS - AO MEAN PG (FULL): 2.8 MMHG
BH CV ECHO MEAS - AO MEAN PG: 7.3 MMHG
BH CV ECHO MEAS - AO ROOT AREA (BSA CORRECTED): 1.2
BH CV ECHO MEAS - AO ROOT AREA: 4.5 CM^2
BH CV ECHO MEAS - AO ROOT DIAM: 2.4 CM
BH CV ECHO MEAS - AO V2 MAX: 182 CM/SEC
BH CV ECHO MEAS - AO V2 MEAN: 128 CM/SEC
BH CV ECHO MEAS - AO V2 VTI: 34 CM
BH CV ECHO MEAS - BSA(HAYCOCK): 2.2 M^2
BH CV ECHO MEAS - BSA: 2.1 M^2
BH CV ECHO MEAS - BZI_BMI: 44.2 KILOGRAMS/M^2
BH CV ECHO MEAS - BZI_METRIC_HEIGHT: 157 CM
BH CV ECHO MEAS - BZI_METRIC_WEIGHT: 109 KG
BH CV ECHO MEAS - EDV(CUBED): 141.3 ML
BH CV ECHO MEAS - EDV(MOD-SP2): 65 ML
BH CV ECHO MEAS - EDV(MOD-SP4): 65.4 ML
BH CV ECHO MEAS - EDV(TEICH): 130 ML
BH CV ECHO MEAS - EF(CUBED): 66.6 %
BH CV ECHO MEAS - EF(TEICH): 57.8 %
BH CV ECHO MEAS - ESV(CUBED): 47.1 ML
BH CV ECHO MEAS - ESV(TEICH): 54.9 ML
BH CV ECHO MEAS - FS: 30.6 %
BH CV ECHO MEAS - IVS/LVPW: 0.7
BH CV ECHO MEAS - IVSD: 0.67 CM
BH CV ECHO MEAS - LA DIMENSION: 3.1 CM
BH CV ECHO MEAS - LA/AO: 1.3
BH CV ECHO MEAS - LV DIASTOLIC VOL/BSA (35-75): 31.7 ML/M^2
BH CV ECHO MEAS - LV IVRT: 0.07 SEC
BH CV ECHO MEAS - LV MASS(C)D: 147.3 GRAMS
BH CV ECHO MEAS - LV MASS(C)DI: 71.4 GRAMS/M^2
BH CV ECHO MEAS - LV MAX PG: 7.3 MMHG
BH CV ECHO MEAS - LV MEAN PG: 4.4 MMHG
BH CV ECHO MEAS - LV V1 MAX: 135 CM/SEC
BH CV ECHO MEAS - LV V1 MEAN: 99.1 CM/SEC
BH CV ECHO MEAS - LV V1 VTI: 29.9 CM
BH CV ECHO MEAS - LVIDD: 5.2 CM
BH CV ECHO MEAS - LVIDS: 3.6 CM
BH CV ECHO MEAS - LVPWD: 0.95 CM
BH CV ECHO MEAS - MV A MAX VEL: 53.7 CM/SEC
BH CV ECHO MEAS - MV E MAX VEL: 119.4 CM/SEC
BH CV ECHO MEAS - MV E/A: 2.2
BH CV ECHO MEAS - PA ACC TIME: 0.08 SEC
BH CV ECHO MEAS - PA PR(ACCEL): 42.6 MMHG
BH CV ECHO MEAS - RAP SYSTOLE: 10 MMHG
BH CV ECHO MEAS - RVDD: 2.9 CM
BH CV ECHO MEAS - RVSP: 42.5 MMHG
BH CV ECHO MEAS - SI(AO): 74.7 ML/M^2
BH CV ECHO MEAS - SI(CUBED): 45.7 ML/M^2
BH CV ECHO MEAS - SI(TEICH): 36.4 ML/M^2
BH CV ECHO MEAS - SV(AO): 154.1 ML
BH CV ECHO MEAS - SV(CUBED): 94.2 ML
BH CV ECHO MEAS - SV(TEICH): 75.1 ML
BH CV ECHO MEAS - TR MAX VEL: 285.3 CM/SEC
BILIRUB SERPL-MCNC: 0.3 MG/DL (ref 0.2–1.8)
BNP SERPL-MCNC: 380 PG/ML (ref 0–100)
BUN BLD-MCNC: 55 MG/DL (ref 7–21)
BUN/CREAT SERPL: 16.1 (ref 7–25)
CALCIUM SPEC-SCNC: 8.9 MG/DL (ref 7.7–10)
CHLORIDE SERPL-SCNC: 108 MMOL/L (ref 99–112)
CK MB SERPL-CCNC: 1.13 NG/ML (ref 0–5)
CK MB SERPL-RTO: 2.7 % (ref 0–3)
CK SERPL-CCNC: 42 U/L (ref 24–204)
CO2 SERPL-SCNC: 23.1 MMOL/L (ref 24.3–31.9)
CREAT BLD-MCNC: 3.41 MG/DL (ref 0.43–1.29)
CREAT UR-MCNC: 42.6 MG/DL
DEPRECATED RDW RBC AUTO: 46.9 FL (ref 37–54)
EOSINOPHIL # BLD AUTO: 0.17 10*3/MM3 (ref 0–0.7)
EOSINOPHIL NFR BLD AUTO: 3.4 % (ref 0–5)
ERYTHROCYTE [DISTWIDTH] IN BLOOD BY AUTOMATED COUNT: 14.4 % (ref 11.5–14.5)
GFR SERPL CREATININE-BSD FRML MDRD: 18 ML/MIN/1.73
GLOBULIN UR ELPH-MCNC: 3.1 GM/DL
GLUCOSE BLD-MCNC: 106 MG/DL (ref 70–110)
GLUCOSE BLDC GLUCOMTR-MCNC: 144 MG/DL (ref 70–130)
GLUCOSE BLDC GLUCOMTR-MCNC: 177 MG/DL (ref 70–130)
GLUCOSE BLDC GLUCOMTR-MCNC: 243 MG/DL (ref 70–130)
GLUCOSE BLDC GLUCOMTR-MCNC: 252 MG/DL (ref 70–130)
GLUCOSE BLDC GLUCOMTR-MCNC: 62 MG/DL (ref 70–130)
HCT VFR BLD AUTO: 24 % (ref 42–52)
HGB BLD-MCNC: 7.2 G/DL (ref 14–18)
IMM GRANULOCYTES # BLD AUTO: 0.02 10*3/MM3 (ref 0–0.03)
IMM GRANULOCYTES NFR BLD AUTO: 0.4 % (ref 0–0.5)
LYMPHOCYTES # BLD AUTO: 1.62 10*3/MM3 (ref 1–3)
LYMPHOCYTES NFR BLD AUTO: 32.1 % (ref 21–51)
MAGNESIUM SERPL-MCNC: 1.8 MG/DL (ref 1.7–2.6)
MAXIMAL PREDICTED HEART RATE: 156 BPM
MCH RBC QN AUTO: 28.3 PG (ref 27–33)
MCHC RBC AUTO-ENTMCNC: 30 G/DL (ref 33–37)
MCV RBC AUTO: 94.5 FL (ref 80–94)
MONOCYTES # BLD AUTO: 0.51 10*3/MM3 (ref 0.1–0.9)
MONOCYTES NFR BLD AUTO: 10.1 % (ref 0–10)
MYOGLOBIN SERPL-MCNC: 102 NG/ML (ref 0–109)
NEUTROPHILS # BLD AUTO: 2.7 10*3/MM3 (ref 1.4–6.5)
NEUTROPHILS NFR BLD AUTO: 53.4 % (ref 30–70)
OSMOLALITY SERPL CALC.SUM OF ELEC: 296.8 MOSM/KG (ref 273–305)
PHOSPHATE SERPL-MCNC: 5.9 MG/DL (ref 2.7–4.5)
PLATELET # BLD AUTO: 166 10*3/MM3 (ref 130–400)
PMV BLD AUTO: 11.7 FL (ref 6–10)
POTASSIUM BLD-SCNC: 4.4 MMOL/L (ref 3.5–5.3)
PROT SERPL-MCNC: 7.2 G/DL (ref 6–8)
PROT UR-MCNC: 8.9 MG/DL
PROT/CREAT UR: 208.9 MG/G CREA (ref 0–200)
RBC # BLD AUTO: 2.54 10*6/MM3 (ref 4.7–6.1)
SODIUM BLD-SCNC: 141 MMOL/L (ref 135–153)
STRESS TARGET HR: 133 BPM
TROPONIN I SERPL-MCNC: 0.01 NG/ML
WBC NRBC COR # BLD: 5.05 10*3/MM3 (ref 4.5–12.5)

## 2019-01-11 PROCEDURE — 93306 TTE W/DOPPLER COMPLETE: CPT | Performed by: INTERNAL MEDICINE

## 2019-01-11 PROCEDURE — 82570 ASSAY OF URINE CREATININE: CPT | Performed by: INTERNAL MEDICINE

## 2019-01-11 PROCEDURE — 82550 ASSAY OF CK (CPK): CPT | Performed by: NURSE PRACTITIONER

## 2019-01-11 PROCEDURE — 85025 COMPLETE CBC W/AUTO DIFF WBC: CPT | Performed by: NURSE PRACTITIONER

## 2019-01-11 PROCEDURE — 83880 ASSAY OF NATRIURETIC PEPTIDE: CPT | Performed by: NURSE PRACTITIONER

## 2019-01-11 PROCEDURE — G0108 DIAB MANAGE TRN  PER INDIV: HCPCS

## 2019-01-11 PROCEDURE — 84484 ASSAY OF TROPONIN QUANT: CPT | Performed by: NURSE PRACTITIONER

## 2019-01-11 PROCEDURE — 99254 IP/OBS CNSLTJ NEW/EST MOD 60: CPT | Performed by: NURSE PRACTITIONER

## 2019-01-11 PROCEDURE — 83735 ASSAY OF MAGNESIUM: CPT | Performed by: NURSE PRACTITIONER

## 2019-01-11 PROCEDURE — 94799 UNLISTED PULMONARY SVC/PX: CPT

## 2019-01-11 PROCEDURE — 82962 GLUCOSE BLOOD TEST: CPT

## 2019-01-11 PROCEDURE — 99233 SBSQ HOSP IP/OBS HIGH 50: CPT | Performed by: INTERNAL MEDICINE

## 2019-01-11 PROCEDURE — 93306 TTE W/DOPPLER COMPLETE: CPT

## 2019-01-11 PROCEDURE — 80053 COMPREHEN METABOLIC PANEL: CPT | Performed by: NURSE PRACTITIONER

## 2019-01-11 PROCEDURE — 84100 ASSAY OF PHOSPHORUS: CPT | Performed by: NURSE PRACTITIONER

## 2019-01-11 PROCEDURE — 83874 ASSAY OF MYOGLOBIN: CPT | Performed by: NURSE PRACTITIONER

## 2019-01-11 PROCEDURE — 84156 ASSAY OF PROTEIN URINE: CPT | Performed by: INTERNAL MEDICINE

## 2019-01-11 PROCEDURE — 63710000001 INSULIN DETEMIR PER 5 UNITS: Performed by: INTERNAL MEDICINE

## 2019-01-11 PROCEDURE — 63710000001 INSULIN ASPART PER 5 UNITS: Performed by: NURSE PRACTITIONER

## 2019-01-11 PROCEDURE — 82553 CREATINE MB FRACTION: CPT | Performed by: NURSE PRACTITIONER

## 2019-01-11 RX ORDER — FUROSEMIDE 10 MG/ML
40 INJECTION INTRAMUSCULAR; INTRAVENOUS EVERY 12 HOURS SCHEDULED
Status: DISCONTINUED | OUTPATIENT
Start: 2019-01-11 | End: 2019-01-11

## 2019-01-11 RX ADMIN — TRIAMCINOLONE ACETONIDE 1 APPLICATION: 1 CREAM TOPICAL at 23:37

## 2019-01-11 RX ADMIN — GLIPIZIDE 10 MG: 5 TABLET ORAL at 18:32

## 2019-01-11 RX ADMIN — LINAGLIPTIN 5 MG: 5 TABLET, FILM COATED ORAL at 10:03

## 2019-01-11 RX ADMIN — SODIUM BICARBONATE TAB 650 MG 650 MG: 650 TAB at 21:33

## 2019-01-11 RX ADMIN — CETIRIZINE HCL 5 MG: 10 TABLET ORAL at 10:06

## 2019-01-11 RX ADMIN — ISOSORBIDE MONONITRATE 120 MG: 60 TABLET, EXTENDED RELEASE ORAL at 10:04

## 2019-01-11 RX ADMIN — SODIUM BICARBONATE TAB 650 MG 650 MG: 650 TAB at 18:32

## 2019-01-11 RX ADMIN — HYDRALAZINE HYDROCHLORIDE 75 MG: 50 TABLET ORAL at 17:35

## 2019-01-11 RX ADMIN — TAMSULOSIN HYDROCHLORIDE 0.4 MG: 0.4 CAPSULE ORAL at 21:33

## 2019-01-11 RX ADMIN — HYDRALAZINE HYDROCHLORIDE 75 MG: 50 TABLET ORAL at 13:14

## 2019-01-11 RX ADMIN — ATORVASTATIN CALCIUM 80 MG: 40 TABLET, FILM COATED ORAL at 10:05

## 2019-01-11 RX ADMIN — PANTOPRAZOLE SODIUM 40 MG: 40 TABLET, DELAYED RELEASE ORAL at 06:46

## 2019-01-11 RX ADMIN — GLIPIZIDE 10 MG: 5 TABLET ORAL at 10:03

## 2019-01-11 RX ADMIN — IPRATROPIUM BROMIDE AND ALBUTEROL SULFATE 3 ML: .5; 3 SOLUTION RESPIRATORY (INHALATION) at 07:18

## 2019-01-11 RX ADMIN — CYCLOBENZAPRINE 10 MG: 10 TABLET, FILM COATED ORAL at 10:04

## 2019-01-11 RX ADMIN — INSULIN ASPART 3 UNITS: 100 INJECTION, SOLUTION INTRAVENOUS; SUBCUTANEOUS at 21:32

## 2019-01-11 RX ADMIN — TRIAMCINOLONE ACETONIDE 1 APPLICATION: 1 CREAM TOPICAL at 10:06

## 2019-01-11 RX ADMIN — CARVEDILOL 25 MG: 25 TABLET, FILM COATED ORAL at 19:08

## 2019-01-11 RX ADMIN — SODIUM CHLORIDE, PRESERVATIVE FREE 3 ML: 5 INJECTION INTRAVENOUS at 10:06

## 2019-01-11 RX ADMIN — ASPIRIN 81 MG: 81 TABLET ORAL at 10:05

## 2019-01-11 RX ADMIN — INSULIN ASPART 4 UNITS: 100 INJECTION, SOLUTION INTRAVENOUS; SUBCUTANEOUS at 18:32

## 2019-01-11 RX ADMIN — HYDRALAZINE HYDROCHLORIDE 75 MG: 50 TABLET ORAL at 21:33

## 2019-01-11 RX ADMIN — HYDROCODONE BITARTRATE AND ACETAMINOPHEN 1 TABLET: 7.5; 325 TABLET ORAL at 21:32

## 2019-01-11 RX ADMIN — SODIUM CHLORIDE, PRESERVATIVE FREE 3 ML: 5 INJECTION INTRAVENOUS at 21:33

## 2019-01-11 RX ADMIN — CARVEDILOL 25 MG: 25 TABLET, FILM COATED ORAL at 10:04

## 2019-01-11 RX ADMIN — INSULIN DETEMIR 60 UNITS: 100 INJECTION, SOLUTION SUBCUTANEOUS at 21:34

## 2019-01-11 RX ADMIN — SODIUM BICARBONATE TAB 650 MG 650 MG: 650 TAB at 10:06

## 2019-01-11 RX ADMIN — SENNOSIDES AND DOCUSATE SODIUM 2 TABLET: 8.6; 5 TABLET ORAL at 23:36

## 2019-01-11 RX ADMIN — INSULIN ASPART 2 UNITS: 100 INJECTION, SOLUTION INTRAVENOUS; SUBCUTANEOUS at 13:14

## 2019-01-11 RX ADMIN — LEVOTHYROXINE SODIUM 137.5 MCG: 125 TABLET ORAL at 06:45

## 2019-01-11 NOTE — CONSULTS
Nephrology Consult Note    Referring Provider: Sangeeta Quiles APRN  Reason for Consultation: HANNAH on CKD stage 4     Subjective       History of present illness:  Catarino Arvizu is a 63 y.o. male who presented to [facility] emergency department with chief complaint of worsening shortness of breath and leg edema. He states he never felt better after he was discharged from the hospital about 2 months ago. He has CKD stage 4 which has been worsening progressively. His baseline creatinine is around 3 now. He was given lasix 40 mg iv x 2 yesterday and creatinine has worsened to 3.4. He did not notice improvement in breathing or increased urine output with lasix. Om my evaluation he is lying almost flat on left side and getting 2 D echo. He is on room air and is not in any distress. He has been eating canned food pickles etc on regular basis. He has HTN and Type 2 DM for over 20 years. He denies nausea, vomiting or diarrhea. He has dry cough for long time. He denies fever or chills.   no Chronic NSAIDS use. Patient denies hematuria, dysuria, difficulty passing urine.       History  Past Medical History:   Diagnosis Date   • Chest pain    • CHF (congestive heart failure) (CMS/HCC) 1/10/2019   • Chronic kidney disease    • CPAP (continuous positive airway pressure) dependence    • Diabetes mellitus (CMS/HCC)    • Elevated cholesterol    • GERD (gastroesophageal reflux disease)    • Heart murmur    • Hyperlipidemia    • Hypertension    • Irregular heart beat    • Polio    • Prostate cancer (CMS/HCC) 05/2016    Dr. Khalif Kohler MD- [place], [place]   • Shortness of breath    • Sleep apnea    , Past Surgical History:   Procedure Laterality Date   • CARDIAC CATHETERIZATION  2008    50% diffuse LAD stenosis, 50% septal  branch   • COLONOSCOPY      Long time ago   • COLONOSCOPY N/A 5/9/2018    Procedure: COLONOSCOPY  CPTCODE:51111;  Surgeon: Bob Mcguire III, MD;  Location: [facility] OR;   Service: Gastroenterology   • ENDOSCOPY N/A 5/9/2018    Procedure: ESOPHAGOGASTRODUODENOSCOPY WITH BIOPSY  CPTCODE:35967;  Surgeon: Bob Mcguire III, MD;  Location: [facility] OR;  Service: Gastroenterology   • HEMORRHOIDECTOMY     • HERNIA REPAIR     • UPPER GASTROINTESTINAL ENDOSCOPY      Long time ago   • URETHRAL DILATATION N/A 12/13/2017    Procedure: URETHRAL DILATATION;  Surgeon: Khalif Kohler MD;  Location: [facility] OR;  Service:    , Family History   Problem Relation Age of Onset   • Heart disease Brother    • Diabetes Brother    • Cancer Brother         not sure the kind   • Heart disease Brother    • Diabetes Brother    • Diabetes Sister    • Diabetes Daughter    • Heart disease Daughter    • Pancreatitis Daughter    • Crohn's disease Daughter    • Diabetes Son    • Heart disease Son    , Social History     Tobacco Use   • Smoking status: Never Smoker   • Smokeless tobacco: Current User     Types: Chew   • Tobacco comment: Chewed tobacco since he was 4 yrs old.   Substance Use Topics   • Alcohol use: No   • Drug use: No   , Medications Prior to Admission   Medication Sig Dispense Refill Last Dose   • amLODIPine (NORVASC) 10 MG tablet Take 10 mg by mouth Daily.   1/10/2019 at 0600   • aspirin 81 MG EC tablet Take 81 mg by mouth Daily.   1/10/2019 at 0600   • atorvastatin (LIPITOR) 80 MG tablet Take 80 mg by mouth daily.   1/10/2019 at 0600   • carvedilol (COREG) 25 MG tablet Take 1 tablet by mouth 2 (Two) Times a Day. 180 tablet 3 1/10/2019 at 0600   • cetirizine (ZyrTEC) 10 MG tablet Take 10 mg by mouth daily.   1/10/2019 at 0600   • cyclobenzaprine (FLEXERIL) 10 MG tablet Take 10 mg by mouth Daily.   1/10/2019 at 0600   • fenofibrate (TRICOR) 145 MG tablet Take 145 mg by mouth Daily.   1/10/2019 at 0600   • ferrous sulfate 325 (65 FE) MG tablet Take 325 mg by mouth Daily With Breakfast.   1/10/2019 at 0600   • furosemide (LASIX) 40 MG tablet Take 40 mg by mouth Take As Directed. Prior to  [facility] Admission, Patient was on: Take 1 tablet every day except Wednesday and Vic   1/10/2019 at 0600   • glimepiride (AMARYL) 4 MG tablet Take 4 mg by mouth 2 (two) times a day.   1/10/2019 at 0600   • hydrALAZINE (APRESOLINE) 25 MG tablet Take 25 mg by mouth 3 (Three) Times a Day.   1/10/2019 at 0600   • Insulin Glargine (BASAGLAR KWIKPEN) 100 UNIT/ML injection pen Inject 60 Units under the skin into the appropriate area as directed Every Night.   1/9/2019 at Unknown time   • isosorbide mononitrate (IMDUR) 120 MG 24 hr tablet Take 120 mg by mouth Daily.   1/10/2019 at 0600   • levothyroxine (SYNTHROID, LEVOTHROID) 137 MCG tablet Take 137 mcg by mouth Daily.   1/10/2019 at 0600   • pantoprazole (PROTONIX) 40 MG EC tablet Take 40 mg by mouth Daily.   1/10/2019 at 0600   • raNITIdine (ZANTAC) 300 MG tablet Take 300 mg by mouth Every Night.   1/9/2019 at Unknown time   • senna-docusate (SENEXON-S) 8.6-50 MG per tablet Take 2 tablets by mouth Every Night.   1/9/2019 at Unknown time   • sitaGLIPtin (JANUVIA) 50 MG tablet Take 1 tablet by mouth daily. 30 tablet 3 1/10/2019 at 0600   • sodium bicarbonate 650 MG tablet Take 650 mg by mouth 3 (Three) Times a Day.   1/10/2019 at 0600   • tamsulosin (FLOMAX) 0.4 MG capsule 24 hr capsule Take 1 capsule by mouth Every Night.   1/9/2019 at Unknown time   • triamcinolone (KENALOG) 0.1 % cream Apply 1 application topically to the appropriate area as directed 2 (Two) Times a Day. Prior to [facility] Admission, Patient was on: applies to rash   1/9/2019 at Unknown time   • HYDROcodone-acetaminophen (NORCO) 7.5-325 MG per tablet Take 1 tablet by mouth 2 (two) times a day as needed for moderate pain (4-6).   Unknown at Unknown time   • hydrOXYzine (VISTARIL) 25 MG capsule Take 25 mg by mouth Every 6 (Six) Hours As Needed for Itching.   Unknown at Unknown time   • nitroglycerin (NITROSTAT) 0.4 MG SL tablet Place 1 tablet under the tongue Every 5 (Five) Minutes As Needed for Chest  Pain. Take no more than 3 doses in 15 minutes. 25 tablet 0 Unknown at Unknown time   , Scheduled Meds:    amLODIPine 10 mg Oral Daily   aspirin 81 mg Oral Daily   atorvastatin 80 mg Oral Daily   carvedilol 25 mg Oral BID With Meals   cetirizine 5 mg Oral Daily   cyclobenzaprine 10 mg Oral Daily   furosemide 40 mg Intravenous Q12H   glipiZIDE 10 mg Oral BID AC   hydrALAZINE 25 mg Oral TID   insulin aspart 0-7 Units Subcutaneous 4x Daily AC & at Bedtime   insulin detemir 60 Units Subcutaneous Nightly   [START ON 1/12/2019] iron sucrose (VENOFER) IVPB 300 mg Intravenous Q24H   isosorbide mononitrate 120 mg Oral Daily   levothyroxine 137.5 mcg Oral Q AM   linagliptin 5 mg Oral Daily   pantoprazole 40 mg Oral QAM   senna-docusate 2 tablet Oral Nightly   sodium bicarbonate 650 mg Oral TID   sodium chloride 3 mL Intravenous Q12H   tamsulosin 0.4 mg Oral Nightly   triamcinolone 1 application Topical Q12H   , Continuous Infusions:   , PRN Meds:  dextrose  •  dextrose  •  glucagon (human recombinant)  •  HYDROcodone-acetaminophen  •  hydrOXYzine  •  ipratropium-albuterol  •  nitroglycerin  •  [COMPLETED] Insert peripheral IV **AND** sodium chloride  •  sodium chloride and Allergies:  Patient has no known allergies.    Review of Systems  More than 10 point review of systems was done. Pertinent items are noted in HPI, all other systems reviewed and negative    Objective     Vital Signs  Temp:  [97.9 °F (36.6 °C)-98.4 °F (36.9 °C)] 98.2 °F (36.8 °C)  Heart Rate:  [56-69] 66  Resp:  [15-26] 26  BP: (118-176)/(50-94) 143/79    No intake/output data recorded.  I/O last 3 completed shifts:  In: 700 [P.O.:600; IV Piggyback:100]  Out: 2725 [Urine:2725]    Physical Examination:    General Appearance : alert, appears stated age, cooperative and chronically ill  Head : normocephalic, without obvious abnormality and atraumatic  Eyes : conjunctivae and sclerae normal, no icterus, + pallor and PERRLA  Throat : oral mucosa moist  Neck: no  adenopathy, suppple, no carotid bruit and no JVD  Lungs : clear to auscultation, respirations regular and unlabored  Heart : regular rhythm & normal rate, normal S1, S2, no murmur, no joaquin, no rub   Abdomen :  normal bowel sounds, no masses and soft non-tender  Rectal : Deferred  Extremities : moves extremities well, no redness and 2+ edema  Pulses :  palpable and equal bilaterally  Skin : no bleeding, bruising or rash  Neurologic : orientated to person, place, time, grossly no focal deficitis    Laboratory Data :      WBC WBC   Date Value Ref Range Status   01/11/2019 5.05 4.50 - 12.50 10*3/mm3 Final   01/10/2019 4.21 (L) 4.50 - 12.50 10*3/mm3 Final   01/08/2019 4.07 (L) 4.50 - 12.50 10*3/mm3 Final      HGB Hemoglobin   Date Value Ref Range Status   01/11/2019 7.2 (L) 14.0 - 18.0 g/dL Final   01/10/2019 7.4 (L) 14.0 - 18.0 g/dL Final   01/08/2019 7.6 (L) 14.0 - 18.0 g/dL Final      HCT Hematocrit   Date Value Ref Range Status   01/11/2019 24.0 (L) 42.0 - 52.0 % Final   01/10/2019 24.0 (L) 42.0 - 52.0 % Final   01/08/2019 24.4 (L) 42.0 - 52.0 % Final      Platlets No results found for: LABPLAT   MCV MCV   Date Value Ref Range Status   01/11/2019 94.5 (H) 80.0 - 94.0 fL Final   01/10/2019 92.3 80.0 - 94.0 fL Final   01/08/2019 91.7 80.0 - 94.0 fL Final          Sodium Sodium   Date Value Ref Range Status   01/11/2019 141 135 - 153 mmol/L Final   01/10/2019 138 135 - 153 mmol/L Final   01/08/2019 137 135 - 153 mmol/L Final      Potassium Potassium   Date Value Ref Range Status   01/11/2019 4.4 3.5 - 5.3 mmol/L Final   01/10/2019 5.3 3.5 - 5.3 mmol/L Final   01/10/2019 5.7 (C) 3.5 - 5.3 mmol/L Final   01/08/2019 4.8 3.5 - 5.3 mmol/L Final      Chloride Chloride   Date Value Ref Range Status   01/11/2019 108 99 - 112 mmol/L Final   01/10/2019 109 99 - 112 mmol/L Final   01/08/2019 106 99 - 112 mmol/L Final      CO2 CO2   Date Value Ref Range Status   01/11/2019 23.1 (L) 24.3 - 31.9 mmol/L Final   01/10/2019 20.9 (L)  24.3 - 31.9 mmol/L Final   01/08/2019 22.4 (L) 24.3 - 31.9 mmol/L Final      BUN BUN   Date Value Ref Range Status   01/11/2019 55 (H) 7 - 21 mg/dL Final   01/10/2019 49 (H) 7 - 21 mg/dL Final   01/08/2019 44 (H) 7 - 21 mg/dL Final      Creatinine Creatinine   Date Value Ref Range Status   01/11/2019 3.41 (H) 0.43 - 1.29 mg/dL Final   01/10/2019 3.31 (H) 0.43 - 1.29 mg/dL Final   01/08/2019 3.17 (H) 0.43 - 1.29 mg/dL Final      Calcium Calcium   Date Value Ref Range Status   01/11/2019 8.9 7.7 - 10.0 mg/dL Final   01/10/2019 8.8 7.7 - 10.0 mg/dL Final   01/08/2019 8.7 7.7 - 10.0 mg/dL Final      PO4 No results found for: CAPO4   Albumin Albumin   Date Value Ref Range Status   01/11/2019 4.10 3.40 - 4.80 g/dL Final   01/10/2019 4.10 3.40 - 4.80 g/dL Final   01/08/2019 4.30 3.40 - 4.80 g/dL Final   01/08/2019 3.3 2.9 - 4.4 g/dL Final   01/08/2019 3.3 2.9 - 4.4 g/dL Final      Magnesium Magnesium   Date Value Ref Range Status   01/11/2019 1.8 1.7 - 2.6 mg/dL Final      Uric Acid No results found for: URICACID     Radiology results :     Imaging Results (last 72 hours)     Procedure Component Value Units Date/Time    US Venous Doppler Lower Extremity Bilateral (duplex) [[id number]] Collected:  01/11/19 0843     Updated:  01/11/19 0846    Narrative:       EXAMINATION: US VENOUS DOPPLER LOWER EXTREMITY BILATERAL (DUPLEX)-      CLINICAL INDICATION:  elevated D-Dimer     TECHNIQUE: Multiplanar gray scale and Doppler vascular sonographic  imaging of the deep veins of BILATERAL lower extremity, without and with  compression.      COMPARISON: NONE      FINDINGS:   The visualized deep veins demonstrate flow and are compressible. No  evidence of deep venous thrombosis.   Soft tissue edema is noted.       Impression:       No DVT.     This report was finalized on 1/11/2019 8:43 AM by Dr. Rene Luther MD.       NM Lung Scan Perfusion Particulate [[id number]] Collected:  01/10/19 1326     Updated:  01/10/19 1330    Narrative:        EXAMINATION: NM LUNG SCAN PERFUSION PARTICULATE-      CLINICAL INDICATION: SOB and Pain      TECHNIQUE:  4.3 mCi of Tc-99m MAA were administered IV for a perfusion  lung scan. Ventilation could not be performed.     COMPARISON: Chest XRAY performed on January 10, 2019.     FINDINGS: Multiple perfusion images demonstrate a relatively homogeneous  pattern of pulmonary capillary tracer distribution throughout the lungs.   Specifically, there are no segmental or sub-segmental defects to  suggest pulmonary embolism.        Impression:       Low suspicion of PE.         This report was finalized on 1/10/2019 1:26 PM by Dr. Tate Subramanian MD.       CT Abdomen Pelvis Without Contrast [[id number]] Collected:  01/10/19 1058     Updated:  01/10/19 1104    Narrative:          CT ABDOMEN PELVIS WO CONTRAST-        TECHNIQUE: Multiple axial CT images were obtained from lung bases  through pubic symphysis without administration of IV contrast.  Reformatted images in the coronal and/or sagittal plane(s) were  generated from the axial data set to facilitate diagnostic accuracy  and/or surgical planning.  Oral Contrast:NONE.     Radiation dose reduction techniques were utilized per ALARA protocol.  Automated exposure control was initiated through either or [employer] or  DoseRight software packages by  protocol.       [id number] mGy.cm     Clinical information  swelling      Comparison  NONE.     Findings  LOWER THORAX: Clear. No effusions.     ABDOMEN:        LIVER: Homogeneous. No focal hepatic mass or ductal dilatation.        GALLBLADDER: No dilation or stone identified.        PANCREAS: Unremarkable. No mass or ductal dilatation.        SPLEEN: Homogeneous. No splenomegaly.        ADRENALS: No mass.        KIDNEYS: No mass. No obstructive uropathy.  No evidence of  urolithiasis.        GI TRACT: Non-dilated. No definite wall thickening.        PERITONEUM: No free air. No free fluid or loculated fluid  collections.         MESENTERY: Unremarkable.        LYMPH NODES: No lymphadenopathy.        VASCULATURE: No evidence of aneurysm.        ABDOMINAL WALL: No focal hernia or mass.           OTHER: None.     PELVIS:        BLADDER: No focal mass or significant wall thickening        REPRODUCTIVE: Unremarkable as visualized.        APPENDIX: Nondistended. No surrounding inflammation.     BONES: No acute bony abnormality.       Impression:       Impression:  1. Unremarkable exam.  No source for the patient symptoms.  2. Other incidental/non-acute findings as above.     This report was finalized on 1/10/2019 11:00 AM by Dr. Tate Subramanian MD.       CT Chest Without Contrast [[id number]] Collected:  01/10/19 1100     Updated:  01/10/19 1104    Narrative:       CT CHEST WO CONTRAST-        TECHNIQUE: Multiple axial CT images were obtained from lung apex through  upper abdomen without administration of IV contrast. Reformatted images  in the coronal and/or sagittal plane(s) were generated from the axial  data set to facilitate diagnostic accuracy and/or surgical planning.  Oral Contrast:NONE.     Radiation dose reduction techniques were utilized per ALARA protocol.  Automated exposure control was initiated through either or [employer] or  DoseRight software packages by  protocol.          [id number] mGy.cm  Clinical information  sob      Comparison  NONE.     Findings  LUNGS: BILATERAL GROUNDGLASS ATTENUATION AND PROBABLY REPRESENTS  ATELECTASIS OR SEQUELA OF CONGESTIVE FAILURE.     HEART: Unremarkable.     PERICARDIUM: No effusion.     MEDIASTINUM: No masses. No enlarged lymph nodes.  No fluid collections.     PLEURA: TINY BILATERAL PLEURAL EFFUSIONS.     MAJOR AIRWAYS: Clear. No intrinsic mass.     VASCULATURE: No evidence of aneurysm.     VISUALIZED UPPER ABDOMEN:        LIVER: Homogeneous. No focal hepatic mass or ductal dilatation.        SPLEEN: Homogeneous. No splenomegaly.        ADRENALS: No mass.        KIDNEYS: No mass. No  obstructive uropathy.  No evidence of  urolithiasis.        GI TRACT: Non-dilated. No definite wall thickening.        PERITONEUM: No free air. No free fluid or loculated fluid  collections.           ABDOMINAL WALL: No focal hernia or mass.           OTHER: None.     BONES: No acute bony abnormality.       Impression:       Impression:  Tiny bilateral pleural effusions. Bilateral groundglass attenuation and  probably represents atelectasis or sequela of congestive failure.     This report was finalized on 1/10/2019 11:01 AM by Dr. Tate Subramanian MD.       XR Chest 2 View [[id number]] Collected:  01/10/19 1058     Updated:  01/10/19 1104    Narrative:       EXAMINATION: XR CHEST 2 VW-      CLINICAL INDICATION:     sob     TECHNIQUE:  XR CHEST 2 VW-      COMPARISON: NONE      FINDINGS:   The lungs remain aerated.  Heart and mediastinum contours are unremarkable.  No pleural effusion.  No pneumothorax.   Bony and soft tissue structures are unremarkable.       Impression:       No radiographic evidence of acute cardiac or pulmonary  disease.     This report was finalized on 1/10/2019 10:58 AM by Dr. Tate Subramanian MD.               Medications:        amLODIPine 10 mg Oral Daily   aspirin 81 mg Oral Daily   atorvastatin 80 mg Oral Daily   carvedilol 25 mg Oral BID With Meals   cetirizine 5 mg Oral Daily   cyclobenzaprine 10 mg Oral Daily   furosemide 40 mg Intravenous Q12H   glipiZIDE 10 mg Oral BID AC   hydrALAZINE 25 mg Oral TID   insulin aspart 0-7 Units Subcutaneous 4x Daily AC & at Bedtime   insulin detemir 60 Units Subcutaneous Nightly   [START ON 1/12/2019] iron sucrose (VENOFER) IVPB 300 mg Intravenous Q24H   isosorbide mononitrate 120 mg Oral Daily   levothyroxine 137.5 mcg Oral Q AM   linagliptin 5 mg Oral Daily   pantoprazole 40 mg Oral QAM   senna-docusate 2 tablet Oral Nightly   sodium bicarbonate 650 mg Oral TID   sodium chloride 3 mL Intravenous Q12H   tamsulosin 0.4 mg Oral Nightly   triamcinolone 1  application Topical Q12H          Assessment/Plan       CHF (congestive heart failure) (CMS/[facility])      1. HANNAH on CKD stage 4 : His baseline creatinine is around 3. He was given lasix 40 mg iv x2 and metolazone yesterday and creatinine has worsened to 3.4. He doesn't appear to be in CHF although he is edematous. I will dc lasix and metolazone. UA is bland. I will check for proteinuria. No hydronephrosis on CT scan abdomen    2. HTN : will dc norvasc due to edema and increase hydralazine. Keep BP 140s/80s for renal perfusion    3. Shortness of breath : appears to be related to obesity and sleep apnea plus some volume overload . Will diurese as tolerated. He had evidence of mild pulmonary HTN o previous echo. Will await repeat 2 D echo results. Educated patient on low Na diet. Will consult nutrition     4. Iron deficiency anemia : agree with venofer series. Check stool occult    5. Type 2 DM stable    6. Hyperkalemia resolved with medical management. Ct renal diet    Thanks  for the consult. We will follow with you.  I discussed the patient's findings and my recommendations with patient and nursing staff    Pablo Silveira MD  01/11/19  9:29 AM

## 2019-01-11 NOTE — PROGRESS NOTES
[facility] HOSPITALIST PROGRESS NOTE     Patient Identification:  Name:  Catarino Arvizu  Age:  63 y.o.  Sex:  male  :  1955  MRN:  [id number]  Visit Number:  [id number]  ROOM: [address]     Primary Care Provider:  Eleno Chaney APRN    Length of stay:  1    Subjective     Chief Complaint   Patient presents with   • Shortness of Breath       History of presenting illness:    63 year old male with CKD 4,chronic diastolic CHF recently managed here for chf exac presented to [facility] ED on 1/10/19 with smothering/SOB with activity going on 1 month.   Being managed as acute on chronic CHF exac,hyperkalemia due to CKD    Pt still c/o SOB on minimal activity.No chest pain or fever.    UOP>2L since last night.        Objective     Current Hospital Meds:  amLODIPine 10 mg Oral Daily   aspirin 81 mg Oral Daily   atorvastatin 80 mg Oral Daily   carvedilol 25 mg Oral BID With Meals   cetirizine 5 mg Oral Daily   cyclobenzaprine 10 mg Oral Daily   ferrous sulfate 325 mg Oral Daily With Breakfast   glipiZIDE 10 mg Oral BID AC   hydrALAZINE 25 mg Oral TID   insulin aspart 0-7 Units Subcutaneous 4x Daily AC & at Bedtime   insulin detemir 60 Units Subcutaneous Nightly   iron sucrose (VENOFER) IVPB 200 mg Intravenous Q24H   isosorbide mononitrate 120 mg Oral Daily   levothyroxine 137.5 mcg Oral Q AM   linagliptin 5 mg Oral Daily   pantoprazole 40 mg Oral QAM   senna-docusate 2 tablet Oral Nightly   sodium bicarbonate 650 mg Oral TID   sodium chloride 3 mL Intravenous Q12H   tamsulosin 0.4 mg Oral Nightly   triamcinolone 1 application Topical Q12H      ----------------------------------------------------------------------------------------------------------------------  Vital Signs:  Temp:  [97.9 °F (36.6 °C)-98.4 °F (36.9 °C)] 98.2 °F (36.8 °C)  Heart Rate:  [56-69] 61  Resp:  [15-24] 18  BP: (118-176)/(50-94) 150/77  SpO2:  [90 %-99 %] 95 %  on   ;   Device (Oxygen Therapy): room air  Body mass index is  43.95 kg/m².    Wt Readings from Last 3 Encounters:   01/11/19 109 kg (240 lb 4.8 oz)   12/20/18 107 kg (235 lb)   11/13/18 99.9 kg (220 lb 4 oz)       Intake/Output Summary (Last 24 hours) at 1/11/2019 0819  Last data filed at 1/11/2019 0644  Gross per 24 hour   Intake 700 ml   Output 2725 ml   Net -2025 ml     Diet Regular; Cardiac, Consistent Carbohydrate, Renal  ----------------------------------------------------------------------------------------------------------------------  Physical exam:  Constitutional:  Well-developed and well-nourished.  No respiratory distress.      HENT:  Head:  Normocephalic and atraumatic.  Mouth:  Moist mucous membranes.    Eyes:  Conjunctivae and EOM are normal.  Pupils are equal, round, and reactive to light.  No scleral icterus.    Neurological:  Alert and oriented to person, place, and time.  No cranial nerve deficit.  Neck:  Neck supple.  No JVD present.    Cardiovascular:  Normal rate, regular rhythm and normal heart sounds with no murmur.  Pulmonary/Chest:  No respiratory distress, no wheezes, no crackles, with diminished breath sounds both bases and good air movement.  Abdominal:  Soft.  Bowel sounds are normal.  No distension and no tenderness.   Musculoskeletal:  2-3+ bilat pitting pedal edema up to upper shin, no tenderness, and no deformity.  No red or swollen joints anywhere.    Skin:  Skin is warm and dry. No rash noted. No pallor.   Peripheral vascular:  Strong pulses in all 4 extremities with no clubbing, no cyanosis, no edema.  ----------------------------------------------------------------------------------------------------------------------  Tele:    ----------------------------------------------------------------------------------------------------------------------  Results from last 7 days   Lab Units  01/11/19   0306  01/10/19   1018  01/08/19   1007   WBC 10*3/mm3  5.05  4.21*  4.07*   HEMOGLOBIN g/dL  7.2*  7.4*  7.6*   HEMATOCRIT %  24.0*  24.0*  24.4*    MCV fL  94.5*  92.3  91.7   MCHC g/dL  30.0*  30.8*  31.1*   PLATELETS 10*3/mm3  166  182  184     Results from last 7 days   Lab Units  01/11/19   0306  01/10/19   1331  01/10/19   1018  01/08/19   1007   SODIUM mmol/L  141   --   138  137   POTASSIUM mmol/L  4.4  5.3  5.7*  4.8   MAGNESIUM mg/dL  1.8   --    --    --    CHLORIDE mmol/L  108   --   109  106   CO2 mmol/L  23.1*   --   20.9*  22.4*   BUN mg/dL  55*   --   49*  44*   CREATININE mg/dL  3.41*   --   3.31*  3.17*   PHOSPHORUS mg/dL  5.9*   --    --   4.7*   EGFR IF NONAFRICN AM mL/min/1.73  18*   --   19*  20*   CALCIUM mg/dL  8.9   --   8.8  8.7   GLUCOSE mg/dL  106   --   223*  222*   ALBUMIN g/dL  4.10   --   4.10  4.30  3.3  3.3   BILIRUBIN mg/dL  0.3   --   0.3  0.3   ALK PHOS U/L  48   --   47  52   AST (SGOT) U/L  21   --   27  20   ALT (SGPT) U/L  14   --   17  18   Estimated Creatinine Clearance: 23.6 mL/min (A) (by C-G formula based on SCr of 3.41 mg/dL (H)).  Results from last 7 days   Lab Units  01/11/19   0306  01/10/19   1956  01/10/19   1724  01/10/19   1439   CK TOTAL U/L  42  40   --   41   CKMB ng/mL  1.13  1.18   --   1.23   CK MB INDEX %  2.7  3.0   --   3.0   TROPONIN I ng/mL  0.011  0.006   --   0.008   MYOGLOBIN ng/mL  102.0  97.0  96.0   --      Hemoglobin A1C   Date/Time Value Ref Range Status   01/10/2019 1724 8.20 (H) 4.50 - 5.70 % Final     Glucose   Date/Time Value Ref Range Status   01/10/2019 2026 219 (H) 70 - 130 mg/dL Final     No results found for: AMMONIA    Results from last 7 days   Lab Units  01/10/19   1332   NITRITE UA   Negative             I have personally looked at the labs and they are summarized above.  ----------------------------------------------------------------------------------------------------------------------  Imaging Results (last 24 hours)     Procedure Component Value Units Date/Time    US Venous Doppler Lower Extremity Bilateral (duplex) [[id number]] Updated:  01/10/19 1521    NM Lung Scan  Perfusion Particulate [[id number]] Collected:  01/10/19 1326     Updated:  01/10/19 1330    Narrative:       EXAMINATION: NM LUNG SCAN PERFUSION PARTICULATE-      CLINICAL INDICATION: SOB and Pain      TECHNIQUE:  4.3 mCi of Tc-99m MAA were administered IV for a perfusion  lung scan. Ventilation could not be performed.     COMPARISON: Chest XRAY performed on January 10, 2019.     FINDINGS: Multiple perfusion images demonstrate a relatively homogeneous  pattern of pulmonary capillary tracer distribution throughout the lungs.   Specifically, there are no segmental or sub-segmental defects to  suggest pulmonary embolism.        Impression:       Low suspicion of PE.         This report was finalized on 1/10/2019 1:26 PM by Dr. Tate Subramanian MD.       CT Abdomen Pelvis Without Contrast [[id number]] Collected:  01/10/19 1058     Updated:  01/10/19 1104    Narrative:          CT ABDOMEN PELVIS WO CONTRAST-        TECHNIQUE: Multiple axial CT images were obtained from lung bases  through pubic symphysis without administration of IV contrast.  Reformatted images in the coronal and/or sagittal plane(s) were  generated from the axial data set to facilitate diagnostic accuracy  and/or surgical planning.  Oral Contrast:NONE.     Radiation dose reduction techniques were utilized per ALARA protocol.  Automated exposure control was initiated through either or [employer] or  [employer] software packages by  protocol.       [phone] mGy.cm     Clinical information  swelling      Comparison  NONE.     Findings  LOWER THORAX: Clear. No effusions.     ABDOMEN:        LIVER: Homogeneous. No focal hepatic mass or ductal dilatation.        GALLBLADDER: No dilation or stone identified.        PANCREAS: Unremarkable. No mass or ductal dilatation.        SPLEEN: Homogeneous. No splenomegaly.        ADRENALS: No mass.        KIDNEYS: No mass. No obstructive uropathy.  No evidence of  urolithiasis.        GI TRACT: Non-dilated. No  definite wall thickening.        PERITONEUM: No free air. No free fluid or loculated fluid  collections.        MESENTERY: Unremarkable.        LYMPH NODES: No lymphadenopathy.        VASCULATURE: No evidence of aneurysm.        ABDOMINAL WALL: No focal hernia or mass.           OTHER: None.     PELVIS:        BLADDER: No focal mass or significant wall thickening        REPRODUCTIVE: Unremarkable as visualized.        APPENDIX: Nondistended. No surrounding inflammation.     BONES: No acute bony abnormality.       Impression:       Impression:  1. Unremarkable exam.  No source for the patient symptoms.  2. Other incidental/non-acute findings as above.     This report was finalized on 1/10/2019 11:00 AM by Dr. Tate Subramanian MD.       CT Chest Without Contrast [[id number]] Collected:  01/10/19 1100     Updated:  01/10/19 1104    Narrative:       CT CHEST WO CONTRAST-        TECHNIQUE: Multiple axial CT images were obtained from lung apex through  upper abdomen without administration of IV contrast. Reformatted images  in the coronal and/or sagittal plane(s) were generated from the axial  data set to facilitate diagnostic accuracy and/or surgical planning.  Oral Contrast:NONE.     Radiation dose reduction techniques were utilized per ALARA protocol.  Automated exposure control was initiated through either or [employer] or  [employer] software packages by  protocol.          [id number] mGy.cm  Clinical information  sob      Comparison  NONE.     Findings  LUNGS: BILATERAL GROUNDGLASS ATTENUATION AND PROBABLY REPRESENTS  ATELECTASIS OR SEQUELA OF CONGESTIVE FAILURE.     HEART: Unremarkable.     PERICARDIUM: No effusion.     MEDIASTINUM: No masses. No enlarged lymph nodes.  No fluid collections.     PLEURA: TINY BILATERAL PLEURAL EFFUSIONS.     MAJOR AIRWAYS: Clear. No intrinsic mass.     VASCULATURE: No evidence of aneurysm.     VISUALIZED UPPER ABDOMEN:        LIVER: Homogeneous. No focal hepatic mass or ductal  dilatation.        SPLEEN: Homogeneous. No splenomegaly.        ADRENALS: No mass.        KIDNEYS: No mass. No obstructive uropathy.  No evidence of  urolithiasis.        GI TRACT: Non-dilated. No definite wall thickening.        PERITONEUM: No free air. No free fluid or loculated fluid  collections.           ABDOMINAL WALL: No focal hernia or mass.           OTHER: None.     BONES: No acute bony abnormality.       Impression:       Impression:  Tiny bilateral pleural effusions. Bilateral groundglass attenuation and  probably represents atelectasis or sequela of congestive failure.     This report was finalized on 1/10/2019 11:01 AM by Dr. Tate Subramanian MD.       XR Chest 2 View [[id number]] Collected:  01/10/19 1058     Updated:  01/10/19 1104    Narrative:       EXAMINATION: XR CHEST 2 VW-      CLINICAL INDICATION:     sob     TECHNIQUE:  XR CHEST 2 VW-      COMPARISON: NONE      FINDINGS:   The lungs remain aerated.  Heart and mediastinum contours are unremarkable.  No pleural effusion.  No pneumothorax.   Bony and soft tissue structures are unremarkable.       Impression:       No radiographic evidence of acute cardiac or pulmonary  disease.     This report was finalized on 1/10/2019 10:58 AM by Dr. Tate Subramanian MD.           I have personally reviewed the radiology images and read the final radiology report.    Assessment & Plan        Acute On chronic diastolic heart failure in exacerbation  Chest pain  HANNAH on CKD stage IV,  Baseline creatinine 1.8-2.5  Hyperkalemia due to CK-MB  Dyspnea   Elevated D-Dimer   Normocytic Anemia   Chronic Abdominal Pain  DM type 2, insulin dependent   Essential HTN  Hypothyroidism   SOHAIL  Obesity, BMI 42.07  Tobacco use (dips)        Acute on chronic Diastolic Heart failure, : BNP is 386, diuretics given,  S/p metolazone,cont loop diuretics  F/u Echo .Cont ASA, ACE, statin and coreg.   F/u cardiology consult as when he was inpatient in november they did feel he needed a heart  cath, if recommended would have to discuss risk vs. Benefits with his current renal status.     Dyspnea: multifactorial--obesity/restriction playing a role and also fluid overload from heart failure exac and CKD.inflammatory bowel disease hope this is not symptomatic anemia.  Needs bariatric surgery.VQ scan low probability of PE, no pneumonia appreciated on CT, monitor closely.      Chest pain in pt almost already optimised on medical Rx.MI ruled out  Cardio consult ,cont all outpt cardiac meds    HANNAH on CKD stage IV: prerenal from cardiorenal vs morena-cardiac syndrome  creatinine today is 3.31>3.4, from 2.93 k 5.3  avoid nephrotoxic agents,  Renal cardiac diet,low K diet  nephrology consulted,  Monitor strict input/ urine output, daily RFP    Hyperkalemia due to ckd/poor dietary adherence  Daily BMP,renal low K cardiac diet,give education resource,Nephro f/u    Normocytic Anemia of chronic dixsease most likely of CKD:   tsat 11% ferritin 92,start venofer loading dose 1g  Nephro to consider starting ADITI  H/H is 7.2 stable Will monitor closely, repeat in the AM. Type and cross       Elevated D-Dimer: VQ scan shows low probability, Bilateral lower ext. Doppler ordered and pending.      Chronic Abdominal Pain: this has been going on for some time, CT of the abdomen today shows no acute findings. He did have a EGD and colonoscopy done by Dr. Chavez on 5/9/2018, which showed normal results. Last bowel movement today, will monitor.      DM Type 2, insulin dependent: A1c 8.2%, hypoglycemia protocol initiated, accu checks ac/hs and prn, diabetic diet, low dose sliding scale ordered.      Hypothyroidism: TSH ordered, will resume home synthroid when available by pharmacy.      SOHAIL: wears oxygen at HS, monitor.      DVT prophylaxis: SCDs for now due to anemia (Hgb 7.2)  GI prophylaxis: continue home Protonix   Activity: bedrest with BSC   Precautions: falls   Diet: Cardiac, consistent carb, renal, regular     Code status: Full            The patient is high risk due to the following diagnoses/reasons:CHF exac,dyspnea    Mita Miguel MD  [facility] Medicine Team  01/11/19  8:19 AM

## 2019-01-11 NOTE — CONSULTS
Pt laying in bed, family at bedside.  Pt reports he has been a diabetic for over 30 years.  Pt reports he checks his blood glucose daily and takes his insulin as ordered.  Pt and family report that he is non compliant with his diet and that he eats whatever he wants.  Couns on harmful effects of hyperglycemia.  Couns on signs and symptoms of hyperglycemia and how to avoid it.  Pts daughter reports his blood glucose runs over 400 most of the time.  Couns on proper healthy eating and limiting carbohydrates and sugar. Gave pt a handout of nutrition basics, reviewed handout with pt and family.  Family voice understanding.  Will continue to monitor pt as needed.  Strongly encouraged pt to attend a Diabetes Smart class.  Left my name and phone number at pts bedside.

## 2019-01-11 NOTE — PROGRESS NOTES
Discharge Planning Assessment   Charles     Patient Name: Catarino Arvizu  MRN: [id number]  Today's Date: 1/11/2019    Admit Date: 1/10/2019    Discharge Needs Assessment     Row Name 01/11/19 1042       Living Environment    Lives With  child(elba), adult;spouse    Current Living Arrangements  home/apartment/condo    Primary Care Provided by  self    Family Caregiver if Needed  child(elba), adult;spouse    Quality of Family Relationships  helpful;involved;supportive    Able to Return to Prior Arrangements  yes       Resource/Environmental Concerns    Resource/Environmental Concerns  none    Transportation Concerns  car, none       Transition Planning    Patient/Family Anticipates Transition to  home with family    Patient/Family Anticipated Services at Transition  none    Transportation Anticipated  family or friend will provide       Discharge Needs Assessment    Equipment Currently Used at Home  walker, rolling;cane, straight;oxygen;nebulizer    Equipment Needed After Discharge  none        Discharge Plan     Row Name 01/11/19 1043       Plan    Plan  SS spoke with pt on this date. Pt lives at home with spouse and her daughter. Pt does not have home health and does not need home health at this time. Pt utilizes walker, cane, home 02, and nebulizer from [facility]. Pt does not have a POA or living will at this time. Pt utilizes [facility] with no issues at this time. Pt states that he will provide transportation for himself at discharge. Pt states that his vehicle is at [facility]. SS will follow.    Patient/Family in Agreement with Plan  yes          Demographic Summary     Row Name 01/11/19 1042       General Information    Admission Type  inpatient    Arrived From  home    Reason for Consult  discharge planning    Preferred Language  English     Used During This Interaction  no       Suyapa Rosado

## 2019-01-11 NOTE — PAYOR COMM NOTE
"  [facility]  NPI: [id number]    Utilization Review   Contact:Madelin Vincent MSN, APRN, NP-C  Phone: [phone]  Fax: [phone]    [employer]/Attn: Nicolasa Green Req  REF:[id number]  DX: I50.9, N17.9, E87.5  Catarino Arvizu (63 y.o. Male)     Date of Birth Social Security Number Address Home Phone MRN    1955  [address]   [facility] [id number] [phone] [id number]    Yarsanism Marital Status          None        Admission Date Admission Type Admitting Provider Attending Provider Department, Room/Bed    1/10/19 Emergency Mita Miguel MD Olabige, Olutayo T, MD [facility] PROGRESS CARE, P208/1P    Discharge Date Discharge Disposition Discharge Destination                       Attending Provider:  Mita Miguel MD    Allergies:  No Known Allergies    Isolation:  None   Infection:  None   Code Status:  CPR    Ht:  157.5 cm (62\")   Wt:  109 kg (240 lb 4.8 oz)    Admission Cmt:  None   Principal Problem:  None                Active Insurance as of 1/10/2019     Primary Coverage     Payor Plan Insurance Group Employer/Plan Group    [employer] [place] [employer] [place]ICA[place]      Payor Plan Address Payor Plan Phone Number Payor Plan Fax Number Effective Dates    [address] [address] [phone]  2016 - None Entered    [facility] [id number]       Subscriber Name Subscriber Birth Date Member ID       CATARINO ARVIZU 1955 [id number]                 Emergency Contacts      (Rel.) Home Phone Work Phone Mobile Phone    Jerrell Arvizu (Relative) [phone] -- --    Astrid Arvizu (Daughter) -- -- [phone]    Juany Arvizu (Spouse) [phone] -- --               History & Physical      Mita Miguel MD at 1/10/2019  1:47 PM            [facility]  History & Physical          Patient Identification:  Name:  Catarino Arvizu  Age:  63 y.o.  Sex:  male  :  1955  MRN:  [id number]   Visit Number:  [id number]  [facility]" "Physician:  Eleno Chaney APRN         Subjective     Chief complaint: \"smothering\"    History of presenting illness:    Mr. Arvizu is a 63 year old male who presented to [facility] ED on 1/10/19 with smothering. He states this has been going on 1 month.   He states he has shortness of breath with activity. He does have a dry cough for around 1 week but no fever.  He does admit to swelling in his legs, but he says they are at his baseline. He uses 3 pillows to sleep but this is also his baseline. He states he have chest pain across his chest into his left shoulder, this pain occurs when he gets up and gets short of breath, he says this pain resolves with rest, describes it as pressure.     He denies any nausea, has been vomiting for 1 week mostly occurring after he eats. He does have some generalized abdominal pain which he states has been going on \"a long time\". Colonoscopy and EGD within the last year, he was told these were normal. Last bowel movement was this am and normal. He does not do daily weights. He follows with Dr. Daniel, he saw him yesterday and Tuesday. He wanted him to come to the ED to be further evaluated for his shortness of breath. He denies missing any medications.     It is of note he was recently admitted in our observation unit 11/12/18-11/14/18 for chest pain, per the CT note is was stated that cardiology recommended a heart cath at the time but in the setting if his renal disease, Dr. Conroy recommended medical management for the time being.     His work up in the ED shows a blood gas of pH 7.365, C02 33.2, pa02 of 783 and HCO of 18.5 on room air, troponin is 0.009, BNP is 386, glucose 223, sodium 138, Potassium 5.7 and repeat of 5.3, HCO 20.9, creatinine 3.31, BUN 49, D-Dimer 2.01, WBC 4.21, H/H 7..4/24.0, . Chest xray today does not show any Pneumonia, CT of the chest shows tiny bilateral pleural effusions, bilateral ground glass attentuations and probably represents atelectasis or " sequela of CHF. CT of the abdomen also done, no acute findings. VQ scan showed Low suspicion of PE. He was given lasix 40mg IV in the ED. He was also given kayexalate for his hyperkalemia.     His PMH consist of heart failure, SOHAIL, GERD, HTN, Polio, Prostate Cancer, and obesity. He denies any history of having heart stents placed. He uses oxygen at night. Does not wear CPAP. He does not smoke, he does chew tobacco. He denies ETOH use.   ---------------------------------------------------------------------------------------------------------------------   Review of Systems   Constitutional: Negative for chills, diaphoresis, fatigue and fever.   HENT: Negative for congestion and rhinorrhea.    Eyes: Negative for photophobia and visual disturbance.   Respiratory: Positive for cough (dry, non-productive), chest tightness and shortness of breath. Negative for wheezing.    Cardiovascular: Positive for leg swelling. Negative for chest pain.   Gastrointestinal: Positive for abdominal distention and abdominal pain (generalized chronic). Negative for constipation, diarrhea and vomiting.   Endocrine: Negative for cold intolerance and heat intolerance.   Genitourinary: Negative for difficulty urinating.   Musculoskeletal: Negative for arthralgias and myalgias.   Skin: Negative for color change.   Allergic/Immunologic: Negative for environmental allergies.   Neurological: Negative for dizziness, weakness and light-headedness.   Psychiatric/Behavioral: Negative for agitation, behavioral problems and confusion.      ---------------------------------------------------------------------------------------------------------------------   Past Medical History:   Diagnosis Date   • Chest pain    • CHF (congestive heart failure) (CMS/[facility]) 1/10/2019   • Chronic kidney disease    • CPAP (continuous positive airway pressure) dependence    • Diabetes mellitus (CMS/[facility])    • Elevated cholesterol    • GERD (gastroesophageal reflux disease)     • Heart murmur    • Hyperlipidemia    • Hypertension    • Irregular heart beat    • Polio    • Prostate cancer (CMS/[facility]) 05/2016    Dr. Khalif Kohler MD- [place], [place]   • Shortness of breath    • Sleep apnea      Past Surgical History:   Procedure Laterality Date   • CARDIAC CATHETERIZATION  2008    50% diffuse LAD stenosis, 50% septal  branch   • COLONOSCOPY      Long time ago   • COLONOSCOPY N/A 5/9/2018    Procedure: COLONOSCOPY  CPTCODE:76479;  Surgeon: Bob Mcguire III, MD;  Location: [facility] OR;  Service: Gastroenterology   • ENDOSCOPY N/A 5/9/2018    Procedure: ESOPHAGOGASTRODUODENOSCOPY WITH BIOPSY  CPTCODE:34804;  Surgeon: Bob Mcguire III, MD;  Location: [facility] OR;  Service: Gastroenterology   • HEMORRHOIDECTOMY     • HERNIA REPAIR     • UPPER GASTROINTESTINAL ENDOSCOPY      Long time ago   • URETHRAL DILATATION N/A 12/13/2017    Procedure: URETHRAL DILATATION;  Surgeon: Khalif Kohler MD;  Location: [facility] OR;  Service:      Family History   Problem Relation Age of Onset   • Heart disease Brother    • Diabetes Brother    • Cancer Brother         not sure the kind   • Heart disease Brother    • Diabetes Brother    • Diabetes Sister    • Diabetes Daughter    • Heart disease Daughter    • Pancreatitis Daughter    • Crohn's disease Daughter    • Diabetes Son    • Heart disease Son      Social History     Socioeconomic History   • Marital status:      Spouse name: Not on file   • Number of children: Not on file   • Years of education: Not on file   • Highest education level: Not on file   Tobacco Use   • Smoking status: Never Smoker   • Smokeless tobacco: Current User     Types: Chew   • Tobacco comment: Chewed tobacco since he was 4 yrs old.   Substance and Sexual Activity   • Alcohol use: No   • Drug use: No   • Sexual activity: Defer     ---------------------------------------------------------------------------------------------------------------------    Allergies:  Patient has no known allergies.  ---------------------------------------------------------------------------------------------------------------------     Medications below are reported home medications pulling from within the system; at this time, these medications have not been reconciled unless otherwise specified and are in the verification process for further verifcation as current home medications.    Prior to Admission Medications     Prescriptions Last Dose Informant Patient Reported? Taking?    ASPIRIN LOW DOSE 81 MG EC tablet  Pharmacy No No    TAKE 1 TABLET EVERY DAY    atorvastatin (LIPITOR) 80 MG tablet  Pharmacy Yes No    Take 80 mg by mouth daily.    carvedilol (COREG) 25 MG tablet  Pharmacy No No    Take 1 tablet by mouth 2 (Two) Times a Day.    cetirizine (ZyrTEC) 10 MG tablet  Pharmacy Yes No    Take 10 mg by mouth daily.    fenofibrate (TRICOR) 145 MG tablet  Pharmacy Yes No    Take 145 mg by mouth Daily.    Ferrous Sulfate (IRON) 325 (65 Fe) MG tablet  Pharmacy No No    TAKE 1 TABLET WITH BREAKFAST    furosemide (LASIX) 40 MG tablet  Pharmacy Yes No    Take 40 mg by mouth Take As Directed. Prior to [facility] Admission, Patient was on: Take 1 tablet every day except Wednesday and Sunday    glimepiride (AMARYL) 4 MG tablet  Pharmacy Yes No    Take 4 mg by mouth 2 (two) times a day.    hydrALAZINE (APRESOLINE) 25 MG tablet  Pharmacy Yes No    Take 25 mg by mouth 3 (Three) Times a Day.    HYDROcodone-acetaminophen (NORCO) 7.5-325 MG per tablet  Pharmacy Yes No    Take 1 tablet by mouth 2 (two) times a day as needed for moderate pain (4-6).    hydrOXYzine (VISTARIL) 25 MG capsule  Pharmacy Yes No    Take 25 mg by mouth Every 6 (Six) Hours As Needed for Itching.    Insulin Glargine (BASAGLAR KWIKPEN) 100 UNIT/ML injection pen  Pharmacy Yes No    Inject 65 Units under the skin into the appropriate area as directed Every Night.    isosorbide mononitrate (IMDUR) 120 MG 24 hr tablet   No No     TAKE 1 TABLET EVERY DAY    levothyroxine (SYNTHROID, LEVOTHROID) 137 MCG tablet  Pharmacy Yes No    Take 137 mcg by mouth Daily.    losartan (COZAAR) 100 MG tablet  Pharmacy Yes No    Take 100 mg by mouth Daily.    nitroglycerin (NITROSTAT) 0.4 MG SL tablet   No No    Place 1 tablet under the tongue Every 5 (Five) Minutes As Needed for Chest Pain. Take no more than 3 doses in 15 minutes.    pantoprazole (PROTONIX) 40 MG EC tablet  Pharmacy Yes No    Take 1 tablet by mouth 2 (Two) Times a Day With Meals.    raNITIdine (ZANTAC) 300 MG tablet  Pharmacy Yes No    Take 300 mg by mouth Every Night.    ranolazine (RANEXA) 500 MG 12 hr tablet   No No    Take 1 tablet by mouth 2 (Two) Times a Day.    senna-docusate (SENEXON-S) 8.6-50 MG per tablet  Pharmacy Yes No    Take 2 tablets by mouth Every Night.    sitaGLIPtin (JANUVIA) 50 MG tablet  Pharmacy No No    Take 1 tablet by mouth daily.    tamsulosin (FLOMAX) 0.4 MG capsule 24 hr capsule  Pharmacy Yes No    Take 1 capsule by mouth Every Night.        Hospital Scheduled Meds:    furosemide 40 mg Intravenous Once   sodium polystyrene 30 g Oral Once        ---------------------------------------------------------------------------------------------------------------------   Objective       Vital Signs:  Temp:  [97.9 °F (36.6 °C)] 97.9 °F (36.6 °C)  Heart Rate:  [57-65] 57  Resp:  [18-24] 24  BP: (118-121)/(50-63) 118/63      01/10/19  0959   Weight: 104 kg (230 lb)     Body mass index is 42.07 kg/m².  ---------------------------------------------------------------------------------------------------------------------       Physical Exam:  Constitutional:  Well-developed and well-nourished.     HENT:  Head: Normocephalic and atraumatic.  Mouth:  Moist mucous membranes.    Eyes:  Pupils are equal, round, and reactive to light.  No scleral icterus.  Neck:  Neck supple.  No JVD present.    Cardiovascular:  Normal rate, regular rhythm.  With systolic murmur.  Pulmonary/Chest:  Pt  in mild to moderate respiratory distress, no wheezes, but fine crackles bilateral lower lobes.  Abdominal: Distended and tender especially over the left upper quadrant area. Soft.  Bowel sounds are present.  Musculoskeletal: 3+ bilateral lower ext. edema, no tenderness, and no deformity.  No red or swollen joints anywhere.    Neurological:  Alert and oriented to person, place, and time.    No tongue deviation.  No facial droop.  No slurred speech.   Skin:  Skin is warm and dry.  No rash noted.  No pallor.   Psychiatric:  Normal mood and affect.  Behavior is normal.  Judgment and thought content normal.   Peripheral vascular:  strong pulses on all 4 extremities.  ---------------------------------------------------------------------------------------------------------------------  EKG:        Telemetry:    I have personally looked at both the EKG and the telemetry strips.  ---------------------------------------------------------------------------------------------------------------------   Results from last 7 days   Lab Units  01/10/19   1018  01/08/19   1007   WBC 10*3/mm3  4.21*  4.07*   HEMOGLOBIN g/dL  7.4*  7.6*   HEMATOCRIT %  24.0*  24.4*   MCV fL  92.3  91.7   MCHC g/dL  30.8*  31.1*   PLATELETS 10*3/mm3  182  184     Results from last 7 days   Lab Units  01/10/19   1020   PH, ARTERIAL pH units  7.365   PO2 ART mm Hg  78.3*   PCO2, ARTERIAL mm Hg  33.2*   HCO3 ART mmol/L  18.5*     Results from last 7 days   Lab Units  01/10/19   1018  01/08/19   1007   SODIUM mmol/L  138  137   POTASSIUM mmol/L  5.7*  4.8   CHLORIDE mmol/L  109  106   CO2 mmol/L  20.9*  22.4*   BUN mg/dL  49*  44*   CREATININE mg/dL  3.31*  3.17*   EGFR IF NONAFRICN AM mL/min/1.73  19*  20*   CALCIUM mg/dL  8.8  8.7   GLUCOSE mg/dL  223*  222*   ALBUMIN g/dL  4.10  4.30  3.3  3.3   BILIRUBIN mg/dL  0.3  0.3   ALK PHOS U/L  47  52   AST (SGOT) U/L  27  20   ALT (SGPT) U/L  17  18   Estimated Creatinine Clearance: 23.7 mL/min (A) (by C-G  formula based on SCr of 3.31 mg/dL (H)).  No results found for: AMMONIA  Results from last 7 days   Lab Units  01/10/19   1018   TROPONIN I ng/mL  0.009         Lab Results   Component Value Date    HGBA1C 8.00 (H) 11/13/2018     Lab Results   Component Value Date    TSH 1.835 07/11/2018    FREET4 1.03 08/12/2016     No results found for: PREGTESTUR, PREGSERUM, HCG, HCGQUANT  Pain Management Panel     Pain Management Panel Latest Ref Rng & Units 11/13/2018 11/13/2018    CREATININE UR mg/dL 65.9 65.9                        ---------------------------------------------------------------------------------------------------------------------  Imaging Results (last 7 days)     Procedure Component Value Units Date/Time    NM Lung Scan Perfusion Particulate [[id number]] Collected:  01/10/19 1326     Updated:  01/10/19 1330    Narrative:       EXAMINATION: NM LUNG SCAN PERFUSION PARTICULATE-      CLINICAL INDICATION: SOB and Pain      TECHNIQUE:  4.3 mCi of Tc-99m MAA were administered IV for a perfusion  lung scan. Ventilation could not be performed.     COMPARISON: Chest XRAY performed on January 10, 2019.     FINDINGS: Multiple perfusion images demonstrate a relatively homogeneous  pattern of pulmonary capillary tracer distribution throughout the lungs.   Specifically, there are no segmental or sub-segmental defects to  suggest pulmonary embolism.        Impression:       Low suspicion of PE.         This report was finalized on 1/10/2019 1:26 PM by Dr. Tate Subramanian MD.       CT Abdomen Pelvis Without Contrast [[id number]] Collected:  01/10/19 1058     Updated:  01/10/19 1104    Narrative:          CT ABDOMEN PELVIS WO CONTRAST-        TECHNIQUE: Multiple axial CT images were obtained from lung bases  through pubic symphysis without administration of IV contrast.  Reformatted images in the coronal and/or sagittal plane(s) were  generated from the axial data set to facilitate diagnostic accuracy  and/or surgical  planning.  Oral Contrast:NONE.     Radiation dose reduction techniques were utilized per ALARA protocol.  Automated exposure control was initiated through either or [employer] or  DoseRight software packages by  protocol.       [id number] mGy.cm     Clinical information  swelling      Comparison  NONE.     Findings  LOWER THORAX: Clear. No effusions.     ABDOMEN:        LIVER: Homogeneous. No focal hepatic mass or ductal dilatation.        GALLBLADDER: No dilation or stone identified.        PANCREAS: Unremarkable. No mass or ductal dilatation.        SPLEEN: Homogeneous. No splenomegaly.        ADRENALS: No mass.        KIDNEYS: No mass. No obstructive uropathy.  No evidence of  urolithiasis.        GI TRACT: Non-dilated. No definite wall thickening.        PERITONEUM: No free air. No free fluid or loculated fluid  collections.        MESENTERY: Unremarkable.        LYMPH NODES: No lymphadenopathy.        VASCULATURE: No evidence of aneurysm.        ABDOMINAL WALL: No focal hernia or mass.           OTHER: None.     PELVIS:        BLADDER: No focal mass or significant wall thickening        REPRODUCTIVE: Unremarkable as visualized.        APPENDIX: Nondistended. No surrounding inflammation.     BONES: No acute bony abnormality.       Impression:       Impression:  1. Unremarkable exam.  No source for the patient symptoms.  2. Other incidental/non-acute findings as above.     This report was finalized on 1/10/2019 11:00 AM by Dr. Tate Subramanian MD.       CT Chest Without Contrast [[id number]] Collected:  01/10/19 1100     Updated:  01/10/19 1104    Narrative:       CT CHEST WO CONTRAST-        TECHNIQUE: Multiple axial CT images were obtained from lung apex through  upper abdomen without administration of IV contrast. Reformatted images  in the coronal and/or sagittal plane(s) were generated from the axial  data set to facilitate diagnostic accuracy and/or surgical planning.  Oral Contrast:NONE.      Radiation dose reduction techniques were utilized per ALARA protocol.  Automated exposure control was initiated through either or [employer] or  DoseRight software packages by  protocol.          [id number] mGy.cm  Clinical information  sob      Comparison  NONE.     Findings  LUNGS: BILATERAL GROUNDGLASS ATTENUATION AND PROBABLY REPRESENTS  ATELECTASIS OR SEQUELA OF CONGESTIVE FAILURE.     HEART: Unremarkable.     PERICARDIUM: No effusion.     MEDIASTINUM: No masses. No enlarged lymph nodes.  No fluid collections.     PLEURA: TINY BILATERAL PLEURAL EFFUSIONS.     MAJOR AIRWAYS: Clear. No intrinsic mass.     VASCULATURE: No evidence of aneurysm.     VISUALIZED UPPER ABDOMEN:        LIVER: Homogeneous. No focal hepatic mass or ductal dilatation.        SPLEEN: Homogeneous. No splenomegaly.        ADRENALS: No mass.        KIDNEYS: No mass. No obstructive uropathy.  No evidence of  urolithiasis.        GI TRACT: Non-dilated. No definite wall thickening.        PERITONEUM: No free air. No free fluid or loculated fluid  collections.           ABDOMINAL WALL: No focal hernia or mass.           OTHER: None.     BONES: No acute bony abnormality.       Impression:       Impression:  Tiny bilateral pleural effusions. Bilateral groundglass attenuation and  probably represents atelectasis or sequela of congestive failure.     This report was finalized on 1/10/2019 11:01 AM by Dr. Tate Subramanian MD.       XR Chest 2 View [[id number]] Collected:  01/10/19 1058     Updated:  01/10/19 1104    Narrative:       EXAMINATION: XR CHEST 2 VW-      CLINICAL INDICATION:     sob     TECHNIQUE:  XR CHEST 2 VW-      COMPARISON: NONE      FINDINGS:   The lungs remain aerated.  Heart and mediastinum contours are unremarkable.  No pleural effusion.  No pneumothorax.   Bony and soft tissue structures are unremarkable.       Impression:       No radiographic evidence of acute cardiac or pulmonary  disease.     This report was finalized  on 1/10/2019 10:58 AM by Dr. Tate Subramanian MD.             Cultures: none     I have personally reviewed the radiology images and read the final radiology report.  ---------------------------------------------------------------------------------------------------------------------  Assessment / Plan       Assessment and Plan:    Dyspnea   Acute On chronic diastolic heart failure in exacerbation  Chest pain  HANNAH on CKD stage IV,  Baseline creatinine 1.8-2.5  Hyperkalemia due to CK-MB  Elevated D-Dimer   Normocytic Anemia   Chronic Abdominal Pain  DM type 2, insulin dependent   Essential HTN  Hypothyroidism   SOHAIL  Obesity, BMI 42.07  Tobacco use (dips)      Acute on chronic Diastolic Heart failure, : BNP is 386, diuretics given,  Add metolazone,ocnt loop diuretics  Repeat BNP in the am. Echo ordered. He does have chest pain with exertion, relieved with rest. Will monitor on telemetry, trend cardiac enzymes. He is on ASA, ACE, statin and coreg. consulted cardiology as when he was inpatient in november they did feel he needed a heart cath, if recommended would have to discuss risk vs. Benefits with his current renal status.    Dyspnea: likely r/t to fluid overload from heart failure and CKD. Lasix was given in the ED, will monitor his response. VQ scan low probability of PE, no pneumonia appreciated on CT, monitor closely.     Chest pain in pt almost already optimised on medical Rx  Cardio consult ,cont all outpt cardiac meds    HANNAH on CKD stage IV: prerenal from cardiorenal vs morena-cardiac syndrome  creatinine today is 3.31, from 2.93 k 5.3  avoid nephrotoxic agents,  Renal cardiac diet,low K diet  nephrology consulted,  Monitor strict input/ urine output, daily RFP    Elevated D-Dimer: VQ scan shows low probability, Bilateral lower ext. Doppler ordered and pending.     Chronic Abdominal Pain: this has been going on for some time, CT of the abdomen today shows no acute findings. He did have a EGD and colonoscopy done  by Dr. Chavez on 5/9/2018, which showed normal results. Last bowel movement today, will monitor.     DM Type 2, insulin dependent: A1c ordered, hypoglycemia protocol initiated, accu checks ac/hs and prn, diabetic diet, low dose sliding scale ordered.     Hypothyroidism: TSH ordered, will resume home synthroid when available by pharmacy.     Normocytic Anemia of chronic dixsease most likely of CKD:   tsat 11% ferritin 92,start venofer 200mg x 5doses,  Nephro to consider starting ADITI  H/H is 7.4/24.0, most recent blood levels are in this range since November 2018. He is on Iron PO. Will monitor closely, repeat in the AM.     SOHAIL: wears oxygen at HS, monitor.     DVT prophylaxis: SCDs for now due to anemia (Hgb 7.4)  GI prophylaxis: continue home Protonix   Activity: bedrest with BSC   Precautions: falls   Diet: Cardiac, consistent carb, renal, regular    Code status: Full     MISHEL Toro  01/10/19  1:50 PM  ---------------------------------------------------------------------------------------------------------------------   I have seen the patient in conjunction with MISHEL Mei and I agree with the following statements:    I have amended the note to reflect my assessment and treatment plan    Electronically signed by Mita Miguel MD at 1/10/2019  6:21 PM          Emergency Department Notes      Carli Almeida at 1/10/2019  9:56 AM        EKG completed by [facility] and shown to Carli Dial  01/10/19 1058      Electronically signed by Carli Almeida at 1/10/2019 10:58 AM     Aakash Schofield PA at 1/10/2019 10:59 AM     Attestation signed by Reinaldo Blackman MD at 1/10/2019  7:32 PM          For this patient encounter, I reviewed the NP or PA documentation, treatment plan, and medical decision making. Reinaldo Blackman MD 1/10/2019 7:32 PM                  Subjective   63-year-old white male presents secondary to shortness of breath.  He states this is been  ongoing over the past month and progressively worsened admitted to the hospital.  He does report some exertional chest discomfort.  He denies fever area and he has had productive cough.  He has had increased swelling.  He is in chronic renal failure and is followed by Dr. Daniel.  He wears oxygen at night only.            Review of Systems   Constitutional: Negative.  Negative for fever.   HENT: Negative.    Respiratory: Negative.    Cardiovascular: Negative.  Negative for chest pain.   Gastrointestinal: Negative.  Negative for abdominal pain.   Endocrine: Negative.    Genitourinary: Negative.  Negative for dysuria.   Skin: Negative.    Neurological: Negative.    Psychiatric/Behavioral: Negative.    All other systems reviewed and are negative.      Past Medical History:   Diagnosis Date   • Chest pain    • CHF (congestive heart failure) (CMS/[facility]) 1/10/2019   • Chronic kidney disease    • CPAP (continuous positive airway pressure) dependence    • Diabetes mellitus (CMS/[facility])    • Elevated cholesterol    • GERD (gastroesophageal reflux disease)    • Heart murmur    • Hyperlipidemia    • Hypertension    • Irregular heart beat    • Polio    • Prostate cancer (CMS/[facility]) 05/2016    [place]   • Shortness of breath    • Sleep apnea        No Known Allergies    Past Surgical History:   Procedure Laterality Date   • CARDIAC CATHETERIZATION  2008    50% diffuse LAD stenosis, 50% septal  branch   • COLONOSCOPY      Long time ago   • COLONOSCOPY N/A 5/9/2018    Procedure: COLONOSCOPY  CPTCODE:04579;  Surgeon: Bob Mcguire III, MD;  Location: [facility];  Service: Gastroenterology   • ENDOSCOPY N/A 5/9/2018    Procedure: ESOPHAGOGASTRODUODENOSCOPY WITH BIOPSY  CPTCODE:84784;  Surgeon: Bob Mcguire III, MD;  Location: [facility];  Service: Gastroenterology   • HEMORRHOIDECTOMY     • HERNIA REPAIR     • UPPER GASTROINTESTINAL ENDOSCOPY      Long time ago   • URETHRAL DILATATION  N/A 12/13/2017    Procedure: URETHRAL DILATATION;  Surgeon: Khalif Kohler MD;  Location: [facility] OR;  Service:        Family History   Problem Relation Age of Onset   • Heart disease Brother    • Diabetes Brother    • Cancer Brother         not sure the kind   • Heart disease Brother    • Diabetes Brother    • Diabetes Sister    • Diabetes Daughter    • Heart disease Daughter    • Pancreatitis Daughter    • Crohn's disease Daughter    • Diabetes Son    • Heart disease Son        Social History     Socioeconomic History   • Marital status:      Spouse name: Not on file   • Number of children: Not on file   • Years of education: Not on file   • Highest education level: Not on file   Tobacco Use   • Smoking status: Never Smoker   • Smokeless tobacco: Current User     Types: Chew   • Tobacco comment: Chewed tobacco since he was 4 yrs old.   Substance and Sexual Activity   • Alcohol use: No   • Drug use: No   • Sexual activity: Defer           Objective   Physical Exam   Constitutional: He is oriented to person, place, and time. He appears well-developed and well-nourished. No distress.   HENT:   Head: Normocephalic and atraumatic.   Right Ear: External ear normal.   Left Ear: External ear normal.   Nose: Nose normal.   Eyes: Conjunctivae and EOM are normal. Pupils are equal, round, and reactive to light.   Neck: Normal range of motion. Neck supple. No JVD present. No tracheal deviation present.   Cardiovascular: Normal rate, regular rhythm and normal heart sounds.   No murmur heard.  Pulmonary/Chest: Effort normal and breath sounds normal. No respiratory distress. He has no wheezes.   Scattered rhonchi   Abdominal: Soft. Bowel sounds are normal. There is no tenderness.   Musculoskeletal: Normal range of motion. He exhibits no edema or deformity.   Neurological: He is alert and oriented to person, place, and time. No cranial nerve deficit.   Skin: Skin is warm and dry. No rash noted. He is not diaphoretic.  No erythema. No pallor.   Psychiatric: He has a normal mood and affect. His behavior is normal. Thought content normal.   Nursing note and vitals reviewed.      Procedures          ED Course  ED Course as of Ramy 10 1625   Thu Ramy 10, 2019   1334 D/w Dr Silveira. Lasix 40 IV x1. Paged hospitalist.  [JI]      ED Course User Index  [JI] Aakash Schofield PA                  MDM  Number of Diagnoses or Management Options  Congestive heart failure, unspecified HF chronicity, unspecified heart failure type (CMS/HCC): new and requires workup  Hyperkalemia: new and requires workup  Stage 4 chronic kidney disease (CMS/HCC): established and worsening     Amount and/or Complexity of Data Reviewed  Clinical lab tests: reviewed and ordered  Tests in the radiology section of CPT®:  reviewed and ordered  Tests in the medicine section of CPT®:  reviewed and ordered  Decide to obtain previous medical records or to obtain history from someone other than the patient: yes  Discuss the patient with other providers: yes  Independent visualization of images, tracings, or specimens: yes          Final diagnoses:   Hyperkalemia   Congestive heart failure, unspecified HF chronicity, unspecified heart failure type (CMS/HCC)   Stage 4 chronic kidney disease (CMS/HCC)            Aakash Schofield PA  01/10/19 1625      Electronically signed by Reinaldo Blackman MD at 1/10/2019  7:32 PM     Gerard Howard at 1/10/2019  1:03 PM        Went to draw pts lab pt is gone to Gerard Bhagat  01/10/19 1303      Electronically signed by Gerard Howard at 1/10/2019  1:03 PM     Deedee Driver at 1/10/2019  1:31 PM        Paged hospitalist per rain schofield.      Deedee Driver  01/10/19 1331      Electronically signed by Deedee Driver at 1/10/2019  1:31 PM     Deedee Driver at 1/10/2019  1:40 PM        Rain schofield on the phone w/ Dr Miguel at this time     Deedee Driver  01/10/19 1341      Electronically signed by Deedee Driver  P at 1/10/2019  1:41 PM     Shaq Yanez, RN at 1/10/2019  3:33 PM        Patient report given to Jory SMITH in PCU.     Shaq Yanez RN  01/10/19 1533      Electronically signed by Shaq Yanez, RN at 1/10/2019  3:33 PM       ICU Vital Signs     Row Name 01/11/19 0718 01/11/19 0605 01/11/19 0505 01/11/19 0405 01/11/19 0305       Height and Weight    Weight  --  --  --  109 kg (240 lb 4.8 oz)  --    Weight Method  --  --  --  Bed scale  --       Vitals    Temp  --  --  --  98.2 °F (36.8 °C)  --    Temp src  --  --  --  Oral  --    Pulse  61  61  59  59  65    Resp  18  20  19  15  20    BP  --  150/77  176/94  156/94  145/74    Noninvasive MAP (mmHg)  --  105  116  112  102       Oxygen Therapy    SpO2  95 %  97 %  90 %  93 %  94 %    Device (Oxygen Therapy)  room air  --  --  --  --    Row Name 01/11/19 0200 01/11/19 0105 01/11/19 0005 01/11/19 0000 01/10/19 2300       Vitals    Temp  --  --  --  98.2 °F (36.8 °C)  --    Temp src  --  --  --  Oral  --    Pulse  61  60  56  --  64    Resp  20  18  19  --  18    BP  149/74  139/60  144/66  --  141/66    Noninvasive MAP (mmHg)  106  86  97  --  99       Oxygen Therapy    SpO2  95 %  94 %  92 %  --  99 %    Device (Oxygen Therapy)  room air  --  --  --  --    Row Name 01/10/19 2200 01/10/19 2154 01/10/19 2000 01/10/19 1905 01/10/19 1859       Vitals    Temp  --  --  98.4 °F (36.9 °C)  --  --    Temp src  --  --  Oral  --  --    Pulse  66  69  65  64  67    Heart Rate Source  --  --  --  --  Monitor    Resp  20  --  19  20  20    Resp Rate Source  --  --  --  --  Visual    BP  148/90  146/80  149/73  143/74  --    Noninvasive MAP (mmHg)  116  --  100  95  --       Oxygen Therapy    SpO2  96 %  --  98 %  95 %  94 %    Pulse Oximetry Type  --  --  --  --  Continuous    Device (Oxygen Therapy)  --  --  --  --  room air    Row Name 01/10/19 1854 01/10/19 1849 01/10/19 1803 01/10/19 1632 01/10/19 1600       Vitals    Pulse  65  65  64  63  --    Heart Rate Source  --   "Monitor  Monitor  --  --    Resp  --  20 18 18  --    Resp Rate Source  --  Visual  Monitor  --  --    BP  133/64  --  151/76  145/83  --    Noninvasive MAP (mmHg)  --  --  111  107  --       Oxygen Therapy    SpO2  --  94 %  96 %  97 %  --    Pulse Oximetry Type  --  Continuous  Continuous  Continuous  --    Device (Oxygen Therapy)  --  room air  room air  room air  room air    Row Name 01/10/19 1559 01/10/19 1553 01/10/19 14:53:30 01/10/19 1151 01/10/19 1015       Vitals    Temp  --  --  98.2 °F (36.8 °C)  --  --    Temp src  --  --  Oral  --  --    Pulse  --  63  60  57  61    Heart Rate Source  --  --  Monitor  Monitor  --    Resp  --  18 20 24  --    Resp Rate Source  --  --  Visual  Visual  --    BP  --  136/70  120/67  --  118/63    Noninvasive MAP (mmHg)  --  105  --  --  74    BP Location  --  --  Right arm  --  --    BP Method  --  --  Automatic  --  --    Patient Position  --  --  Sitting  --  --       Oxygen Therapy    SpO2  98 %  95 %  98 %  96 %  95 %    Pulse Oximetry Type  --  Continuous  Continuous  --  --    Device (Oxygen Therapy)  room air  room air  room air  room air  --    Row Name 01/10/19 0959                   Height and Weight    Height  157.5 cm (62\")        Height Method  Stated        Weight  104 kg (230 lb)        Weight Method  Stated        Ideal Body Weight (IBW) (kg)  54.13        BSA (Calculated - sq m)  2.03 sq meters        BMI (Calculated)  42.1        Weight in (lb) to have BMI = 25  136.4           Vitals    Temp  97.9 °F (36.6 °C)        Pulse  65        Resp  18        BP  121/50           Oxygen Therapy    SpO2  98 %            Scheduled Meds Sorted by Name   for Catarino Arvizu as of 1/9/19 through 1/11/19     1 Day 3 Days 7 Days 10 Days < Today >    Legend:                           Inactive     Active     Other Encounter    Linked               Medications 01/09/19 01/10/19 01/11/19   albuterol (PROVENTIL) nebulizer solution 0.083% 2.5 mg/3mL   Dose: 2.5 mg  Freq: " Once Route: NEBULIZATION  Start: 01/10/19 1143 End: 01/10/19 1151     1151             amLODIPine (NORVASC) tablet 10 mg   Dose: 10 mg  Freq: Daily Route: PO  Start: 01/11/19 0900    Admin Instructions:   Caution: Look alike/sound alike drug alert. Avoid grapefruit juice.      0900            aspirin EC tablet 81 mg   Dose: 81 mg  Freq: Daily Route: PO  Start: 01/11/19 0900    Admin Instructions:   Herbal/drug interaction: Avoid use with ginkgo biloba. Do not crush or chew.  Do not exceed 4 grams of aspirin in a 24 hr period.    If given for pain, use the following pain scale:   Mild Pain = Pain Score of 1-3, CPOT 1-2  Moderate Pain = Pain Score of 4-6, CPOT 3-4  Severe Pain = Pain Score of 7-10, CPOT 5-8      0900            atorvastatin (LIPITOR) tablet 80 mg   Dose: 80 mg  Freq: Daily Route: PO  Start: 01/11/19 0900    Admin Instructions:   Avoid grapefruit juice.      0900            carvedilol (COREG) tablet 25 mg   Dose: 25 mg  Freq: 2 Times Daily With Meals Route: PO  Start: 01/10/19 1830    Admin Instructions:   Hold for HR less than 60  Give with food.     1854 0800   1800          cetirizine (zyrTEC) tablet 5 mg   Dose: 5 mg  Freq: Daily Route: PO  Start: 01/11/19 0900      0900            cyclobenzaprine (FLEXERIL) tablet 10 mg   Dose: 10 mg  Freq: Daily Route: PO  Start: 01/11/19 0900      0900            ferrous sulfate tablet 325 mg   Dose: 325 mg  Freq: Daily With Breakfast Route: PO  Start: 01/11/19 0800    Admin Instructions:   Swallow whole. Do not crush, split, or chew. Take with food if GI upset occurs.      0800            ferrous sulfate tablet 325 mg   Dose: 325 mg  Freq: Daily With Breakfast Route: PO  Start: 01/11/19 0800 End: 01/10/19 1824    Admin Instructions:   Swallow whole. Do not crush, split, or chew. Take with food if GI upset occurs.     1824-D/C'd           furosemide (LASIX) injection 40 mg   Dose: 40 mg  Freq: Every 12 Hours Scheduled Route: IV  Start: 01/10/19 2000  End: 01/11/19 0624 2102          0624-D/C'd          furosemide (LASIX) injection 40 mg   Dose: 40 mg  Freq: Once Route: IV  Start: 01/10/19 1335 End: 01/10/19 1459     1459             glipiZIDE (GLUCOTROL) tablet 10 mg   Dose: 10 mg  Freq: 2 Times Daily Before Meals Route: PO  Start: 01/10/19 1830    Admin Instructions:   Caution: Look alike/sound alike drug alert     1854          0730   1730          hydrALAZINE (APRESOLINE) tablet 25 mg   Dose: 25 mg  Freq: 3 Times Daily Route: PO  Start: 01/10/19 1800    Admin Instructions:   Caution: Look alike/sound alike drug alert     1854          0900   1600   2100        insulin aspart (novoLOG) injection 0-7 Units   Dose: 0-7 Units  Freq: 4 Times Daily Before Meals & Nightly Route: SC  Start: 01/10/19 1730    Admin Instructions:   Correction - Low Dose.  Less than 40 units/day total insulin dose or lean, elderly, renal patients    Blood glucose 150-199 mg/dL - 2 units  Blood glucose 200-249 mg/dL - 3 units  Blood glucose 250-299 mg/dL - 4 units  Blood glucose 300-349 mg/dL - 5 units  Blood glucose 350-400 mg/dL - 6 units  Blood glucose greater than 400 mg/dL - 7 units and call provider       ([id number])   [id number]   1730     2100            insulin detemir (LEVEMIR) injection 60 Units   Dose: 60 Units  Freq: Nightly Route: SC  Start: 01/10/19 2100    Admin Instructions:        2155 2100            iron sucrose (VENOFER) 200 mg in Sodium chloride 0.9 % 100 mL IVPB   Dose: 200 mg  Freq: Every 24 Hours Route: IV  Start: 01/10/19 2000 End: 01/15/19 1959    Admin Instructions:   Infuse 100mg over 15 minutes, 200mg over 30 minutes, 300mg over 90 minutes, 400mg over 2.5 hours, and 500mg over 3.5 to 4 hours.     2155 2000            isosorbide mononitrate (IMDUR) 24 hr tablet 120 mg   Dose: 120 mg  Freq: Daily Route: PO  Start: 01/11/19 0900    Admin Instructions:   Do not crush, or chew.      0900            levothyroxine (SYNTHROID,  LEVOTHROID) tablet 137.5 mcg   Dose: 137.5 mcg  Freq: Every Early Morning Route: PO  Start: 01/11/19 0600    Admin Instructions:   Take on empty stomach.      0645            linagliptin (TRADJENTA) tablet 5 mg   Dose: 5 mg  Freq: Daily Route: PO  Start: 01/11/19 0900      0900            metOLazone (ZAROXOLYN) tablet 5 mg   Dose: 5 mg  Freq: Daily Route: PO  Start: 01/10/19 2000 End: 01/11/19 0624    Admin Instructions:   Give 30 minutes before Lasix     2154 0624-D/C'd          pantoprazole (PROTONIX) EC tablet 40 mg   Dose: 40 mg  Freq: Every Morning Route: PO  Start: 01/11/19 0700    Admin Instructions:   Swallow whole; do not crush, split, or chew.      0646            senna-docusate (PERICOLACE) 8.6-50 MG per tablet 2 tablet   Dose: 2 tablet  Freq: Nightly Route: PO  Start: 01/10/19 2100      0048)   2100          sodium bicarbonate tablet 650 mg   Dose: 650 mg  Freq: 3 Times Daily Route: PO  Start: 01/10/19 1800     1854          0900   1600   2100        sodium chloride 0.9 % flush 3 mL   Dose: 3 mL  Freq: Every 12 Hours Scheduled Route: IV  Start: 01/10/19 2100     2103          0900   2100          sodium polystyrene (KAYEXALATE) powder 30 g   Dose: 30 g  Freq: Once Route: PO  Start: 01/10/19 1143 End: 01/10/19 1459    Admin Instructions:   Mix as a slurry in liquid.     1459             tamsulosin (FLOMAX) 24 hr capsule 0.4 mg   Dose: 0.4 mg  Freq: Nightly Route: PO  Start: 01/10/19 2100    Admin Instructions:   Swallow whole. Do not crush or chew.     2154 2100            technetium albumin aggregated (MAA) solution 1 dose   Dose: 1 dose  Freq: Once in Imaging Route: IV  Start: 01/10/19 1221 End: 01/10/19 1223    Order specific questions:   Millicuries: 4.3     1223             triamcinolone (KENALOG) 0.1 % cream 1 application   Dose: 1 application  Freq: Every 12 Hours Scheduled Route: TOP  Start: 01/10/19 2100    Admin Instructions:   Apply thin layer to rash     [id number]    2100          Medications 01/09/19 01/10/19 01/11/19       Continuous Meds Sorted by Name   for Catarino Arvizu as of 1/9/19 through 1/11/19    Legend:                           Inactive     Active     Other Encounter    Linked               Medications 01/09/19 01/10/19 01/11/19       PRN Meds Sorted by Name   for Catarino Arvizu as of 1/9/19 through 1/11/19    Legend:                           Inactive     Active     Other Encounter    Linked               Medications 01/09/19 01/10/19 01/11/19   dextrose (D50W) 25 g/ 50mL Intravenous Solution 25 g   Dose: 25 g  Freq: Every 15 Minutes PRN Route: IV  PRN Reason: Low Blood Sugar  PRN Comment: Blood Sugar Less Than 70  Start: 01/10/19 1653    Admin Instructions:   Blood sugar less than 70; patient has IV access - Unresponsive, NPO or Unable To Safely Swallow         dextrose (GLUTOSE) oral gel 15 g   Dose: 15 g  Freq: Every 15 Minutes PRN Route: PO  PRN Reason: Low Blood Sugar  PRN Comment: Blood sugar less than 70  Start: 01/10/19 1653    Admin Instructions:   BS<70, Patient Alert, Is not NPO, Can safely swallow.         glucagon (human recombinant) (GLUCAGEN DIAGNOSTIC) injection 1 mg   Dose: 1 mg  Freq: As Needed Route: SC  PRN Comment: Blood Glucose Less Than 70  Start: 01/10/19 1653    Admin Instructions:   Blood Glucose Less Than 70 - Patient Without IV Access - Unresponsive, NPO or Unable To Safely Swallow         HYDROcodone-acetaminophen (NORCO) 7.5-325 MG per tablet 1 tablet   Dose: 1 tablet  Freq: 2 Times Daily PRN Route: PO  PRN Reason: Moderate Pain   Start: 01/10/19 5798    Admin Instructions:       Do not exceed 4 grams of acetaminophen in a 24 hr period.    If given for pain, use the following pain scale:   Mild Pain = Pain Score of 1-3, CPOT 1-2  Moderate Pain = Pain Score of 4-6, CPOT 3-4  Severe Pain = Pain Score of 7-10, CPOT 5-8         hydrOXYzine (ATARAX) tablet 25 mg   Dose: 25 mg  Freq: 3 Times Daily PRN Route: PO  PRN Reason: Itching  Start:  01/10/19 1738    Admin Instructions:   Caution: Look alike/sound alike drug alert         ipratropium-albuterol (DUO-NEB) nebulizer solution 3 mL   Dose: 3 mL  Freq: Every 6 Hours PRN Route: NEBULIZATION  PRN Reasons: Wheezing,Shortness of Air  Start: 01/10/19 1653     1849          0718            nitroglycerin (NITROSTAT) SL tablet 0.4 mg   Dose: 0.4 mg  Freq: Every 5 Minutes PRN Route: SL  PRN Reason: Chest Pain  Start: 01/10/19 1800    Admin Instructions:   May administer up to 3 doses per episode.         sodium chloride 0.9 % flush 10 mL   Dose: 10 mL  Freq: As Needed Route: IV  PRN Reason: Line Care  Start: 01/10/19 1008         sodium chloride 0.9 % flush 3-10 mL   Dose: 3-10 mL  Freq: As Needed Route: IV  PRN Reason: Line Care  Start: 01/10/19 1653         Medications 01/09/19 01/10/19 01/11/19

## 2019-01-11 NOTE — PLAN OF CARE
Problem: Skin Injury Risk (Adult)  Goal: Identify Related Risk Factors and Signs and Symptoms  Outcome: Outcome(s) achieved Date Met: 01/11/19    Goal: Skin Health and Integrity  Outcome: Ongoing (interventions implemented as appropriate)      Problem: Fall Risk (Adult)  Goal: Identify Related Risk Factors and Signs and Symptoms  Outcome: Outcome(s) achieved Date Met: 01/11/19    Goal: Absence of Fall  Outcome: Ongoing (interventions implemented as appropriate)      Problem: Diabetes, Type 2 (Adult)  Goal: Signs and Symptoms of Listed Potential Problems Will be Absent, Minimized or Managed (Diabetes, Type 2)  Outcome: Ongoing (interventions implemented as appropriate)      Problem: Fluid Volume Excess (Adult)  Goal: Identify Related Risk Factors and Signs and Symptoms  Outcome: Outcome(s) achieved Date Met: 01/11/19    Goal: Optimal Fluid Balance  Outcome: Ongoing (interventions implemented as appropriate)      Problem: Patient Care Overview  Goal: Plan of Care Review  Outcome: Ongoing (interventions implemented as appropriate)   01/11/19 0218   Coping/Psychosocial   Plan of Care Reviewed With patient   Plan of Care Review   Progress improving     Goal: Discharge Needs Assessment  Outcome: Ongoing (interventions implemented as appropriate)   01/10/19 1551 01/11/19 0218   Discharge Needs Assessment   Readmission Within the Last 30 Days --  no previous admission in last 30 days   Concerns to be Addressed --  denies needs/concerns at this time   Patient/Family Anticipates Transition to home with family --    Patient/Family Anticipated Services at Transition none --    Transportation Anticipated family or friend will provide --    Disability   Equipment Currently Used at Home cane, straight;nebulizer;oxygen;walker, rolling --       01/10/19 1551 01/11/19 0218   Discharge Needs Assessment   Readmission Within the Last 30 Days --  no previous admission in last 30 days   Concerns to be Addressed --  denies needs/concerns  at this time   Patient/Family Anticipates Transition to home with family --    Patient/Family Anticipated Services at Transition none --    Transportation Anticipated family or friend will provide --    Disability   Equipment Currently Used at Home cane, straight;nebulizer;oxygen;walker, rolling --

## 2019-01-11 NOTE — PROGRESS NOTES
Nutrition Services    Patient Name:  Catarino Arvizu  YOB: 1955  MRN: [id number]  Admit Date:  1/10/2019    Consult received for education.  Pt educated on Renal / Consistent carbohydrate diet.  Provided written information and reviewed. Reviewed CHO counting, portion control, and provided food choices / foods to avoid for Renal Diet.  RD name and phone number provided.     Electronically signed by:  Ana Leal RD  01/11/19 4:03 PM

## 2019-01-11 NOTE — CONSULTS
Inpatient Cardiology Consult  Consult performed by: Lay Quiñones APRN  Consult ordered by: Sangeeta Quiles APRN        Date of Admit: 1/10/2019  Date of Consult: 01/11/19  No ref. provider found  Catarino Arvizu  1955    Consulting Physician: Petros Gonsales MD    Cardiology consultation    Reason for consultation:  Chest pain on exertion    Assessment:  1. Chest pain with typical and atypical features in the setting of no acute ischemic changes on EKG, normal troponin and normal stress test on 5/7/2018.  2. Acute on chronic diastolic heart failure with BNP of 386.  Gentle diuresis per primary and nephrology.  3. Chronic kidney disease with acute kidney injury, creatinine currently 3.4, increased from 2.9 during November hospitalization.      Recommendations:  1. With symptoms of recurrent chest pain and multiple risk factors for coronary artery disease, I did discuss cardiac catheterization with Mr. Arvizu.  At this time he is not interested in cardiac catheterization due to further kidney injury which may result in the need for dialysis.  He will continue current medications which include Imdur, carvedilol, amlodipine, atorvastatin, aspirin.  Patient is currently maxed out on Imdur.  Ranexa is contraindicated due to CKD.  BB could be increased if nephrology agrees.    2. With regard to heart failure, Mr Arvizu has diuresed substantially since admission.  His lungs are clear.  He shows only LE edema.  Continue gentle diuresis.    3. Thank you for the consult.          History of Present Illness    Subjective     Chief Complaint   Patient presents with   • Shortness of Breath       Catarino Arvizu is a 63 y.o. male with past medical history significant for diastolic heart failure with grade 1 diastolic dysfunction and LVEF of 61-65% per echocardiogram of 5/7/2018, hypertension, hyperlipidemia, diabetes mellitus, chronic kidney disease, sleep apnea, CPAP dependent, polio, history of prostate cancer and  tobacco use.  He presented to the ED on 1/10/2019 with complaints of smothering.  He reported that have been occurring for over one month.  He reported dry cough for the last week.  Denied fever.  He reported generalized abdominal pain.  CT of the abdomen was unremarkable.  Chest x-ray was unremarkable.  CT of the chest showed tiny bilateral pleural effusions, bilateral groundglass attenuations and probably represents atelectasis or sequela of congestive failure.  Potassium was noted to be 5.7.  He was admitted for further evaluation and management.  Cardiology was consulted for chest pain on exertion.    The patient reports that he has chest pain every day, throughout the day.  He describes this as a burning sensation in the mid sternum area which radiates to the left shoulder.  The pain lasts for just a few minutes and resolves spontaneously.  He is not sure if this causes him to be more short of breath than usual.  He denies diaphoresis and nausea.    Cardiac risk factors:diabetes mellitus, hypercholesterolemia, hypertension, Sedentary life style, Obesity and kidney disease    Last Echo: 5/7/2018  Interpretation Summary     · Normal left ventricular cavity size and wall thickness noted.  · Left ventricular systolic function is normal.Estimated EF appears to be in the range of 61 - 65%.  · Left ventricular diastolic dysfunction (grade I) consistent with impaired relaxation.  · No significant valvular heart disease  · Trace aortic valve regurgitation is present. No aortic valve stenosis is present.  · There is no evidence of pericardial effusion.           Last Stress: 5/7/2018  Interpretation Summary     · Findings consistent with a normal ECG stress test.  · Myocardial perfusion imaging indicates a normal myocardial perfusion study with no evidence of ischemia.  · TID:1.29.  · Normal LV cavity size. Normal LV wall motion noted.  · Left ventricular ejection fraction is normal (Calculated EF = 69%).  · Impressions  are consistent with a low risk study.           Last Cath:      Past Medical History:   Diagnosis Date   • Chest pain    • CHF (congestive heart failure) (CMS/[facility]) 1/10/2019   • Chronic kidney disease    • CPAP (continuous positive airway pressure) dependence    • Diabetes mellitus (CMS/[facility])    • Elevated cholesterol    • GERD (gastroesophageal reflux disease)    • Heart murmur    • Hyperlipidemia    • Hypertension    • Irregular heart beat    • Polio    • Prostate cancer (CMS/[facility]) 05/2016    Dr. Khalif Kohler MD- [place], [place]   • Shortness of breath    • Sleep apnea      Past Surgical History:   Procedure Laterality Date   • CARDIAC CATHETERIZATION  2008    50% diffuse LAD stenosis, 50% septal  branch   • COLONOSCOPY      Long time ago   • COLONOSCOPY N/A 5/9/2018    Procedure: COLONOSCOPY  CPTCODE:65963;  Surgeon: Bob Mcguire III, MD;  Location: [facility] OR;  Service: Gastroenterology   • ENDOSCOPY N/A 5/9/2018    Procedure: ESOPHAGOGASTRODUODENOSCOPY WITH BIOPSY  CPTCODE:33561;  Surgeon: Bob Mcguire III, MD;  Location: [facility] OR;  Service: Gastroenterology   • HEMORRHOIDECTOMY     • HERNIA REPAIR     • UPPER GASTROINTESTINAL ENDOSCOPY      Long time ago   • URETHRAL DILATATION N/A 12/13/2017    Procedure: URETHRAL DILATATION;  Surgeon: Khalif Kohler MD;  Location: [facility] OR;  Service:      Family History   Problem Relation Age of Onset   • Heart disease Brother    • Diabetes Brother    • Cancer Brother         not sure the kind   • Heart disease Brother    • Diabetes Brother    • Diabetes Sister    • Diabetes Daughter    • Heart disease Daughter    • Pancreatitis Daughter    • Crohn's disease Daughter    • Diabetes Son    • Heart disease Son      Social History     Tobacco Use   • Smoking status: Never Smoker   • Smokeless tobacco: Current User     Types: Chew   • Tobacco comment: Chewed tobacco since he was 4 yrs old.   Substance Use Topics   • Alcohol use: No   • Drug  use: No     Medications Prior to Admission   Medication Sig Dispense Refill Last Dose   • amLODIPine (NORVASC) 10 MG tablet Take 10 mg by mouth Daily.   1/10/2019 at 0600   • aspirin 81 MG EC tablet Take 81 mg by mouth Daily.   1/10/2019 at 0600   • atorvastatin (LIPITOR) 80 MG tablet Take 80 mg by mouth daily.   1/10/2019 at 0600   • carvedilol (COREG) 25 MG tablet Take 1 tablet by mouth 2 (Two) Times a Day. 180 tablet 3 1/10/2019 at 0600   • cetirizine (ZyrTEC) 10 MG tablet Take 10 mg by mouth daily.   1/10/2019 at 0600   • cyclobenzaprine (FLEXERIL) 10 MG tablet Take 10 mg by mouth Daily.   1/10/2019 at 0600   • fenofibrate (TRICOR) 145 MG tablet Take 145 mg by mouth Daily.   1/10/2019 at 0600   • ferrous sulfate 325 (65 FE) MG tablet Take 325 mg by mouth Daily With Breakfast.   1/10/2019 at 0600   • furosemide (LASIX) 40 MG tablet Take 40 mg by mouth Take As Directed. Prior to [facility] Admission, Patient was on: Take 1 tablet every day except Wednesday and Vic   1/10/2019 at 0600   • glimepiride (AMARYL) 4 MG tablet Take 4 mg by mouth 2 (two) times a day.   1/10/2019 at 0600   • hydrALAZINE (APRESOLINE) 25 MG tablet Take 25 mg by mouth 3 (Three) Times a Day.   1/10/2019 at 0600   • Insulin Glargine (BASAGLAR KWIKPEN) 100 UNIT/ML injection pen Inject 60 Units under the skin into the appropriate area as directed Every Night.   1/9/2019 at Unknown time   • isosorbide mononitrate (IMDUR) 120 MG 24 hr tablet Take 120 mg by mouth Daily.   1/10/2019 at 0600   • levothyroxine (SYNTHROID, LEVOTHROID) 137 MCG tablet Take 137 mcg by mouth Daily.   1/10/2019 at 0600   • pantoprazole (PROTONIX) 40 MG EC tablet Take 40 mg by mouth Daily.   1/10/2019 at 0600   • raNITIdine (ZANTAC) 300 MG tablet Take 300 mg by mouth Every Night.   1/9/2019 at Unknown time   • senna-docusate (SENEXON-S) 8.6-50 MG per tablet Take 2 tablets by mouth Every Night.   1/9/2019 at Unknown time   • sitaGLIPtin (JANUVIA) 50 MG tablet Take 1 tablet by  mouth daily. 30 tablet 3 1/10/2019 at 0600   • sodium bicarbonate 650 MG tablet Take 650 mg by mouth 3 (Three) Times a Day.   1/10/2019 at 0600   • tamsulosin (FLOMAX) 0.4 MG capsule 24 hr capsule Take 1 capsule by mouth Every Night.   1/9/2019 at Unknown time   • triamcinolone (KENALOG) 0.1 % cream Apply 1 application topically to the appropriate area as directed 2 (Two) Times a Day. Prior to [facility] Admission, Patient was on: applies to rash   1/9/2019 at Unknown time   • HYDROcodone-acetaminophen (NORCO) 7.5-325 MG per tablet Take 1 tablet by mouth 2 (two) times a day as needed for moderate pain (4-6).   Unknown at Unknown time   • hydrOXYzine (VISTARIL) 25 MG capsule Take 25 mg by mouth Every 6 (Six) Hours As Needed for Itching.   Unknown at Unknown time   • nitroglycerin (NITROSTAT) 0.4 MG SL tablet Place 1 tablet under the tongue Every 5 (Five) Minutes As Needed for Chest Pain. Take no more than 3 doses in 15 minutes. 25 tablet 0 Unknown at Unknown time     Allergies:  Patient has no known allergies.    Review of Systems   Constitutional: Negative for fatigue.   Respiratory: Positive for shortness of breath.    Cardiovascular: Negative for chest pain, palpitations and leg swelling.   Gastrointestinal: Negative for blood in stool.   Neurological: Negative for dizziness, syncope, weakness and light-headedness.   Hematological: Does not bruise/bleed easily.         Objective      Vital Signs  Temp:  [97.9 °F (36.6 °C)-98.4 °F (36.9 °C)] 98.2 °F (36.8 °C)  Heart Rate:  [56-69] 61  Resp:  [15-24] 18  BP: (118-176)/(50-94) 150/77  Body mass index is 43.95 kg/m².    Intake/Output Summary (Last 24 hours) at 1/11/2019 0843  Last data filed at 1/11/2019 0644  Gross per 24 hour   Intake 700 ml   Output 2725 ml   Net -2025 ml       Physical Exam   Constitutional: He appears well-developed and well-nourished.   HENT:   Head: Normocephalic and atraumatic.   Eyes: Pupils are equal, round, and reactive to light.   Neck: No  JVD present.   Cardiovascular: Normal rate and regular rhythm. Exam reveals no gallop and no friction rub.   No murmur heard.  Pulmonary/Chest: Effort normal and breath sounds normal. No respiratory distress. He has no wheezes. He has no rales.   Abdominal: Soft. He exhibits no mass. There is no tenderness. No hernia.   Skin: Skin is warm and dry.   Psychiatric: He has a normal mood and affect.   Vitals reviewed.      Telemetry:  Sinus 60s    Results Review:   I reviewed the patient's new clinical results.  Results from last 7 days   Lab Units  01/11/19   0306  01/10/19   1956  01/10/19   1724  01/10/19   1439  01/10/19   1018   CK TOTAL U/L  42  40   --   41   --    TROPONIN I ng/mL  0.011  0.006   --   0.008  0.009   CKMB ng/mL  1.13  1.18   --   1.23   --    MYOGLOBIN ng/mL  102.0  97.0  96.0   --    --      Results from last 7 days   Lab Units  01/11/19   0306  01/10/19   1018  01/08/19   1007   WBC 10*3/mm3  5.05  4.21*  4.07*   HEMOGLOBIN g/dL  7.2*  7.4*  7.6*   PLATELETS 10*3/mm3  166  182  184     Results from last 7 days   Lab Units  01/11/19   0306  01/10/19   1331  01/10/19   1018  01/08/19   1007   SODIUM mmol/L  141   --   138  137   POTASSIUM mmol/L  4.4  5.3  5.7*  4.8   CHLORIDE mmol/L  108   --   109  106   CO2 mmol/L  23.1*   --   20.9*  22.4*   BUN mg/dL  55*   --   49*  44*   CREATININE mg/dL  3.41*   --   3.31*  3.17*   CALCIUM mg/dL  8.9   --   8.8  8.7   GLUCOSE mg/dL  106   --   223*  222*   ALT (SGPT) U/L  14   --   17  18   AST (SGOT) U/L  21   --   27  20     No results found for: INR  Lab Results   Component Value Date    MG 1.8 01/11/2019    MG 2.3 07/11/2018     Lab Results   Component Value Date    TSH 3.991 01/10/2019    PSA 3.100 11/06/2018    TRIG 1,214 (H) 08/09/2016    HDL 32 (L) 08/09/2016    LDL  08/09/2016      Comment:      Unable to calculate      Lab Results   Component Value Date    .0 (H) 01/11/2019    .0 (H) 01/10/2019    .0 (H) 11/12/2018         EKG:         Imaging Results (last 72 hours)     Procedure Component Value Units Date/Time    US Venous Doppler Lower Extremity Bilateral (duplex) [[id number]] Resulted:  01/11/19 0843     Updated:  01/10/19 1521    NM Lung Scan Perfusion Particulate [[id number]] Collected:  01/10/19 1326     Updated:  01/10/19 1330    Narrative:       EXAMINATION: NM LUNG SCAN PERFUSION PARTICULATE-      CLINICAL INDICATION: SOB and Pain      TECHNIQUE:  4.3 mCi of Tc-99m MAA were administered IV for a perfusion  lung scan. Ventilation could not be performed.     COMPARISON: Chest XRAY performed on January 10, 2019.     FINDINGS: Multiple perfusion images demonstrate a relatively homogeneous  pattern of pulmonary capillary tracer distribution throughout the lungs.   Specifically, there are no segmental or sub-segmental defects to  suggest pulmonary embolism.        Impression:       Low suspicion of PE.         This report was finalized on 1/10/2019 1:26 PM by Dr. Tate Subramanian MD.       CT Abdomen Pelvis Without Contrast [[id number]] Collected:  01/10/19 1058     Updated:  01/10/19 1104    Narrative:          CT ABDOMEN PELVIS WO CONTRAST-        TECHNIQUE: Multiple axial CT images were obtained from lung bases  through pubic symphysis without administration of IV contrast.  Reformatted images in the coronal and/or sagittal plane(s) were  generated from the axial data set to facilitate diagnostic accuracy  and/or surgical planning.  Oral Contrast:NONE.     Radiation dose reduction techniques were utilized per ALARA protocol.  Automated exposure control was initiated through either or CareDose or  DoseRight software packages by  protocol.       [phone] mGy.cm     Clinical information  swelling      Comparison  NONE.     Findings  LOWER THORAX: Clear. No effusions.     ABDOMEN:        LIVER: Homogeneous. No focal hepatic mass or ductal dilatation.        GALLBLADDER: No dilation or stone identified.        PANCREAS:  Unremarkable. No mass or ductal dilatation.        SPLEEN: Homogeneous. No splenomegaly.        ADRENALS: No mass.        KIDNEYS: No mass. No obstructive uropathy.  No evidence of  urolithiasis.        GI TRACT: Non-dilated. No definite wall thickening.        PERITONEUM: No free air. No free fluid or loculated fluid  collections.        MESENTERY: Unremarkable.        LYMPH NODES: No lymphadenopathy.        VASCULATURE: No evidence of aneurysm.        ABDOMINAL WALL: No focal hernia or mass.           OTHER: None.     PELVIS:        BLADDER: No focal mass or significant wall thickening        REPRODUCTIVE: Unremarkable as visualized.        APPENDIX: Nondistended. No surrounding inflammation.     BONES: No acute bony abnormality.       Impression:       Impression:  1. Unremarkable exam.  No source for the patient symptoms.  2. Other incidental/non-acute findings as above.     This report was finalized on 1/10/2019 11:00 AM by Dr. Tate Subramanian MD.       CT Chest Without Contrast [[id number]] Collected:  01/10/19 1100     Updated:  01/10/19 1104    Narrative:       CT CHEST WO CONTRAST-        TECHNIQUE: Multiple axial CT images were obtained from lung apex through  upper abdomen without administration of IV contrast. Reformatted images  in the coronal and/or sagittal plane(s) were generated from the axial  data set to facilitate diagnostic accuracy and/or surgical planning.  Oral Contrast:NONE.     Radiation dose reduction techniques were utilized per ALARA protocol.  Automated exposure control was initiated through either or CareDoDailyLook or  DoseRigPinch Media software packages by  protocol.          [id number] mGy.cm  Clinical information  sob      Comparison  NONE.     Findings  LUNGS: BILATERAL GROUNDGLASS ATTENUATION AND PROBABLY REPRESENTS  ATELECTASIS OR SEQUELA OF CONGESTIVE FAILURE.     HEART: Unremarkable.     PERICARDIUM: No effusion.     MEDIASTINUM: No masses. No enlarged lymph nodes.  No fluid  collections.     PLEURA: TINY BILATERAL PLEURAL EFFUSIONS.     MAJOR AIRWAYS: Clear. No intrinsic mass.     VASCULATURE: No evidence of aneurysm.     VISUALIZED UPPER ABDOMEN:        LIVER: Homogeneous. No focal hepatic mass or ductal dilatation.        SPLEEN: Homogeneous. No splenomegaly.        ADRENALS: No mass.        KIDNEYS: No mass. No obstructive uropathy.  No evidence of  urolithiasis.        GI TRACT: Non-dilated. No definite wall thickening.        PERITONEUM: No free air. No free fluid or loculated fluid  collections.           ABDOMINAL WALL: No focal hernia or mass.           OTHER: None.     BONES: No acute bony abnormality.       Impression:       Impression:  Tiny bilateral pleural effusions. Bilateral groundglass attenuation and  probably represents atelectasis or sequela of congestive failure.     This report was finalized on 1/10/2019 11:01 AM by Dr. Tate Subramanian MD.       XR Chest 2 View [[id number]] Collected:  01/10/19 1058     Updated:  01/10/19 1104    Narrative:       EXAMINATION: XR CHEST 2 VW-      CLINICAL INDICATION:     sob     TECHNIQUE:  XR CHEST 2 VW-      COMPARISON: NONE      FINDINGS:   The lungs remain aerated.  Heart and mediastinum contours are unremarkable.  No pleural effusion.  No pneumothorax.   Bony and soft tissue structures are unremarkable.       Impression:       No radiographic evidence of acute cardiac or pulmonary  disease.     This report was finalized on 1/10/2019 10:58 AM by Dr. Tate Subramanian MD.                Thank you very much for asking us to be involved in this patient's care.  We will follow along with you.    Lay uQiñones, APRN   01/11/19  8:43 AM

## 2019-01-12 LAB
ABO GROUP BLD: NORMAL
ABO GROUP BLD: NORMAL
ANION GAP SERPL CALCULATED.3IONS-SCNC: 12.4 MMOL/L (ref 3.6–11.2)
BASOPHILS # BLD AUTO: 0.02 10*3/MM3 (ref 0–0.3)
BASOPHILS NFR BLD AUTO: 0.5 % (ref 0–2)
BLD GP AB SCN SERPL QL: NEGATIVE
BUN BLD-MCNC: 51 MG/DL (ref 7–21)
BUN/CREAT SERPL: 14.4 (ref 7–25)
CALCIUM SPEC-SCNC: 8.5 MG/DL (ref 7.7–10)
CHLORIDE SERPL-SCNC: 108 MMOL/L (ref 99–112)
CO2 SERPL-SCNC: 22.6 MMOL/L (ref 24.3–31.9)
CREAT BLD-MCNC: 3.53 MG/DL (ref 0.43–1.29)
DEPRECATED RDW RBC AUTO: 46.6 FL (ref 37–54)
EOSINOPHIL # BLD AUTO: 0.16 10*3/MM3 (ref 0–0.7)
EOSINOPHIL NFR BLD AUTO: 3.8 % (ref 0–5)
ERYTHROCYTE [DISTWIDTH] IN BLOOD BY AUTOMATED COUNT: 14.3 % (ref 11.5–14.5)
GFR SERPL CREATININE-BSD FRML MDRD: 18 ML/MIN/1.73
GLUCOSE BLD-MCNC: 110 MG/DL (ref 70–110)
GLUCOSE BLDC GLUCOMTR-MCNC: 220 MG/DL (ref 70–130)
GLUCOSE BLDC GLUCOMTR-MCNC: 231 MG/DL (ref 70–130)
GLUCOSE BLDC GLUCOMTR-MCNC: 237 MG/DL (ref 70–130)
GLUCOSE BLDC GLUCOMTR-MCNC: 79 MG/DL (ref 70–130)
HCT VFR BLD AUTO: 23.3 % (ref 42–52)
HGB BLD-MCNC: 6.8 G/DL (ref 14–18)
IMM GRANULOCYTES # BLD AUTO: 0.01 10*3/MM3 (ref 0–0.03)
IMM GRANULOCYTES NFR BLD AUTO: 0.2 % (ref 0–0.5)
LYMPHOCYTES # BLD AUTO: 1.44 10*3/MM3 (ref 1–3)
LYMPHOCYTES NFR BLD AUTO: 34 % (ref 21–51)
MCH RBC QN AUTO: 28.3 PG (ref 27–33)
MCHC RBC AUTO-ENTMCNC: 29.2 G/DL (ref 33–37)
MCV RBC AUTO: 97.1 FL (ref 80–94)
MONOCYTES # BLD AUTO: 0.4 10*3/MM3 (ref 0.1–0.9)
MONOCYTES NFR BLD AUTO: 9.5 % (ref 0–10)
NEUTROPHILS # BLD AUTO: 2.2 10*3/MM3 (ref 1.4–6.5)
NEUTROPHILS NFR BLD AUTO: 52 % (ref 30–70)
OSMOLALITY SERPL CALC.SUM OF ELEC: 299.3 MOSM/KG (ref 273–305)
PLATELET # BLD AUTO: 154 10*3/MM3 (ref 130–400)
PMV BLD AUTO: 11.6 FL (ref 6–10)
POTASSIUM BLD-SCNC: 4.2 MMOL/L (ref 3.5–5.3)
RBC # BLD AUTO: 2.4 10*6/MM3 (ref 4.7–6.1)
RH BLD: POSITIVE
RH BLD: POSITIVE
SODIUM BLD-SCNC: 143 MMOL/L (ref 135–153)
T&S EXPIRATION DATE: NORMAL
WBC NRBC COR # BLD: 4.23 10*3/MM3 (ref 4.5–12.5)

## 2019-01-12 PROCEDURE — 86901 BLOOD TYPING SEROLOGIC RH(D): CPT

## 2019-01-12 PROCEDURE — 94799 UNLISTED PULMONARY SVC/PX: CPT

## 2019-01-12 PROCEDURE — 86850 RBC ANTIBODY SCREEN: CPT | Performed by: HOSPITALIST

## 2019-01-12 PROCEDURE — 63710000001 INSULIN DETEMIR PER 5 UNITS: Performed by: INTERNAL MEDICINE

## 2019-01-12 PROCEDURE — 36415 COLL VENOUS BLD VENIPUNCTURE: CPT | Performed by: NURSE PRACTITIONER

## 2019-01-12 PROCEDURE — 86900 BLOOD TYPING SEROLOGIC ABO: CPT | Performed by: HOSPITALIST

## 2019-01-12 PROCEDURE — P9016 RBC LEUKOCYTES REDUCED: HCPCS

## 2019-01-12 PROCEDURE — 86900 BLOOD TYPING SEROLOGIC ABO: CPT

## 2019-01-12 PROCEDURE — 80048 BASIC METABOLIC PNL TOTAL CA: CPT | Performed by: INTERNAL MEDICINE

## 2019-01-12 PROCEDURE — 85025 COMPLETE CBC W/AUTO DIFF WBC: CPT | Performed by: NURSE PRACTITIONER

## 2019-01-12 PROCEDURE — 99254 IP/OBS CNSLTJ NEW/EST MOD 60: CPT | Performed by: SURGERY

## 2019-01-12 PROCEDURE — 99232 SBSQ HOSP IP/OBS MODERATE 35: CPT | Performed by: INTERNAL MEDICINE

## 2019-01-12 PROCEDURE — 63710000001 INSULIN ASPART PER 5 UNITS: Performed by: NURSE PRACTITIONER

## 2019-01-12 PROCEDURE — 82962 GLUCOSE BLOOD TEST: CPT

## 2019-01-12 PROCEDURE — 86901 BLOOD TYPING SEROLOGIC RH(D): CPT | Performed by: HOSPITALIST

## 2019-01-12 PROCEDURE — 36430 TRANSFUSION BLD/BLD COMPNT: CPT

## 2019-01-12 PROCEDURE — 25010000002 IRON SUCROSE PER 1 MG: Performed by: INTERNAL MEDICINE

## 2019-01-12 PROCEDURE — 86923 COMPATIBILITY TEST ELECTRIC: CPT

## 2019-01-12 PROCEDURE — 99233 SBSQ HOSP IP/OBS HIGH 50: CPT | Performed by: INTERNAL MEDICINE

## 2019-01-12 RX ORDER — BISACODYL 5 MG/1
10 TABLET, DELAYED RELEASE ORAL ONCE
Status: COMPLETED | OUTPATIENT
Start: 2019-01-12 | End: 2019-01-12

## 2019-01-12 RX ADMIN — PANTOPRAZOLE SODIUM 40 MG: 40 TABLET, DELAYED RELEASE ORAL at 05:43

## 2019-01-12 RX ADMIN — BISACODYL 10 MG: 5 TABLET, COATED ORAL at 21:41

## 2019-01-12 RX ADMIN — INSULIN DETEMIR 60 UNITS: 100 INJECTION, SOLUTION SUBCUTANEOUS at 21:43

## 2019-01-12 RX ADMIN — CYCLOBENZAPRINE 10 MG: 10 TABLET, FILM COATED ORAL at 09:49

## 2019-01-12 RX ADMIN — HYDRALAZINE HYDROCHLORIDE 75 MG: 50 TABLET ORAL at 18:43

## 2019-01-12 RX ADMIN — CARVEDILOL 25 MG: 25 TABLET, FILM COATED ORAL at 09:45

## 2019-01-12 RX ADMIN — HYDRALAZINE HYDROCHLORIDE 75 MG: 50 TABLET ORAL at 09:47

## 2019-01-12 RX ADMIN — LINAGLIPTIN 5 MG: 5 TABLET, FILM COATED ORAL at 09:44

## 2019-01-12 RX ADMIN — CETIRIZINE HCL 5 MG: 10 TABLET ORAL at 09:48

## 2019-01-12 RX ADMIN — TRIAMCINOLONE ACETONIDE 1 APPLICATION: 1 CREAM TOPICAL at 21:43

## 2019-01-12 RX ADMIN — IPRATROPIUM BROMIDE AND ALBUTEROL SULFATE 3 ML: .5; 3 SOLUTION RESPIRATORY (INHALATION) at 19:09

## 2019-01-12 RX ADMIN — SODIUM CHLORIDE, PRESERVATIVE FREE 3 ML: 5 INJECTION INTRAVENOUS at 21:43

## 2019-01-12 RX ADMIN — GLIPIZIDE 10 MG: 5 TABLET ORAL at 18:48

## 2019-01-12 RX ADMIN — HYDRALAZINE HYDROCHLORIDE 75 MG: 50 TABLET ORAL at 21:42

## 2019-01-12 RX ADMIN — SODIUM BICARBONATE TAB 650 MG 650 MG: 650 TAB at 21:42

## 2019-01-12 RX ADMIN — TAMSULOSIN HYDROCHLORIDE 0.4 MG: 0.4 CAPSULE ORAL at 21:42

## 2019-01-12 RX ADMIN — IRON SUCROSE 300 MG: 20 INJECTION, SOLUTION INTRAVENOUS at 10:18

## 2019-01-12 RX ADMIN — IPRATROPIUM BROMIDE AND ALBUTEROL SULFATE 3 ML: .5; 3 SOLUTION RESPIRATORY (INHALATION) at 08:06

## 2019-01-12 RX ADMIN — ATORVASTATIN CALCIUM 80 MG: 40 TABLET, FILM COATED ORAL at 09:47

## 2019-01-12 RX ADMIN — GLIPIZIDE 10 MG: 5 TABLET ORAL at 09:45

## 2019-01-12 RX ADMIN — SENNOSIDES AND DOCUSATE SODIUM 2 TABLET: 8.6; 5 TABLET ORAL at 21:42

## 2019-01-12 RX ADMIN — CARVEDILOL 25 MG: 25 TABLET, FILM COATED ORAL at 18:44

## 2019-01-12 RX ADMIN — SODIUM BICARBONATE TAB 650 MG 650 MG: 650 TAB at 18:43

## 2019-01-12 RX ADMIN — INSULIN ASPART 3 UNITS: 100 INJECTION, SOLUTION INTRAVENOUS; SUBCUTANEOUS at 18:48

## 2019-01-12 RX ADMIN — TRIAMCINOLONE ACETONIDE 1 APPLICATION: 1 CREAM TOPICAL at 10:05

## 2019-01-12 RX ADMIN — LEVOTHYROXINE SODIUM 137.5 MCG: 125 TABLET ORAL at 05:43

## 2019-01-12 RX ADMIN — ISOSORBIDE MONONITRATE 120 MG: 60 TABLET, EXTENDED RELEASE ORAL at 09:46

## 2019-01-12 RX ADMIN — ASPIRIN 81 MG: 81 TABLET ORAL at 09:49

## 2019-01-12 RX ADMIN — SODIUM CHLORIDE, PRESERVATIVE FREE 3 ML: 5 INJECTION INTRAVENOUS at 09:50

## 2019-01-12 RX ADMIN — SODIUM BICARBONATE TAB 650 MG 650 MG: 650 TAB at 09:47

## 2019-01-12 RX ADMIN — INSULIN ASPART 3 UNITS: 100 INJECTION, SOLUTION INTRAVENOUS; SUBCUTANEOUS at 21:42

## 2019-01-12 RX ADMIN — HYDROCODONE BITARTRATE AND ACETAMINOPHEN 1 TABLET: 7.5; 325 TABLET ORAL at 21:42

## 2019-01-12 NOTE — PROGRESS NOTES
Patient Identification:  Name:  Catarino Arvizu  Age:  63 y.o.  Sex:  male  :  1955  MRN:  [id number]  Visit Number:  [id number]    Chief Complaint:   SOB    Subjective:    Seen and examined today.   His Hb dropped to 6.8, has been gradually decreasing from 2018 from 7.4 to 6.8 today.   He had EGD / Colonoscopy in 2018 but his decline is after that.   He has received 2 doses of IV Lasix and good diuresis and breathing has improved.   Troponin has been negative.   EKG showed no acute ST-T changes.   Cr gradually increased to 3.5 from 3.1  ----------------------------------------------------------------------------------------------------------------------  Current Hospital Meds:    aspirin 81 mg Oral Daily   atorvastatin 80 mg Oral Daily   carvedilol 25 mg Oral BID With Meals   cetirizine 5 mg Oral Daily   cyclobenzaprine 10 mg Oral Daily   glipiZIDE 10 mg Oral BID AC   hydrALAZINE 75 mg Oral TID   insulin aspart 0-7 Units Subcutaneous 4x Daily AC & at Bedtime   insulin detemir 60 Units Subcutaneous Nightly   iron sucrose (VENOFER) IVPB 300 mg Intravenous Q24H   isosorbide mononitrate 120 mg Oral Daily   levothyroxine 137.5 mcg Oral Q AM   linagliptin 5 mg Oral Daily   pantoprazole 40 mg Oral QAM   sennosides-docusate sodium 2 tablet Oral Nightly   sodium bicarbonate 650 mg Oral TID   sodium chloride 3 mL Intravenous Q12H   tamsulosin 0.4 mg Oral Nightly   triamcinolone 1 application Topical Q12H        ----------------------------------------------------------------------------------------------------------------------  Vital Signs:  Temp:  [97.8 °F (36.6 °C)-98.4 °F (36.9 °C)] 98.2 °F (36.8 °C)  Heart Rate:  [55-68] 64  Resp:  [10-24] 12  BP: (116-154)/(49-89) 142/88      01/10/19  0959 19  0405 19  0400   Weight: 104 kg (230 lb) 109 kg (240 lb 4.8 oz) 109 kg (240 lb 8 oz)     Body mass index is 43.99 kg/m².    Intake/Output Summary (Last 24 hours) at 2019 1158  Last data filed at  1/12/2019 1018  Gross per 24 hour   Intake 835 ml   Output 1500 ml   Net -665 ml     Diet Regular; Cardiac, Consistent Carbohydrate, Renal  ----------------------------------------------------------------------------------------------------------------------  Physical exam:  Constitutional:    HENT:  Head:  Normocephalic and atraumatic.    Eyes:  Conjunctivae and EOM are normal.  Pupils are equal, round, and reactive to light.  No scleral icterus.    Neck:  Neck supple.  No JVD present.    Cardiovascular: Normal rate, regular rhythm, S1 S2+, NO S3 / S4  Pulmonary/Chest: Decreased BS on R side.   Abdominal:  Soft.  Bowel sounds are normal.  No distension and no tenderness.      Neurological:  Alert and oriented to person, place, and time. No focal defecits  Skin:  Skin is warm and dry. No rash noted. No pallor.   Musculoskeletal:  2+ edema, no tenderness, and no deformity.  No red or swollen joints anywhere.   Peripheral vascular:  2+ Pulses B/L DP  ----------------------------------------------------------------------------------------------------------------------    ----------------------------------------------------------------------------------------------------------------------  Results from last 7 days   Lab Units  01/11/19   0306  01/10/19   1956  01/10/19   1724  01/10/19   1439   CK TOTAL U/L  42  40   --   41   CKMB ng/mL  1.13  1.18   --   1.23   CK MB INDEX %  2.7  3.0   --   3.0   TROPONIN I ng/mL  0.011  0.006   --   0.008   MYOGLOBIN ng/mL  102.0  97.0  96.0   --      Results from last 7 days   Lab Units  01/12/19   0345  01/11/19   0306  01/10/19   1018   WBC 10*3/mm3  4.23*  5.05  4.21*   HEMOGLOBIN g/dL  6.8*  7.2*  7.4*   HEMATOCRIT %  23.3*  24.0*  24.0*   MCV fL  97.1*  94.5*  92.3   MCHC g/dL  29.2*  30.0*  30.8*   PLATELETS 10*3/mm3  154  166  182     Results from last 7 days   Lab Units  01/10/19   1020   PH, ARTERIAL pH units  7.365   PO2 ART mm Hg  78.3*   PCO2, ARTERIAL mm Hg  33.2*    HCO3 ART mmol/L  18.5*     Results from last 7 days   Lab Units  01/12/19   0345  01/11/19   0306  01/10/19   1331  01/10/19   1018  01/08/19   1007   SODIUM mmol/L  143  141   --   138  137   POTASSIUM mmol/L  4.2  4.4  5.3  5.7*  4.8   MAGNESIUM mg/dL   --   1.8   --    --    --    CHLORIDE mmol/L  108  108   --   109  106   CO2 mmol/L  22.6*  23.1*   --   20.9*  22.4*   BUN mg/dL  51*  55*   --   49*  44*   CREATININE mg/dL  3.53*  3.41*   --   3.31*  3.17*   EGFR IF NONAFRICN AM mL/min/1.73  18*  18*   --   19*  20*   CALCIUM mg/dL  8.5  8.9   --   8.8  8.7   GLUCOSE mg/dL  110  106   --   223*  222*   ALBUMIN g/dL   --   4.10   --   4.10  4.30  3.3  3.3   BILIRUBIN mg/dL   --   0.3   --   0.3  0.3   ALK PHOS U/L   --   48   --   47  52   AST (SGOT) U/L   --   21   --   27  20   ALT (SGPT) U/L   --   14   --   17  18   Estimated Creatinine Clearance: 22.8 mL/min (A) (by C-G formula based on SCr of 3.53 mg/dL (H)).    No results found for: AMMONIA                    I have personally looked at the labs and they are summarized above.  ----------------------------------------------------------------------------------------------------------------------  Imaging Results (last 24 hours)     ** No results found for the last 24 hours. **        ----------------------------------------------------------------------------------------------------------------------    Assessment and Plan:  Dyspnea , multifactorial, combination of CKD, Anemia, Obesity. SOHAIL  Anemia, etiology unclear, planned for EGD/ Colonoscopy for evaluation of GI bleed.   Chest pain , normal troponin and no changes in EKG, likely from anemia, stress test from 5/2018 was normal. No need for ischemia evaluation at this time.   Normal LV EF from echo.   Normal LV diastolic fucntion.   Cont with Asa,Coreg Statins, Imdur  Will sign off, please call for any questions.               Petros Gonsales MD, FACC  Interventional  Cardiology        01/12/19  11:55 AM

## 2019-01-12 NOTE — OP NOTE
Catarino Arvizu        Operative Progress Note:    Surgeon and Assistant: Dr. Napier    Pre-Operative Diagnosis: sepsis and respiratory failure and kidney failure    Post-Operative Diagnosis: sepsis, respiratory failure, kidney failure    Procedure(s):  placement of left IJ triple catheter with ultrasound guidance    Type of Anesthesia Administered: 1% Xylocaine    Estimated Blood Loss: Minimal    Blood Products: None    Specimen Obtained/Removed:None    Complication(s):  None    Graft/Implant/Prosthetics/Implanted Device/Transplants: triple-lumen catheter    Indication: patient is a 63-year-old white male    Findings: patient extremely obese on the ventilator with a right IJ tunneled dialysis catheter in place    Operative Report:  The patient was placed in Trendelenburg position.  [Left neck was prepped and draped in usual sterile fashion.  1% Xylocaine was infiltrated.  Using ultrasound guidance.  A Triple lumen Arrow Angiocath was placed by modified Seldinger technique.  It was sutured in place in the routine fashion.  The lines were flushed with saline followed by heparinized saline.  A sterile dressing was applied and the procedure was terminated.  Chest x-ray post-procedure is pending at the time of this dictation.  The patient tolerated the procedure well.       Electronically Signed by: Ottoniel Napier MD        Dictated Utilizing Dragon Dictation

## 2019-01-12 NOTE — PROGRESS NOTES
[facility] HOSPITALIST PROGRESS NOTE     Patient Identification:  Name:  Catarino Arvizu  Age:  63 y.o.  Sex:  male  :  1955  MRN:  [id number]  Visit Number:  [id number]  ROOM: [address]     Primary Care Provider:  Eleno Chaney APRN    Length of stay:  2    Subjective     Chief Complaint   Patient presents with   • Shortness of Breath       History of presenting illness:    63 year old male with CKD 4,chronic diastolic CHF recently managed here for chf exac presented to [facility] ED on 1/10/19 with smothering/SOB with activity going on 1 month.   Being managed as acute on chronic CHF exac,hyperkalemia due to CKD    Pt still c/o SOB on minimal activity.No chest pain or fever.    UOP>3.1L since  Admission     hemoglobin 6.8 preparing for  Packed red Blood cell tranfusion        Objective     Current Hospital Meds:    aspirin 81 mg Oral Daily   atorvastatin 80 mg Oral Daily   carvedilol 25 mg Oral BID With Meals   cetirizine 5 mg Oral Daily   cyclobenzaprine 10 mg Oral Daily   glipiZIDE 10 mg Oral BID AC   hydrALAZINE 75 mg Oral TID   insulin aspart 0-7 Units Subcutaneous 4x Daily AC & at Bedtime   insulin detemir 60 Units Subcutaneous Nightly   iron sucrose (VENOFER) IVPB 300 mg Intravenous Q24H   isosorbide mononitrate 120 mg Oral Daily   levothyroxine 137.5 mcg Oral Q AM   linagliptin 5 mg Oral Daily   pantoprazole 40 mg Oral QAM   sennosides-docusate sodium 2 tablet Oral Nightly   sodium bicarbonate 650 mg Oral TID   sodium chloride 3 mL Intravenous Q12H   tamsulosin 0.4 mg Oral Nightly   triamcinolone 1 application Topical Q12H      ----------------------------------------------------------------------------------------------------------------------  Vital Signs:  Temp:  [97.8 °F (36.6 °C)-98.4 °F (36.9 °C)] 98 °F (36.7 °C)  Heart Rate:  [55-68] 61  Resp:  [10-26] 14  BP: (116-154)/(49-89) 122/89  SpO2:  [90 %-99 %] 90 %  on   ;   Device (Oxygen Therapy): room air  Body mass index is  43.99 kg/m².    Wt Readings from Last 3 Encounters:   01/12/19 109 kg (240 lb 8 oz)   12/20/18 107 kg (235 lb)   11/13/18 99.9 kg (220 lb 4 oz)       Intake/Output Summary (Last 24 hours) at 1/12/2019 0803  Last data filed at 1/12/2019 0702  Gross per 24 hour   Intake 720 ml   Output 1425 ml   Net -705 ml     Diet Regular; Cardiac, Consistent Carbohydrate, Renal  ----------------------------------------------------------------------------------------------------------------------  Physical exam:  Constitutional:  Well-developed and well-nourished.  No respiratory distress.      HENT:  Head:  Normocephalic and atraumatic.  Mouth:  Moist mucous membranes.    Eyes:  Conjunctivae and EOM are normal.  Pupils are equal, round, and reactive to light.  No scleral icterus.    Neurological:  Alert and oriented to person, place, and time.  No cranial nerve deficit.  Neck:  Neck supple.  No JVD present.    Cardiovascular:  Normal rate, regular rhythm and normal heart sounds with no murmur.  Pulmonary/Chest:  No respiratory distress, no wheezes, no crackles, with diminished breath sounds both bases and good air movement.  Abdominal:  Soft.  Bowel sounds are normal.  No distension and no tenderness.   Musculoskeletal:  2-3+ bilat pitting pedal edema up to upper shin, no tenderness, and no deformity.  No red or swollen joints anywhere.    Skin:  Skin is warm and dry. No rash noted. No pallor.   Peripheral vascular:  Strong pulses in all 4 extremities with no clubbing, no cyanosis, no edema.  ----------------------------------------------------------------------------------------------------------------------  Tele:    ----------------------------------------------------------------------------------------------------------------------  Results from last 7 days   Lab Units  01/12/19   0345  01/11/19   0306  01/10/19   1018   WBC 10*3/mm3  4.23*  5.05  4.21*   HEMOGLOBIN g/dL  6.8*  7.2*  7.4*   HEMATOCRIT %  23.3*  24.0*  24.0*    MCV fL  97.1*  94.5*  92.3   MCHC g/dL  29.2*  30.0*  30.8*   PLATELETS 10*3/mm3  154  166  182     Results from last 7 days   Lab Units  01/12/19   0345  01/11/19   0306  01/10/19   1331  01/10/19   1018  01/08/19   1007   SODIUM mmol/L  143  141   --   138  137   POTASSIUM mmol/L  4.2  4.4  5.3  5.7*  4.8   MAGNESIUM mg/dL   --   1.8   --    --    --    CHLORIDE mmol/L  108  108   --   109  106   CO2 mmol/L  22.6*  23.1*   --   20.9*  22.4*   BUN mg/dL  51*  55*   --   49*  44*   CREATININE mg/dL  3.53*  3.41*   --   3.31*  3.17*   PHOSPHORUS mg/dL   --   5.9*   --    --   4.7*   EGFR IF NONAFRICN AM mL/min/1.73  18*  18*   --   19*  20*   CALCIUM mg/dL  8.5  8.9   --   8.8  8.7   GLUCOSE mg/dL  110  106   --   223*  222*   ALBUMIN g/dL   --   4.10   --   4.10  4.30  3.3  3.3   BILIRUBIN mg/dL   --   0.3   --   0.3  0.3   ALK PHOS U/L   --   48   --   47  52   AST (SGOT) U/L   --   21   --   27  20   ALT (SGPT) U/L   --   14   --   17  18   Estimated Creatinine Clearance: 22.8 mL/min (A) (by C-G formula based on SCr of 3.53 mg/dL (H)).  Results from last 7 days   Lab Units  01/11/19   0306  01/10/19   1956  01/10/19   1724  01/10/19   1439   CK TOTAL U/L  42  40   --   41   CKMB ng/mL  1.13  1.18   --   1.23   CK MB INDEX %  2.7  3.0   --   3.0   TROPONIN I ng/mL  0.011  0.006   --   0.008   MYOGLOBIN ng/mL  102.0  97.0  96.0   --      Hemoglobin A1C   Date/Time Value Ref Range Status   01/10/2019 1724 8.20 (H) 4.50 - 5.70 % Final     Glucose   Date/Time Value Ref Range Status   01/12/2019 0625 79 70 - 130 mg/dL Final   01/11/2019 2119 243 (H) 70 - 130 mg/dL Final   01/11/2019 1700 252 (H) 70 - 130 mg/dL Final   01/11/2019 1027 177 (H) 70 - 130 mg/dL Final   01/11/2019 0817 144 (H) 70 - 130 mg/dL Final   01/11/2019 0643 62 (L) 70 - 130 mg/dL Final   01/10/2019 2026 219 (H) 70 - 130 mg/dL Final     No results found for: AMMONIA    Results from last 7 days   Lab Units  01/10/19   1332   NITRITE UA   Negative              I have personally looked at the labs and they are summarized above.  ----------------------------------------------------------------------------------------------------------------------  Imaging Results (last 24 hours)     Procedure Component Value Units Date/Time    US Venous Doppler Lower Extremity Bilateral (duplex) [[id number]] Collected:  01/11/19 0843     Updated:  01/11/19 0846    Narrative:       EXAMINATION: US VENOUS DOPPLER LOWER EXTREMITY BILATERAL (DUPLEX)-      CLINICAL INDICATION:  elevated D-Dimer     TECHNIQUE: Multiplanar gray scale and Doppler vascular sonographic  imaging of the deep veins of BILATERAL lower extremity, without and with  compression.      COMPARISON: NONE      FINDINGS:   The visualized deep veins demonstrate flow and are compressible. No  evidence of deep venous thrombosis.   Soft tissue edema is noted.       Impression:       No DVT.     This report was finalized on 1/11/2019 8:43 AM by Dr. Rene Luther MD.           I have personally reviewed the radiology images and read the final radiology report.    Assessment & Plan        Acute On chronic diastolic heart failure in exacerbation  Chest pain  HANNAH on CKD stage IV,  Baseline creatinine 1.8-2.5  Hyperkalemia due to CKD  Dyspnea   Elevated D-Dimer   Symptomatic anemia with evidence of iron deficiency superimposed on normocytic anemia of chronic disease  Chronic Abdominal Pain  DM type 2, insulin dependent   Essential HTN  Hypothyroidism   SOHAIL  Obesity, BMI 42.07  Tobacco use (dips)     Symptomatic anemia with evidence of iron deficiency superimposed on normocytic anemia of chronic  Kidney disease   for PrBC transfusion today. Trend hemoglobin  tsat 11% ferritin 92,cont venofer loading dose 1g  Nephro to consider starting ADITI    Acute on chronic Diastolic Heart failure, : BNP is 386, diuretics on hold.pt now compensated  S/p metolazone,off diuretics bcos of renal fxn  Echo EF 60-65% normal diastolic function , mild  pulmoary hypertension .Cont ASA, ACE, statin and coreg.   F/u cardiology consult as when he was inpatient in november they did feel he needed a heart cath, if recommended would have to discuss risk vs. Benefits with his current renal status.     Dyspnea now worsened by symptomatic anemia: multifactorial--obesity/restriction playing a role and also fluid overload from heart failure exac and CKD.  Needs bariatric surgery.VQ scan low probability of PE, no pneumonia appreciated on CT, monitor closely.      Chest pain in pt almost already optimised on medical Rx.MI ruled out  Cardio consult ,cont all outpt cardiac meds    HANNAH on CKD stage IV: prerenal from cardiorenal vs morena-cardiac syndrome  creatinine today is 3.31>3.4>3.5, from 2.93 k 5.3  avoid nephrotoxic agents,transfuse PRBC today  Renal cardiac diet,low K diet  nephrology consult appreciated,  Monitor strict input/ urine output, daily RFP    Hyperkalemia due to ckd/poor dietary adherence  Daily BMP,renal low K cardiac diet,give education resource,Nephro f/u     Elevated D-Dimer: VQ scan shows low probability, Bilateral lower ext. Doppler ordered and pending.      Chronic Abdominal Pain: this has been going on for some time, CT of the abdomen today shows no acute findings. He did have a EGD and colonoscopy done by Dr. Chavez on 5/9/2018, which showed normal results. Last bowel movement today, will monitor.      DM Type 2, insulin dependent: A1c 8.2%, hypoglycemia protocol initiated, accu checks ac/hs and prn, diabetic diet, low dose sliding scale ordered.      Hypothyroidism: TSH ordered, will resume home synthroid when available by pharmacy.      SOHAIL: wears oxygen at HS, monitor.      DVT prophylaxis: SCDs for now due to anemia (Hgb 7.2)  GI prophylaxis: continue home Protonix   Activity: bedrest with BSC   Precautions: falls   Diet: Cardiac, consistent carb, renal, regular     Code status: Full     The patient is high risk due to the following  diagnoses/reasons:CHF exac,dyspnea and symptomatic anemia    Mita Miguel MD  [facility] Medicine Team  01/12/19  8:03 AM

## 2019-01-12 NOTE — PLAN OF CARE
Problem: Skin Injury Risk (Adult)  Goal: Skin Health and Integrity  Outcome: Ongoing (interventions implemented as appropriate)      Problem: Fall Risk (Adult)  Goal: Absence of Fall  Outcome: Ongoing (interventions implemented as appropriate)      Problem: Diabetes, Type 2 (Adult)  Goal: Signs and Symptoms of Listed Potential Problems Will be Absent, Minimized or Managed (Diabetes, Type 2)  Outcome: Ongoing (interventions implemented as appropriate)      Problem: Fluid Volume Excess (Adult)  Goal: Optimal Fluid Balance  Outcome: Ongoing (interventions implemented as appropriate)      Problem: Patient Care Overview  Goal: Plan of Care Review  Outcome: Ongoing (interventions implemented as appropriate)   01/12/19 0046   Coping/Psychosocial   Plan of Care Reviewed With patient   Plan of Care Review   Progress improving     Goal: Discharge Needs Assessment  Outcome: Ongoing (interventions implemented as appropriate)

## 2019-01-12 NOTE — CONSULTS
Consultation note    Referring physician: hospitalist service    Consulting Physician: Dr. Ottoniel Napier MD    Reason for consultation: anemia      HPI:   Patient's a 63-year-old white male who is been admitted to the hospital service 2 days ago with diagnosis of acute on chronic diastolic heart failure, dyspnea, chest pain, acute kidney injury on chronic kidney disease stage IV, elevated d-dimer, chronic abdominal pain, type 2 diabetes, hypothyroidism, normocytic anemia of chronic disease most likely of chronic kidney disease, obstructive sleep apnea.  Patient underwent an EGD and colonoscopy approximately 10 months ago by Dr. RODRIGUEZ.  Old records indicate no significant findings.  His hemoglobin is now down to the 6 range.  There is no indication of acute bleeding.      Past Medical History:   Diagnosis Date   • Chest pain    • CHF (congestive heart failure) (CMS/[facility]) 1/10/2019   • Chronic kidney disease    • CPAP (continuous positive airway pressure) dependence    • Diabetes mellitus (CMS/[facility])    • Elevated cholesterol    • GERD (gastroesophageal reflux disease)    • Heart murmur    • Hyperlipidemia    • Hypertension    • Irregular heart beat    • Polio    • Prostate cancer (CMS/[facility]) 05/2016    [place]   • Shortness of breath    • Sleep apnea        Past Surgical History:   Procedure Laterality Date   • CARDIAC CATHETERIZATION  2008    50% diffuse LAD stenosis, 50% septal  branch   • COLONOSCOPY      Long time ago   • COLONOSCOPY N/A 5/9/2018    Procedure: COLONOSCOPY  CPTCODE:54727;  Surgeon: Bob Mcguire III, MD;  Location: [facility];  Service: Gastroenterology   • ENDOSCOPY N/A 5/9/2018    Procedure: ESOPHAGOGASTRODUODENOSCOPY WITH BIOPSY  CPTCODE:39440;  Surgeon: Bob Mcguire III, MD;  Location: [facility];  Service: Gastroenterology   • HEMORRHOIDECTOMY     • HERNIA REPAIR     • UPPER GASTROINTESTINAL ENDOSCOPY      Long time ago   • URETHRAL DILATATION N/A  12/13/2017    Procedure: URETHRAL DILATATION;  Surgeon: Khalif Kohler MD;  Location: [facility];  Service:          Current Facility-Administered Medications:   •  aspirin EC tablet 81 mg, 81 mg, Oral, Daily, Mita Miguel MD, 81 mg at 01/12/19 0949  •  atorvastatin (LIPITOR) tablet 80 mg, 80 mg, Oral, Daily, Mita Miguel MD, 80 mg at 01/12/19 0947  •  carvedilol (COREG) tablet 25 mg, 25 mg, Oral, BID With Meals, Mita Miguel MD, 25 mg at 01/12/19 0945  •  cetirizine (zyrTEC) tablet 5 mg, 5 mg, Oral, Daily, Mita Miguel MD, 5 mg at 01/12/19 0948  •  cyclobenzaprine (FLEXERIL) tablet 10 mg, 10 mg, Oral, Daily, Mita Miguel MD, 10 mg at 01/12/19 0949  •  dextrose (D50W) 25 g/ 50mL Intravenous Solution 25 g, 25 g, Intravenous, Q15 Min PRN, Sangeeta Quiles, MISHEL  •  dextrose (GLUTOSE) oral gel 15 g, 15 g, Oral, Q15 Min PRN, Sangeeta Quiles APRN  •  glipiZIDE (GLUCOTROL) tablet 10 mg, 10 mg, Oral, BID AC, Mita Miguel MD, 10 mg at 01/12/19 0945  •  glucagon (human recombinant) (GLUCAGEN DIAGNOSTIC) injection 1 mg, 1 mg, Subcutaneous, PRN, Sangeeta Quiles, APRN  •  hydrALAZINE (APRESOLINE) tablet 75 mg, 75 mg, Oral, TID, Pablo Silveira MD, 75 mg at 01/12/19 0947  •  HYDROcodone-acetaminophen (NORCO) 7.5-325 MG per tablet 1 tablet, 1 tablet, Oral, BID PRN, Mita Miguel MD, 1 tablet at 01/11/19 2132  •  hydrOXYzine (ATARAX) tablet 25 mg, 25 mg, Oral, TID PRN, Mita Miguel MD  •  insulin aspart (novoLOG) injection 0-7 Units, 0-7 Units, Subcutaneous, 4x Daily AC & at Bedtime, Sangeeta Quiles APRN, 3 Units at 01/11/19 2132  •  insulin detemir (LEVEMIR) injection 60 Units, 60 Units, Subcutaneous, Nightly, Mita Miguel MD, 60 Units at 01/11/19 2134  •  ipratropium-albuterol (DUO-NEB) nebulizer solution 3 mL, 3 mL, Nebulization, Q6H PRN, Sangeeta Quiles, MISHEL, 3 mL at 01/12/19 0806  •  iron sucrose (VENOFER) 300 mg in Sodium chloride  0.9 % 100 mL IVPB, 300 mg, Intravenous, Q24H, Mita Miguel MD, 300 mg at 01/12/19 1018  •  isosorbide mononitrate (IMDUR) 24 hr tablet 120 mg, 120 mg, Oral, Daily, Mita Miguel MD, 120 mg at 01/12/19 0946  •  levothyroxine (SYNTHROID, LEVOTHROID) tablet 137.5 mcg, 137.5 mcg, Oral, Q AM, Mita Miguel MD, 137.5 mcg at 01/12/19 0543  •  linagliptin (TRADJENTA) tablet 5 mg, 5 mg, Oral, Daily, Mita Miguel MD, 5 mg at 01/12/19 0944  •  nitroglycerin (NITROSTAT) SL tablet 0.4 mg, 0.4 mg, Sublingual, Q5 Min PRN, Mita Miguel MD  •  pantoprazole (PROTONIX) EC tablet 40 mg, 40 mg, Oral, QAM, Mita Miguel MD, 40 mg at 01/12/19 0543  •  sennosides-docusate sodium (SENOKOT-S) 8.6-50 MG tablet 2 tablet, 2 tablet, Oral, Nightly, Mita Miguel MD, 2 tablet at 01/11/19 2336  •  sodium bicarbonate tablet 650 mg, 650 mg, Oral, TID, Mita Miguel MD, 650 mg at 01/12/19 0947  •  [COMPLETED] Insert peripheral IV, , , Once **AND** sodium chloride 0.9 % flush 10 mL, 10 mL, Intravenous, PRN, Aakash Schofield PA  •  sodium chloride 0.9 % flush 3 mL, 3 mL, Intravenous, Q12H, Sangeeta Quiles APRN, 3 mL at 01/12/19 0950  •  sodium chloride 0.9 % flush 3-10 mL, 3-10 mL, Intravenous, PRN, Sangeeta Quiles, APRN  •  tamsulosin (FLOMAX) 24 hr capsule 0.4 mg, 0.4 mg, Oral, Nightly, Mita Miguel MD, 0.4 mg at 01/11/19 5483  •  triamcinolone (KENALOG) 0.1 % cream 1 application, 1 application, Topical, Q12H, Mita Miguel MD, 1 application at 01/12/19 1005    No Known Allergies    Social History     Socioeconomic History   • Marital status:      Spouse name: Not on file   • Number of children: Not on file   • Years of education: Not on file   • Highest education level: Not on file   Social Needs   • Financial resource strain: Not on file   • Food insecurity - worry: Not on file   • Food insecurity - inability: Not on file   • Transportation needs - medical: Not on file    • Transportation needs - non-medical: Not on file   Occupational History   • Not on file   Tobacco Use   • Smoking status: Never Smoker   • Smokeless tobacco: Current User     Types: Chew   • Tobacco comment: Chewed tobacco since he was 4 yrs old.   Substance and Sexual Activity   • Alcohol use: No   • Drug use: No   • Sexual activity: Defer   Other Topics Concern   • Not on file   Social History Narrative   • Not on file       Family History   Problem Relation Age of Onset   • Heart disease Brother    • Diabetes Brother    • Cancer Brother         not sure the kind   • Heart disease Brother    • Diabetes Brother    • Diabetes Sister    • Diabetes Daughter    • Heart disease Daughter    • Pancreatitis Daughter    • Crohn's disease Daughter    • Diabetes Son    • Heart disease Son        ROS:   Constitutional: denies any weight changes, fatigue or weakness.  Eyes: : denies blurred or double vision  Cardiovascular: denies chest pain, palpitations, edemas.  Respiratory: denies cough, sputum, SOB.  Gastrointestinal: denies N&V, abd pain, diarrhea, constipation.  Genitourinary: denies dysuria, frequency.  Endocrine: denies cold intolerance, lethargy and flushing.  Hem: denies excessive bruising and postop bleeding.  Musculoskeletal: denies weakness, joint swelling, pain or stiffness.  Neuro: denies seizures, CVA, paresthesia, or peripheral neuropathy.   Skin: denies change in nevi, rashes, masses.        Physical Exam:       General Appearance:    Alert, cooperative, in no acute distress   Head:    Normocephalic, without obvious abnormality, atraumatic   Eyes:            Lids and lashes normal, conjunctivae and sclerae normal, no   icterus, no pallor, corneas clear, PERRLA   Ears:    Ears appear intact with no abnormalities noted   Throat:   No oral lesions, no thrush, oral mucosa moist   Neck:   No adenopathy, supple, trachea midline, no thyromegaly, no   carotid bruit, no JVD   Back:     No kyphosis present, no  scoliosis present, no skin lesions,      erythema or scars, no tenderness to percussion or                   palpation,   range of motion normal   Lungs:     Clear to auscultation,respirations regular, even and                  unlabored    Heart:    Regular rhythm and normal rate, normal S1 and S2, no            murmur, no gallop, no rub, no click   Chest Wall:    No abnormalities observed   Abdomen:    Markedly distended    Rectal:     Deferred   Extremities:   Moves all extremities well, no edema, no cyanosis, no             redness   Pulses:   Pulses palpable and equal bilaterally   Skin:   No bleeding, bruising or rash   Lymph nodes:   No palpable adenopathy   Neurologic:   Cranial nerves 2 - 12 grossly intact, sensation intact, DTR       present and equal bilaterally     Vitals:    01/12/19 0944   BP: 142/88   Pulse: 64   Resp:    Temp:    SpO2:        Lab Results   Component Value Date    GLUCOSE 110 01/12/2019    BUN 51 (H) 01/12/2019    CREATININE 3.53 (H) 01/12/2019    EGFRIFNONA 18 (L) 01/12/2019    BCR 14.4 01/12/2019    CO2 22.6 (L) 01/12/2019    CALCIUM 8.5 01/12/2019    PROTENTOTREF 6.9 01/08/2019    PROTENTOTREF 6.8 01/08/2019    ALBUMIN 4.10 01/11/2019    LABIL2 1.0 01/08/2019    LABIL2 0.9 01/08/2019    AST 21 01/11/2019    ALT 14 01/11/2019     WBC   Date Value Ref Range Status   01/12/2019 4.23 (L) 4.50 - 12.50 10*3/mm3 Final     RBC   Date Value Ref Range Status   01/12/2019 2.40 (L) 4.70 - 6.10 10*6/mm3 Final     Hemoglobin   Date Value Ref Range Status   01/12/2019 6.8 (C) 14.0 - 18.0 g/dL Final     Hematocrit   Date Value Ref Range Status   01/12/2019 23.3 (L) 42.0 - 52.0 % Final     MCV   Date Value Ref Range Status   01/12/2019 97.1 (H) 80.0 - 94.0 fL Final     MCH   Date Value Ref Range Status   01/12/2019 28.3 27.0 - 33.0 pg Final     MCHC   Date Value Ref Range Status   01/12/2019 29.2 (L) 33.0 - 37.0 g/dL Final     RDW   Date Value Ref Range Status   01/12/2019 14.3 11.5 - 14.5 % Final      RDW-SD   Date Value Ref Range Status   01/12/2019 46.6 37.0 - 54.0 fl Final     MPV   Date Value Ref Range Status   01/12/2019 11.6 (H) 6.0 - 10.0 fL Final     Platelets   Date Value Ref Range Status   01/12/2019 154 130 - 400 10*3/mm3 Final     Neutrophil %   Date Value Ref Range Status   01/12/2019 52.0 30.0 - 70.0 % Final     Lymphocyte %   Date Value Ref Range Status   01/12/2019 34.0 21.0 - 51.0 % Final     Monocyte %   Date Value Ref Range Status   01/12/2019 9.5 0.0 - 10.0 % Final     Eosinophil %   Date Value Ref Range Status   01/12/2019 3.8 0.0 - 5.0 % Final     Basophil %   Date Value Ref Range Status   01/12/2019 0.5 0.0 - 2.0 % Final     Immature Grans %   Date Value Ref Range Status   01/12/2019 0.2 0.0 - 0.5 % Final     Neutrophils, Absolute   Date Value Ref Range Status   01/12/2019 2.20 1.40 - 6.50 10*3/mm3 Final     Lymphocytes, Absolute   Date Value Ref Range Status   01/12/2019 1.44 1.00 - 3.00 10*3/mm3 Final     Monocytes, Absolute   Date Value Ref Range Status   01/12/2019 0.40 0.10 - 0.90 10*3/mm3 Final     Eosinophils, Absolute   Date Value Ref Range Status   01/12/2019 0.16 0.00 - 0.70 10*3/mm3 Final     Basophils, Absolute   Date Value Ref Range Status   01/12/2019 0.02 0.00 - 0.30 10*3/mm3 Final     Immature Grans, Absolute   Date Value Ref Range Status   01/12/2019 0.01 0.00 - 0.03 10*3/mm3 Final       Imaging Results (last 72 hours)     Procedure Component Value Units Date/Time    US Venous Doppler Lower Extremity Bilateral (duplex) [[id number]] Collected:  01/11/19 0843     Updated:  01/11/19 0846    Narrative:       EXAMINATION: US VENOUS DOPPLER LOWER EXTREMITY BILATERAL (DUPLEX)-      CLINICAL INDICATION:  elevated D-Dimer     TECHNIQUE: Multiplanar gray scale and Doppler vascular sonographic  imaging of the deep veins of BILATERAL lower extremity, without and with  compression.      COMPARISON: NONE      FINDINGS:   The visualized deep veins demonstrate flow and are compressible.  No  evidence of deep venous thrombosis.   Soft tissue edema is noted.       Impression:       No DVT.     This report was finalized on 1/11/2019 8:43 AM by Dr. Rene Luther MD.       NM Lung Scan Perfusion Particulate [[id number]] Collected:  01/10/19 1326     Updated:  01/10/19 1330    Narrative:       EXAMINATION: NM LUNG SCAN PERFUSION PARTICULATE-      CLINICAL INDICATION: SOB and Pain      TECHNIQUE:  4.3 mCi of Tc-99m MAA were administered IV for a perfusion  lung scan. Ventilation could not be performed.     COMPARISON: Chest XRAY performed on January 10, 2019.     FINDINGS: Multiple perfusion images demonstrate a relatively homogeneous  pattern of pulmonary capillary tracer distribution throughout the lungs.   Specifically, there are no segmental or sub-segmental defects to  suggest pulmonary embolism.        Impression:       Low suspicion of PE.         This report was finalized on 1/10/2019 1:26 PM by Dr. Tate Subramanian MD.       CT Abdomen Pelvis Without Contrast [[id number]] Collected:  01/10/19 1058     Updated:  01/10/19 1104    Narrative:          CT ABDOMEN PELVIS WO CONTRAST-        TECHNIQUE: Multiple axial CT images were obtained from lung bases  through pubic symphysis without administration of IV contrast.  Reformatted images in the coronal and/or sagittal plane(s) were  generated from the axial data set to facilitate diagnostic accuracy  and/or surgical planning.  Oral Contrast:NONE.     Radiation dose reduction techniques were utilized per ALARA protocol.  Automated exposure control was initiated through either or [employer] or  [employer] software packages by  protocol.       [phone] mGy.cm     Clinical information  swelling      Comparison  NONE.     Findings  LOWER THORAX: Clear. No effusions.     ABDOMEN:        LIVER: Homogeneous. No focal hepatic mass or ductal dilatation.        GALLBLADDER: No dilation or stone identified.        PANCREAS: Unremarkable. No mass or ductal  dilatation.        SPLEEN: Homogeneous. No splenomegaly.        ADRENALS: No mass.        KIDNEYS: No mass. No obstructive uropathy.  No evidence of  urolithiasis.        GI TRACT: Non-dilated. No definite wall thickening.        PERITONEUM: No free air. No free fluid or loculated fluid  collections.        MESENTERY: Unremarkable.        LYMPH NODES: No lymphadenopathy.        VASCULATURE: No evidence of aneurysm.        ABDOMINAL WALL: No focal hernia or mass.           OTHER: None.     PELVIS:        BLADDER: No focal mass or significant wall thickening        REPRODUCTIVE: Unremarkable as visualized.        APPENDIX: Nondistended. No surrounding inflammation.     BONES: No acute bony abnormality.       Impression:       Impression:  1. Unremarkable exam.  No source for the patient symptoms.  2. Other incidental/non-acute findings as above.     This report was finalized on 1/10/2019 11:00 AM by Dr. Tate Subramanian MD.       CT Chest Without Contrast [[id number]] Collected:  01/10/19 1100     Updated:  01/10/19 1104    Narrative:       CT CHEST WO CONTRAST-        TECHNIQUE: Multiple axial CT images were obtained from lung apex through  upper abdomen without administration of IV contrast. Reformatted images  in the coronal and/or sagittal plane(s) were generated from the axial  data set to facilitate diagnostic accuracy and/or surgical planning.  Oral Contrast:NONE.     Radiation dose reduction techniques were utilized per ALARA protocol.  Automated exposure control was initiated through either or [employer] or  Dose[employer] software packages by  protocol.          [id number] mGy.cm  Clinical information  sob      Comparison  NONE.     Findings  LUNGS: BILATERAL GROUNDGLASS ATTENUATION AND PROBABLY REPRESENTS  ATELECTASIS OR SEQUELA OF CONGESTIVE FAILURE.     HEART: Unremarkable.     PERICARDIUM: No effusion.     MEDIASTINUM: No masses. No enlarged lymph nodes.  No fluid collections.     PLEURA: TINY BILATERAL  PLEURAL EFFUSIONS.     MAJOR AIRWAYS: Clear. No intrinsic mass.     VASCULATURE: No evidence of aneurysm.     VISUALIZED UPPER ABDOMEN:        LIVER: Homogeneous. No focal hepatic mass or ductal dilatation.        SPLEEN: Homogeneous. No splenomegaly.        ADRENALS: No mass.        KIDNEYS: No mass. No obstructive uropathy.  No evidence of  urolithiasis.        GI TRACT: Non-dilated. No definite wall thickening.        PERITONEUM: No free air. No free fluid or loculated fluid  collections.           ABDOMINAL WALL: No focal hernia or mass.           OTHER: None.     BONES: No acute bony abnormality.       Impression:       Impression:  Tiny bilateral pleural effusions. Bilateral groundglass attenuation and  probably represents atelectasis or sequela of congestive failure.     This report was finalized on 1/10/2019 11:01 AM by Dr. Tate Subramanian MD.       XR Chest 2 View [[id number]] Collected:  01/10/19 1058     Updated:  01/10/19 1104    Narrative:       EXAMINATION: XR CHEST 2 VW-      CLINICAL INDICATION:     sob     TECHNIQUE:  XR CHEST 2 VW-      COMPARISON: NONE      FINDINGS:   The lungs remain aerated.  Heart and mediastinum contours are unremarkable.  No pleural effusion.  No pneumothorax.   Bony and soft tissue structures are unremarkable.       Impression:       No radiographic evidence of acute cardiac or pulmonary  disease.     This report was finalized on 1/10/2019 10:58 AM by Dr. Tate Subramanian MD.               Assessment:   Anemia with GI likely source.  Multiple comorbidities      Plan:  EGD and colonoscopy on Monday.      Dragon disclaimer:  Much of this encounter note is an electronic transcription/translation of spoken language to printed text. The electronic translation of spoken language may permit erroneous, or at times, nonsensical words or phrases to be inadvertently transcribed; Although I have reviewed the note for such errors, some may still exist.

## 2019-01-12 NOTE — PLAN OF CARE
Problem: Skin Injury Risk (Adult)  Goal: Skin Health and Integrity  Outcome: Ongoing (interventions implemented as appropriate)   01/11/19 1400   Skin Injury Risk (Adult)   Skin Health and Integrity making progress toward outcome       Problem: Fall Risk (Adult)  Goal: Absence of Fall  Outcome: Ongoing (interventions implemented as appropriate)   01/11/19 1400   Fall Risk (Adult)   Absence of Fall making progress toward outcome       Problem: Diabetes, Type 2 (Adult)  Goal: Signs and Symptoms of Listed Potential Problems Will be Absent, Minimized or Managed (Diabetes, Type 2)  Outcome: Ongoing (interventions implemented as appropriate)   01/11/19 1400   Goal/Outcome Evaluation   Problems Assessed (Type 2 Diabetes) all   Problems Present (Type 2 Diabetes) none       Problem: Fluid Volume Excess (Adult)  Goal: Optimal Fluid Balance  Outcome: Ongoing (interventions implemented as appropriate)   01/11/19 1400   Fluid Volume Excess (Adult)   Optimal Fluid Balance making progress toward outcome       Problem: Patient Care Overview  Goal: Plan of Care Review  Outcome: Ongoing (interventions implemented as appropriate)   01/11/19 1400   Coping/Psychosocial   Plan of Care Reviewed With patient   Plan of Care Review   Progress improving

## 2019-01-12 NOTE — PLAN OF CARE
Problem: Skin Injury Risk (Adult)  Goal: Skin Health and Integrity  Outcome: Ongoing (interventions implemented as appropriate)      Problem: Diabetes, Type 2 (Adult)  Goal: Signs and Symptoms of Listed Potential Problems Will be Absent, Minimized or Managed (Diabetes, Type 2)  Outcome: Ongoing (interventions implemented as appropriate)      Problem: Fluid Volume Excess (Adult)  Goal: Optimal Fluid Balance  Outcome: Ongoing (interventions implemented as appropriate)      Problem: Patient Care Overview  Goal: Plan of Care Review  Outcome: Ongoing (interventions implemented as appropriate)    Goal: Discharge Needs Assessment  Outcome: Ongoing (interventions implemented as appropriate)

## 2019-01-12 NOTE — PROGRESS NOTES
Nephrology Note      Subjective       Hb dropped and plan is for PRBC transfusion, RN noted fresh blood in stool. Patient continues to report SOB on exertion. Not using home CPAP    Objective     Vital Signs  Temp:  [97.8 °F (36.6 °C)-98.4 °F (36.9 °C)] 98.2 °F (36.8 °C)  Heart Rate:  [55-68] 64  Resp:  [10-24] 12  BP: (116-154)/(49-89) 142/88    I/O this shift:  In: 595 [P.O.:480; IV Piggyback:115]  Out: 250 [Urine:250]  I/O last 3 completed shifts:  In: 1180 [P.O.:1080; IV Piggyback:100]  Out: 3600 [Urine:3600]    Physical Examination:    General Appearance : alert, appears stated age, cooperative and chronically ill  Head : normocephalic, without obvious abnormality and atraumatic  Eyes :  + pallor and PERRLA  Throat : oral mucosa moist  Neck:  no JVD  Lungs : clear to auscultation, respirations regular and unlabored  Heart : regular rhythm & normal rate, normal S1, S2, no murmur, no joaquin, no rub   Abdomen :   soft non-tender  Extremities : d 2+ edema  Pulses :  palpable and equal bilaterally  Skin : no bleeding, bruising or rash  Neurologic : orientated to person, place, time, grossly no focal deficitis    Laboratory Data :      WBC WBC   Date Value Ref Range Status   01/12/2019 4.23 (L) 4.50 - 12.50 10*3/mm3 Final   01/11/2019 5.05 4.50 - 12.50 10*3/mm3 Final   01/10/2019 4.21 (L) 4.50 - 12.50 10*3/mm3 Final      HGB Hemoglobin   Date Value Ref Range Status   01/12/2019 6.8 (C) 14.0 - 18.0 g/dL Final   01/11/2019 7.2 (L) 14.0 - 18.0 g/dL Final   01/10/2019 7.4 (L) 14.0 - 18.0 g/dL Final      HCT Hematocrit   Date Value Ref Range Status   01/12/2019 23.3 (L) 42.0 - 52.0 % Final   01/11/2019 24.0 (L) 42.0 - 52.0 % Final   01/10/2019 24.0 (L) 42.0 - 52.0 % Final      Platlets No results found for: LABPLAT   MCV MCV   Date Value Ref Range Status   01/12/2019 97.1 (H) 80.0 - 94.0 fL Final   01/11/2019 94.5 (H) 80.0 - 94.0 fL Final   01/10/2019 92.3 80.0 - 94.0 fL Final          Sodium Sodium   Date Value Ref Range  Status   01/12/2019 143 135 - 153 mmol/L Final   01/11/2019 141 135 - 153 mmol/L Final   01/10/2019 138 135 - 153 mmol/L Final      Potassium Potassium   Date Value Ref Range Status   01/12/2019 4.2 3.5 - 5.3 mmol/L Final   01/11/2019 4.4 3.5 - 5.3 mmol/L Final   01/10/2019 5.3 3.5 - 5.3 mmol/L Final   01/10/2019 5.7 (C) 3.5 - 5.3 mmol/L Final      Chloride Chloride   Date Value Ref Range Status   01/12/2019 108 99 - 112 mmol/L Final   01/11/2019 108 99 - 112 mmol/L Final   01/10/2019 109 99 - 112 mmol/L Final      CO2 CO2   Date Value Ref Range Status   01/12/2019 22.6 (L) 24.3 - 31.9 mmol/L Final   01/11/2019 23.1 (L) 24.3 - 31.9 mmol/L Final   01/10/2019 20.9 (L) 24.3 - 31.9 mmol/L Final      BUN BUN   Date Value Ref Range Status   01/12/2019 51 (H) 7 - 21 mg/dL Final   01/11/2019 55 (H) 7 - 21 mg/dL Final   01/10/2019 49 (H) 7 - 21 mg/dL Final      Creatinine Creatinine   Date Value Ref Range Status   01/12/2019 3.53 (H) 0.43 - 1.29 mg/dL Final   01/11/2019 3.41 (H) 0.43 - 1.29 mg/dL Final   01/10/2019 3.31 (H) 0.43 - 1.29 mg/dL Final      Calcium Calcium   Date Value Ref Range Status   01/12/2019 8.5 7.7 - 10.0 mg/dL Final   01/11/2019 8.9 7.7 - 10.0 mg/dL Final   01/10/2019 8.8 7.7 - 10.0 mg/dL Final      PO4 No results found for: CAPO4   Albumin Albumin   Date Value Ref Range Status   01/11/2019 4.10 3.40 - 4.80 g/dL Final   01/10/2019 4.10 3.40 - 4.80 g/dL Final      Magnesium Magnesium   Date Value Ref Range Status   01/11/2019 1.8 1.7 - 2.6 mg/dL Final      Uric Acid No results found for: URICACID     Radiology results :     Imaging Results (last 24 hours)     ** No results found for the last 24 hours. **              Medications:        aspirin 81 mg Oral Daily   atorvastatin 80 mg Oral Daily   carvedilol 25 mg Oral BID With Meals   cetirizine 5 mg Oral Daily   cyclobenzaprine 10 mg Oral Daily   glipiZIDE 10 mg Oral BID AC   hydrALAZINE 75 mg Oral TID   insulin aspart 0-7 Units Subcutaneous 4x Daily AC &  at Bedtime   insulin detemir 60 Units Subcutaneous Nightly   iron sucrose (VENOFER) IVPB 300 mg Intravenous Q24H   isosorbide mononitrate 120 mg Oral Daily   levothyroxine 137.5 mcg Oral Q AM   linagliptin 5 mg Oral Daily   pantoprazole 40 mg Oral QAM   sennosides-docusate sodium 2 tablet Oral Nightly   sodium bicarbonate 650 mg Oral TID   sodium chloride 3 mL Intravenous Q12H   tamsulosin 0.4 mg Oral Nightly   triamcinolone 1 application Topical Q12H          Assessment/Plan       CHF (congestive heart failure) (CMS/[facility])      1. HANNAH on CKD stage 4 : His baseline creatinine is around 3.   creatinine has worsened to 3.5 today likely from drop in Hb and poor hemodynamics.  UA is bland and only 208 mg of proteinuria. No hydronephrosis on CT scan abdomen    2. HTN : stable. Keep BP 140s/80s for renal perfusion    3. Shortness of breath : appears to be related to obesity and sleep apnea plus some volume overload Will diurese as tolerated.  repeat 2 D echo shows normal systolic and diastolic LV function    4. Iron deficiency anemia : agree with venofer series. Consult surgery for GI bleed. No ADITI due to prostate cancer    5. Type 2 DM stable      I discussed the patient's findings and my recommendations with patient and nursing staff    Pablo Silveira MD  01/12/19  11:34 AM

## 2019-01-13 LAB
ABO + RH BLD: NORMAL
ANION GAP SERPL CALCULATED.3IONS-SCNC: 9.4 MMOL/L (ref 3.6–11.2)
BASOPHILS # BLD AUTO: 0.01 10*3/MM3 (ref 0–0.3)
BASOPHILS NFR BLD AUTO: 0.2 % (ref 0–2)
BH BB BLOOD EXPIRATION DATE: NORMAL
BH BB BLOOD TYPE BARCODE: 5100
BH BB DISPENSE STATUS: NORMAL
BH BB PRODUCT CODE: NORMAL
BH BB UNIT NUMBER: NORMAL
BUN BLD-MCNC: 51 MG/DL (ref 7–21)
BUN/CREAT SERPL: 14.4 (ref 7–25)
CALCIUM SPEC-SCNC: 8.8 MG/DL (ref 7.7–10)
CHLORIDE SERPL-SCNC: 105 MMOL/L (ref 99–112)
CO2 SERPL-SCNC: 22.6 MMOL/L (ref 24.3–31.9)
CREAT BLD-MCNC: 3.54 MG/DL (ref 0.43–1.29)
DEPRECATED RDW RBC AUTO: 49 FL (ref 37–54)
EOSINOPHIL # BLD AUTO: 0.16 10*3/MM3 (ref 0–0.7)
EOSINOPHIL NFR BLD AUTO: 3.6 % (ref 0–5)
ERYTHROCYTE [DISTWIDTH] IN BLOOD BY AUTOMATED COUNT: 15.1 % (ref 11.5–14.5)
GFR SERPL CREATININE-BSD FRML MDRD: 17 ML/MIN/1.73
GLUCOSE BLD-MCNC: 133 MG/DL (ref 70–110)
GLUCOSE BLDC GLUCOMTR-MCNC: 147 MG/DL (ref 70–130)
GLUCOSE BLDC GLUCOMTR-MCNC: 168 MG/DL (ref 70–130)
HCT VFR BLD AUTO: 26.7 % (ref 42–52)
HGB BLD-MCNC: 8.2 G/DL (ref 14–18)
IMM GRANULOCYTES # BLD AUTO: 0.01 10*3/MM3 (ref 0–0.03)
IMM GRANULOCYTES NFR BLD AUTO: 0.2 % (ref 0–0.5)
LYMPHOCYTES # BLD AUTO: 1.29 10*3/MM3 (ref 1–3)
LYMPHOCYTES NFR BLD AUTO: 29.2 % (ref 21–51)
MCH RBC QN AUTO: 28.8 PG (ref 27–33)
MCHC RBC AUTO-ENTMCNC: 30.7 G/DL (ref 33–37)
MCV RBC AUTO: 93.7 FL (ref 80–94)
MONOCYTES # BLD AUTO: 0.38 10*3/MM3 (ref 0.1–0.9)
MONOCYTES NFR BLD AUTO: 8.6 % (ref 0–10)
NEUTROPHILS # BLD AUTO: 2.57 10*3/MM3 (ref 1.4–6.5)
NEUTROPHILS NFR BLD AUTO: 58.2 % (ref 30–70)
OSMOLALITY SERPL CALC.SUM OF ELEC: 289.4 MOSM/KG (ref 273–305)
PLATELET # BLD AUTO: 172 10*3/MM3 (ref 130–400)
PMV BLD AUTO: 12.1 FL (ref 6–10)
POTASSIUM BLD-SCNC: 4.3 MMOL/L (ref 3.5–5.3)
RBC # BLD AUTO: 2.85 10*6/MM3 (ref 4.7–6.1)
SODIUM BLD-SCNC: 137 MMOL/L (ref 135–153)
UNIT  ABO: NORMAL
UNIT  RH: NORMAL
WBC NRBC COR # BLD: 4.42 10*3/MM3 (ref 4.5–12.5)

## 2019-01-13 PROCEDURE — 94799 UNLISTED PULMONARY SVC/PX: CPT

## 2019-01-13 PROCEDURE — 85025 COMPLETE CBC W/AUTO DIFF WBC: CPT | Performed by: NURSE PRACTITIONER

## 2019-01-13 PROCEDURE — 82962 GLUCOSE BLOOD TEST: CPT

## 2019-01-13 PROCEDURE — 25010000002 IRON SUCROSE PER 1 MG: Performed by: INTERNAL MEDICINE

## 2019-01-13 PROCEDURE — 99232 SBSQ HOSP IP/OBS MODERATE 35: CPT | Performed by: INTERNAL MEDICINE

## 2019-01-13 PROCEDURE — 63710000001 INSULIN ASPART PER 5 UNITS: Performed by: NURSE PRACTITIONER

## 2019-01-13 PROCEDURE — 80048 BASIC METABOLIC PNL TOTAL CA: CPT | Performed by: INTERNAL MEDICINE

## 2019-01-13 PROCEDURE — 63710000001 INSULIN DETEMIR PER 5 UNITS: Performed by: INTERNAL MEDICINE

## 2019-01-13 RX ORDER — HYDRALAZINE HYDROCHLORIDE 50 MG/1
50 TABLET, FILM COATED ORAL 3 TIMES DAILY
Status: DISCONTINUED | OUTPATIENT
Start: 2019-01-13 | End: 2019-01-15 | Stop reason: HOSPADM

## 2019-01-13 RX ADMIN — CYCLOBENZAPRINE 10 MG: 10 TABLET, FILM COATED ORAL at 08:43

## 2019-01-13 RX ADMIN — POLYETHYLENE GLYCOL 3350 255 G: 1 POWDER ORAL at 18:35

## 2019-01-13 RX ADMIN — SODIUM BICARBONATE TAB 650 MG 650 MG: 650 TAB at 08:42

## 2019-01-13 RX ADMIN — TAMSULOSIN HYDROCHLORIDE 0.4 MG: 0.4 CAPSULE ORAL at 22:42

## 2019-01-13 RX ADMIN — ATORVASTATIN CALCIUM 80 MG: 40 TABLET, FILM COATED ORAL at 08:42

## 2019-01-13 RX ADMIN — CARVEDILOL 25 MG: 25 TABLET, FILM COATED ORAL at 08:41

## 2019-01-13 RX ADMIN — TRIAMCINOLONE ACETONIDE 1 APPLICATION: 1 CREAM TOPICAL at 22:43

## 2019-01-13 RX ADMIN — INSULIN DETEMIR 60 UNITS: 100 INJECTION, SOLUTION SUBCUTANEOUS at 22:43

## 2019-01-13 RX ADMIN — IPRATROPIUM BROMIDE AND ALBUTEROL SULFATE 3 ML: .5; 3 SOLUTION RESPIRATORY (INHALATION) at 13:06

## 2019-01-13 RX ADMIN — CETIRIZINE HCL 5 MG: 10 TABLET ORAL at 08:43

## 2019-01-13 RX ADMIN — SODIUM BICARBONATE TAB 650 MG 650 MG: 650 TAB at 16:00

## 2019-01-13 RX ADMIN — HYDRALAZINE HYDROCHLORIDE 50 MG: 50 TABLET ORAL at 08:42

## 2019-01-13 RX ADMIN — GLIPIZIDE 10 MG: 5 TABLET ORAL at 17:36

## 2019-01-13 RX ADMIN — SODIUM CHLORIDE, PRESERVATIVE FREE 3 ML: 5 INJECTION INTRAVENOUS at 22:43

## 2019-01-13 RX ADMIN — HYDRALAZINE HYDROCHLORIDE 50 MG: 50 TABLET ORAL at 22:42

## 2019-01-13 RX ADMIN — HYDRALAZINE HYDROCHLORIDE 50 MG: 50 TABLET ORAL at 16:00

## 2019-01-13 RX ADMIN — SODIUM BICARBONATE TAB 650 MG 650 MG: 650 TAB at 22:42

## 2019-01-13 RX ADMIN — LINAGLIPTIN 5 MG: 5 TABLET, FILM COATED ORAL at 08:41

## 2019-01-13 RX ADMIN — TRIAMCINOLONE ACETONIDE 1 APPLICATION: 1 CREAM TOPICAL at 08:46

## 2019-01-13 RX ADMIN — LEVOTHYROXINE SODIUM 137.5 MCG: 125 TABLET ORAL at 06:14

## 2019-01-13 RX ADMIN — GLIPIZIDE 10 MG: 5 TABLET ORAL at 08:42

## 2019-01-13 RX ADMIN — CARVEDILOL 25 MG: 25 TABLET, FILM COATED ORAL at 17:35

## 2019-01-13 RX ADMIN — SODIUM CHLORIDE, PRESERVATIVE FREE 3 ML: 5 INJECTION INTRAVENOUS at 08:43

## 2019-01-13 RX ADMIN — IRON SUCROSE 300 MG: 20 INJECTION, SOLUTION INTRAVENOUS at 08:41

## 2019-01-13 RX ADMIN — ASPIRIN 81 MG: 81 TABLET ORAL at 08:43

## 2019-01-13 RX ADMIN — INSULIN ASPART 2 UNITS: 100 INJECTION, SOLUTION INTRAVENOUS; SUBCUTANEOUS at 08:40

## 2019-01-13 RX ADMIN — ISOSORBIDE MONONITRATE 120 MG: 60 TABLET, EXTENDED RELEASE ORAL at 08:42

## 2019-01-13 RX ADMIN — PANTOPRAZOLE SODIUM 40 MG: 40 TABLET, DELAYED RELEASE ORAL at 06:15

## 2019-01-13 NOTE — PROGRESS NOTES
[facility] HOSPITALIST PROGRESS NOTE     Patient Identification:  Name:  Catarino Arvizu  Age:  63 y.o.  Sex:  male  :  1955  MRN:  [id number]  Visit Number:  [id number]  ROOM: [address]     Primary Care Provider:  Eleno Chaney APRN    Length of stay:  3    Subjective     Chief Complaint   Patient presents with   • Shortness of Breath       History of presenting illness:    63 year old male with CKD 4,chronic diastolic CHF recently managed here for chf exac presented to [facility] ED on 1/10/19 with smothering/SOB with activity going on 1 month.   Being managed as acute on chronic CHF exac,hyperkalemia due to CKD    Pt still c/o SOB on minimal activity.No chest pain or fever.  No new events overnight  Being prepared for EGD/Colonoscopy ,NPO after MN    hemoglobin 6.8 s/p 1 unit prbc,rpt Hgb 8        Objective     Current Hospital Meds:    aspirin 81 mg Oral Daily   atorvastatin 80 mg Oral Daily   carvedilol 25 mg Oral BID With Meals   cetirizine 5 mg Oral Daily   cyclobenzaprine 10 mg Oral Daily   glipiZIDE 10 mg Oral BID AC   hydrALAZINE 50 mg Oral TID   insulin aspart 0-7 Units Subcutaneous 4x Daily AC & at Bedtime   insulin detemir 60 Units Subcutaneous Nightly   iron sucrose (VENOFER) IVPB 300 mg Intravenous Q24H   isosorbide mononitrate 120 mg Oral Daily   levothyroxine 137.5 mcg Oral Q AM   linagliptin 5 mg Oral Daily   pantoprazole 40 mg Oral QAM   sennosides-docusate sodium 2 tablet Oral Nightly   sodium bicarbonate 650 mg Oral TID   sodium chloride 3 mL Intravenous Q12H   tamsulosin 0.4 mg Oral Nightly   triamcinolone 1 application Topical Q12H      ----------------------------------------------------------------------------------------------------------------------  Vital Signs:  Temp:  [97.2 °F (36.2 °C)-97.9 °F (36.6 °C)] 97.6 °F (36.4 °C)  Heart Rate:  [53-75] 57  Resp:  [18-25] 24  BP: (109-180)/() 122/68  SpO2:  [85 %-100 %] 98 %  on   ;   Device (Oxygen Therapy):  room air  Body mass index is 44.17 kg/m².    Wt Readings from Last 3 Encounters:   01/13/19 110 kg (241 lb 8 oz)   12/20/18 107 kg (235 lb)   11/13/18 99.9 kg (220 lb 4 oz)       Intake/Output Summary (Last 24 hours) at 1/13/2019 1101  Last data filed at 1/13/2019 0800  Gross per 24 hour   Intake 1705.42 ml   Output 1200 ml   Net 505.42 ml     Diet Clear Liquid  ----------------------------------------------------------------------------------------------------------------------  Physical exam:  Constitutional:  Well-developed and well-nourished.  No respiratory distress.      HENT:  Head:  Normocephalic and atraumatic.  Mouth:  Moist mucous membranes.    Eyes:  Conjunctivae and EOM are normal.  Pupils are equal, round, and reactive to light.  No scleral icterus.    Neurological:  Alert and oriented to person, place, and time.  No cranial nerve deficit.  Neck:  Neck supple.  No JVD present.    Cardiovascular:  Normal rate, regular rhythm and normal heart sounds with no murmur.  Pulmonary/Chest:  No respiratory distress, no wheezes, no crackles or rales, with diminished breath sounds both bases and good air movement.  Abdominal:  Soft.  Bowel sounds are normal.  No distension and no tenderness.   Musculoskeletal:  2+ bilat pitting pedal edema up to upper shin, no tenderness, and no deformity.  No red or swollen joints anywhere.    Skin:  Skin is warm and dry. No rash noted. No pallor.   Peripheral vascular:  Strong pulses in all 4 extremities with no clubbing, no cyanosis, no edema.  ----------------------------------------------------------------------------------------------------------------------  Tele:    ----------------------------------------------------------------------------------------------------------------------  Results from last 7 days   Lab Units  01/13/19   0335  01/12/19   0345  01/11/19   0306   WBC 10*3/mm3  4.42*  4.23*  5.05   HEMOGLOBIN g/dL  8.2*  6.8*  7.2*   HEMATOCRIT %  26.7*  23.3*   24.0*   MCV fL  93.7  97.1*  94.5*   MCHC g/dL  30.7*  29.2*  30.0*   PLATELETS 10*3/mm3  172  154  166     Results from last 7 days   Lab Units  01/13/19   0335  01/12/19   0345  01/11/19   0306   01/10/19   1018  01/08/19   1007   SODIUM mmol/L  137  143  141   --   138  137   POTASSIUM mmol/L  4.3  4.2  4.4   < >  5.7*  4.8   MAGNESIUM mg/dL   --    --   1.8   --    --    --    CHLORIDE mmol/L  105  108  108   --   109  106   CO2 mmol/L  22.6*  22.6*  23.1*   --   20.9*  22.4*   BUN mg/dL  51*  51*  55*   --   49*  44*   CREATININE mg/dL  3.54*  3.53*  3.41*   --   3.31*  3.17*   PHOSPHORUS mg/dL   --    --   5.9*   --    --   4.7*   EGFR IF NONAFRICN AM mL/min/1.73  17*  18*  18*   --   19*  20*   CALCIUM mg/dL  8.8  8.5  8.9   --   8.8  8.7   GLUCOSE mg/dL  133*  110  106   --   223*  222*   ALBUMIN g/dL   --    --   4.10   --   4.10  4.30  3.3  3.3   BILIRUBIN mg/dL   --    --   0.3   --   0.3  0.3   ALK PHOS U/L   --    --   48   --   47  52   AST (SGOT) U/L   --    --   21   --   27  20   ALT (SGPT) U/L   --    --   14   --   17  18    < > = values in this interval not displayed.   Estimated Creatinine Clearance: 22.9 mL/min (A) (by C-G formula based on SCr of 3.54 mg/dL (H)).  Results from last 7 days   Lab Units  01/11/19   0306  01/10/19   1956  01/10/19   1724  01/10/19   1439   CK TOTAL U/L  42  40   --   41   CKMB ng/mL  1.13  1.18   --   1.23   CK MB INDEX %  2.7  3.0   --   3.0   TROPONIN I ng/mL  0.011  0.006   --   0.008   MYOGLOBIN ng/mL  102.0  97.0  96.0   --      Hemoglobin A1C   Date/Time Value Ref Range Status   01/10/2019 1724 8.20 (H) 4.50 - 5.70 % Final     Glucose   Date/Time Value Ref Range Status   01/13/2019 0631 168 (H) 70 - 130 mg/dL Final   01/12/2019 2048 237 (H) 70 - 130 mg/dL Final   01/12/2019 1842 231 (H) 70 - 130 mg/dL Final   01/12/2019 1108 220 (H) 70 - 130 mg/dL Final   01/12/2019 0625 79 70 - 130 mg/dL Final   01/11/2019 2119 243 (H) 70 - 130 mg/dL Final   01/11/2019  1700 252 (H) 70 - 130 mg/dL Final   01/11/2019 1027 177 (H) 70 - 130 mg/dL Final     No results found for: AMMONIA    Results from last 7 days   Lab Units  01/10/19   1332   NITRITE UA   Negative             I have personally looked at the labs and they are summarized above.  ----------------------------------------------------------------------------------------------------------------------  Imaging Results (last 24 hours)     ** No results found for the last 24 hours. **        I have personally reviewed the radiology images and read the final radiology report.    Assessment & Plan        Acute On chronic diastolic heart failure in exacerbation  Chest pain  HANNAH on CKD stage IV,  Baseline creatinine 1.8-2.5  Hyperkalemia due to CKD  Dyspnea   Elevated D-Dimer   Symptomatic anemia with evidence of iron deficiency superimposed on normocytic anemia of chronic disease  Chronic Abdominal Pain  DM type 2, insulin dependent   Essential HTN  Hypothyroidism   SOHAIL  Obesity, BMI 42.07  Tobacco use (dips)     Symptomatic anemia with evidence of iron deficiency superimposed on normocytic anemia of chronic  Kidney disease  S/p 1 unit PrBC transfusion 1/12. Trend hemoglobin.tsat 11% ferritin 92,s/p 800mg venofer Nephro to consider starting ADITI    Acute on chronic Diastolic Heart failure, : BNP is 386, diuretics on hold.pt now compensated  S/p metolazone,off diuretics bcos of renal fxn  Echo EF 60-65% normal diastolic function , mild pulmoary hypertension .Cont ASA, ACE, statin and coreg.   F/u cardiology consult as when he was inpatient in november they did feel he needed a heart cath, if recommended would have to discuss risk vs. Benefits with his current renal status.     Dyspnea now worsened by symptomatic anemia: multifactorial--obesity/restriction playing a role and also fluid overload from heart failure exac and CKD.  Needs bariatric surgery.VQ scan low probability of PE, no pneumonia appreciated on CT, monitor closely.       Chest pain in pt almost already optimised on medical Rx.MI ruled out  Cardio consult ,cont all outpt cardiac meds    HANNAH on CKD stage IV: prerenal from cardiorenal vs morena-cardiac syndrome  creatinine today is 3.31>3.4>3.5, from 2.93 k 5.3>4.3  avoid nephrotoxic agents,s/p 1 unit PRBC 1/12  Renal cardiac diet,low K diet  nephrology consult appreciated,  Monitor strict input/ urine output, daily RFP    Hyperkalemia due to ckd/poor dietary adherence---improved  Daily BMP,renal low K cardiac diet,give education resource,Nephro f/u     Elevated D-Dimer: VQ scan shows low probability, Bilateral lower ext. Doppler ordered and pending.      Chronic Abdominal Pain: this has been going on for some time, CT of the abdomen today shows no acute findings. He did have a EGD and colonoscopy done by Dr. Chavez on 5/9/2018, which showed normal results. Last bowel movement today, will monitor.      DM Type 2, insulin dependent: A1c 8.2%, hypoglycemia protocol initiated, accu checks ac/hs and prn, diabetic diet, low dose sliding scale ordered.      Hypothyroidism: TSH ordered, will resume home synthroid when available by pharmacy.      SOHAIL: wears oxygen at HS, monitor.        DVT prophylaxis: SCDs for now due to anemia (Hgb 7.2)  GI prophylaxis: continue home Protonix   Activity: bedrest with BSC   Precautions: falls   Diet: Cardiac, consistent carb, renal, regular     Code status: Full     Disposition: afterstable Hgb,EGD/colonoscopy,to f/u nephro,pcp,cardiology as outpt    The patient is high risk due to the following diagnoses/reasons:CHF exac,dyspnea and symptomatic anemia,awaitng EGD/colonoscopy      Mita Miguel MD  Hospital Medicine Team  01/13/19  11:01 AM

## 2019-01-13 NOTE — PLAN OF CARE
Problem: Skin Injury Risk (Adult)  Goal: Skin Health and Integrity  Outcome: Ongoing (interventions implemented as appropriate)      Problem: Fall Risk (Adult)  Goal: Absence of Fall  Outcome: Ongoing (interventions implemented as appropriate)      Problem: Diabetes, Type 2 (Adult)  Goal: Signs and Symptoms of Listed Potential Problems Will be Absent, Minimized or Managed (Diabetes, Type 2)  Outcome: Ongoing (interventions implemented as appropriate)      Problem: Fluid Volume Excess (Adult)  Goal: Optimal Fluid Balance  Outcome: Ongoing (interventions implemented as appropriate)      Problem: Patient Care Overview  Goal: Plan of Care Review  Outcome: Ongoing (interventions implemented as appropriate)   01/12/19 0822   Coping/Psychosocial   Plan of Care Reviewed With patient   Plan of Care Review   Progress improving     Goal: Discharge Needs Assessment  Outcome: Ongoing (interventions implemented as appropriate)

## 2019-01-13 NOTE — PROGRESS NOTES
Nephrology Note      Subjective       No new overnight events reported. Denies chest pain. Still SOB on exertion but not in any distress    Objective     Vital Signs  Temp:  [97.2 °F (36.2 °C)-97.9 °F (36.6 °C)] 97.6 °F (36.4 °C)  Heart Rate:  [53-75] 57  Resp:  [18-25] 24  BP: (109-180)/() 122/68    I/O this shift:  In: 480 [P.O.:480]  Out: -   I/O last 3 completed shifts:  In: 1820.4 [P.O.:1320; Blood:385.4; IV Piggyback:115]  Out: 2050 [Urine:2050]    Physical Examination:    General Appearance : alert, appears stated age, cooperative and chronically ill  Head : normocephalic, without obvious abnormality and atraumatic  Eyes :  + pallor and PERRLA  Throat : oral mucosa moist  Neck:  no JVD  Lungs : clear to auscultation, respirations regular and unlabored  Heart : regular rhythm & normal rate, normal S1, S2, no murmur, no joaquin, no rub   Abdomen :   soft non-tender  Extremities : d 2+ edema  Pulses :  palpable and equal bilaterally  Skin : no bleeding, bruising or rash  Neurologic : orientated to person, place, time, grossly no focal deficitis    Laboratory Data :      WBC WBC   Date Value Ref Range Status   01/13/2019 4.42 (L) 4.50 - 12.50 10*3/mm3 Final   01/12/2019 4.23 (L) 4.50 - 12.50 10*3/mm3 Final   01/11/2019 5.05 4.50 - 12.50 10*3/mm3 Final      HGB Hemoglobin   Date Value Ref Range Status   01/13/2019 8.2 (L) 14.0 - 18.0 g/dL Final   01/12/2019 6.8 (C) 14.0 - 18.0 g/dL Final   01/11/2019 7.2 (L) 14.0 - 18.0 g/dL Final      HCT Hematocrit   Date Value Ref Range Status   01/13/2019 26.7 (L) 42.0 - 52.0 % Final   01/12/2019 23.3 (L) 42.0 - 52.0 % Final   01/11/2019 24.0 (L) 42.0 - 52.0 % Final      Platlets No results found for: LABPLAT   MCV MCV   Date Value Ref Range Status   01/13/2019 93.7 80.0 - 94.0 fL Final   01/12/2019 97.1 (H) 80.0 - 94.0 fL Final   01/11/2019 94.5 (H) 80.0 - 94.0 fL Final          Sodium Sodium   Date Value Ref Range Status   01/13/2019 137 135 - 153 mmol/L Final    01/12/2019 143 135 - 153 mmol/L Final   01/11/2019 141 135 - 153 mmol/L Final      Potassium Potassium   Date Value Ref Range Status   01/13/2019 4.3 3.5 - 5.3 mmol/L Final   01/12/2019 4.2 3.5 - 5.3 mmol/L Final   01/11/2019 4.4 3.5 - 5.3 mmol/L Final   01/10/2019 5.3 3.5 - 5.3 mmol/L Final      Chloride Chloride   Date Value Ref Range Status   01/13/2019 105 99 - 112 mmol/L Final   01/12/2019 108 99 - 112 mmol/L Final   01/11/2019 108 99 - 112 mmol/L Final      CO2 CO2   Date Value Ref Range Status   01/13/2019 22.6 (L) 24.3 - 31.9 mmol/L Final   01/12/2019 22.6 (L) 24.3 - 31.9 mmol/L Final   01/11/2019 23.1 (L) 24.3 - 31.9 mmol/L Final      BUN BUN   Date Value Ref Range Status   01/13/2019 51 (H) 7 - 21 mg/dL Final   01/12/2019 51 (H) 7 - 21 mg/dL Final   01/11/2019 55 (H) 7 - 21 mg/dL Final      Creatinine Creatinine   Date Value Ref Range Status   01/13/2019 3.54 (H) 0.43 - 1.29 mg/dL Final   01/12/2019 3.53 (H) 0.43 - 1.29 mg/dL Final   01/11/2019 3.41 (H) 0.43 - 1.29 mg/dL Final      Calcium Calcium   Date Value Ref Range Status   01/13/2019 8.8 7.7 - 10.0 mg/dL Final   01/12/2019 8.5 7.7 - 10.0 mg/dL Final   01/11/2019 8.9 7.7 - 10.0 mg/dL Final      PO4 No results found for: CAPO4   Albumin Albumin   Date Value Ref Range Status   01/11/2019 4.10 3.40 - 4.80 g/dL Final      Magnesium Magnesium   Date Value Ref Range Status   01/11/2019 1.8 1.7 - 2.6 mg/dL Final      Uric Acid No results found for: URICACID     Radiology results :     Imaging Results (last 24 hours)     ** No results found for the last 24 hours. **              Medications:        aspirin 81 mg Oral Daily   atorvastatin 80 mg Oral Daily   carvedilol 25 mg Oral BID With Meals   cetirizine 5 mg Oral Daily   cyclobenzaprine 10 mg Oral Daily   glipiZIDE 10 mg Oral BID AC   hydrALAZINE 50 mg Oral TID   insulin aspart 0-7 Units Subcutaneous 4x Daily AC & at Bedtime   insulin detemir 60 Units Subcutaneous Nightly   iron sucrose (VENOFER) IVPB 300  mg Intravenous Q24H   isosorbide mononitrate 120 mg Oral Daily   levothyroxine 137.5 mcg Oral Q AM   linagliptin 5 mg Oral Daily   pantoprazole 40 mg Oral QAM   sennosides-docusate sodium 2 tablet Oral Nightly   sodium bicarbonate 650 mg Oral TID   sodium chloride 3 mL Intravenous Q12H   tamsulosin 0.4 mg Oral Nightly   triamcinolone 1 application Topical Q12H          Assessment/Plan       CHF (congestive heart failure) (CMS/HCC)      1. HANNAH on CKD stage 4 : His baseline creatinine is around 3.   creatinine stable at 3.5 today . Will get 24 hr crcl  UA is bland and only 208 mg of proteinuria. No hydronephrosis on CT scan abdomen    2. HTN : stable. Keep BP 140s/80s for renal perfusion    3. Shortness of breath : appears to be related to obesity and sleep apnea plus some volume overload Will diurese as tolerated.  repeat 2 D echo shows normal systolic and diastolic LV function    4. Iron deficiency anemia : on venofer series.EGD /coonoscopy on Monday. No ADITI due to prostate cancer    5. Type 2 DM stable      I discussed the patient's findings and my recommendations with patient and nursing staff    Pablo Silveira MD  01/13/19  11:05 AM

## 2019-01-13 NOTE — PLAN OF CARE
Problem: Skin Injury Risk (Adult)  Goal: Skin Health and Integrity  Outcome: Ongoing (interventions implemented as appropriate)      Problem: Fall Risk (Adult)  Goal: Absence of Fall  Outcome: Ongoing (interventions implemented as appropriate)      Problem: Diabetes, Type 2 (Adult)  Goal: Signs and Symptoms of Listed Potential Problems Will be Absent, Minimized or Managed (Diabetes, Type 2)  Outcome: Ongoing (interventions implemented as appropriate)      Problem: Fluid Volume Excess (Adult)  Goal: Optimal Fluid Balance  Outcome: Ongoing (interventions implemented as appropriate)

## 2019-01-14 ENCOUNTER — ANESTHESIA EVENT (OUTPATIENT)
Dept: PERIOP | Facility: HOSPITAL | Age: 64
End: 2019-01-14

## 2019-01-14 ENCOUNTER — HOSPITAL ENCOUNTER (OUTPATIENT)
Dept: INFUSION THERAPY | Facility: HOSPITAL | Age: 64
Discharge: HOME | End: 2019-01-14
Attending: INTERNAL MEDICINE

## 2019-01-14 ENCOUNTER — ANESTHESIA (OUTPATIENT)
Dept: PERIOP | Facility: HOSPITAL | Age: 64
End: 2019-01-14

## 2019-01-14 PROBLEM — D50.9 IRON DEFICIENCY ANEMIA: Status: ACTIVE | Noted: 2019-01-10

## 2019-01-14 LAB
ANION GAP SERPL CALCULATED.3IONS-SCNC: 12.6 MMOL/L (ref 3.6–11.2)
BASOPHILS # BLD AUTO: 0.02 10*3/MM3 (ref 0–0.3)
BASOPHILS NFR BLD AUTO: 0.4 % (ref 0–2)
BUN BLD-MCNC: 47 MG/DL (ref 7–21)
BUN/CREAT SERPL: 13.8 (ref 7–25)
CALCIUM SPEC-SCNC: 9 MG/DL (ref 7.7–10)
CHLORIDE SERPL-SCNC: 107 MMOL/L (ref 99–112)
CO2 SERPL-SCNC: 22.4 MMOL/L (ref 24.3–31.9)
COLLECT DURATION TIME UR: 24 HRS
COLLECT DURATION TIME UR: 24 HRS
CREAT BLD-MCNC: 3.4 MG/DL (ref 0.43–1.29)
CREAT CL 24H UR+SERPL-VRATE: 16.2 ML/MIN (ref 63–143)
CREAT CL 24H UR+SERPL-VRATE: 23.3 L/24 HR (ref 95–205.9)
CREAT UR-MCNC: 65.3 MG/DL
CREAT UR-MCNC: 65.3 MG/DL
CREATINE 24H UR-MRATE: 0.95 G/24 HR (ref 0.98–2.2)
CREATINE 24H UR-MRATE: 0.95 G/24 HR (ref 0.98–2.2)
DEPRECATED RDW RBC AUTO: 49.3 FL (ref 37–54)
EOSINOPHIL # BLD AUTO: 0.15 10*3/MM3 (ref 0–0.7)
EOSINOPHIL NFR BLD AUTO: 3.1 % (ref 0–5)
ERYTHROCYTE [DISTWIDTH] IN BLOOD BY AUTOMATED COUNT: 15.1 % (ref 11.5–14.5)
GFR SERPL CREATININE-BSD FRML MDRD: 18 ML/MIN/1.73
GLUCOSE BLD-MCNC: 55 MG/DL (ref 70–110)
GLUCOSE BLDC GLUCOMTR-MCNC: 307 MG/DL (ref 70–130)
GLUCOSE BLDC GLUCOMTR-MCNC: 46 MG/DL (ref 70–130)
GLUCOSE BLDC GLUCOMTR-MCNC: 62 MG/DL (ref 70–130)
GLUCOSE BLDC GLUCOMTR-MCNC: 65 MG/DL (ref 70–130)
GLUCOSE BLDC GLUCOMTR-MCNC: 77 MG/DL (ref 70–130)
GLUCOSE BLDC GLUCOMTR-MCNC: 78 MG/DL (ref 70–130)
HCT VFR BLD AUTO: 27.5 % (ref 42–52)
HGB BLD-MCNC: 8.2 G/DL (ref 14–18)
IMM GRANULOCYTES # BLD AUTO: 0.01 10*3/MM3 (ref 0–0.03)
IMM GRANULOCYTES NFR BLD AUTO: 0.2 % (ref 0–0.5)
LYMPHOCYTES # BLD AUTO: 1.29 10*3/MM3 (ref 1–3)
LYMPHOCYTES NFR BLD AUTO: 26.5 % (ref 21–51)
MCH RBC QN AUTO: 28.1 PG (ref 27–33)
MCHC RBC AUTO-ENTMCNC: 29.8 G/DL (ref 33–37)
MCV RBC AUTO: 94.2 FL (ref 80–94)
MONOCYTES # BLD AUTO: 0.43 10*3/MM3 (ref 0.1–0.9)
MONOCYTES NFR BLD AUTO: 8.8 % (ref 0–10)
NEUTROPHILS # BLD AUTO: 2.96 10*3/MM3 (ref 1.4–6.5)
NEUTROPHILS NFR BLD AUTO: 61 % (ref 30–70)
OSMOLALITY SERPL CALC.SUM OF ELEC: 293 MOSM/KG (ref 273–305)
PLATELET # BLD AUTO: 171 10*3/MM3 (ref 130–400)
PMV BLD AUTO: 11.9 FL (ref 6–10)
POTASSIUM BLD-SCNC: 4.1 MMOL/L (ref 3.5–5.3)
RBC # BLD AUTO: 2.92 10*6/MM3 (ref 4.7–6.1)
SODIUM BLD-SCNC: 142 MMOL/L (ref 135–153)
SPECIMEN VOL 24H UR: 1450 ML
WBC NRBC COR # BLD: 4.86 10*3/MM3 (ref 4.5–12.5)

## 2019-01-14 PROCEDURE — 0DJ08ZZ INSPECTION OF UPPER INTESTINAL TRACT, VIA NATURAL OR ARTIFICIAL OPENING ENDOSCOPIC: ICD-10-PCS | Performed by: SURGERY

## 2019-01-14 PROCEDURE — 63710000001 INSULIN DETEMIR PER 5 UNITS: Performed by: INTERNAL MEDICINE

## 2019-01-14 PROCEDURE — 82962 GLUCOSE BLOOD TEST: CPT

## 2019-01-14 PROCEDURE — 0DBL8ZZ EXCISION OF TRANSVERSE COLON, VIA NATURAL OR ARTIFICIAL OPENING ENDOSCOPIC: ICD-10-PCS | Performed by: SURGERY

## 2019-01-14 PROCEDURE — 85025 COMPLETE CBC W/AUTO DIFF WBC: CPT | Performed by: NURSE PRACTITIONER

## 2019-01-14 PROCEDURE — 25010000002 MIDAZOLAM PER 1 MG: Performed by: NURSE ANESTHETIST, CERTIFIED REGISTERED

## 2019-01-14 PROCEDURE — 82575 CREATININE CLEARANCE TEST: CPT | Performed by: INTERNAL MEDICINE

## 2019-01-14 PROCEDURE — 25010000002 IRON SUCROSE PER 1 MG: Performed by: INTERNAL MEDICINE

## 2019-01-14 PROCEDURE — 81050 URINALYSIS VOLUME MEASURE: CPT | Performed by: INTERNAL MEDICINE

## 2019-01-14 PROCEDURE — 25010000002 FENTANYL CITRATE (PF) 100 MCG/2ML SOLUTION: Performed by: NURSE ANESTHETIST, CERTIFIED REGISTERED

## 2019-01-14 PROCEDURE — 82570 ASSAY OF URINE CREATININE: CPT | Performed by: INTERNAL MEDICINE

## 2019-01-14 PROCEDURE — 43235 EGD DIAGNOSTIC BRUSH WASH: CPT | Performed by: SURGERY

## 2019-01-14 PROCEDURE — 94799 UNLISTED PULMONARY SVC/PX: CPT

## 2019-01-14 PROCEDURE — 45380 COLONOSCOPY AND BIOPSY: CPT | Performed by: SURGERY

## 2019-01-14 PROCEDURE — 80048 BASIC METABOLIC PNL TOTAL CA: CPT | Performed by: INTERNAL MEDICINE

## 2019-01-14 PROCEDURE — 99232 SBSQ HOSP IP/OBS MODERATE 35: CPT | Performed by: HOSPITALIST

## 2019-01-14 PROCEDURE — 25010000002 PROPOFOL 10 MG/ML EMULSION: Performed by: NURSE ANESTHETIST, CERTIFIED REGISTERED

## 2019-01-14 PROCEDURE — 36415 COLL VENOUS BLD VENIPUNCTURE: CPT | Performed by: INTERNAL MEDICINE

## 2019-01-14 PROCEDURE — 63710000001 INSULIN ASPART PER 5 UNITS: Performed by: NURSE PRACTITIONER

## 2019-01-14 PROCEDURE — 25010000002 PROPOFOL 1000 MG/ML EMULSION: Performed by: NURSE ANESTHETIST, CERTIFIED REGISTERED

## 2019-01-14 PROCEDURE — 88305 TISSUE EXAM BY PATHOLOGIST: CPT | Performed by: SURGERY

## 2019-01-14 RX ORDER — OXYCODONE HYDROCHLORIDE AND ACETAMINOPHEN 5; 325 MG/1; MG/1
1 TABLET ORAL ONCE AS NEEDED
Status: DISCONTINUED | OUTPATIENT
Start: 2019-01-14 | End: 2019-01-15 | Stop reason: HOSPADM

## 2019-01-14 RX ORDER — SODIUM CHLORIDE, SODIUM LACTATE, POTASSIUM CHLORIDE, CALCIUM CHLORIDE 600; 310; 30; 20 MG/100ML; MG/100ML; MG/100ML; MG/100ML
125 INJECTION, SOLUTION INTRAVENOUS CONTINUOUS
Status: DISCONTINUED | OUTPATIENT
Start: 2019-01-14 | End: 2019-01-14

## 2019-01-14 RX ORDER — SODIUM CHLORIDE 0.9 % (FLUSH) 0.9 %
3 SYRINGE (ML) INJECTION EVERY 12 HOURS SCHEDULED
Status: DISCONTINUED | OUTPATIENT
Start: 2019-01-14 | End: 2019-01-14 | Stop reason: HOSPADM

## 2019-01-14 RX ORDER — MEPERIDINE HYDROCHLORIDE 25 MG/ML
12.5 INJECTION INTRAMUSCULAR; INTRAVENOUS; SUBCUTANEOUS
Status: DISCONTINUED | OUTPATIENT
Start: 2019-01-14 | End: 2019-01-15 | Stop reason: HOSPADM

## 2019-01-14 RX ORDER — MIDAZOLAM HYDROCHLORIDE 1 MG/ML
INJECTION INTRAMUSCULAR; INTRAVENOUS AS NEEDED
Status: DISCONTINUED | OUTPATIENT
Start: 2019-01-14 | End: 2019-01-14 | Stop reason: SURG

## 2019-01-14 RX ORDER — LIDOCAINE HYDROCHLORIDE 20 MG/ML
INJECTION, SOLUTION INFILTRATION; PERINEURAL AS NEEDED
Status: DISCONTINUED | OUTPATIENT
Start: 2019-01-14 | End: 2019-01-14 | Stop reason: SURG

## 2019-01-14 RX ORDER — PROPOFOL 10 MG/ML
VIAL (ML) INTRAVENOUS AS NEEDED
Status: DISCONTINUED | OUTPATIENT
Start: 2019-01-14 | End: 2019-01-14 | Stop reason: SURG

## 2019-01-14 RX ORDER — SODIUM CHLORIDE 0.9 % (FLUSH) 0.9 %
3-10 SYRINGE (ML) INJECTION AS NEEDED
Status: DISCONTINUED | OUTPATIENT
Start: 2019-01-14 | End: 2019-01-14 | Stop reason: HOSPADM

## 2019-01-14 RX ORDER — FENTANYL CITRATE 50 UG/ML
INJECTION, SOLUTION INTRAMUSCULAR; INTRAVENOUS AS NEEDED
Status: DISCONTINUED | OUTPATIENT
Start: 2019-01-14 | End: 2019-01-14 | Stop reason: SURG

## 2019-01-14 RX ORDER — IPRATROPIUM BROMIDE AND ALBUTEROL SULFATE 2.5; .5 MG/3ML; MG/3ML
3 SOLUTION RESPIRATORY (INHALATION) ONCE AS NEEDED
Status: DISCONTINUED | OUTPATIENT
Start: 2019-01-14 | End: 2019-01-15 | Stop reason: HOSPADM

## 2019-01-14 RX ORDER — ONDANSETRON 2 MG/ML
4 INJECTION INTRAMUSCULAR; INTRAVENOUS ONCE AS NEEDED
Status: DISCONTINUED | OUTPATIENT
Start: 2019-01-14 | End: 2019-01-15 | Stop reason: HOSPADM

## 2019-01-14 RX ORDER — DEXTROSE MONOHYDRATE 25 G/50ML
15 INJECTION, SOLUTION INTRAVENOUS
Status: DISCONTINUED | OUTPATIENT
Start: 2019-01-14 | End: 2019-01-14 | Stop reason: HOSPADM

## 2019-01-14 RX ORDER — FENTANYL CITRATE 50 UG/ML
50 INJECTION, SOLUTION INTRAMUSCULAR; INTRAVENOUS
Status: DISCONTINUED | OUTPATIENT
Start: 2019-01-14 | End: 2019-01-15 | Stop reason: HOSPADM

## 2019-01-14 RX ADMIN — GLIPIZIDE 10 MG: 5 TABLET ORAL at 18:27

## 2019-01-14 RX ADMIN — TRIAMCINOLONE ACETONIDE 1 APPLICATION: 1 CREAM TOPICAL at 21:01

## 2019-01-14 RX ADMIN — HYDRALAZINE HYDROCHLORIDE 50 MG: 50 TABLET ORAL at 21:01

## 2019-01-14 RX ADMIN — CETIRIZINE HCL 5 MG: 10 TABLET ORAL at 15:56

## 2019-01-14 RX ADMIN — DEXTROSE MONOHYDRATE: 25 INJECTION, SOLUTION INTRAVENOUS at 12:19

## 2019-01-14 RX ADMIN — SODIUM CHLORIDE, PRESERVATIVE FREE 3 ML: 5 INJECTION INTRAVENOUS at 14:53

## 2019-01-14 RX ADMIN — FENTANYL CITRATE 100 MCG: 50 INJECTION INTRAMUSCULAR; INTRAVENOUS at 12:33

## 2019-01-14 RX ADMIN — PROPOFOL 30 MG: 10 INJECTION, EMULSION INTRAVENOUS at 12:35

## 2019-01-14 RX ADMIN — ATORVASTATIN CALCIUM 80 MG: 40 TABLET, FILM COATED ORAL at 15:57

## 2019-01-14 RX ADMIN — ASPIRIN 81 MG: 81 TABLET ORAL at 15:57

## 2019-01-14 RX ADMIN — ISOSORBIDE MONONITRATE 120 MG: 60 TABLET, EXTENDED RELEASE ORAL at 15:56

## 2019-01-14 RX ADMIN — TRIAMCINOLONE ACETONIDE 1 APPLICATION: 1 CREAM TOPICAL at 09:34

## 2019-01-14 RX ADMIN — TAMSULOSIN HYDROCHLORIDE 0.4 MG: 0.4 CAPSULE ORAL at 21:01

## 2019-01-14 RX ADMIN — SODIUM CHLORIDE, POTASSIUM CHLORIDE, SODIUM LACTATE AND CALCIUM CHLORIDE: 600; 310; 30; 20 INJECTION, SOLUTION INTRAVENOUS at 12:33

## 2019-01-14 RX ADMIN — MIDAZOLAM HYDROCHLORIDE 2 MG: 1 INJECTION, SOLUTION INTRAMUSCULAR; INTRAVENOUS at 12:33

## 2019-01-14 RX ADMIN — SODIUM BICARBONATE TAB 650 MG 650 MG: 650 TAB at 21:00

## 2019-01-14 RX ADMIN — CYCLOBENZAPRINE 10 MG: 10 TABLET, FILM COATED ORAL at 15:57

## 2019-01-14 RX ADMIN — HYDRALAZINE HYDROCHLORIDE 50 MG: 50 TABLET ORAL at 15:55

## 2019-01-14 RX ADMIN — INSULIN ASPART 4 UNITS: 100 INJECTION, SOLUTION INTRAVENOUS; SUBCUTANEOUS at 21:01

## 2019-01-14 RX ADMIN — DEXTROSE MONOHYDRATE 25 G: 25 INJECTION, SOLUTION INTRAVENOUS at 04:25

## 2019-01-14 RX ADMIN — IRON SUCROSE 300 MG: 20 INJECTION, SOLUTION INTRAVENOUS at 14:52

## 2019-01-14 RX ADMIN — CARVEDILOL 25 MG: 25 TABLET, FILM COATED ORAL at 18:28

## 2019-01-14 RX ADMIN — LIDOCAINE HYDROCHLORIDE 60 MG: 20 INJECTION, SOLUTION INFILTRATION; PERINEURAL at 12:35

## 2019-01-14 RX ADMIN — SODIUM BICARBONATE TAB 650 MG 650 MG: 650 TAB at 15:56

## 2019-01-14 RX ADMIN — IPRATROPIUM BROMIDE AND ALBUTEROL SULFATE 3 ML: .5; 3 SOLUTION RESPIRATORY (INHALATION) at 07:11

## 2019-01-14 RX ADMIN — PROPOFOL 140 MCG/KG/MIN: 10 INJECTION, EMULSION INTRAVENOUS at 12:35

## 2019-01-14 RX ADMIN — INSULIN DETEMIR 60 UNITS: 100 INJECTION, SOLUTION SUBCUTANEOUS at 21:02

## 2019-01-14 RX ADMIN — LINAGLIPTIN 5 MG: 5 TABLET, FILM COATED ORAL at 15:57

## 2019-01-14 NOTE — OP NOTE
Catarino Arvizu  1/10/2019 - 1/14/2019      Operative Progress Note:    Surgeon and Assistant: Dr. Napier    Pre-Operative Diagnosis: anemia    Post-Operative Diagnosis: anemia and hyperplastic transverse colon polyp measuring 0.25 cm and normal EGD    Procedure(s):  EGD and colonoscopy to cecum with polypectomy with cold forceps    Type of Anesthesia Administered: IV general    Estimated Blood Loss: Minimal    Blood Products: None    Specimen Obtained/Removed: polyp transverse colon    Complication(s):  None    Graft/Implant/Prosthetics/Implanted Device/Transplants: None    Indication: patient's a 63-year-old white male    Findings: duodenum normal, stomach normal, and esophagus normal.  Colon normal its entirety except for findings of a small hyperplastic 0.5 cm polyp mid transverse colon    Operative Report:  Patient was taken operating room and placed in a left lateral decubitus position.  IV sedation was given per anesthesia.  Gastroscope was introduced and passed into the duodenum.  The scope was slowly withdrawn.  I examined the duodenum, stomach and esophagus thoroughly.   Findings are listed as above.    Patient was taken to the operating room and placed in a left lateral decubitus position.  IV sedation was given.  Colonoscope was introduced and passed all the way to cecum.  The scope was slowly withdrawn.  Cecum, ascending colon, hepatic flexure, transverse colon, splenic flexure, descending colon, sigmoid colon, and rectum were all examined.  After findings were a 0.25 cm hyperplastic-appearing polyp in mid transverse colon.  It was removed with cold forceps  Digital rectal exam was unremarkable.  The procedures and terminated.  Patient tolerated procedure very well and was returned recovery room in satisfactory condition.    There is nothing found at upper or lower endoscopy which would explain this patient's anemia.  He will need a PillCam.    Patient will need a repeat colonoscopy in 10 years or  less.       Electronically Signed by: Ottoniel Napier MD        Dictated Utilizing Dragon Dictation

## 2019-01-14 NOTE — ANESTHESIA POSTPROCEDURE EVALUATION
Patient: Catarino Arvizu    Procedure Summary     Date:  01/14/19 Room / Location:  [facility] OR [address] [place] [address] COR OR    Anesthesia Start:  1233 Anesthesia Stop:  1254    Procedures:       ESOPHAGOGASTRODUODENOSCOPY (N/A Esophagus)      COLONOSCOPY (N/A ) Diagnosis:       Iron deficiency anemia, unspecified iron deficiency anemia type      (Iron deficiency anemia, unspecified iron deficiency anemia type [D50.9])    Surgeon:  Ottoniel Napier MD Provider:  Serjio Perrin MD    Anesthesia Type:  general ASA Status:  3          Anesthesia Type: general  Last vitals  BP   164/81 (01/14/19 1326)   Temp   97.8 °F (36.6 °C) (01/14/19 1256)   Pulse   65 (01/14/19 1326)   Resp   20 (01/14/19 1326)     SpO2   100 % (01/14/19 1326)     Post Anesthesia Care and Evaluation    Patient location during evaluation: PACU  Patient participation: complete - patient participated  Level of consciousness: awake and alert  Pain score: 1  Pain management: adequate  Airway patency: patent  Anesthetic complications: No anesthetic complications  PONV Status: controlled  Cardiovascular status: acceptable  Respiratory status: acceptable  Hydration status: acceptable

## 2019-01-14 NOTE — PLAN OF CARE
Problem: Skin Injury Risk (Adult)  Goal: Skin Health and Integrity  Outcome: Ongoing (interventions implemented as appropriate)      Problem: Fall Risk (Adult)  Goal: Absence of Fall  Outcome: Ongoing (interventions implemented as appropriate)      Problem: Diabetes, Type 2 (Adult)  Goal: Signs and Symptoms of Listed Potential Problems Will be Absent, Minimized or Managed (Diabetes, Type 2)  Outcome: Ongoing (interventions implemented as appropriate)      Problem: Fluid Volume Excess (Adult)  Goal: Optimal Fluid Balance  Outcome: Ongoing (interventions implemented as appropriate)      Problem: Patient Care Overview  Goal: Plan of Care Review  Outcome: Ongoing (interventions implemented as appropriate)    Goal: Discharge Needs Assessment  Outcome: Ongoing (interventions implemented as appropriate)

## 2019-01-14 NOTE — PROGRESS NOTES
Nephrology Note      Subjective       On my evaluation he was sleeping flat on room and in no distress. Upon awakening he reports SOB on exertion. No chest pain. He denies nausea or vomiting    Objective     Vital Signs  Temp:  [97.1 °F (36.2 °C)-98.8 °F (37.1 °C)] 97.1 °F (36.2 °C)  Heart Rate:  [51-63] 52  Resp:  [16-26] 20  BP: (111-150)/(54-93) 142/63    I/O this shift:  In: 250 [I.V.:250]  Out: -   I/O last 3 completed shifts:  In: 1610 [P.O.:1560; IV Piggyback:50]  Out: 2050 [Urine:2050]    Physical Examination:    General Appearance : alert,  chronically ill  Head : normocephalic  Eyes :  + pallor   Throat : oral mucosa moist  Neck:  no JVD  Lungs : clear to auscultation  Heart : regular rhythm & normal rate, normal S1, S2, no murmur, no rub   Abdomen :   soft non-tender  Extremities :  2+ edema  Neurologic : orientated to person, place, time, grossly no focal deficitis    Laboratory Data :      WBC WBC   Date Value Ref Range Status   01/14/2019 4.86 4.50 - 12.50 10*3/mm3 Final   01/13/2019 4.42 (L) 4.50 - 12.50 10*3/mm3 Final   01/12/2019 4.23 (L) 4.50 - 12.50 10*3/mm3 Final      HGB Hemoglobin   Date Value Ref Range Status   01/14/2019 8.2 (L) 14.0 - 18.0 g/dL Final   01/13/2019 8.2 (L) 14.0 - 18.0 g/dL Final   01/12/2019 6.8 (C) 14.0 - 18.0 g/dL Final      HCT Hematocrit   Date Value Ref Range Status   01/14/2019 27.5 (L) 42.0 - 52.0 % Final   01/13/2019 26.7 (L) 42.0 - 52.0 % Final   01/12/2019 23.3 (L) 42.0 - 52.0 % Final      Platlets No results found for: LABPLAT   MCV MCV   Date Value Ref Range Status   01/14/2019 94.2 (H) 80.0 - 94.0 fL Final   01/13/2019 93.7 80.0 - 94.0 fL Final   01/12/2019 97.1 (H) 80.0 - 94.0 fL Final          Sodium Sodium   Date Value Ref Range Status   01/14/2019 142 135 - 153 mmol/L Final   01/13/2019 137 135 - 153 mmol/L Final   01/12/2019 143 135 - 153 mmol/L Final      Potassium Potassium   Date Value Ref Range Status   01/14/2019 4.1 3.5 - 5.3 mmol/L Final   01/13/2019  4.3 3.5 - 5.3 mmol/L Final   01/12/2019 4.2 3.5 - 5.3 mmol/L Final      Chloride Chloride   Date Value Ref Range Status   01/14/2019 107 99 - 112 mmol/L Final   01/13/2019 105 99 - 112 mmol/L Final   01/12/2019 108 99 - 112 mmol/L Final      CO2 CO2   Date Value Ref Range Status   01/14/2019 22.4 (L) 24.3 - 31.9 mmol/L Final   01/13/2019 22.6 (L) 24.3 - 31.9 mmol/L Final   01/12/2019 22.6 (L) 24.3 - 31.9 mmol/L Final      BUN BUN   Date Value Ref Range Status   01/14/2019 47 (H) 7 - 21 mg/dL Final   01/13/2019 51 (H) 7 - 21 mg/dL Final   01/12/2019 51 (H) 7 - 21 mg/dL Final      Creatinine Creatinine   Date Value Ref Range Status   01/14/2019 3.40 (H) 0.43 - 1.29 mg/dL Final   01/13/2019 3.54 (H) 0.43 - 1.29 mg/dL Final   01/12/2019 3.53 (H) 0.43 - 1.29 mg/dL Final      Calcium Calcium   Date Value Ref Range Status   01/14/2019 9.0 7.7 - 10.0 mg/dL Final   01/13/2019 8.8 7.7 - 10.0 mg/dL Final   01/12/2019 8.5 7.7 - 10.0 mg/dL Final      PO4 No results found for: CAPO4   Albumin No results found for: ALBUMIN   Magnesium No results found for: MG   Uric Acid No results found for: URICACID     Radiology results :     Imaging Results (last 24 hours)     ** No results found for the last 24 hours. **              Medications:        [MAR Hold] aspirin 81 mg Oral Daily   [MAR Hold] atorvastatin 80 mg Oral Daily   carvedilol 25 mg Oral BID With Meals   [MAR Hold] cetirizine 5 mg Oral Daily   [MAR Hold] cyclobenzaprine 10 mg Oral Daily   [MAR Hold] glipiZIDE 10 mg Oral BID AC   hydrALAZINE 50 mg Oral TID   [MAR Hold] insulin aspart 0-7 Units Subcutaneous 4x Daily AC & at Bedtime   [MAR Hold] insulin detemir 60 Units Subcutaneous Nightly   [MAR Hold] iron sucrose (VENOFER) IVPB 300 mg Intravenous Q24H   [MAR Hold] isosorbide mononitrate 120 mg Oral Daily   [MAR Hold] levothyroxine 137.5 mcg Oral Q AM   [MAR Hold] linagliptin 5 mg Oral Daily   [MAR Hold] pantoprazole 40 mg Oral QAM   [MAR Hold] sennosides-docusate sodium 2  tablet Oral Nightly   [MAR Hold] sodium bicarbonate 650 mg Oral TID   [MAR Hold] sodium chloride 3 mL Intravenous Q12H   sodium chloride 3 mL Intravenous Q12H   [MAR Hold] tamsulosin 0.4 mg Oral Nightly   [MAR Hold] triamcinolone 1 application Topical Q12H       lactated ringers 125 mL/hr       Assessment/Plan       Iron deficiency anemia    CHF (congestive heart failure) (CMS/HCC)      1. HANNAH on CKD stage 4 : His baseline creatinine is around 3.   creatinine is better at 3.4 today . Will get 24 hr crcl in progress  UA is bland and only 208 mg of proteinuria. No hydronephrosis on CT scan abdomen    2. HTN : stable. Keep BP 140s/80s for renal perfusion    3. Shortness of breath : appears to be related to obesity and sleep apnea plus some volume overload.  repeat 2 D echo shows normal systolic and diastolic LV function    4. Iron deficiency anemia : got venofer  EGD /coonoscopy today. No ADITI due to prostate cancer    5. Type 2 DM stable      I discussed the patient's findings and my recommendations with patient and nursing staff    Pablo Silveira MD  01/14/19  12:56 PM

## 2019-01-14 NOTE — PROGRESS NOTES
Discharge Planning Assessment  BREE Soto     Patient Name: Catarino Arvizu  MRN: [id number]  Today's Date: 1/14/2019    Admit Date: 1/10/2019      Discharge Plan     Row Name 01/14/19 1417       Plan    Plan  Pt lives at home with his spouse and daughter and plans to return home at discharge. Pt does not have home health services. Pt has DME from [facility]. SS will follow and assist as needed.     Patient/Family in Agreement with Plan  yes              Suyapa Rosado

## 2019-01-15 VITALS
BODY MASS INDEX: 44.02 KG/M2 | HEART RATE: 61 BPM | OXYGEN SATURATION: 99 % | SYSTOLIC BLOOD PRESSURE: 110 MMHG | HEIGHT: 62 IN | DIASTOLIC BLOOD PRESSURE: 85 MMHG | WEIGHT: 239.2 LBS | RESPIRATION RATE: 16 BRPM | TEMPERATURE: 98 F

## 2019-01-15 PROBLEM — I50.9 CHF (CONGESTIVE HEART FAILURE) (HCC): Status: RESOLVED | Noted: 2019-01-10 | Resolved: 2019-01-15

## 2019-01-15 LAB
ANION GAP SERPL CALCULATED.3IONS-SCNC: 9.5 MMOL/L (ref 3.6–11.2)
BASOPHILS # BLD AUTO: 0.01 10*3/MM3 (ref 0–0.3)
BASOPHILS NFR BLD AUTO: 0.2 % (ref 0–2)
BUN BLD-MCNC: 44 MG/DL (ref 7–21)
BUN/CREAT SERPL: 12.6 (ref 7–25)
CALCIUM SPEC-SCNC: 9.1 MG/DL (ref 7.7–10)
CHLORIDE SERPL-SCNC: 109 MMOL/L (ref 99–112)
CO2 SERPL-SCNC: 21.5 MMOL/L (ref 24.3–31.9)
CREAT BLD-MCNC: 3.48 MG/DL (ref 0.43–1.29)
DEPRECATED RDW RBC AUTO: 51.2 FL (ref 37–54)
EOSINOPHIL # BLD AUTO: 0.16 10*3/MM3 (ref 0–0.7)
EOSINOPHIL NFR BLD AUTO: 3.3 % (ref 0–5)
ERYTHROCYTE [DISTWIDTH] IN BLOOD BY AUTOMATED COUNT: 15.2 % (ref 11.5–14.5)
GFR SERPL CREATININE-BSD FRML MDRD: 18 ML/MIN/1.73
GLUCOSE BLD-MCNC: 80 MG/DL (ref 70–110)
GLUCOSE BLDC GLUCOMTR-MCNC: 139 MG/DL (ref 70–130)
GLUCOSE BLDC GLUCOMTR-MCNC: 156 MG/DL (ref 70–130)
GLUCOSE BLDC GLUCOMTR-MCNC: 163 MG/DL (ref 70–130)
GLUCOSE BLDC GLUCOMTR-MCNC: 70 MG/DL (ref 70–130)
HCT VFR BLD AUTO: 26.7 % (ref 42–52)
HCT VFR BLD AUTO: 26.9 % (ref 42–52)
HGB BLD-MCNC: 8 G/DL (ref 14–18)
HGB BLD-MCNC: 8.3 G/DL (ref 14–18)
IMM GRANULOCYTES # BLD AUTO: 0.01 10*3/MM3 (ref 0–0.03)
IMM GRANULOCYTES NFR BLD AUTO: 0.2 % (ref 0–0.5)
LYMPHOCYTES # BLD AUTO: 1.33 10*3/MM3 (ref 1–3)
LYMPHOCYTES NFR BLD AUTO: 27.5 % (ref 21–51)
MCH RBC QN AUTO: 29 PG (ref 27–33)
MCHC RBC AUTO-ENTMCNC: 31.1 G/DL (ref 33–37)
MCV RBC AUTO: 93.4 FL (ref 80–94)
MONOCYTES # BLD AUTO: 0.47 10*3/MM3 (ref 0.1–0.9)
MONOCYTES NFR BLD AUTO: 9.7 % (ref 0–10)
NEUTROPHILS # BLD AUTO: 2.85 10*3/MM3 (ref 1.4–6.5)
NEUTROPHILS NFR BLD AUTO: 59.1 % (ref 30–70)
OSMOLALITY SERPL CALC.SUM OF ELEC: 289.6 MOSM/KG (ref 273–305)
PLATELET # BLD AUTO: 183 10*3/MM3 (ref 130–400)
PMV BLD AUTO: 11.8 FL (ref 6–10)
POTASSIUM BLD-SCNC: 4.3 MMOL/L (ref 3.5–5.3)
RBC # BLD AUTO: 2.86 10*6/MM3 (ref 4.7–6.1)
SODIUM BLD-SCNC: 140 MMOL/L (ref 135–153)
WBC NRBC COR # BLD: 4.83 10*3/MM3 (ref 4.5–12.5)

## 2019-01-15 PROCEDURE — 85025 COMPLETE CBC W/AUTO DIFF WBC: CPT | Performed by: NURSE PRACTITIONER

## 2019-01-15 PROCEDURE — 80048 BASIC METABOLIC PNL TOTAL CA: CPT | Performed by: INTERNAL MEDICINE

## 2019-01-15 PROCEDURE — 36415 COLL VENOUS BLD VENIPUNCTURE: CPT | Performed by: HOSPITALIST

## 2019-01-15 PROCEDURE — 25010000002 IRON SUCROSE PER 1 MG: Performed by: INTERNAL MEDICINE

## 2019-01-15 PROCEDURE — 63710000001 INSULIN ASPART PER 5 UNITS: Performed by: NURSE PRACTITIONER

## 2019-01-15 PROCEDURE — 82962 GLUCOSE BLOOD TEST: CPT

## 2019-01-15 PROCEDURE — 25010000002 ENOXAPARIN PER 10 MG: Performed by: SURGERY

## 2019-01-15 PROCEDURE — 99239 HOSP IP/OBS DSCHRG MGMT >30: CPT | Performed by: HOSPITALIST

## 2019-01-15 PROCEDURE — 94799 UNLISTED PULMONARY SVC/PX: CPT

## 2019-01-15 PROCEDURE — 85014 HEMATOCRIT: CPT | Performed by: HOSPITALIST

## 2019-01-15 PROCEDURE — 85018 HEMOGLOBIN: CPT | Performed by: HOSPITALIST

## 2019-01-15 RX ORDER — HYDRALAZINE HYDROCHLORIDE 25 MG/1
50 TABLET, FILM COATED ORAL 3 TIMES DAILY
Qty: 180 TABLET | Refills: 0 | Status: SHIPPED | OUTPATIENT
Start: 2019-01-15 | End: 2019-02-14

## 2019-01-15 RX ORDER — RANITIDINE 300 MG/1
150 TABLET ORAL NIGHTLY
Start: 2019-01-15 | End: 2019-05-03 | Stop reason: ALTCHOICE

## 2019-01-15 RX ORDER — CETIRIZINE HYDROCHLORIDE 10 MG/1
5 TABLET ORAL DAILY
Status: ON HOLD
Start: 2019-01-15 | End: 2021-01-01

## 2019-01-15 RX ORDER — INSULIN GLARGINE 100 [IU]/ML
30 INJECTION, SOLUTION SUBCUTANEOUS NIGHTLY
Status: ON HOLD
Start: 2019-01-15 | End: 2021-01-01

## 2019-01-15 RX ADMIN — TRIAMCINOLONE ACETONIDE 1 APPLICATION: 1 CREAM TOPICAL at 09:20

## 2019-01-15 RX ADMIN — LEVOTHYROXINE SODIUM 137.5 MCG: 125 TABLET ORAL at 06:59

## 2019-01-15 RX ADMIN — CARVEDILOL 25 MG: 25 TABLET, FILM COATED ORAL at 09:18

## 2019-01-15 RX ADMIN — PANTOPRAZOLE SODIUM 40 MG: 40 TABLET, DELAYED RELEASE ORAL at 06:59

## 2019-01-15 RX ADMIN — ISOSORBIDE MONONITRATE 120 MG: 60 TABLET, EXTENDED RELEASE ORAL at 09:19

## 2019-01-15 RX ADMIN — INSULIN ASPART 2 UNITS: 100 INJECTION, SOLUTION INTRAVENOUS; SUBCUTANEOUS at 12:24

## 2019-01-15 RX ADMIN — SODIUM CHLORIDE, PRESERVATIVE FREE 3 ML: 5 INJECTION INTRAVENOUS at 09:19

## 2019-01-15 RX ADMIN — ASPIRIN 81 MG: 81 TABLET ORAL at 09:17

## 2019-01-15 RX ADMIN — SODIUM BICARBONATE TAB 650 MG 650 MG: 650 TAB at 09:17

## 2019-01-15 RX ADMIN — ENOXAPARIN SODIUM 30 MG: 30 INJECTION SUBCUTANEOUS at 09:18

## 2019-01-15 RX ADMIN — CYCLOBENZAPRINE 10 MG: 10 TABLET, FILM COATED ORAL at 09:19

## 2019-01-15 RX ADMIN — HYDRALAZINE HYDROCHLORIDE 50 MG: 50 TABLET ORAL at 09:18

## 2019-01-15 RX ADMIN — CETIRIZINE HCL 5 MG: 10 TABLET ORAL at 09:19

## 2019-01-15 RX ADMIN — ATORVASTATIN CALCIUM 80 MG: 40 TABLET, FILM COATED ORAL at 09:17

## 2019-01-15 RX ADMIN — LINAGLIPTIN 5 MG: 5 TABLET, FILM COATED ORAL at 09:19

## 2019-01-15 RX ADMIN — IRON SUCROSE 300 MG: 20 INJECTION, SOLUTION INTRAVENOUS at 10:47

## 2019-01-15 NOTE — PROGRESS NOTES
Nephrology Note      Subjective       He is sitting up in bed. He denies any nausea, vomiting or diarrhea. He continues to complains of SOB. He is not on CPAP at night which he uses at home.     Objective     Vital Signs  Temp:  [97.8 °F (36.6 °C)-98.2 °F (36.8 °C)] 98 °F (36.7 °C)  Heart Rate:  [56-72] 58  Resp:  [14-20] 14  BP: (113-182)/() 135/68    I/O this shift:  In: 100 [IV Piggyback:100]  Out: -   I/O last 3 completed shifts:  In: 420 [P.O.:120; I.V.:250; IV Piggyback:50]  Out: 1200 [Urine:1200]    Physical Examination:    General Appearance : alert,  chronically ill  Head : normocephalic  Eyes :  + pallor   Throat : oral mucosa moist  Neck:  no JVD  Lungs : clear to auscultation  Heart : regular rhythm & normal rate, normal S1, S2, no murmur, no rub   Abdomen :   soft non-tender  Extremities :  1+ edema  Neurologic : orientated to person, place, time, grossly no focal deficitis    Laboratory Data :      WBC WBC   Date Value Ref Range Status   01/15/2019 4.83 4.50 - 12.50 10*3/mm3 Final   01/14/2019 4.86 4.50 - 12.50 10*3/mm3 Final   01/13/2019 4.42 (L) 4.50 - 12.50 10*3/mm3 Final      HGB Hemoglobin   Date Value Ref Range Status   01/15/2019 8.0 (L) 14.0 - 18.0 g/dL Final   01/15/2019 8.3 (L) 14.0 - 18.0 g/dL Final   01/14/2019 8.2 (L) 14.0 - 18.0 g/dL Final   01/13/2019 8.2 (L) 14.0 - 18.0 g/dL Final      HCT Hematocrit   Date Value Ref Range Status   01/15/2019 26.9 (L) 42.0 - 52.0 % Final   01/15/2019 26.7 (L) 42.0 - 52.0 % Final   01/14/2019 27.5 (L) 42.0 - 52.0 % Final   01/13/2019 26.7 (L) 42.0 - 52.0 % Final      Platlets No results found for: LABPLAT   MCV MCV   Date Value Ref Range Status   01/15/2019 93.4 80.0 - 94.0 fL Final   01/14/2019 94.2 (H) 80.0 - 94.0 fL Final   01/13/2019 93.7 80.0 - 94.0 fL Final          Sodium Sodium   Date Value Ref Range Status   01/15/2019 140 135 - 153 mmol/L Final   01/14/2019 142 135 - 153 mmol/L Final   01/13/2019 137 135 - 153 mmol/L Final      Potassium  Potassium   Date Value Ref Range Status   01/15/2019 4.3 3.5 - 5.3 mmol/L Final   01/14/2019 4.1 3.5 - 5.3 mmol/L Final   01/13/2019 4.3 3.5 - 5.3 mmol/L Final      Chloride Chloride   Date Value Ref Range Status   01/15/2019 109 99 - 112 mmol/L Final   01/14/2019 107 99 - 112 mmol/L Final   01/13/2019 105 99 - 112 mmol/L Final      CO2 CO2   Date Value Ref Range Status   01/15/2019 21.5 (L) 24.3 - 31.9 mmol/L Final   01/14/2019 22.4 (L) 24.3 - 31.9 mmol/L Final   01/13/2019 22.6 (L) 24.3 - 31.9 mmol/L Final      BUN BUN   Date Value Ref Range Status   01/15/2019 44 (H) 7 - 21 mg/dL Final   01/14/2019 47 (H) 7 - 21 mg/dL Final   01/13/2019 51 (H) 7 - 21 mg/dL Final      Creatinine Creatinine   Date Value Ref Range Status   01/15/2019 3.48 (H) 0.43 - 1.29 mg/dL Final   01/14/2019 3.40 (H) 0.43 - 1.29 mg/dL Final   01/13/2019 3.54 (H) 0.43 - 1.29 mg/dL Final      Calcium Calcium   Date Value Ref Range Status   01/15/2019 9.1 7.7 - 10.0 mg/dL Final   01/14/2019 9.0 7.7 - 10.0 mg/dL Final   01/13/2019 8.8 7.7 - 10.0 mg/dL Final      PO4 No results found for: CAPO4   Albumin No results found for: ALBUMIN   Magnesium No results found for: MG   Uric Acid No results found for: URICACID     Radiology results :     Imaging Results (last 24 hours)     ** No results found for the last 24 hours. **              Medications:        aspirin 81 mg Oral Daily   atorvastatin 80 mg Oral Daily   carvedilol 25 mg Oral BID With Meals   cetirizine 5 mg Oral Daily   cyclobenzaprine 10 mg Oral Daily   enoxaparin 30 mg Subcutaneous Daily   hydrALAZINE 50 mg Oral TID   insulin aspart 0-7 Units Subcutaneous 4x Daily AC & at Bedtime   insulin detemir 40 Units Subcutaneous Nightly   isosorbide mononitrate 120 mg Oral Daily   levothyroxine 137.5 mcg Oral Q AM   linagliptin 5 mg Oral Daily   pantoprazole 40 mg Oral QAM   sennosides-docusate sodium 2 tablet Oral Nightly   sodium bicarbonate 650 mg Oral TID   sodium chloride 3 mL Intravenous Q12H    tamsulosin 0.4 mg Oral Nightly   triamcinolone 1 application Topical Q12H          Assessment/Plan       Iron deficiency anemia    CHF (congestive heart failure) (CMS/HCC)      1. HANNAH on CKD stage 4 : His baseline creatinine is around 3.   creatinine is stable at 3.4 today .  24 hr crcl is 16.2 cc/min and doesn't need to to start HD for now  UA is bland and only 208 mg of proteinuria. No hydronephrosis on CT scan abdomen    2. HTN : stable. Keep BP 140s/80s for renal perfusion    3. Shortness of breath : related to obesity and sleep apnea plus some volume overload. 2 D echo shows normal systolic and diastolic LV function  dw RN to order CPAP     4. Iron deficiency anemia : got venofer  EGD /coonoscopy did not show active bleeding. No ADITI due to prostate cancer    5. Type 2 DM stable    Ok to dc from renal standpoint and fu Dr Daniel in 1 week with BMP  I discussed the patient's findings and my recommendations with patient and nursing staff    Pablo Silveira MD  01/15/19  11:32 AM

## 2019-01-15 NOTE — PAYOR COMM NOTE
"[facility]   HEMA LOVETT  PHONE  [phone]  FAX  [phone]  NPI:[id number]    CLINICAL UPDATE    Catarino Arvizu (63 y.o. Male)     Date of Birth Social Security Number Address Home Phone MRN    1955  [address] [id number]    [employer] Marital Status          None        Admission Date Admission Type Admitting Provider Attending Provider Department, Room/Bed    1/10/19 Emergency Mita Miguel MD Srinivas, Priyanka, MD [facility] PROGRESS CARE, P208/1P    Discharge Date Discharge Disposition Discharge Destination                       Attending Provider:  Kassidy Magaña MD    Allergies:  No Known Allergies    Isolation:  None   Infection:  None   Code Status:  CPR    Ht:  157.5 cm (62\")   Wt:  109 kg (239 lb 3.2 oz)    Admission Cmt:  None   Principal Problem:  Iron deficiency anemia [D50.9] More...                 Active Insurance as of 1/10/2019     Primary Coverage     Payor Plan Insurance Group Employer/Plan Group    [employer] [place] [employer] [place]ICAID      Payor Plan Address Payor Plan Phone Number Payor Plan Fax Number Effective Dates    [address] [address] [phone]  7/8/2016 - None Entered    [facility] [id number]       Subscriber Name Subscriber Birth Date Member ID       CATARINO ARVIZU 1955 [id number]                 Emergency Contacts      (Rel.) Home Phone Work Phone Mobile Phone    Jerrell Arvizu (Relative) [phone] -- --    NolbertoAstrid (Daughter) -- -- [phone]    ArvizuJuany (Spouse) [phone] -- --            Hospital Medications (active)       Dose Frequency Start End    aspirin EC tablet 81 mg 81 mg Daily 1/11/2019     Sig - Route: Take 1 tablet by mouth Daily. - Oral    atorvastatin (LIPITOR) tablet 80 mg 80 mg Daily 1/11/2019     Sig - Route: Take 2 tablets by mouth Daily. - Oral    carvedilol (COREG) tablet 25 mg 25 mg 2 Times Daily With Meals 1/10/2019     Sig - Route: Take 1 tablet by " mouth 2 (Two) Times a Day With Meals. - Oral    cetirizine (zyrTEC) tablet 5 mg 5 mg Daily 1/11/2019     Sig - Route: Take 0.5 tablets by mouth Daily. - Oral    cyclobenzaprine (FLEXERIL) tablet 10 mg 10 mg Daily 1/11/2019     Sig - Route: Take 1 tablet by mouth Daily. - Oral    dextrose (D50W) 25 g/ 50mL Intravenous Solution 25 g 25 g Every 15 Minutes PRN 1/10/2019     Sig - Route: Infuse 50 mL into a venous catheter Every 15 (Fifteen) Minutes As Needed for Low Blood Sugar (Blood Sugar Less Than 70). - Intravenous    dextrose (GLUTOSE) oral gel 15 g 15 g Every 15 Minutes PRN 1/10/2019     Sig - Route: Take 15 g by mouth Every 15 (Fifteen) Minutes As Needed for Low Blood Sugar (Blood sugar less than 70). - Oral    enoxaparin (LOVENOX) syringe 30 mg 30 mg Daily 1/15/2019     Sig - Route: Inject 0.3 mL under the skin into the appropriate area as directed Daily. - Subcutaneous    fentaNYL citrate (PF) (SUBLIMAZE) injection 50 mcg 50 mcg Every 5 Minutes PRN 1/14/2019     Sig - Route: Infuse 1 mL into a venous catheter Every 5 (Five) Minutes As Needed for Moderate Pain  or Severe Pain . - Intravenous    glucagon (human recombinant) (GLUCAGEN DIAGNOSTIC) injection 1 mg 1 mg As Needed 1/10/2019     Sig - Route: Inject 1 mg under the skin into the appropriate area as directed As Needed (Blood Glucose Less Than 70). - Subcutaneous    hydrALAZINE (APRESOLINE) tablet 50 mg 50 mg 3 Times Daily 1/13/2019     Sig - Route: Take 1 tablet by mouth 3 (Three) Times a Day. - Oral    HYDROcodone-acetaminophen (NORCO) 7.5-325 MG per tablet 1 tablet 1 tablet 2 Times Daily PRN 1/10/2019     Sig - Route: Take 1 tablet by mouth 2 (Two) Times a Day As Needed for Moderate Pain . - Oral    hydrOXYzine (ATARAX) tablet 25 mg 25 mg 3 Times Daily PRN 1/10/2019     Sig - Route: Take 1 tablet by mouth 3 (Three) Times a Day As Needed for Itching. - Oral    insulin aspart (novoLOG) injection 0-7 Units 0-7 Units 4 Times Daily Before Meals & Nightly  1/10/2019     Sig - Route: Inject 0-7 Units under the skin into the appropriate area as directed 4 (Four) Times a Day Before Meals & at Bedtime. - Subcutaneous    insulin detemir (LEVEMIR) injection 40 Units 40 Units Nightly 1/15/2019     Sig - Route: Inject 40 Units under the skin into the appropriate area as directed Every Night. - Subcutaneous    ipratropium-albuterol (DUO-NEB) nebulizer solution 3 mL 3 mL Every 6 Hours PRN 1/10/2019     Sig - Route: Take 3 mL by nebulization Every 6 (Six) Hours As Needed for Wheezing or Shortness of Air. - Nebulization    ipratropium-albuterol (DUO-NEB) nebulizer solution 3 mL 3 mL Once As Needed 1/14/2019     Sig - Route: Take 3 mL by nebulization 1 (One) Time As Needed for Wheezing or Shortness of Air (bronchospasm). - Nebulization    iron sucrose (VENOFER) 300 mg in Sodium chloride 0.9 % 100 mL IVPB 300 mg Every 24 Hours Scheduled 1/12/2019 1/15/2019    Sig - Route: Infuse 300 mg into a venous catheter Daily. - Intravenous    isosorbide mononitrate (IMDUR) 24 hr tablet 120 mg 120 mg Daily 1/11/2019     Sig - Route: Take 2 tablets by mouth Daily. - Oral    levothyroxine (SYNTHROID, LEVOTHROID) tablet 137.5 mcg 137.5 mcg Every Early Morning 1/11/2019     Sig - Route: Take 137.5 mcg by mouth Every Morning. - Oral    linagliptin (TRADJENTA) tablet 5 mg 5 mg Daily 1/11/2019     Sig - Route: Take 1 tablet by mouth Daily. - Oral    meperidine (DEMEROL) injection 12.5 mg 12.5 mg Every 5 Minutes PRN 1/14/2019 1/15/2019    Sig - Route: Infuse 0.5 mL into a venous catheter Every 5 (Five) Minutes As Needed for Shivering (May repeat x 1). - Intravenous    nitroglycerin (NITROSTAT) SL tablet 0.4 mg 0.4 mg Every 5 Minutes PRN 1/10/2019     Sig - Route: Place 1 tablet under the tongue Every 5 (Five) Minutes As Needed for Chest Pain. - Sublingual    ondansetron (ZOFRAN) injection 4 mg 4 mg Once As Needed 1/14/2019     Sig - Route: Infuse 2 mL into a venous catheter 1 (One) Time As Needed for  "Nausea or Vomiting (may repeat times one dose). - Intravenous    oxyCODONE-acetaminophen (PERCOCET) 5-325 MG per tablet 1 tablet 1 tablet Once As Needed 1/14/2019     Sig - Route: Take 1 tablet by mouth 1 (One) Time As Needed for Moderate Pain . - Oral    pantoprazole (PROTONIX) EC tablet 40 mg 40 mg Every Morning 1/11/2019     Sig - Route: Take 1 tablet by mouth Every Morning. - Oral    sennosides-docusate sodium (SENOKOT-S) 8.6-50 MG tablet 2 tablet 2 tablet Nightly 1/10/2019     Sig - Route: Take 2 tablets by mouth Every Night. - Oral    sodium bicarbonate tablet 650 mg 650 mg 3 Times Daily 1/10/2019     Sig - Route: Take 1 tablet by mouth 3 (Three) Times a Day. - Oral    sodium chloride 0.9 % flush 10 mL 10 mL As Needed 1/10/2019     Sig - Route: Infuse 10 mL into a venous catheter As Needed for Line Care. - Intravenous    Cosign for Ordering: Accepted by Reinaldo Blackman MD on 1/10/2019  7:36 PM    Linked Group 1:  \"And\" Linked Group Details        sodium chloride 0.9 % flush 3 mL 3 mL Every 12 Hours Scheduled 1/10/2019     Sig - Route: Infuse 3 mL into a venous catheter Every 12 (Twelve) Hours. - Intravenous    sodium chloride 0.9 % flush 3-10 mL 3-10 mL As Needed 1/10/2019     Sig - Route: Infuse 3-10 mL into a venous catheter As Needed for Line Care. - Intravenous    tamsulosin (FLOMAX) 24 hr capsule 0.4 mg 0.4 mg Nightly 1/10/2019     Sig - Route: Take 1 capsule by mouth Every Night. - Oral    triamcinolone (KENALOG) 0.1 % cream 1 application 1 application Every 12 Hours Scheduled 1/10/2019     Sig - Route: Apply 1 application topically to the appropriate area as directed Every 12 (Twelve) Hours. - Topical    glipiZIDE (GLUCOTROL) tablet 10 mg (Discontinued) 10 mg 2 Times Daily Before Meals 1/10/2019 1/14/2019    Sig - Route: Take 2 tablets by mouth 2 (Two) Times a Day Before Meals. - Oral    insulin detemir (LEVEMIR) injection 60 Units (Discontinued) 60 Units Nightly 1/10/2019 1/14/2019    Sig - Route: " Inject 60 Units under the skin into the appropriate area as directed Every Night. - Subcutaneous    lactated ringers infusion (Discontinued) 125 mL/hr Continuous 1/14/2019 1/14/2019    Sig - Route: Infuse 125 mL/hr into a venous catheter Continuous. - Intravenous             Physician Progress Notes (last 72 hours) (Notes from 1/12/2019  1:23 PM through 1/15/2019  1:23 PM)      Pablo Silveira MD at 1/15/2019 11:32 AM          Nephrology Note      Subjective       He is sitting up in bed. He denies any nausea, vomiting or diarrhea. He continues to complains of SOB. He is not on CPAP at night which he uses at home.     Objective     Vital Signs  Temp:  [97.8 °F (36.6 °C)-98.2 °F (36.8 °C)] 98 °F (36.7 °C)  Heart Rate:  [56-72] 58  Resp:  [14-20] 14  BP: (113-182)/() 135/68    I/O this shift:  In: 100 [IV Piggyback:100]  Out: -   I/O last 3 completed shifts:  In: 420 [P.O.:120; I.V.:250; IV Piggyback:50]  Out: 1200 [Urine:1200]    Physical Examination:    General Appearance : alert,  chronically ill  Head : normocephalic  Eyes :  + pallor   Throat : oral mucosa moist  Neck:  no JVD  Lungs : clear to auscultation  Heart : regular rhythm & normal rate, normal S1, S2, no murmur, no rub   Abdomen :   soft non-tender  Extremities :  1+ edema  Neurologic : orientated to person, place, time, grossly no focal deficitis    Laboratory Data :      WBC WBC   Date Value Ref Range Status   01/15/2019 4.83 4.50 - 12.50 10*3/mm3 Final   01/14/2019 4.86 4.50 - 12.50 10*3/mm3 Final   01/13/2019 4.42 (L) 4.50 - 12.50 10*3/mm3 Final      HGB Hemoglobin   Date Value Ref Range Status   01/15/2019 8.0 (L) 14.0 - 18.0 g/dL Final   01/15/2019 8.3 (L) 14.0 - 18.0 g/dL Final   01/14/2019 8.2 (L) 14.0 - 18.0 g/dL Final   01/13/2019 8.2 (L) 14.0 - 18.0 g/dL Final      HCT Hematocrit   Date Value Ref Range Status   01/15/2019 26.9 (L) 42.0 - 52.0 % Final   01/15/2019 26.7 (L) 42.0 - 52.0 % Final   01/14/2019 27.5 (L) 42.0 - 52.0 % Final    01/13/2019 26.7 (L) 42.0 - 52.0 % Final      Platlets No results found for: LABPLAT   MCV MCV   Date Value Ref Range Status   01/15/2019 93.4 80.0 - 94.0 fL Final   01/14/2019 94.2 (H) 80.0 - 94.0 fL Final   01/13/2019 93.7 80.0 - 94.0 fL Final          Sodium Sodium   Date Value Ref Range Status   01/15/2019 140 135 - 153 mmol/L Final   01/14/2019 142 135 - 153 mmol/L Final   01/13/2019 137 135 - 153 mmol/L Final      Potassium Potassium   Date Value Ref Range Status   01/15/2019 4.3 3.5 - 5.3 mmol/L Final   01/14/2019 4.1 3.5 - 5.3 mmol/L Final   01/13/2019 4.3 3.5 - 5.3 mmol/L Final      Chloride Chloride   Date Value Ref Range Status   01/15/2019 109 99 - 112 mmol/L Final   01/14/2019 107 99 - 112 mmol/L Final   01/13/2019 105 99 - 112 mmol/L Final      CO2 CO2   Date Value Ref Range Status   01/15/2019 21.5 (L) 24.3 - 31.9 mmol/L Final   01/14/2019 22.4 (L) 24.3 - 31.9 mmol/L Final   01/13/2019 22.6 (L) 24.3 - 31.9 mmol/L Final      BUN BUN   Date Value Ref Range Status   01/15/2019 44 (H) 7 - 21 mg/dL Final   01/14/2019 47 (H) 7 - 21 mg/dL Final   01/13/2019 51 (H) 7 - 21 mg/dL Final      Creatinine Creatinine   Date Value Ref Range Status   01/15/2019 3.48 (H) 0.43 - 1.29 mg/dL Final   01/14/2019 3.40 (H) 0.43 - 1.29 mg/dL Final   01/13/2019 3.54 (H) 0.43 - 1.29 mg/dL Final      Calcium Calcium   Date Value Ref Range Status   01/15/2019 9.1 7.7 - 10.0 mg/dL Final   01/14/2019 9.0 7.7 - 10.0 mg/dL Final   01/13/2019 8.8 7.7 - 10.0 mg/dL Final      PO4 No results found for: CAPO4   Albumin No results found for: ALBUMIN   Magnesium No results found for: MG   Uric Acid No results found for: URICACID     Radiology results :     Imaging Results (last 24 hours)     ** No results found for the last 24 hours. **              Medications:        aspirin 81 mg Oral Daily   atorvastatin 80 mg Oral Daily   carvedilol 25 mg Oral BID With Meals   cetirizine 5 mg Oral Daily   cyclobenzaprine 10 mg Oral Daily   enoxaparin  30 mg Subcutaneous Daily   hydrALAZINE 50 mg Oral TID   insulin aspart 0-7 Units Subcutaneous 4x Daily AC & at Bedtime   insulin detemir 40 Units Subcutaneous Nightly   isosorbide mononitrate 120 mg Oral Daily   levothyroxine 137.5 mcg Oral Q AM   linagliptin 5 mg Oral Daily   pantoprazole 40 mg Oral QAM   sennosides-docusate sodium 2 tablet Oral Nightly   sodium bicarbonate 650 mg Oral TID   sodium chloride 3 mL Intravenous Q12H   tamsulosin 0.4 mg Oral Nightly   triamcinolone 1 application Topical Q12H          Assessment/Plan       Iron deficiency anemia    CHF (congestive heart failure) (CMS/formerly [facility])      1. HANNAH on CKD stage 4 : His baseline creatinine is around 3.   creatinine is stable at 3.4 today .  24 hr crcl is 16.2 cc/min and doesn't need to to start HD for now  UA is bland and only 208 mg of proteinuria. No hydronephrosis on CT scan abdomen    2. HTN : stable. Keep BP 140s/80s for renal perfusion    3. Shortness of breath : related to obesity and sleep apnea plus some volume overload. 2 D echo shows normal systolic and diastolic LV function  dw RN to order CPAP     4. Iron deficiency anemia : got venofer  EGD /coonoscopy did not show active bleeding. No ADITI due to prostate cancer    5. Type 2 DM stable    Ok to dc from renal standpoint and fu Dr Daniel in 1 week with BMP  I discussed the patient's findings and my recommendations with patient and nursing staff    Pablo Silveira MD  01/15/19  11:32 AM            Electronically signed by Pablo Silveira MD at 1/15/2019 11:35 AM     Kassidy Magaña MD at 2019  9:27 PM              [facility] HOSPITALIST PROGRESS NOTE     Patient Identification:  Name:  Catarino Arvizu  Age:  63 y.o.  Sex:  male  :  1955  MRN:  [id number]  Visit Number:  [id number]  ROOM: [address]     Primary Care Provider:  Eleno Chaney APRN    Length of stay:  4    Subjective        Chief Compliant follow-up for dyspnea on exertion/anemia    Patient seen and  examined this evening with no family at bedside.  States that he continues to have dyspnea on exertion.  Denies any shortness of breath at rest.  Complains of fall bilateral pedal edema and abdominal distention.  Denies chest pain.  Denies cough.  On room air.  Had EGD and colonoscopy done this morning with no findings of active bleeding.  Afebrile and no events overnight.  Noted to have episodes of hypoglycemia this morning.      Objective     Current Hospital Meds:  aspirin 81 mg Oral Daily   atorvastatin 80 mg Oral Daily   carvedilol 25 mg Oral BID With Meals   cetirizine 5 mg Oral Daily   cyclobenzaprine 10 mg Oral Daily   [START ON 1/15/2019] enoxaparin 30 mg Subcutaneous Daily   glipiZIDE 10 mg Oral BID AC   hydrALAZINE 50 mg Oral TID   insulin aspart 0-7 Units Subcutaneous 4x Daily AC & at Bedtime   insulin detemir 60 Units Subcutaneous Nightly   iron sucrose (VENOFER) IVPB 300 mg Intravenous Q24H   isosorbide mononitrate 120 mg Oral Daily   levothyroxine 137.5 mcg Oral Q AM   linagliptin 5 mg Oral Daily   pantoprazole 40 mg Oral QAM   sennosides-docusate sodium 2 tablet Oral Nightly   sodium bicarbonate 650 mg Oral TID   sodium chloride 3 mL Intravenous Q12H   tamsulosin 0.4 mg Oral Nightly   triamcinolone 1 application Topical Q12H      ----------------------------------------------------------------------------------------------------------------------  Vital Signs:  Temp:  [97.1 °F (36.2 °C)-98.8 °F (37.1 °C)] 98.2 °F (36.8 °C)  Heart Rate:  [51-72] 66  Resp:  [16-26] 18  BP: (114-182)/() 158/78  SpO2:  [85 %-100 %] 91 %  on  Flow (L/min):  [2-6] 2;   Device (Oxygen Therapy): room air  Body mass index is 44.23 kg/m².    Wt Readings from Last 3 Encounters:   01/14/19 110 kg (241 lb 13.5 oz)   12/20/18 107 kg (235 lb)   11/13/18 99.9 kg (220 lb 4 oz)       Intake/Output Summary (Last 24 hours) at 1/14/2019 2128  Last data filed at 1/14/2019 1600  Gross per 24 hour   Intake 420 ml   Output 1000 ml    Net -580 ml     Diet Regular; Consistent Carbohydrate, Renal, Cardiac  ----------------------------------------------------------------------------------------------------------------------  Physical exam:  General: Comfortable, Not in distress.  Well-developed and well-nourished.   HENT:  Head:  Normocephalic and atraumatic.  Mouth:  Moist mucous membranes.    Eyes:  Conjunctivae and EOM are normal.  Pupils are equal, round, and reactive to light.  No scleral icterus.    Neck:  Neck supple.  No JVD present.    Cardiovascular:  Normal rate, regular rhythm with no murmur.  Pulmonary/Chest:  No respiratory distress, no wheezes, + crackles, with coarse breath sounds and good air movement.  Abdomen:  Soft.  Bowel sounds are normal.  + distension and no tenderness. No organomegaly.  Musculoskeletal:  No edema, no tenderness, and no deformity.  No red or swollen joints anywhere.    Neurological:  Alert and oriented to person, place, and time.  No cranial nerve deficit. No focal deficits. No facial droop.  No slurred speech.   Skin:  Skin is warm and dry. No rash noted. No pallor.   Peripheral vascular: pulses in all 4 extremities with no clubbing, no cyanosis, 2+ edema.  Genitourinary: no medina  ----------------------------------------------------------------------------------------------------------------------  ----------------------------------------------------------------------------------------------------------------------Results from last 7 days   Lab Units  01/14/19 0314 01/13/19 0335 01/12/19 0345   WBC 10*3/mm3  4.86  4.42*  4.23*   HEMOGLOBIN g/dL  8.2*  8.2*  6.8*   HEMATOCRIT %  27.5*  26.7*  23.3*   MCV fL  94.2*  93.7  97.1*   MCHC g/dL  29.8*  30.7*  29.2*   PLATELETS 10*3/mm3  171  172  154     Results from last 7 days   Lab Units  01/14/19 0314 01/13/19 0335 01/12/19   0345  01/11/19   0306   01/10/19   1018  01/08/19   1007   SODIUM mmol/L  142  137  143  141   --   138  137   POTASSIUM  mmol/L  4.1  4.3  4.2  4.4   < >  5.7*  4.8   MAGNESIUM mg/dL   --    --    --   1.8   --    --    --    CHLORIDE mmol/L  107  105  108  108   --   109  106   CO2 mmol/L  22.4*  22.6*  22.6*  23.1*   --   20.9*  22.4*   BUN mg/dL  47*  51*  51*  55*   --   49*  44*   CREATININE mg/dL  3.40*  3.54*  3.53*  3.41*   --   3.31*  3.17*   PHOSPHORUS mg/dL   --    --    --   5.9*   --    --   4.7*   EGFR IF NONAFRICN AM mL/min/1.73  18*  17*  18*  18*   --   19*  20*   CALCIUM mg/dL  9.0  8.8  8.5  8.9   --   8.8  8.7   GLUCOSE mg/dL  55*  133*  110  106   --   223*  222*   ALBUMIN g/dL   --    --    --   4.10   --   4.10  4.30  3.3  3.3   BILIRUBIN mg/dL   --    --    --   0.3   --   0.3  0.3   ALK PHOS U/L   --    --    --   48   --   47  52   AST (SGOT) U/L   --    --    --   21   --   27  20   ALT (SGPT) U/L   --    --    --   14   --   17  18    < > = values in this interval not displayed.   Estimated Creatinine Clearance: 23.8 mL/min (A) (by C-G formula based on SCr of 3.4 mg/dL (H)).  Results from last 7 days   Lab Units  01/11/19   0306  01/10/19   1956  01/10/19   1724  01/10/19   1439   CK TOTAL U/L  42  40   --   41   CKMB ng/mL  1.13  1.18   --   1.23   CK MB INDEX %  2.7  3.0   --   3.0   TROPONIN I ng/mL  0.011  0.006   --   0.008   MYOGLOBIN ng/mL  102.0  97.0  96.0   --      Glucose   Date/Time Value Ref Range Status   01/14/2019 2033 307 (H) 70 - 130 mg/dL Final   01/14/2019 1717 65 (L) 70 - 130 mg/dL Final   01/14/2019 1302 77 70 - 130 mg/dL Final   01/14/2019 1236 78 70 - 130 mg/dL Final   01/14/2019 1156 62 (L) 70 - 130 mg/dL Final   01/14/2019 0420 46 (C) 70 - 130 mg/dL Final   01/13/2019 2016 147 (H) 70 - 130 mg/dL Final   01/13/2019 0631 168 (H) 70 - 130 mg/dL Final     No results found for: AMMONIA    Results from last 7 days   Lab Units  01/10/19   1332   NITRITE UA   Negative             I have personally looked at the labs and they are summarized  above.  ----------------------------------------------------------------------------------------------------------------------  Imaging Results (last 24 hours)     ** No results found for the last 24 hours. **        I have personally reviewed the radiology images and read the final radiology report.    Assessment & Plan       Assessment:  Acute On chronic diastolic heart failure in exacerbation  Chest pain  HANNAH on CKD stage IV,  Baseline creatinine 1.8-2.5  Hyperkalemia due to CKD  Dyspnea   Elevated D-Dimer   Symptomatic anemia with evidence of iron deficiency superimposed on normocytic anemia of chronic disease  Chronic Abdominal Pain  DM type 2, insulin dependent   Essential HTN  Hypothyroidism   SOHAIL  Obesity, BMI 42.07  Tobacco use (dips)     Plan:  Symptomatic anemia with evidence of iron deficiency superimposed on normocytic anemia of chronic  Kidney disease. S/p 1 unit PrBC transfusion 1/12. Trend hemoglobin.tsat 11% ferritin 92,s/p 800mg venofer . No ADITI since H/O prostate cancer.  EGD and colonoscopy negative.  Had capsule endoscopy done today.     Acute on chronic Diastolic Heart failure, : BNP is 386, diuretics on hold.pt now compensated. S/p metolazone,off diuretics bcos of renal fxn. Diuretic per nephology.   Echo EF 60-65% normal diastolic function , mild pulmoary hypertension .Cont ASA, ACE, statin and coreg.      Dyspnea now worsened by symptomatic anemia: multifactorial--obesity/restriction playing a role and also fluid overload from heart failure exac and CKD. VQ scan low probability of PE, no pneumonia appreciated on CT, monitor closely.      Chest pain in pt almost already optimised on medical Rx. MI ruled out  Cardiology evaluated the patient and currently patient is not interested in cath,cont all outpt cardiac meds    HANNAH on CKD stage IV: prerenal from cardiorenal vs morena-cardiac syndrome  creatinine today is 3.4. avoid nephrotoxic agents,s/p 1 unit PRBC 1/12. Renal cardiac diet,low K diet.  nephrology consult appreciated,  Monitor strict input/ urine output, daily RFP     Hyperkalemia due to ckd/poor dietary adherence---improved  Daily BMP,renal low K cardiac diet,give education resource,Nephro f/u     Elevated D-Dimer: VQ scan shows low probability, Bilateral lower ext. Doppler negative for DVT.     Chronic Abdominal Pain: this has been going on for some time, CT of the abdomen today shows no acute findings. He did have a EGD and colonoscopy done by Dr. Chavez on 5/9/2018, which showed normal results. Last bowel movement today, will monitor.      DM Type 2, insulin dependent with hyperglycemia: A1c 8.2%, hypoglycemia protocol initiated, accu checks ac/hs and prn, diabetic diet, low dose sliding scale ordered.  Decreased dose of Levemir and discontinue glipizide.  Very high risk for hypoglycemia with sulfonylurea insulin in a patient with CKD.     SOHAIL: wears oxygen at HS, monitor.     DVT prophylaxis: SCDs for now due to anemia (Hgb 7.2)  GI prophylaxis: continue home Protonix   Activity:  As tolerated  Precautions: falls   Diet: Cardiac, consistent carb, renal, regular     Code status: Full     The management plan discussed in detail with the patient.  All questions were answered.     The patient is high risk due to the following diagnoses/reasons:CHF exac,dyspnea and symptomatic anemia,awaitng EGD/colonoscopy    Kassidy Magaña MD  01/14/19  9:28 PM    Electronically signed by Kassidy Magaña MD at 1/14/2019  9:41 PM     Pablo Silveira MD at 1/14/2019 12:56 PM          Nephrology Note      Subjective       On my evaluation he was sleeping flat on room and in no distress. Upon awakening he reports SOB on exertion. No chest pain. He denies nausea or vomiting    Objective     Vital Signs  Temp:  [97.1 °F (36.2 °C)-98.8 °F (37.1 °C)] 97.1 °F (36.2 °C)  Heart Rate:  [51-63] 52  Resp:  [16-26] 20  BP: (111-150)/(54-93) 142/63    I/O this shift:  In: 250 [I.V.:250]  Out: -   I/O last 3 completed  shifts:  In: 1610 [P.O.:1560; IV Piggyback:50]  Out: 2050 [Urine:2050]    Physical Examination:    General Appearance : alert,  chronically ill  Head : normocephalic  Eyes :  + pallor   Throat : oral mucosa moist  Neck:  no JVD  Lungs : clear to auscultation  Heart : regular rhythm & normal rate, normal S1, S2, no murmur, no rub   Abdomen :   soft non-tender  Extremities :  2+ edema  Neurologic : orientated to person, place, time, grossly no focal deficitis    Laboratory Data :      WBC WBC   Date Value Ref Range Status   01/14/2019 4.86 4.50 - 12.50 10*3/mm3 Final   01/13/2019 4.42 (L) 4.50 - 12.50 10*3/mm3 Final   01/12/2019 4.23 (L) 4.50 - 12.50 10*3/mm3 Final      HGB Hemoglobin   Date Value Ref Range Status   01/14/2019 8.2 (L) 14.0 - 18.0 g/dL Final   01/13/2019 8.2 (L) 14.0 - 18.0 g/dL Final   01/12/2019 6.8 (C) 14.0 - 18.0 g/dL Final      HCT Hematocrit   Date Value Ref Range Status   01/14/2019 27.5 (L) 42.0 - 52.0 % Final   01/13/2019 26.7 (L) 42.0 - 52.0 % Final   01/12/2019 23.3 (L) 42.0 - 52.0 % Final      Platlets No results found for: LABPLAT   MCV MCV   Date Value Ref Range Status   01/14/2019 94.2 (H) 80.0 - 94.0 fL Final   01/13/2019 93.7 80.0 - 94.0 fL Final   01/12/2019 97.1 (H) 80.0 - 94.0 fL Final          Sodium Sodium   Date Value Ref Range Status   01/14/2019 142 135 - 153 mmol/L Final   01/13/2019 137 135 - 153 mmol/L Final   01/12/2019 143 135 - 153 mmol/L Final      Potassium Potassium   Date Value Ref Range Status   01/14/2019 4.1 3.5 - 5.3 mmol/L Final   01/13/2019 4.3 3.5 - 5.3 mmol/L Final   01/12/2019 4.2 3.5 - 5.3 mmol/L Final      Chloride Chloride   Date Value Ref Range Status   01/14/2019 107 99 - 112 mmol/L Final   01/13/2019 105 99 - 112 mmol/L Final   01/12/2019 108 99 - 112 mmol/L Final      CO2 CO2   Date Value Ref Range Status   01/14/2019 22.4 (L) 24.3 - 31.9 mmol/L Final   01/13/2019 22.6 (L) 24.3 - 31.9 mmol/L Final   01/12/2019 22.6 (L) 24.3 - 31.9 mmol/L Final      BUN  BUN   Date Value Ref Range Status   01/14/2019 47 (H) 7 - 21 mg/dL Final   01/13/2019 51 (H) 7 - 21 mg/dL Final   01/12/2019 51 (H) 7 - 21 mg/dL Final      Creatinine Creatinine   Date Value Ref Range Status   01/14/2019 3.40 (H) 0.43 - 1.29 mg/dL Final   01/13/2019 3.54 (H) 0.43 - 1.29 mg/dL Final   01/12/2019 3.53 (H) 0.43 - 1.29 mg/dL Final      Calcium Calcium   Date Value Ref Range Status   01/14/2019 9.0 7.7 - 10.0 mg/dL Final   01/13/2019 8.8 7.7 - 10.0 mg/dL Final   01/12/2019 8.5 7.7 - 10.0 mg/dL Final      PO4 No results found for: CAPO4   Albumin No results found for: ALBUMIN   Magnesium No results found for: MG   Uric Acid No results found for: URICACID     Radiology results :     Imaging Results (last 24 hours)     ** No results found for the last 24 hours. **              Medications:        [MAR Hold] aspirin 81 mg Oral Daily   [MAR Hold] atorvastatin 80 mg Oral Daily   carvedilol 25 mg Oral BID With Meals   [MAR Hold] cetirizine 5 mg Oral Daily   [MAR Hold] cyclobenzaprine 10 mg Oral Daily   [MAR Hold] glipiZIDE 10 mg Oral BID AC   hydrALAZINE 50 mg Oral TID   [MAR Hold] insulin aspart 0-7 Units Subcutaneous 4x Daily AC & at Bedtime   [MAR Hold] insulin detemir 60 Units Subcutaneous Nightly   [MAR Hold] iron sucrose (VENOFER) IVPB 300 mg Intravenous Q24H   [MAR Hold] isosorbide mononitrate 120 mg Oral Daily   [MAR Hold] levothyroxine 137.5 mcg Oral Q AM   [MAR Hold] linagliptin 5 mg Oral Daily   [MAR Hold] pantoprazole 40 mg Oral QAM   [MAR Hold] sennosides-docusate sodium 2 tablet Oral Nightly   [MAR Hold] sodium bicarbonate 650 mg Oral TID   [MAR Hold] sodium chloride 3 mL Intravenous Q12H   sodium chloride 3 mL Intravenous Q12H   [MAR Hold] tamsulosin 0.4 mg Oral Nightly   [MAR Hold] triamcinolone 1 application Topical Q12H       lactated ringers 125 mL/hr       Assessment/Plan       Iron deficiency anemia    CHF (congestive heart failure) (CMS/HCC)      1. HANNAH on CKD stage 4 : His baseline  creatinine is around 3.   creatinine is better at 3.4 today . Will get 24 hr crcl in progress  UA is bland and only 208 mg of proteinuria. No hydronephrosis on CT scan abdomen    2. HTN : stable. Keep BP 140s/80s for renal perfusion    3. Shortness of breath : appears to be related to obesity and sleep apnea plus some volume overload.  repeat 2 D echo shows normal systolic and diastolic LV function    4. Iron deficiency anemia : got venofer  EGD /coonoscopy today. No ADITI due to prostate cancer    5. Type 2 DM stable      I discussed the patient's findings and my recommendations with patient and nursing staff    Pablo Silveira MD  01/14/19  12:56 PM            Electronically signed by Pablo Silveira MD at 1/14/2019  1:12 PM     Pablo Silveira MD at 1/13/2019 11:05 AM          Nephrology Note      Subjective       No new overnight events reported. Denies chest pain. Still SOB on exertion but not in any distress    Objective     Vital Signs  Temp:  [97.2 °F (36.2 °C)-97.9 °F (36.6 °C)] 97.6 °F (36.4 °C)  Heart Rate:  [53-75] 57  Resp:  [18-25] 24  BP: (109-180)/() 122/68    I/O this shift:  In: 480 [P.O.:480]  Out: -   I/O last 3 completed shifts:  In: 1820.4 [P.O.:1320; Blood:385.4; IV Piggyback:115]  Out: 2050 [Urine:2050]    Physical Examination:    General Appearance : alert, appears stated age, cooperative and chronically ill  Head : normocephalic, without obvious abnormality and atraumatic  Eyes :  + pallor and PERRLA  Throat : oral mucosa moist  Neck:  no JVD  Lungs : clear to auscultation, respirations regular and unlabored  Heart : regular rhythm & normal rate, normal S1, S2, no murmur, no joaquin, no rub   Abdomen :   soft non-tender  Extremities : d 2+ edema  Pulses :  palpable and equal bilaterally  Skin : no bleeding, bruising or rash  Neurologic : orientated to person, place, time, grossly no focal deficitis    Laboratory Data :      WBC WBC   Date Value Ref Range Status   01/13/2019 4.42 (L) 4.50 - 12.50  10*3/mm3 Final   01/12/2019 4.23 (L) 4.50 - 12.50 10*3/mm3 Final   01/11/2019 5.05 4.50 - 12.50 10*3/mm3 Final      HGB Hemoglobin   Date Value Ref Range Status   01/13/2019 8.2 (L) 14.0 - 18.0 g/dL Final   01/12/2019 6.8 (C) 14.0 - 18.0 g/dL Final   01/11/2019 7.2 (L) 14.0 - 18.0 g/dL Final      HCT Hematocrit   Date Value Ref Range Status   01/13/2019 26.7 (L) 42.0 - 52.0 % Final   01/12/2019 23.3 (L) 42.0 - 52.0 % Final   01/11/2019 24.0 (L) 42.0 - 52.0 % Final      Platlets No results found for: LABPLAT   MCV MCV   Date Value Ref Range Status   01/13/2019 93.7 80.0 - 94.0 fL Final   01/12/2019 97.1 (H) 80.0 - 94.0 fL Final   01/11/2019 94.5 (H) 80.0 - 94.0 fL Final          Sodium Sodium   Date Value Ref Range Status   01/13/2019 137 135 - 153 mmol/L Final   01/12/2019 143 135 - 153 mmol/L Final   01/11/2019 141 135 - 153 mmol/L Final      Potassium Potassium   Date Value Ref Range Status   01/13/2019 4.3 3.5 - 5.3 mmol/L Final   01/12/2019 4.2 3.5 - 5.3 mmol/L Final   01/11/2019 4.4 3.5 - 5.3 mmol/L Final   01/10/2019 5.3 3.5 - 5.3 mmol/L Final      Chloride Chloride   Date Value Ref Range Status   01/13/2019 105 99 - 112 mmol/L Final   01/12/2019 108 99 - 112 mmol/L Final   01/11/2019 108 99 - 112 mmol/L Final      CO2 CO2   Date Value Ref Range Status   01/13/2019 22.6 (L) 24.3 - 31.9 mmol/L Final   01/12/2019 22.6 (L) 24.3 - 31.9 mmol/L Final   01/11/2019 23.1 (L) 24.3 - 31.9 mmol/L Final      BUN BUN   Date Value Ref Range Status   01/13/2019 51 (H) 7 - 21 mg/dL Final   01/12/2019 51 (H) 7 - 21 mg/dL Final   01/11/2019 55 (H) 7 - 21 mg/dL Final      Creatinine Creatinine   Date Value Ref Range Status   01/13/2019 3.54 (H) 0.43 - 1.29 mg/dL Final   01/12/2019 3.53 (H) 0.43 - 1.29 mg/dL Final   01/11/2019 3.41 (H) 0.43 - 1.29 mg/dL Final      Calcium Calcium   Date Value Ref Range Status   01/13/2019 8.8 7.7 - 10.0 mg/dL Final   01/12/2019 8.5 7.7 - 10.0 mg/dL Final   01/11/2019 8.9 7.7 - 10.0 mg/dL Final       PO4 No results found for: CAPO4   Albumin Albumin   Date Value Ref Range Status   01/11/2019 4.10 3.40 - 4.80 g/dL Final      Magnesium Magnesium   Date Value Ref Range Status   01/11/2019 1.8 1.7 - 2.6 mg/dL Final      Uric Acid No results found for: URICACID     Radiology results :     Imaging Results (last 24 hours)     ** No results found for the last 24 hours. **              Medications:        aspirin 81 mg Oral Daily   atorvastatin 80 mg Oral Daily   carvedilol 25 mg Oral BID With Meals   cetirizine 5 mg Oral Daily   cyclobenzaprine 10 mg Oral Daily   glipiZIDE 10 mg Oral BID AC   hydrALAZINE 50 mg Oral TID   insulin aspart 0-7 Units Subcutaneous 4x Daily AC & at Bedtime   insulin detemir 60 Units Subcutaneous Nightly   iron sucrose (VENOFER) IVPB 300 mg Intravenous Q24H   isosorbide mononitrate 120 mg Oral Daily   levothyroxine 137.5 mcg Oral Q AM   linagliptin 5 mg Oral Daily   pantoprazole 40 mg Oral QAM   sennosides-docusate sodium 2 tablet Oral Nightly   sodium bicarbonate 650 mg Oral TID   sodium chloride 3 mL Intravenous Q12H   tamsulosin 0.4 mg Oral Nightly   triamcinolone 1 application Topical Q12H          Assessment/Plan       CHF (congestive heart failure) (CMS/[facility])      1. HANNAH on CKD stage 4 : His baseline creatinine is around 3.   creatinine stable at 3.5 today . Will get 24 hr crcl  UA is bland and only 208 mg of proteinuria. No hydronephrosis on CT scan abdomen    2. HTN : stable. Keep BP 140s/80s for renal perfusion    3. Shortness of breath : appears to be related to obesity and sleep apnea plus some volume overload Will diurese as tolerated.  repeat 2 D echo shows normal systolic and diastolic LV function    4. Iron deficiency anemia : on venofer series.EGD /coonoscopy on Monday. No ADITI due to prostate cancer    5. Type 2 DM stable      I discussed the patient's findings and my recommendations with patient and nursing staff    Pablo Silveira MD  01/13/19  11:05 AM            Electronically  signed by Pablo Silveira MD at 2019 11:06 AM     Mita Miguel MD at 2019 11:01 AM              [facility] HOSPITALIST PROGRESS NOTE     Patient Identification:  Name:  Catarino Arvizu  Age:  63 y.o.  Sex:  male  :  1955  MRN:  [id number]  Visit Number:  [id number]  ROOM: [address]     Primary Care Provider:  Eleno Chaney APRN    Length of stay:  3    Subjective     Chief Complaint   Patient presents with   • Shortness of Breath       History of presenting illness:    63 year old male with CKD 4,chronic diastolic CHF recently managed here for chf exac presented to [facility] ED on 1/10/19 with smothering/SOB with activity going on 1 month.   Being managed as acute on chronic CHF exac,hyperkalemia due to CKD    Pt still c/o SOB on minimal activity.No chest pain or fever.  No new events overnight  Being prepared for EGD/Colonoscopy ,NPO after MN    hemoglobin 6.8 s/p 1 unit prbc,rpt Hgb 8        Objective     Current Hospital Meds:    aspirin 81 mg Oral Daily   atorvastatin 80 mg Oral Daily   carvedilol 25 mg Oral BID With Meals   cetirizine 5 mg Oral Daily   cyclobenzaprine 10 mg Oral Daily   glipiZIDE 10 mg Oral BID AC   hydrALAZINE 50 mg Oral TID   insulin aspart 0-7 Units Subcutaneous 4x Daily AC & at Bedtime   insulin detemir 60 Units Subcutaneous Nightly   iron sucrose (VENOFER) IVPB 300 mg Intravenous Q24H   isosorbide mononitrate 120 mg Oral Daily   levothyroxine 137.5 mcg Oral Q AM   linagliptin 5 mg Oral Daily   pantoprazole 40 mg Oral QAM   sennosides-docusate sodium 2 tablet Oral Nightly   sodium bicarbonate 650 mg Oral TID   sodium chloride 3 mL Intravenous Q12H   tamsulosin 0.4 mg Oral Nightly   triamcinolone 1 application Topical Q12H      ----------------------------------------------------------------------------------------------------------------------  Vital Signs:  Temp:  [97.2 °F (36.2 °C)-97.9 °F (36.6 °C)] 97.6 °F (36.4 °C)  Heart Rate:  [53-75] 57  Resp:   [18-25] 24  BP: (109-180)/() 122/68  SpO2:  [85 %-100 %] 98 %  on   ;   Device (Oxygen Therapy): room air  Body mass index is 44.17 kg/m².    Wt Readings from Last 3 Encounters:   01/13/19 110 kg (241 lb 8 oz)   12/20/18 107 kg (235 lb)   11/13/18 99.9 kg (220 lb 4 oz)       Intake/Output Summary (Last 24 hours) at 1/13/2019 1101  Last data filed at 1/13/2019 0800  Gross per 24 hour   Intake 1705.42 ml   Output 1200 ml   Net 505.42 ml     Diet Clear Liquid  ----------------------------------------------------------------------------------------------------------------------  Physical exam:  Constitutional:  Well-developed and well-nourished.  No respiratory distress.      HENT:  Head:  Normocephalic and atraumatic.  Mouth:  Moist mucous membranes.    Eyes:  Conjunctivae and EOM are normal.  Pupils are equal, round, and reactive to light.  No scleral icterus.    Neurological:  Alert and oriented to person, place, and time.  No cranial nerve deficit.  Neck:  Neck supple.  No JVD present.    Cardiovascular:  Normal rate, regular rhythm and normal heart sounds with no murmur.  Pulmonary/Chest:  No respiratory distress, no wheezes, no crackles or rales, with diminished breath sounds both bases and good air movement.  Abdominal:  Soft.  Bowel sounds are normal.  No distension and no tenderness.   Musculoskeletal:  2+ bilat pitting pedal edema up to upper shin, no tenderness, and no deformity.  No red or swollen joints anywhere.    Skin:  Skin is warm and dry. No rash noted. No pallor.   Peripheral vascular:  Strong pulses in all 4 extremities with no clubbing, no cyanosis, no edema.  ----------------------------------------------------------------------------------------------------------------------  Tele:    ----------------------------------------------------------------------------------------------------------------------  Results from last 7 days   Lab Units  01/13/19   0335  01/12/19   0345  01/11/19   0306    WBC 10*3/mm3  4.42*  4.23*  5.05   HEMOGLOBIN g/dL  8.2*  6.8*  7.2*   HEMATOCRIT %  26.7*  23.3*  24.0*   MCV fL  93.7  97.1*  94.5*   MCHC g/dL  30.7*  29.2*  30.0*   PLATELETS 10*3/mm3  172  154  166     Results from last 7 days   Lab Units  01/13/19   0335  01/12/19   0345  01/11/19   0306   01/10/19   1018  01/08/19   1007   SODIUM mmol/L  137  143  141   --   138  137   POTASSIUM mmol/L  4.3  4.2  4.4   < >  5.7*  4.8   MAGNESIUM mg/dL   --    --   1.8   --    --    --    CHLORIDE mmol/L  105  108  108   --   109  106   CO2 mmol/L  22.6*  22.6*  23.1*   --   20.9*  22.4*   BUN mg/dL  51*  51*  55*   --   49*  44*   CREATININE mg/dL  3.54*  3.53*  3.41*   --   3.31*  3.17*   PHOSPHORUS mg/dL   --    --   5.9*   --    --   4.7*   EGFR IF NONAFRICN AM mL/min/1.73  17*  18*  18*   --   19*  20*   CALCIUM mg/dL  8.8  8.5  8.9   --   8.8  8.7   GLUCOSE mg/dL  133*  110  106   --   223*  222*   ALBUMIN g/dL   --    --   4.10   --   4.10  4.30  3.3  3.3   BILIRUBIN mg/dL   --    --   0.3   --   0.3  0.3   ALK PHOS U/L   --    --   48   --   47  52   AST (SGOT) U/L   --    --   21   --   27  20   ALT (SGPT) U/L   --    --   14   --   17  18    < > = values in this interval not displayed.   Estimated Creatinine Clearance: 22.9 mL/min (A) (by C-G formula based on SCr of 3.54 mg/dL (H)).  Results from last 7 days   Lab Units  01/11/19   0306  01/10/19   1956  01/10/19   1724  01/10/19   1439   CK TOTAL U/L  42  40   --   41   CKMB ng/mL  1.13  1.18   --   1.23   CK MB INDEX %  2.7  3.0   --   3.0   TROPONIN I ng/mL  0.011  0.006   --   0.008   MYOGLOBIN ng/mL  102.0  97.0  96.0   --      Hemoglobin A1C   Date/Time Value Ref Range Status   01/10/2019 1724 8.20 (H) 4.50 - 5.70 % Final     Glucose   Date/Time Value Ref Range Status   01/13/2019 0631 168 (H) 70 - 130 mg/dL Final   01/12/2019 2048 237 (H) 70 - 130 mg/dL Final   01/12/2019 1842 231 (H) 70 - 130 mg/dL Final   01/12/2019 1108 220 (H) 70 - 130 mg/dL Final    01/12/2019 0625 79 70 - 130 mg/dL Final   01/11/2019 2119 243 (H) 70 - 130 mg/dL Final   01/11/2019 1700 252 (H) 70 - 130 mg/dL Final   01/11/2019 1027 177 (H) 70 - 130 mg/dL Final     No results found for: AMMONIA    Results from last 7 days   Lab Units  01/10/19   1332   NITRITE UA   Negative             I have personally looked at the labs and they are summarized above.  ----------------------------------------------------------------------------------------------------------------------  Imaging Results (last 24 hours)     ** No results found for the last 24 hours. **        I have personally reviewed the radiology images and read the final radiology report.    Assessment & Plan        Acute On chronic diastolic heart failure in exacerbation  Chest pain  HANNAH on CKD stage IV,  Baseline creatinine 1.8-2.5  Hyperkalemia due to CKD  Dyspnea   Elevated D-Dimer   Symptomatic anemia with evidence of iron deficiency superimposed on normocytic anemia of chronic disease  Chronic Abdominal Pain  DM type 2, insulin dependent   Essential HTN  Hypothyroidism   SOHAIL  Obesity, BMI 42.07  Tobacco use (dips)     Symptomatic anemia with evidence of iron deficiency superimposed on normocytic anemia of chronic  Kidney disease  S/p 1 unit PrBC transfusion 1/12. Trend hemoglobin.tsat 11% ferritin 92,s/p 800mg venofer Nephro to consider starting ADITI    Acute on chronic Diastolic Heart failure, : BNP is 386, diuretics on hold.pt now compensated  S/p metolazone,off diuretics bcos of renal fxn  Echo EF 60-65% normal diastolic function , mild pulmoary hypertension .Cont ASA, ACE, statin and coreg.   F/u cardiology consult as when he was inpatient in november they did feel he needed a heart cath, if recommended would have to discuss risk vs. Benefits with his current renal status.     Dyspnea now worsened by symptomatic anemia: multifactorial--obesity/restriction playing a role and also fluid overload from heart failure exac and  CKD.  Needs bariatric surgery.VQ scan low probability of PE, no pneumonia appreciated on CT, monitor closely.      Chest pain in pt almost already optimised on medical Rx.MI ruled out  Cardio consult ,cont all outpt cardiac meds    HANNAH on CKD stage IV: prerenal from cardiorenal vs morena-cardiac syndrome  creatinine today is 3.31>3.4>3.5, from 2.93 k 5.3>4.3  avoid nephrotoxic agents,s/p 1 unit PRBC 1/12  Renal cardiac diet,low K diet  nephrology consult appreciated,  Monitor strict input/ urine output, daily RFP    Hyperkalemia due to ckd/poor dietary adherence---improved  Daily BMP,renal low K cardiac diet,give education resource,Nephro f/u     Elevated D-Dimer: VQ scan shows low probability, Bilateral lower ext. Doppler ordered and pending.      Chronic Abdominal Pain: this has been going on for some time, CT of the abdomen today shows no acute findings. He did have a EGD and colonoscopy done by Dr. Chavez on 5/9/2018, which showed normal results. Last bowel movement today, will monitor.      DM Type 2, insulin dependent: A1c 8.2%, hypoglycemia protocol initiated, accu checks ac/hs and prn, diabetic diet, low dose sliding scale ordered.      Hypothyroidism: TSH ordered, will resume home synthroid when available by pharmacy.      SOHAIL: wears oxygen at HS, monitor.        DVT prophylaxis: SCDs for now due to anemia (Hgb 7.2)  GI prophylaxis: continue home Protonix   Activity: bedrest with BSC   Precautions: falls   Diet: Cardiac, consistent carb, renal, regular     Code status: Full     Disposition: afterstable Hgb,EGD/colonoscopy,to f/u nephro,pcp,cardiology as outpt    The patient is high risk due to the following diagnoses/reasons:CHF exac,dyspnea and symptomatic anemia,awaitng EGD/colonoscopy      Mita Miguel MD  Hospital Medicine Team  01/13/19  11:01 AM    Electronically signed by Mita Miguel MD at 1/13/2019 11:08 AM       Consult Notes (last 72 hours) (Notes from 1/12/2019  1:23 PM through  1/15/2019  1:23 PM)     No notes of this type exist for this encounter.

## 2019-01-15 NOTE — PROGRESS NOTES
Discharge Planning Assessment  [facility]     Patient Name: Catarino Arvizu  MRN: [id number]  Today's Date: 1/15/2019    Admit Date: 1/10/2019      Discharge Plan     Row Name 01/15/19 1344       Plan    Final Discharge Disposition Code  01 - home or self-care    Final Note  Pt is being discharged home on this date. Pt has no needs at this tmie.         Expected Discharge Date and Time     Expected Discharge Date Expected Discharge Time    Ramy 15, 2019          Suyapa Rosado

## 2019-01-15 NOTE — DISCHARGE SUMMARY
[facility] HOSPITALISTS DISCHARGE SUMMARY    Patient Identification:  Name:  Catarino Arvizu  Age:  63 y.o.  Sex:  male  :  1955  MRN:  [id number]  Visit Number:  [id number]    Date of Admission: 1/10/2019  Date of Discharge:  1/15/2019     PCP: Eleno Chaney APRN    DISCHARGE DIAGNOSIS  Acute On chronic diastolic heart failure in exacerbation  Chest pain  HANNAH on CKD stage IV,  Baseline creatinine 1.8-2.5  Hyperkalemia due to CKD  Dyspnea   Elevated D-Dimer   Symptomatic anemia with evidence of iron deficiency superimposed on normocytic anemia of chronic disease  Chronic Abdominal Pain  DM type 2, insulin dependent   Essential HTN  Hypothyroidism   SOHAIL  Obesity, BMI 42.07  Tobacco use (dips)    CONSULTS   Nephrology  Surgery  Cardiology    PROCEDURES PERFORMED  EGD   Colonoscopy   Capsule Endoscopy    HOSPITAL COURSE  Mr. Arvizu is a 63 y.o. male  with CKD 4,chronic diastolic CHF presented to [facility] complaining of shortness of breath.  Please see the admitting history and physical for further details.    Diagnosed with acute on chronic CHF exac,hyperkalemia due to CKD.  Nephrology consultation done and patient was diuresed and renal function monitored closely.  Creatinine remains stable from 3.3-3.4 the time of discharge.  Baseline renal function as 3.  Echo EF 60-65% normal diastolic function , mild pulmoary hypertension. Per nephrology, 24 hr crcl is 16.2 cc/min and doesn't need to to start HD for now. UA is bland and only 208 mg of proteinuria. No hydronephrosis on CT scan abdomen. Also diagnosed with Symptomatic anemia with evidence of iron deficiency superimposed on normocytic anemia of chronic  Kidney disease. S/p 1 unit PrBC transfusion . s/p 800mg venofer. No ADITI since H/O prostate cancer.  surgery consultation done and the patient had  EGD and colonoscopy done which was negative.  Had capsule endoscopy done, results of which will be followed up as  an outpatient with Dr. Napier in 1 week.  Patient was advised to have sleep study done for diagnosis of possible sleep apnea. Dyspnea on exertion multifactorial secondary to symptomatic anemia, obesity/restriction, CHF  and CKD. VQ scan low probability of PE, no pneumonia appreciated on CT. Chest pain, already optimised on medical Rx. MI ruled out.  Cardiology evaluated the patient and currently patient is not interested in cath,cont all cardiac medications. For DM Type 2, insulin dependent with hyperglycemia: A1c 8.2%, Decreased dose of Levemir and glipizide discontinued at the time of discharge.  Dose of Januvia adjusted for renal function. Very high risk for hypoglycemia with sulfonylurea and insulin in a patient with CKD.  for hypertension, hydralazine dose was increased and amlodipine discontinued at time of discharge.  Fenofibrate was discontinued since contraindicated with CKD stage IV.  Since patient was vitally stable, improved symptomatically and would like to go home and cleared by nephrology to be discharged, it was decided to discharge the patient to home.  Discharge disposition stable      VITAL SIGNS:  Temp:  [97.9 °F (36.6 °C)-98.2 °F (36.8 °C)] 98 °F (36.7 °C)  Heart Rate:  [56-72] 61  Resp:  [14-20] 16  BP: (110-182)/() 110/85  SpO2:  [90 %-100 %] 99 %  on  Flow (L/min):  [2] 2;   Device (Oxygen Therapy): nasal cannula    Body mass index is 43.75 kg/m².  Wt Readings from Last 3 Encounters:   01/15/19 109 kg (239 lb 3.2 oz)   12/20/18 107 kg (235 lb)   11/13/18 99.9 kg (220 lb 4 oz)       PHYSICAL EXAM:  General: Comfortable, Not in distress.  Well-developed and well-nourished.   HENT:  Head:  Normocephalic and atraumatic.  Mouth:  Moist mucous membranes.    Eyes:  Conjunctivae and EOM are normal.  Pupils are equal, round, and reactive to light.  No scleral icterus.    Neck:  Neck supple.  No JVD present.    Cardiovascular:  Normal rate, regular rhythm with no murmur.  Pulmonary/Chest:  No  respiratory distress, no wheezes, + crackles, with coarse breath sounds and good air movement.  Abdomen:  Soft.  Bowel sounds are normal.  + distension and no tenderness. No organomegaly.  Musculoskeletal:  No edema, no tenderness, and no deformity.  No red or swollen joints anywhere.    Neurological:  Alert and oriented to person, place, and time.  No cranial nerve deficit. No focal deficits. No facial droop.  No slurred speech.   Skin:  Skin is warm and dry. No rash noted. No pallor.   Peripheral vascular: pulses in all 4 extremities with no clubbing, no cyanosis, 2+ edema.  Genitourinary: no medina    DISCHARGE DISPOSITION   Home    DISCHARGE MEDICATIONS:     Discharge Medications      Changes to Medications      Instructions Start Date   cetirizine 10 MG tablet  Commonly known as:  zyrTEC  What changed:  how much to take   5 mg, Oral, Daily      hydrALAZINE 25 MG tablet  Commonly known as:  APRESOLINE  What changed:  how much to take   50 mg, Oral, 3 Times Daily      Insulin Glargine 100 UNIT/ML injection pen  Commonly known as:  BASAGLAR KWIKPEN  What changed:  how much to take   30 Units, Subcutaneous, Nightly      JANUVIA 50 MG tablet  Generic drug:  SITagliptin  What changed:  how much to take   25 mg, Oral, Daily      raNITIdine 300 MG tablet  Commonly known as:  ZANTAC  What changed:  how much to take   150 mg, Oral, Nightly         Continue These Medications      Instructions Start Date   aspirin 81 MG EC tablet   81 mg, Oral, Daily      atorvastatin 80 MG tablet  Commonly known as:  LIPITOR   80 mg, Oral, Daily      carvedilol 25 MG tablet  Commonly known as:  COREG   25 mg, Oral, 2 Times Daily      cyclobenzaprine 10 MG tablet  Commonly known as:  FLEXERIL   10 mg, Oral, Daily      ferrous sulfate 325 (65 FE) MG tablet   325 mg, Oral, Daily With Breakfast      furosemide 40 MG tablet  Commonly known as:  LASIX   40 mg, Oral, Take As Directed, Prior to [facility] Admission, Patient was on: Take 1 tablet  every day except Wednesday and Sunday       HYDROcodone-acetaminophen 7.5-325 MG per tablet  Commonly known as:  NORCO   1 tablet, Oral, 2 Times Daily PRN      hydrOXYzine 25 MG capsule  Commonly known as:  VISTARIL   25 mg, Oral, Every 6 Hours PRN      isosorbide mononitrate 120 MG 24 hr tablet  Commonly known as:  IMDUR   120 mg, Oral, Daily      levothyroxine 137 MCG tablet  Commonly known as:  SYNTHROID, LEVOTHROID   137 mcg, Oral, Daily      nitroglycerin 0.4 MG SL tablet  Commonly known as:  NITROSTAT   0.4 mg, Sublingual, Every 5 Minutes PRN, Take no more than 3 doses in 15 minutes.       pantoprazole 40 MG EC tablet  Commonly known as:  PROTONIX   40 mg, Oral, Daily      SENEXON-S 8.6-50 MG per tablet  Generic drug:  senna-docusate   2 tablets, Oral, Nightly      sodium bicarbonate 650 MG tablet   650 mg, Oral, 3 Times Daily      tamsulosin 0.4 MG capsule 24 hr capsule  Commonly known as:  FLOMAX   1 capsule, Oral, Nightly      triamcinolone 0.1 % cream  Commonly known as:  KENALOG   1 application, Topical, 2 Times Daily, Prior to [facility] Admission, Patient was on: applies to rash          Stop These Medications    amLODIPine 10 MG tablet  Commonly known as:  NORVASC     fenofibrate 145 MG tablet  Commonly known as:  TRICOR     glimepiride 4 MG tablet  Commonly known as:  AMARYL            Diet Instructions     Diet: Consistent Carbohydrate, Cardiac, Renal      Discharge Diet:   Consistent Carbohydrate  Cardiac  Renal           Activity Instructions     Activity as Tolerated          Future Appointments   Date Time Provider Department Center   1/22/2019  1:50 PM Khalif Kohler MD [facility] [place] None   1/25/2019 12:00 PM Burt Floyd PA-C [facility] None     Your Scheduled Appointments    Jan 22, 2019  1:50 PM EST  Follow Up with Khalif Kohler MD  [facility] GROUP UROLOGY (--) [address] [facility]  [place] [address] [phone]   Arrive 15 minutes prior to appointment.   Jan  25, 2019 12:00 PM EST  Follow Up with Burt Floyd PA-C  [facility] CARDIOLOGY (--) [address] [place] [address] [phone]   Arrive 15 minutes prior to appointment.        Additional Instructions for the Follow-ups that You Need to Schedule     Discharge Follow-up with PCP   As directed       Currently Documented PCP:    Eleno Chaney APRN    PCP Phone Number:    [phone]     Follow Up Details:  within 1 week         Discharge Follow-up with Specialty: Nephrology; 1 Week   As directed      Specialty:  Nephrology    Follow Up:  1 Week    Follow Up Details:  Dr. Daniel         Discharge Follow-up with Specified Provider: Surgery; 1 Week   As directed      To:  Surgery    Follow Up:  1 Week    Follow Up Details:  Dr. Napier         Basic Metabolic Panel    Jan 21, 2019 (Approximate)        Follow-up Information     Eleno Chaney APRN .    Specialty:  Nurse Practitioner  Why:  within 1 week  Contact information:  [address] [place] [address] [phone]                    TEST  RESULTS PENDING AT DISCHARGE   Order Current Status    Tissue Pathology Exam In process           CODE STATUS  Code Status and Medical Interventions:   Ordered at: 01/14/19 1452     Level Of Support Discussed With:    Patient     Code Status:    CPR     Medical Interventions (Level of Support Prior to Arrest):    Full       Kassidy Magaña MD  01/15/19  2:36 PM    Please note that this discharge summary required more than 30 minutes to complete.    Please send a copy of this dictation to the following providers:  Eleno Chaney APRN

## 2019-01-15 NOTE — PROGRESS NOTES
[facility] HOSPITALIST PROGRESS NOTE     Patient Identification:  Name:  Catarino Arvizu  Age:  63 y.o.  Sex:  male  :  1955  MRN:  [id number]  Visit Number:  [id number]  ROOM: [address]     Primary Care Provider:  Eleno Chaney APRN    Length of stay:  4    Subjective        Chief Compliant follow-up for dyspnea on exertion/anemia    Patient seen and examined this evening with no family at bedside.  States that he continues to have dyspnea on exertion.  Denies any shortness of breath at rest.  Complains of fall bilateral pedal edema and abdominal distention.  Denies chest pain.  Denies cough.  On room air.  Had EGD and colonoscopy done this morning with no findings of active bleeding.  Afebrile and no events overnight.  Noted to have episodes of hypoglycemia this morning.      Objective     Current Hospital Meds:  aspirin 81 mg Oral Daily   atorvastatin 80 mg Oral Daily   carvedilol 25 mg Oral BID With Meals   cetirizine 5 mg Oral Daily   cyclobenzaprine 10 mg Oral Daily   [START ON 1/15/2019] enoxaparin 30 mg Subcutaneous Daily   glipiZIDE 10 mg Oral BID AC   hydrALAZINE 50 mg Oral TID   insulin aspart 0-7 Units Subcutaneous 4x Daily AC & at Bedtime   insulin detemir 60 Units Subcutaneous Nightly   iron sucrose (VENOFER) IVPB 300 mg Intravenous Q24H   isosorbide mononitrate 120 mg Oral Daily   levothyroxine 137.5 mcg Oral Q AM   linagliptin 5 mg Oral Daily   pantoprazole 40 mg Oral QAM   sennosides-docusate sodium 2 tablet Oral Nightly   sodium bicarbonate 650 mg Oral TID   sodium chloride 3 mL Intravenous Q12H   tamsulosin 0.4 mg Oral Nightly   triamcinolone 1 application Topical Q12H      ----------------------------------------------------------------------------------------------------------------------  Vital Signs:  Temp:  [97.1 °F (36.2 °C)-98.8 °F (37.1 °C)] 98.2 °F (36.8 °C)  Heart Rate:  [51-72] 66  Resp:  [16-26] 18  BP: (114-182)/() 158/78  SpO2:  [85 %-100 %] 91 %   on  Flow (L/min):  [2-6] 2;   Device (Oxygen Therapy): room air  Body mass index is 44.23 kg/m².    Wt Readings from Last 3 Encounters:   01/14/19 110 kg (241 lb 13.5 oz)   12/20/18 107 kg (235 lb)   11/13/18 99.9 kg (220 lb 4 oz)       Intake/Output Summary (Last 24 hours) at 1/14/2019 2128  Last data filed at 1/14/2019 1600  Gross per 24 hour   Intake 420 ml   Output 1000 ml   Net -580 ml     Diet Regular; Consistent Carbohydrate, Renal, Cardiac  ----------------------------------------------------------------------------------------------------------------------  Physical exam:  General: Comfortable, Not in distress.  Well-developed and well-nourished.   HENT:  Head:  Normocephalic and atraumatic.  Mouth:  Moist mucous membranes.    Eyes:  Conjunctivae and EOM are normal.  Pupils are equal, round, and reactive to light.  No scleral icterus.    Neck:  Neck supple.  No JVD present.    Cardiovascular:  Normal rate, regular rhythm with no murmur.  Pulmonary/Chest:  No respiratory distress, no wheezes, + crackles, with coarse breath sounds and good air movement.  Abdomen:  Soft.  Bowel sounds are normal.  + distension and no tenderness. No organomegaly.  Musculoskeletal:  No edema, no tenderness, and no deformity.  No red or swollen joints anywhere.    Neurological:  Alert and oriented to person, place, and time.  No cranial nerve deficit. No focal deficits. No facial droop.  No slurred speech.   Skin:  Skin is warm and dry. No rash noted. No pallor.   Peripheral vascular: pulses in all 4 extremities with no clubbing, no cyanosis, 2+ edema.  Genitourinary: no medina  ----------------------------------------------------------------------------------------------------------------------  ----------------------------------------------------------------------------------------------------------------------Results from last 7 days   Lab Units  01/14/19   0314  01/13/19   0335  01/12/19   0345   WBC 10*3/mm3  4.86  4.42*   4.23*   HEMOGLOBIN g/dL  8.2*  8.2*  6.8*   HEMATOCRIT %  27.5*  26.7*  23.3*   MCV fL  94.2*  93.7  97.1*   MCHC g/dL  29.8*  30.7*  29.2*   PLATELETS 10*3/mm3  171  172  154     Results from last 7 days   Lab Units  01/14/19   0314  01/13/19   0335  01/12/19   0345  01/11/19   0306   01/10/19   1018  01/08/19   1007   SODIUM mmol/L  142  137  143  141   --   138  137   POTASSIUM mmol/L  4.1  4.3  4.2  4.4   < >  5.7*  4.8   MAGNESIUM mg/dL   --    --    --   1.8   --    --    --    CHLORIDE mmol/L  107  105  108  108   --   109  106   CO2 mmol/L  22.4*  22.6*  22.6*  23.1*   --   20.9*  22.4*   BUN mg/dL  47*  51*  51*  55*   --   49*  44*   CREATININE mg/dL  3.40*  3.54*  3.53*  3.41*   --   3.31*  3.17*   PHOSPHORUS mg/dL   --    --    --   5.9*   --    --   4.7*   EGFR IF NONAFRICN AM mL/min/1.73  18*  17*  18*  18*   --   19*  20*   CALCIUM mg/dL  9.0  8.8  8.5  8.9   --   8.8  8.7   GLUCOSE mg/dL  55*  133*  110  106   --   223*  222*   ALBUMIN g/dL   --    --    --   4.10   --   4.10  4.30  3.3  3.3   BILIRUBIN mg/dL   --    --    --   0.3   --   0.3  0.3   ALK PHOS U/L   --    --    --   48   --   47  52   AST (SGOT) U/L   --    --    --   21   --   27  20   ALT (SGPT) U/L   --    --    --   14   --   17  18    < > = values in this interval not displayed.   Estimated Creatinine Clearance: 23.8 mL/min (A) (by C-G formula based on SCr of 3.4 mg/dL (H)).  Results from last 7 days   Lab Units  01/11/19   0306  01/10/19   1956  01/10/19   1724  01/10/19   1439   CK TOTAL U/L  42  40   --   41   CKMB ng/mL  1.13  1.18   --   1.23   CK MB INDEX %  2.7  3.0   --   3.0   TROPONIN I ng/mL  0.011  0.006   --   0.008   MYOGLOBIN ng/mL  102.0  97.0  96.0   --      Glucose   Date/Time Value Ref Range Status   01/14/2019 2033 307 (H) 70 - 130 mg/dL Final   01/14/2019 1717 65 (L) 70 - 130 mg/dL Final   01/14/2019 1302 77 70 - 130 mg/dL Final   01/14/2019 1236 78 70 - 130 mg/dL Final   01/14/2019 1156 62 (L) 70 - 130  mg/dL Final   01/14/2019 0420 46 (C) 70 - 130 mg/dL Final   01/13/2019 2016 147 (H) 70 - 130 mg/dL Final   01/13/2019 0631 168 (H) 70 - 130 mg/dL Final     No results found for: AMMONIA    Results from last 7 days   Lab Units  01/10/19   1332   NITRITE UA   Negative             I have personally looked at the labs and they are summarized above.  ----------------------------------------------------------------------------------------------------------------------  Imaging Results (last 24 hours)     ** No results found for the last 24 hours. **        I have personally reviewed the radiology images and read the final radiology report.    Assessment & Plan      Assessment:  Acute On chronic diastolic heart failure in exacerbation  Chest pain  HANNAH on CKD stage IV,  Baseline creatinine 1.8-2.5  Hyperkalemia due to CKD  Dyspnea   Elevated D-Dimer   Symptomatic anemia with evidence of iron deficiency superimposed on normocytic anemia of chronic disease  Chronic Abdominal Pain  DM type 2, insulin dependent   Essential HTN  Hypothyroidism   SOHAIL  Obesity, BMI 42.07  Tobacco use (dips)     Plan:  Symptomatic anemia with evidence of iron deficiency superimposed on normocytic anemia of chronic  Kidney disease. S/p 1 unit PrBC transfusion 1/12. Trend hemoglobin.tsat 11% ferritin 92,s/p 800mg venofer. No ADITI since H/O prostate cancer.  EGD and colonoscopy negative.  Had capsule endoscopy done today.     Acute on chronic Diastolic Heart failure, : BNP is 386, diuretics on hold.pt now compensated. S/p metolazone,off diuretics bcos of renal fxn. Diuretic per nephology.   Echo EF 60-65% normal diastolic function , mild pulmoary hypertension .Cont ASA, ACE, statin and coreg.      Dyspnea now worsened by symptomatic anemia: multifactorial--obesity/restriction playing a role and also fluid overload from heart failure exac and CKD. VQ scan low probability of PE, no pneumonia appreciated on CT, monitor closely.      Chest pain in pt  almost already optimised on medical Rx. MI ruled out  Cardiology evaluated the patient and currently patient is not interested in cath,cont all outpt cardiac meds    HANNAH on CKD stage IV: prerenal from cardiorenal vs morena-cardiac syndrome  creatinine today is 3.4. avoid nephrotoxic agents,s/p 1 unit PRBC 1/12. Renal cardiac diet,low K diet. nephrology consult appreciated,  Monitor strict input/ urine output, daily RFP     Hyperkalemia due to ckd/poor dietary adherence---improved  Daily BMP,renal low K cardiac diet,give education resource,Nephro f/u     Elevated D-Dimer: VQ scan shows low probability, Bilateral lower ext. Doppler negative for DVT.     Chronic Abdominal Pain: this has been going on for some time, CT of the abdomen today shows no acute findings. He did have a EGD and colonoscopy done by Dr. Chavez on 5/9/2018, which showed normal results. Last bowel movement today, will monitor.      DM Type 2, insulin dependent with hyperglycemia: A1c 8.2%, hypoglycemia protocol initiated, accu checks ac/hs and prn, diabetic diet, low dose sliding scale ordered.  Decreased dose of Levemir and discontinue glipizide.  Very high risk for hypoglycemia with sulfonylurea insulin in a patient with CKD.     SOHAIL: wears oxygen at HS, monitor.     DVT prophylaxis: SCDs for now due to anemia (Hgb 7.2)  GI prophylaxis: continue home Protonix   Activity:  As tolerated  Precautions: falls   Diet: Cardiac, consistent carb, renal, regular     Code status: Full     The management plan discussed in detail with the patient.  All questions were answered.     The patient is high risk due to the following diagnoses/reasons:CHF exac,dyspnea and symptomatic anemia,awaitng EGD/colonoscopy    Kassidy Magaña MD  01/14/19  9:28 PM

## 2019-01-15 NOTE — PLAN OF CARE
Problem: Skin Injury Risk (Adult)  Goal: Skin Health and Integrity  Outcome: Ongoing (interventions implemented as appropriate)   01/15/19 1220   Skin Injury Risk (Adult)   Skin Health and Integrity making progress toward outcome       Problem: Fall Risk (Adult)  Goal: Absence of Fall  Outcome: Ongoing (interventions implemented as appropriate)   01/15/19 1220   Fall Risk (Adult)   Absence of Fall making progress toward outcome       Problem: Diabetes, Type 2 (Adult)  Goal: Signs and Symptoms of Listed Potential Problems Will be Absent, Minimized or Managed (Diabetes, Type 2)  Outcome: Ongoing (interventions implemented as appropriate)   01/15/19 1220   Goal/Outcome Evaluation   Problems Assessed (Type 2 Diabetes) all   Problems Present (Type 2 Diabetes) none       Problem: Fluid Volume Excess (Adult)  Goal: Optimal Fluid Balance  Outcome: Ongoing (interventions implemented as appropriate)   01/15/19 1220   Fluid Volume Excess (Adult)   Optimal Fluid Balance making progress toward outcome

## 2019-01-15 NOTE — PLAN OF CARE
Problem: Skin Injury Risk (Adult)  Goal: Skin Health and Integrity  Outcome: Ongoing (interventions implemented as appropriate)   01/15/19 0249   Skin Injury Risk (Adult)   Skin Health and Integrity making progress toward outcome       Problem: Fall Risk (Adult)  Goal: Absence of Fall  Outcome: Ongoing (interventions implemented as appropriate)   01/15/19 0249   Fall Risk (Adult)   Absence of Fall making progress toward outcome       Problem: Diabetes, Type 2 (Adult)  Goal: Signs and Symptoms of Listed Potential Problems Will be Absent, Minimized or Managed (Diabetes, Type 2)  Outcome: Ongoing (interventions implemented as appropriate)   01/15/19 0249   Goal/Outcome Evaluation   Problems Assessed (Type 2 Diabetes) all   Problems Present (Type 2 Diabetes) none       Problem: Fluid Volume Excess (Adult)  Goal: Optimal Fluid Balance  Outcome: Ongoing (interventions implemented as appropriate)   01/15/19 0249   Fluid Volume Excess (Adult)   Optimal Fluid Balance making progress toward outcome       Problem: Patient Care Overview  Goal: Plan of Care Review  Outcome: Outcome(s) achieved Date Met: 01/15/19   01/15/19 0249   Coping/Psychosocial   Plan of Care Reviewed With patient   Plan of Care Review   Progress improving     Goal: Discharge Needs Assessment  Outcome: Outcome(s) achieved Date Met: 01/15/19   01/11/19 1042 01/15/19 0249   Discharge Needs Assessment   Readmission Within the Last 30 Days --  no previous admission in last 30 days   Concerns to be Addressed --  denies needs/concerns at this time   Patient/Family Anticipates Transition to --  home with family   Patient/Family Anticipated Services at Transition --  none   Transportation Concerns --  car, none   Transportation Anticipated --  family or friend will provide   Equipment Needed After Discharge --  none   Disability   Equipment Currently Used at Home walker, rolling;cane, straight;oxygen;nebulizer --

## 2019-01-16 ENCOUNTER — READMISSION MANAGEMENT (OUTPATIENT)
Dept: CALL CENTER | Facility: HOSPITAL | Age: 64
End: 2019-01-16

## 2019-01-16 DIAGNOSIS — R39.198 DIFFICULTY URINATING: Primary | ICD-10-CM

## 2019-01-16 LAB
LAB AP CASE REPORT: NORMAL
PATH REPORT.FINAL DX SPEC: NORMAL

## 2019-01-16 NOTE — OUTREACH NOTE
Prep Survey      Responses   Facility patient discharged from?  [facility]   Is patient eligible?  Yes   Discharge diagnosis  Dyspnea, acute on chronic diastolic heart failure in Exac., chest pain, HANNAH on CKD IV, Hyperkaemia, elevated D-dimer, normocytic anemia, chronic abdominal pain, IDDM II, essential HTN, hypothyroidism, SOHAIL, obesity, tobacco use, s/p EGD and colonoscopy with polypectomy    Does the patient have one of the following disease processes/diagnoses(primary or secondary)?  CHF   Does the patient have Home health ordered?  No   Is there a DME ordered?  No   What DME was ordered?  Pt. has cane, walker, Home O2 and nebulizer from [facility].    Comments regarding appointments  See AVS   Prep survey completed?  Yes          Suyapa Bailey RN

## 2019-01-17 ENCOUNTER — READMISSION MANAGEMENT (OUTPATIENT)
Dept: CALL CENTER | Facility: HOSPITAL | Age: 64
End: 2019-01-17

## 2019-01-17 RX ORDER — PNV NO.95/FERROUS FUM/FOLIC AC 28MG-0.8MG
TABLET ORAL
Qty: 30 TABLET | Refills: 0 | Status: SHIPPED | OUTPATIENT
Start: 2019-01-17 | End: 2019-02-14 | Stop reason: SDUPTHER

## 2019-01-17 RX ORDER — TAMSULOSIN HYDROCHLORIDE 0.4 MG/1
CAPSULE ORAL
Qty: 30 CAPSULE | Refills: 11 | Status: SHIPPED | OUTPATIENT
Start: 2019-01-17 | End: 2020-01-07

## 2019-01-17 RX ORDER — ASPIRIN 81 MG
TABLET, DELAYED RELEASE (ENTERIC COATED) ORAL
Qty: 30 TABLET | Refills: 0 | Status: SHIPPED | OUTPATIENT
Start: 2019-01-17 | End: 2019-02-14 | Stop reason: SDUPTHER

## 2019-01-17 RX ORDER — ISOSORBIDE MONONITRATE 120 MG/1
TABLET, EXTENDED RELEASE ORAL
Qty: 30 TABLET | Refills: 0 | Status: SHIPPED | OUTPATIENT
Start: 2019-01-17 | End: 2019-02-14 | Stop reason: SDUPTHER

## 2019-01-17 NOTE — OUTREACH NOTE
CHF Week 1 Survey      Responses   Facility patient discharged from?  [facility]   Does the patient have one of the following disease processes/diagnoses(primary or secondary)?  CHF   Is there a successful TCM telephone encounter documented?  No   CHF Week 1 attempt successful?  Yes   Call start time  1016   Call end time  1020   Discharge diagnosis  Dyspnea, acute on chronic diastolic heart failure in [place]., chest pain, HANNAH on CKD IV, Hyperkaemia, elevated D-dimer, normocytic anemia, chronic abdominal pain, IDDM II, essential HTN, hypothyroidism, SOHAIL, obesity, tobacco use, s/p EGD and colonoscopy with polypectomy    Does the patient have a primary care provider?   Yes   Does the patient have an appointment with their PCP within 7 days of discharge?  Yes   Has the patient kept scheduled appointments due by today?  N/A   Has home health visited the patient within 72 hours of discharge?  N/A   What DME was ordered?  Pt. has cane, walker, Home O2 and nebulizer from [facility].    Psychosocial issues?  No   Comments  Pt reports Worsening GAO. PCP appt next week. He is afraid that he may be getting significantly worse. Advised to return to [facility] or ER. He reports that he will go to [facility] now.   Did the patient receive a copy of their discharge instructions?  Yes   Nursing interventions  Reviewed instructions with patient   What is the patient's perception of their health status since discharge?  Worsening   Nursing interventions  Nurse provided patient education    CHF Week 1 call completed?  Yes          Gerard Cortes RN

## 2019-01-22 ENCOUNTER — OFFICE VISIT (OUTPATIENT)
Dept: UROLOGY | Facility: CLINIC | Age: 64
End: 2019-01-22

## 2019-01-22 VITALS — WEIGHT: 239 LBS | BODY MASS INDEX: 43.98 KG/M2 | HEIGHT: 62 IN

## 2019-01-22 DIAGNOSIS — Q64.33 CONGENITAL MEATAL STENOSIS: Primary | ICD-10-CM

## 2019-01-22 DIAGNOSIS — N99.115 POSTPROCEDURAL MALE FOSSA NAVICULARIS URETHRAL STRICTURE: ICD-10-CM

## 2019-01-22 DIAGNOSIS — N42.9 DISORDER OF PROSTATE: ICD-10-CM

## 2019-01-22 DIAGNOSIS — C61 PROSTATE CANCER (HCC): ICD-10-CM

## 2019-01-22 LAB — PSA SERPL-MCNC: 7.3 NG/ML (ref 0–4)

## 2019-01-22 PROCEDURE — 99213 OFFICE O/P EST LOW 20 MIN: CPT | Performed by: UROLOGY

## 2019-01-22 PROCEDURE — 84153 ASSAY OF PSA TOTAL: CPT | Performed by: UROLOGY

## 2019-01-22 NOTE — PROGRESS NOTES
Chief Complaint:          Chief Complaint   Patient presents with   • Meatal Stenosis       HPI:   63 y.o. male.  63-year-old with prostate cancer on intermittent Lupron doing much better his meatal stenosis resolved he's having no complaints or problems I have a urine culture pending his postvoid residual was 12 cc.  There is no burning, blood, discharge, fevers chills or any other symptoms have a PSA pending at the time of this dictation his examination is entirely unremarkable.  He returns today he's had a lot of problems with breathing is gained a lot of weight he's not doing it but his penis normal have a repeat PSA pending I'll see him back in 6 months    Past Medical History:        Past Medical History:   Diagnosis Date   • Chest pain    • CHF (congestive heart failure) (CMS/[facility]) 1/10/2019   • Chronic kidney disease    • CPAP (continuous positive airway pressure) dependence    • Diabetes mellitus (CMS/[facility])    • Elevated cholesterol    • GERD (gastroesophageal reflux disease)    • Heart murmur    • Hyperlipidemia    • Hypertension    • Irregular heart beat    • Polio    • Prostate cancer (CMS/[facility]) 05/2016    Dr. Khalif Kohler MD- [place], [place]   • Shortness of breath    • Sleep apnea          Current Meds:     Current Outpatient Medications   Medication Sig Dispense Refill   • ASPIRIN LOW DOSE 81 MG EC tablet TAKE 1 TABLET BY MOUTH EVERY DAY 30 tablet 0   • atorvastatin (LIPITOR) 80 MG tablet Take 80 mg by mouth daily.     • carvedilol (COREG) 25 MG tablet Take 1 tablet by mouth 2 (Two) Times a Day. 180 tablet 3   • cetirizine (zyrTEC) 10 MG tablet Take 0.5 tablets by mouth Daily.     • cyclobenzaprine (FLEXERIL) 10 MG tablet Take 10 mg by mouth Daily.     • Ferrous Sulfate (IRON) 325 (65 Fe) MG tablet TAKE 1 TABLET DAILY WITH BREAKFAST 30 tablet 0   • furosemide (LASIX) 40 MG tablet Take 40 mg by mouth Take As Directed. Prior to [facility] Admission, Patient was on: Take 1 tablet every day except  Wednesday and Sunday     • hydrALAZINE (APRESOLINE) 25 MG tablet Take 2 tablets by mouth 3 (Three) Times a Day for 30 days. 180 tablet 0   • HYDROcodone-acetaminophen (NORCO) 7.5-325 MG per tablet Take 1 tablet by mouth 2 (two) times a day as needed for moderate pain (4-6).     • hydrOXYzine (VISTARIL) 25 MG capsule Take 25 mg by mouth Every 6 (Six) Hours As Needed for Itching.     • Insulin Glargine (BASAGLAR KWIKPEN) 100 UNIT/ML injection pen Inject 30 Units under the skin into the appropriate area as directed Every Night.     • isosorbide mononitrate (IMDUR) 120 MG 24 hr tablet TAKE 1 TABLET BY MOUTH EVERY DAY 30 tablet 0   • levothyroxine (SYNTHROID, LEVOTHROID) 137 MCG tablet Take 137 mcg by mouth Daily.     • nitroglycerin (NITROSTAT) 0.4 MG SL tablet Place 1 tablet under the tongue Every 5 (Five) Minutes As Needed for Chest Pain. Take no more than 3 doses in 15 minutes. 25 tablet 0   • pantoprazole (PROTONIX) 40 MG EC tablet Take 40 mg by mouth Daily.     • raNITIdine (ZANTAC) 300 MG tablet Take 0.5 tablets by mouth Every Night.     • senna-docusate (SENEXON-S) 8.6-50 MG per tablet Take 2 tablets by mouth Every Night.     • SITagliptin (JANUVIA) 50 MG tablet Take ½ tablet by mouth Daily. 30 tablet 3   • sodium bicarbonate 650 MG tablet Take 650 mg by mouth 3 (Three) Times a Day.     • tamsulosin (FLOMAX) 0.4 MG capsule 24 hr capsule Take 1 capsule by mouth Every Night.     • tamsulosin (FLOMAX) 0.4 MG capsule 24 hr capsule TAKE 1 CAPSULE EVERY EVENING. 30 capsule 11   • triamcinolone (KENALOG) 0.1 % cream Apply 1 application topically to the appropriate area as directed 2 (Two) Times a Day. Prior to [facility] Admission, Patient was on: applies to rash       No current facility-administered medications for this visit.         Allergies:      No Known Allergies     Past Surgical History:     Past Surgical History:   Procedure Laterality Date   • CARDIAC CATHETERIZATION  2008    50% diffuse LAD stenosis, 50%  septal  branch   • COLONOSCOPY      Long time ago   • COLONOSCOPY N/A 5/9/2018    Procedure: COLONOSCOPY  CPTCODE:75587;  Surgeon: Bob Mcguire III, MD;  Location: [facility] OR;  Service: Gastroenterology   • COLONOSCOPY N/A 1/14/2019    Procedure: COLONOSCOPY;  Surgeon: Ottoniel Napier MD;  Location: [facility] OR;  Service: Gastroenterology   • ENDOSCOPY N/A 5/9/2018    Procedure: ESOPHAGOGASTRODUODENOSCOPY WITH BIOPSY  CPTCODE:68560;  Surgeon: Bob Mcguire III, MD;  Location: [facility] OR;  Service: Gastroenterology   • ENDOSCOPY N/A 1/14/2019    Procedure: ESOPHAGOGASTRODUODENOSCOPY;  Surgeon: Ottoniel Napier MD;  Location: [facility] OR;  Service: Gastroenterology   • HEMORRHOIDECTOMY     • HERNIA REPAIR     • UPPER GASTROINTESTINAL ENDOSCOPY      Long time ago   • URETHRAL DILATATION N/A 12/13/2017    Procedure: URETHRAL DILATATION;  Surgeon: Khalif Kohler MD;  Location: [facility] OR;  Service:          Social History:     Social History     Socioeconomic History   • Marital status:      Spouse name: Not on file   • Number of children: Not on file   • Years of education: Not on file   • Highest education level: Not on file   Social Needs   • Financial resource strain: Not on file   • Food insecurity - worry: Not on file   • Food insecurity - inability: Not on file   • Transportation needs - medical: Not on file   • Transportation needs - non-medical: Not on file   Occupational History   • Not on file   Tobacco Use   • Smoking status: Never Smoker   • Smokeless tobacco: Current User     Types: Chew   • Tobacco comment: Chewed tobacco since he was 4 yrs old.   Substance and Sexual Activity   • Alcohol use: No   • Drug use: No   • Sexual activity: Defer   Other Topics Concern   • Not on file   Social History Narrative   • Not on file       Family History:     Family History   Problem Relation Age of Onset   • Heart disease Brother    • Diabetes Brother    • Cancer Brother         not sure  the kind   • Heart disease Brother    • Diabetes Brother    • Diabetes Sister    • Diabetes Daughter    • Heart disease Daughter    • Pancreatitis Daughter    • Crohn's disease Daughter    • Diabetes Son    • Heart disease Son        Review of Systems:     Review of Systems   Constitutional: Negative.    HENT: Negative.    Eyes: Negative.    Respiratory: Negative.    Cardiovascular: Negative.    Gastrointestinal: Negative.    Endocrine: Negative.    Musculoskeletal: Negative.    Allergic/Immunologic: Negative.    Neurological: Negative.    Hematological: Negative.    Psychiatric/Behavioral: Negative.        Physical Exam:     Physical Exam   Constitutional: He is oriented to person, place, and time. He appears well-developed and well-nourished.   HENT:   Head: Normocephalic and atraumatic.   Eyes: Conjunctivae and EOM are normal. Pupils are equal, round, and reactive to light.   Neck: Normal range of motion.   Cardiovascular: Normal rate, regular rhythm, normal heart sounds and intact distal pulses.   Pulmonary/Chest: Effort normal and breath sounds normal.   Abdominal: Soft. Bowel sounds are normal.   Musculoskeletal: Normal range of motion.   Neurological: He is alert and oriented to person, place, and time. He has normal reflexes.   Skin: Skin is warm and dry.   Psychiatric: He has a normal mood and affect. His behavior is normal. Judgment and thought content normal.   Nursing note and vitals reviewed.      I have reviewed the following portions of the patient's history: allergies, current medications, past family history, past medical history, past social history, past surgical history, problem list and ROS and confirm it's accurate.      Procedure:       Assessment/Plan:   Prostate cancer-PSA pending  Meatal stenosis-stable     Patient's Body mass index is 43.87 kg/m². BMI is above normal parameters. Recommendations include: educational material.          This document has been electronically signed by  CLAUDIO LEAL MD January 22, 2019 12:49 PM

## 2019-01-24 ENCOUNTER — READMISSION MANAGEMENT (OUTPATIENT)
Dept: CALL CENTER | Facility: HOSPITAL | Age: 64
End: 2019-01-24

## 2019-01-24 NOTE — OUTREACH NOTE
CHF Week 2 Survey      Responses   Facility patient discharged from?  [facility]   Does the patient have one of the following disease processes/diagnoses(primary or secondary)?  CHF   Week 2 attempt successful?  No   Unsuccessful attempts  Attempt 1          Lenora Wilson RN

## 2019-01-25 ENCOUNTER — READMISSION MANAGEMENT (OUTPATIENT)
Dept: CALL CENTER | Facility: HOSPITAL | Age: 64
End: 2019-01-25

## 2019-01-25 NOTE — OUTREACH NOTE
CHF Week 2 Survey      Responses   Facility patient discharged from?  [facility]   Does the patient have one of the following disease processes/diagnoses(primary or secondary)?  CHF   Week 2 attempt successful?  No   Unsuccessful attempts  Attempt 2          Lenora Wilson RN

## 2019-01-31 ENCOUNTER — TELEPHONE (OUTPATIENT)
Dept: CARDIOLOGY | Facility: CLINIC | Age: 64
End: 2019-01-31

## 2019-02-01 ENCOUNTER — TELEPHONE (OUTPATIENT)
Dept: UROLOGY | Facility: CLINIC | Age: 64
End: 2019-02-01

## 2019-02-14 RX ORDER — ISOSORBIDE MONONITRATE 120 MG/1
TABLET, EXTENDED RELEASE ORAL
Qty: 30 TABLET | Refills: 0 | Status: SHIPPED | OUTPATIENT
Start: 2019-02-14 | End: 2019-03-14

## 2019-02-14 RX ORDER — PNV NO.95/FERROUS FUM/FOLIC AC 28MG-0.8MG
TABLET ORAL
Qty: 30 TABLET | Refills: 0 | Status: SHIPPED | OUTPATIENT
Start: 2019-02-14 | End: 2019-03-14 | Stop reason: SDUPTHER

## 2019-02-14 RX ORDER — ASPIRIN 81 MG/1
TABLET ORAL
Qty: 30 TABLET | Refills: 0 | Status: SHIPPED | OUTPATIENT
Start: 2019-02-14 | End: 2019-03-14 | Stop reason: SDUPTHER

## 2019-02-26 ENCOUNTER — PRIOR AUTHORIZATION (OUTPATIENT)
Dept: UROLOGY | Facility: CLINIC | Age: 64
End: 2019-02-26

## 2019-02-26 ENCOUNTER — OFFICE VISIT (OUTPATIENT)
Dept: UROLOGY | Facility: CLINIC | Age: 64
End: 2019-02-26

## 2019-02-26 VITALS — WEIGHT: 239 LBS | BODY MASS INDEX: 43.98 KG/M2 | HEIGHT: 62 IN

## 2019-02-26 DIAGNOSIS — C61 PROSTATE CANCER (HCC): Primary | ICD-10-CM

## 2019-02-26 DIAGNOSIS — N45.1 EPIDIDYMITIS: ICD-10-CM

## 2019-02-26 DIAGNOSIS — N99.115 POSTPROCEDURAL MALE FOSSA NAVICULARIS URETHRAL STRICTURE: ICD-10-CM

## 2019-02-26 DIAGNOSIS — N48.0 BXO (BALANITIS XEROTICA OBLITERANS): ICD-10-CM

## 2019-02-26 PROCEDURE — 96402 CHEMO HORMON ANTINEOPL SQ/IM: CPT | Performed by: UROLOGY

## 2019-02-26 PROCEDURE — 99214 OFFICE O/P EST MOD 30 MIN: CPT | Performed by: UROLOGY

## 2019-02-26 NOTE — PROGRESS NOTES
Chief Complaint:          Chief Complaint   Patient presents with   • Groin Swelling       HPI:   63 y.o. male.  63-year-old white male with prostate cancer on intermittent Lupron therapy returns today his meatal stenosis is doing better he is having no complaints or problems he had the development of swollen testicles with pain last week but it has resolved and this was likely an episode of epididymitis that is completely resolved today he is gained a lot of weight he does not look good his repeat PSA shows growth and therefore he was given Lupron 22.5 mg today with follow-up in 3 months    Past Medical History:        Past Medical History:   Diagnosis Date   • Chest pain    • CHF (congestive heart failure) (CMS/[facility]) 1/10/2019   • Chronic kidney disease    • CPAP (continuous positive airway pressure) dependence    • Diabetes mellitus (CMS/[facility])    • Elevated cholesterol    • GERD (gastroesophageal reflux disease)    • Heart murmur    • Hyperlipidemia    • Hypertension    • Irregular heart beat    • Polio    • Prostate cancer (CMS/[facility]) 05/2016    Dr. Khalif Kohler MD- [place], [place]   • Shortness of breath    • Sleep apnea          Current Meds:     Current Outpatient Medications   Medication Sig Dispense Refill   • ASPIRIN ADULT LOW STRENGTH 81 MG EC tablet TAKE 1 TABLET EVERY DAY 30 tablet 0   • atorvastatin (LIPITOR) 80 MG tablet Take 80 mg by mouth daily.     • carvedilol (COREG) 25 MG tablet Take 1 tablet by mouth 2 (Two) Times a Day. 180 tablet 3   • cetirizine (zyrTEC) 10 MG tablet Take 0.5 tablets by mouth Daily.     • cyclobenzaprine (FLEXERIL) 10 MG tablet Take 10 mg by mouth Daily.     • Ferrous Sulfate (IRON) 325 (65 Fe) MG tablet TAKE 1 TABLET DAILY WITH BREAKFAST 30 tablet 0   • furosemide (LASIX) 40 MG tablet Take 40 mg by mouth Take As Directed. Prior to [facility] Admission, Patient was on: Take 1 tablet every day except Wednesday and Sunday     • HYDROcodone-acetaminophen (NORCO) 7.5-325 MG per  tablet Take 1 tablet by mouth 2 (two) times a day as needed for moderate pain (4-6).     • hydrOXYzine (VISTARIL) 25 MG capsule Take 25 mg by mouth Every 6 (Six) Hours As Needed for Itching.     • Insulin Glargine (BASAGLAR KWIKPEN) 100 UNIT/ML injection pen Inject 30 Units under the skin into the appropriate area as directed Every Night.     • isosorbide mononitrate (IMDUR) 120 MG 24 hr tablet TAKE 1 TABLET EVERY DAY 30 tablet 0   • levothyroxine (SYNTHROID, LEVOTHROID) 137 MCG tablet Take 137 mcg by mouth Daily.     • nitroglycerin (NITROSTAT) 0.4 MG SL tablet Place 1 tablet under the tongue Every 5 (Five) Minutes As Needed for Chest Pain. Take no more than 3 doses in 15 minutes. 25 tablet 0   • pantoprazole (PROTONIX) 40 MG EC tablet Take 40 mg by mouth Daily.     • raNITIdine (ZANTAC) 300 MG tablet Take 0.5 tablets by mouth Every Night.     • senna-docusate (SENEXON-S) 8.6-50 MG per tablet Take 2 tablets by mouth Every Night.     • SITagliptin (JANUVIA) 50 MG tablet Take ½ tablet by mouth Daily. 30 tablet 3   • sodium bicarbonate 650 MG tablet Take 650 mg by mouth 3 (Three) Times a Day.     • tamsulosin (FLOMAX) 0.4 MG capsule 24 hr capsule Take 1 capsule by mouth Every Night.     • tamsulosin (FLOMAX) 0.4 MG capsule 24 hr capsule TAKE 1 CAPSULE EVERY EVENING. 30 capsule 11   • triamcinolone (KENALOG) 0.1 % cream Apply 1 application topically to the appropriate area as directed 2 (Two) Times a Day. Prior to [facility] Admission, Patient was on: applies to rash       No current facility-administered medications for this visit.         Allergies:      No Known Allergies     Past Surgical History:     Past Surgical History:   Procedure Laterality Date   • CARDIAC CATHETERIZATION  2008    50% diffuse LAD stenosis, 50% septal  branch   • COLONOSCOPY      Long time ago   • COLONOSCOPY N/A 5/9/2018    Procedure: COLONOSCOPY  CPTCODE:50111;  Surgeon: Bob Mcguire III, MD;  Location: [facility];  Service:  Gastroenterology   • COLONOSCOPY N/A 1/14/2019    Procedure: COLONOSCOPY;  Surgeon: Ottoniel Napier MD;  Location: [facility] OR;  Service: Gastroenterology   • ENDOSCOPY N/A 5/9/2018    Procedure: ESOPHAGOGASTRODUODENOSCOPY WITH BIOPSY  CPTCODE:94549;  Surgeon: Bob Mcguire III, MD;  Location: [facility] OR;  Service: Gastroenterology   • ENDOSCOPY N/A 1/14/2019    Procedure: ESOPHAGOGASTRODUODENOSCOPY;  Surgeon: Ottoniel Napier MD;  Location: [facility] OR;  Service: Gastroenterology   • HEMORRHOIDECTOMY     • HERNIA REPAIR     • UPPER GASTROINTESTINAL ENDOSCOPY      Long time ago   • URETHRAL DILATATION N/A 12/13/2017    Procedure: URETHRAL DILATATION;  Surgeon: Khalif Kohler MD;  Location: [facility] OR;  Service:          Social History:     Social History     Socioeconomic History   • Marital status:      Spouse name: Not on file   • Number of children: Not on file   • Years of education: Not on file   • Highest education level: Not on file   Social Needs   • Financial resource strain: Not on file   • Food insecurity - worry: Not on file   • Food insecurity - inability: Not on file   • Transportation needs - medical: Not on file   • Transportation needs - non-medical: Not on file   Occupational History   • Not on file   Tobacco Use   • Smoking status: Never Smoker   • Smokeless tobacco: Current User     Types: Chew   • Tobacco comment: Chewed tobacco since he was 4 yrs old.   Substance and Sexual Activity   • Alcohol use: No   • Drug use: No   • Sexual activity: Defer   Other Topics Concern   • Not on file   Social History Narrative   • Not on file       Family History:     Family History   Problem Relation Age of Onset   • Heart disease Brother    • Diabetes Brother    • Cancer Brother         not sure the kind   • Heart disease Brother    • Diabetes Brother    • Diabetes Sister    • Diabetes Daughter    • Heart disease Daughter    • Pancreatitis Daughter    • Crohn's disease Daughter    • Diabetes  Son    • Heart disease Son        Review of Systems:     Review of Systems   Constitutional: Negative.    HENT: Negative.    Eyes: Negative.    Respiratory: Negative.    Cardiovascular: Negative.    Gastrointestinal: Negative.    Endocrine: Negative.    Genitourinary: Positive for scrotal swelling and testicular pain.   Musculoskeletal: Negative.    Allergic/Immunologic: Negative.    Neurological: Negative.    Hematological: Negative.    Psychiatric/Behavioral: Negative.        Physical Exam:     Physical Exam   Constitutional: He is oriented to person, place, and time. He appears well-developed and well-nourished.   HENT:   Head: Normocephalic and atraumatic.   Eyes: Conjunctivae and EOM are normal. Pupils are equal, round, and reactive to light.   Neck: Normal range of motion.   Cardiovascular: Normal rate, regular rhythm, normal heart sounds and intact distal pulses.   Pulmonary/Chest: Effort normal and breath sounds normal.   Abdominal: Soft. Bowel sounds are normal.   Genitourinary:   Genitourinary Comments: Normal uncircumcised male phallus bilaterally descended testes no masses no swelling no pain   Musculoskeletal: Normal range of motion.   Neurological: He is alert and oriented to person, place, and time. He has normal reflexes.   Skin: Skin is warm and dry.   Psychiatric: He has a normal mood and affect. His behavior is normal. Judgment and thought content normal.   Nursing note and vitals reviewed.      I have reviewed the following portions of the patient's history: allergies, current medications, past family history, past medical history, past social history, past surgical history, problem list and ROS and confirm it's accurate.      Procedure:       Assessment/Plan:   Prostate cancer-continues on intermittent Lupron therapy and actually doing quite well he is having no other complaints or problems, recommend continued observation check him back in 3 months after having Lupron 22.5 mg administered  today.  Epididymitis-appears to have resolved  Meatal stenosis-doing well    Patient's Body mass index is 43.87 kg/m². BMI is above normal parameters. Recommendations include: educational material.          This document has been electronically signed by CLAUDIO LEAL MD February 26, 2019 10:48 AM

## 2019-02-26 NOTE — TELEPHONE ENCOUNTER
[employer] was approved for 1 time until 08/26/2019    Auto approved due to [place] being in a state of emergency.     Auth #: [id number] Spoke with [employer].

## 2019-02-27 ENCOUNTER — APPOINTMENT (OUTPATIENT)
Dept: GENERAL RADIOLOGY | Facility: HOSPITAL | Age: 64
End: 2019-02-27

## 2019-02-27 ENCOUNTER — HOSPITAL ENCOUNTER (EMERGENCY)
Facility: HOSPITAL | Age: 64
Discharge: HOME | End: 2019-02-27
Attending: FAMILY MEDICINE | Admitting: FAMILY MEDICINE

## 2019-02-27 VITALS
WEIGHT: 230 LBS | TEMPERATURE: 98 F | DIASTOLIC BLOOD PRESSURE: 71 MMHG | SYSTOLIC BLOOD PRESSURE: 130 MMHG | OXYGEN SATURATION: 99 % | BODY MASS INDEX: 42.33 KG/M2 | HEIGHT: 62 IN | RESPIRATION RATE: 16 BRPM | HEART RATE: 68 BPM

## 2019-02-27 DIAGNOSIS — R89.9 ABNORMAL LABORATORY TEST: ICD-10-CM

## 2019-02-27 DIAGNOSIS — R07.9 CHEST PAIN, UNSPECIFIED TYPE: Primary | ICD-10-CM

## 2019-02-27 LAB
ALBUMIN SERPL-MCNC: 4 G/DL (ref 3.4–4.8)
ALBUMIN/GLOB SERPL: 1.3 G/DL (ref 1.5–2.5)
ALP SERPL-CCNC: 59 U/L (ref 40–129)
ALT SERPL W P-5'-P-CCNC: 23 U/L (ref 10–44)
ANION GAP SERPL CALCULATED.3IONS-SCNC: 7.4 MMOL/L (ref 3.6–11.2)
AST SERPL-CCNC: 32 U/L (ref 10–34)
BASOPHILS # BLD AUTO: 0.03 10*3/MM3 (ref 0–0.3)
BASOPHILS NFR BLD AUTO: 0.6 % (ref 0–2)
BILIRUB SERPL-MCNC: 0.4 MG/DL (ref 0.2–1.8)
BUN BLD-MCNC: 48 MG/DL (ref 7–21)
BUN/CREAT SERPL: 13.1 (ref 7–25)
CALCIUM SPEC-SCNC: 8.4 MG/DL (ref 7.7–10)
CHLORIDE SERPL-SCNC: 109 MMOL/L (ref 99–112)
CO2 SERPL-SCNC: 21.6 MMOL/L (ref 24.3–31.9)
CREAT BLD-MCNC: 3.66 MG/DL (ref 0.43–1.29)
DEPRECATED RDW RBC AUTO: 49.5 FL (ref 37–54)
EOSINOPHIL # BLD AUTO: 0.11 10*3/MM3 (ref 0–0.7)
EOSINOPHIL NFR BLD AUTO: 2.2 % (ref 0–5)
ERYTHROCYTE [DISTWIDTH] IN BLOOD BY AUTOMATED COUNT: 14.8 % (ref 11.5–14.5)
GFR SERPL CREATININE-BSD FRML MDRD: 17 ML/MIN/1.73
GLOBULIN UR ELPH-MCNC: 3.2 GM/DL
GLUCOSE BLD-MCNC: 221 MG/DL (ref 70–110)
HCT VFR BLD AUTO: 25.9 % (ref 42–52)
HGB BLD-MCNC: 8.1 G/DL (ref 14–18)
HOLD SPECIMEN: NORMAL
HOLD SPECIMEN: NORMAL
IMM GRANULOCYTES # BLD AUTO: 0.02 10*3/MM3 (ref 0–0.03)
IMM GRANULOCYTES NFR BLD AUTO: 0.4 % (ref 0–0.5)
LYMPHOCYTES # BLD AUTO: 1.79 10*3/MM3 (ref 1–3)
LYMPHOCYTES NFR BLD AUTO: 35.7 % (ref 21–51)
MCH RBC QN AUTO: 28.5 PG (ref 27–33)
MCHC RBC AUTO-ENTMCNC: 31.3 G/DL (ref 33–37)
MCV RBC AUTO: 91.2 FL (ref 80–94)
MONOCYTES # BLD AUTO: 0.33 10*3/MM3 (ref 0.1–0.9)
MONOCYTES NFR BLD AUTO: 6.6 % (ref 0–10)
NEUTROPHILS # BLD AUTO: 2.73 10*3/MM3 (ref 1.4–6.5)
NEUTROPHILS NFR BLD AUTO: 54.5 % (ref 30–70)
OSMOLALITY SERPL CALC.SUM OF ELEC: 295.1 MOSM/KG (ref 273–305)
PLATELET # BLD AUTO: 230 10*3/MM3 (ref 130–400)
PMV BLD AUTO: 10.1 FL (ref 6–10)
POTASSIUM BLD-SCNC: 4.6 MMOL/L (ref 3.5–5.3)
PROT SERPL-MCNC: 7.2 G/DL (ref 6–8)
RBC # BLD AUTO: 2.84 10*6/MM3 (ref 4.7–6.1)
SODIUM BLD-SCNC: 138 MMOL/L (ref 135–153)
TROPONIN I SERPL-MCNC: 0.02 NG/ML
TROPONIN I SERPL-MCNC: 0.03 NG/ML
WBC NRBC COR # BLD: 5.01 10*3/MM3 (ref 4.5–12.5)
WHOLE BLOOD HOLD SPECIMEN: NORMAL
WHOLE BLOOD HOLD SPECIMEN: NORMAL

## 2019-02-27 PROCEDURE — 80053 COMPREHEN METABOLIC PANEL: CPT | Performed by: FAMILY MEDICINE

## 2019-02-27 PROCEDURE — 99284 EMERGENCY DEPT VISIT MOD MDM: CPT

## 2019-02-27 PROCEDURE — 84484 ASSAY OF TROPONIN QUANT: CPT | Performed by: PHYSICIAN ASSISTANT

## 2019-02-27 PROCEDURE — 93010 ELECTROCARDIOGRAM REPORT: CPT | Performed by: INTERNAL MEDICINE

## 2019-02-27 PROCEDURE — 84484 ASSAY OF TROPONIN QUANT: CPT | Performed by: FAMILY MEDICINE

## 2019-02-27 PROCEDURE — 93005 ELECTROCARDIOGRAM TRACING: CPT | Performed by: FAMILY MEDICINE

## 2019-02-27 PROCEDURE — 71045 X-RAY EXAM CHEST 1 VIEW: CPT

## 2019-02-27 PROCEDURE — 85025 COMPLETE CBC W/AUTO DIFF WBC: CPT | Performed by: FAMILY MEDICINE

## 2019-02-27 PROCEDURE — 71045 X-RAY EXAM CHEST 1 VIEW: CPT | Performed by: RADIOLOGY

## 2019-02-27 RX ORDER — SODIUM CHLORIDE 0.9 % (FLUSH) 0.9 %
10 SYRINGE (ML) INJECTION AS NEEDED
Status: DISCONTINUED | OUTPATIENT
Start: 2019-02-27 | End: 2019-02-28 | Stop reason: HOSPADM

## 2019-02-27 RX ORDER — ASPIRIN 325 MG
325 TABLET ORAL ONCE
Status: DISCONTINUED | OUTPATIENT
Start: 2019-02-27 | End: 2019-02-28 | Stop reason: HOSPADM

## 2019-02-28 PROBLEM — N45.1 EPIDIDYMITIS: Status: ACTIVE | Noted: 2019-02-28

## 2019-03-04 DIAGNOSIS — R06.02 SOB (SHORTNESS OF BREATH): Primary | ICD-10-CM

## 2019-03-14 RX ORDER — ISOSORBIDE MONONITRATE 120 MG/1
TABLET, EXTENDED RELEASE ORAL
Qty: 30 TABLET | Refills: 0 | Status: SHIPPED | OUTPATIENT
Start: 2019-03-14 | End: 2019-09-13 | Stop reason: SDUPTHER

## 2019-03-14 RX ORDER — ASPIRIN 81 MG/1
TABLET ORAL
Qty: 30 TABLET | Refills: 0 | Status: SHIPPED | OUTPATIENT
Start: 2019-03-14 | End: 2019-04-11 | Stop reason: SDUPTHER

## 2019-03-14 RX ORDER — PNV NO.95/FERROUS FUM/FOLIC AC 28MG-0.8MG
TABLET ORAL
Qty: 30 TABLET | Refills: 0 | Status: SHIPPED | OUTPATIENT
Start: 2019-03-14 | End: 2019-04-11 | Stop reason: SDUPTHER

## 2019-03-14 NOTE — TELEPHONE ENCOUNTER
Patient no showed his 4 week follow up on 4/25/19 after refusing to go to the ER. Has not called to rescheduled. Now is requesting refills.

## 2019-04-12 RX ORDER — PNV NO.95/FERROUS FUM/FOLIC AC 28MG-0.8MG
TABLET ORAL
Qty: 30 TABLET | Refills: 0 | Status: ON HOLD | OUTPATIENT
Start: 2019-04-12 | End: 2021-01-01

## 2019-04-12 RX ORDER — ASPIRIN 81 MG/1
TABLET ORAL
Qty: 30 TABLET | Refills: 0 | Status: ON HOLD | OUTPATIENT
Start: 2019-04-12 | End: 2021-01-01

## 2019-04-12 RX ORDER — ISOSORBIDE MONONITRATE 120 MG/1
TABLET, EXTENDED RELEASE ORAL
Qty: 30 TABLET | Refills: 0 | Status: SHIPPED | OUTPATIENT
Start: 2019-04-12 | End: 2019-09-13 | Stop reason: SDUPTHER

## 2019-05-03 ENCOUNTER — OFFICE VISIT (OUTPATIENT)
Dept: CARDIOLOGY | Facility: CLINIC | Age: 64
End: 2019-05-03

## 2019-05-03 VITALS
SYSTOLIC BLOOD PRESSURE: 140 MMHG | DIASTOLIC BLOOD PRESSURE: 71 MMHG | OXYGEN SATURATION: 98 % | HEART RATE: 70 BPM | HEIGHT: 62 IN | BODY MASS INDEX: 42.07 KG/M2

## 2019-05-03 DIAGNOSIS — I10 ESSENTIAL HYPERTENSION: ICD-10-CM

## 2019-05-03 DIAGNOSIS — E78.5 DYSLIPIDEMIA: ICD-10-CM

## 2019-05-03 DIAGNOSIS — R07.2 PRECORDIAL PAIN: Primary | ICD-10-CM

## 2019-05-03 DIAGNOSIS — E11.9 TYPE 2 DIABETES MELLITUS WITHOUT COMPLICATION, WITHOUT LONG-TERM CURRENT USE OF INSULIN (HCC): ICD-10-CM

## 2019-05-03 PROCEDURE — 99214 OFFICE O/P EST MOD 30 MIN: CPT | Performed by: PHYSICIAN ASSISTANT

## 2019-05-03 RX ORDER — METOLAZONE 5 MG/1
5 TABLET ORAL 3 TIMES WEEKLY
Status: ON HOLD | COMMUNITY
End: 2021-01-01

## 2019-05-03 RX ORDER — HYDRALAZINE HYDROCHLORIDE 25 MG/1
25 TABLET, FILM COATED ORAL 3 TIMES DAILY
Status: ON HOLD | COMMUNITY
End: 2021-01-01

## 2019-05-03 RX ORDER — SULFAMETHOXAZOLE AND TRIMETHOPRIM 400; 80 MG/1; MG/1
1 TABLET ORAL 2 TIMES DAILY
Status: ON HOLD | COMMUNITY
End: 2021-01-01

## 2019-05-03 RX ORDER — AMLODIPINE BESYLATE 10 MG/1
10 TABLET ORAL DAILY
Status: ON HOLD | COMMUNITY
End: 2021-01-01

## 2019-05-03 RX ORDER — GLIMEPIRIDE 4 MG/1
4 TABLET ORAL
COMMUNITY

## 2019-05-03 RX ORDER — FENOFIBRATE 145 MG/1
145 TABLET, COATED ORAL DAILY
COMMUNITY

## 2019-05-03 RX ORDER — RANOLAZINE 500 MG/1
500 TABLET, EXTENDED RELEASE ORAL 2 TIMES DAILY
Qty: 30 TABLET | Refills: 3 | Status: SHIPPED | OUTPATIENT
Start: 2019-05-03 | End: 2020-04-16

## 2019-05-03 NOTE — PROGRESS NOTES
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 male with a past medical history significant for essential hypertension, dyslipidemia, IDDM, and a history of polio with deformities of the bilateral lower extremities. He presents to the office today for a follow up.     Patient reports chest pain that lasts for roughly 25-30 minutes he describes as an ache. He denies any radiation of this pain but it does make him short of breath and diaphoretic. He does report worsening of this pain with exertion. He also reports that he does feel better with rest.     No Known Allergies:      Current Outpatient Medications:   •  amLODIPine (NORVASC) 10 MG tablet, Take 10 mg by mouth Daily., Disp: , Rfl:   •  ASPIRIN ADULT LOW STRENGTH 81 MG EC tablet, TAKE 1 TABLET BY MOUTH EVERY DAY, Disp: 30 tablet, Rfl: 0  •  atorvastatin (LIPITOR) 80 MG tablet, Take 80 mg by mouth daily., Disp: , Rfl:   •  carvedilol (COREG) 25 MG tablet, Take 1 tablet by mouth 2 (Two) Times a Day., Disp: 180 tablet, Rfl: 3  •  cetirizine (zyrTEC) 10 MG tablet, Take 0.5 tablets by mouth Daily., Disp: , Rfl:   •   cyclobenzaprine (FLEXERIL) 10 MG tablet, Take 10 mg by mouth Daily., Disp: , Rfl:   •  fenofibrate (TRICOR) 145 MG tablet, Take 145 mg by mouth Daily., Disp: , Rfl:   •  Ferrous Sulfate (IRON) 325 (65 Fe) MG tablet, TAKE 1 TABLET DAILY WITH BREAKFAST, Disp: 30 tablet, Rfl: 0  •  furosemide (LASIX) 40 MG tablet, Take 40 mg by mouth Take As Directed. Prior to [facility] [place] Admission, Patient was on: Take 1 tablet every day except Wednesday and Sunday, Disp: , Rfl:   •  glimepiride (AMARYL) 4 MG tablet, Take 4 mg by mouth Every Morning Before Breakfast., Disp: , Rfl:   •  hydrALAZINE (APRESOLINE) 25 MG tablet, Take 25 mg by mouth 3 (Three) Times a Day., Disp: , Rfl:   •  HYDROcodone-acetaminophen (NORCO) 7.5-325 MG per tablet, Take 1 tablet by mouth 2 (two) times a day as needed for moderate pain (4-6)., Disp: , Rfl:   •  Insulin Glargine (BASAGLAR KWIKPEN) 100 UNIT/ML injection pen, Inject 30 Units under the skin into the appropriate area as directed Every Night., Disp: , Rfl:   •  isosorbide mononitrate (IMDUR) 120 MG 24 hr tablet, TAKE 1 TABLET BY MOUTH EVERY DAY, Disp: 30 tablet, Rfl: 0  •  isosorbide mononitrate (IMDUR) 120 MG 24 hr tablet, TAKE 1 TABLET BY MOUTH EVERY DAY, Disp: 30 tablet, Rfl: 0  •  levothyroxine (SYNTHROID, LEVOTHROID) 137 MCG tablet, Take 137 mcg by mouth Daily., Disp: , Rfl:   •  metOLazone (ZAROXOLYN) 5 MG tablet, Take 5 mg by mouth 3 (Three) Times a Week., Disp: , Rfl:   •  nitroglycerin (NITROSTAT) 0.4 MG SL tablet, Place 1 tablet under the tongue Every 5 (Five) Minutes As Needed for Chest Pain. Take no more than 3 doses in 15 minutes., Disp: 25 tablet, Rfl: 0  •  pantoprazole (PROTONIX) 40 MG EC tablet, Take 40 mg by mouth Daily., Disp: , Rfl:   •  senna-docusate (SENEXON-S) 8.6-50 MG per tablet, Take 2 tablets by mouth Every Night., Disp: , Rfl:   •  SITagliptin (JANUVIA) 50 MG tablet, Take ½ tablet by mouth Daily., Disp: 30 tablet, Rfl: 3  •  sodium bicarbonate 650 MG tablet, Take 650 mg by  "mouth 3 (Three) Times a Day., Disp: , Rfl:   •  sulfamethoxazole-trimethoprim (BACTRIM,SEPTRA) 400-80 MG tablet, Take 1 tablet by mouth 2 (Two) Times a Day., Disp: , Rfl:   •  tamsulosin (FLOMAX) 0.4 MG capsule 24 hr capsule, TAKE 1 CAPSULE EVERY EVENING., Disp: 30 capsule, Rfl: 11  •  triamcinolone (KENALOG) 0.1 % cream, Apply 1 application topically to the appropriate area as directed 2 (Two) Times a Day. Prior to [facility] Admission, Patient was on: applies to rash, Disp: , Rfl:   •  hydrOXYzine (VISTARIL) 25 MG capsule, Take 25 mg by mouth Every 6 (Six) Hours As Needed for Itching., Disp: , Rfl:   •  ranolazine (RANEXA) 500 MG 12 hr tablet, Take 1 tablet by mouth 2 (Two) Times a Day., Disp: 30 tablet, Rfl: 3    The following portions of the patient's history were reviewed and updated as appropriate: allergies, current medications, past family history, past medical history, past social history, past surgical history and problem list.    Social History     Tobacco Use   • Smoking status: Never Smoker   • Smokeless tobacco: Current User     Types: Chew   • Tobacco comment: Chewed tobacco since he was 4 yrs old.   Substance Use Topics   • Alcohol use: No   • Drug use: No       Review of Systems   Constitution: Negative for weakness and malaise/fatigue.   Cardiovascular: Positive for dyspnea on exertion, leg swelling (improved) and palpitations. Negative for chest pain and irregular heartbeat.   Respiratory: Positive for cough, shortness of breath and wheezing.    Hematologic/Lymphatic: Negative for bleeding problem. Does not bruise/bleed easily.   Gastrointestinal: Negative for nausea and vomiting.       Objective   Vitals:    05/03/19 1353   BP: 140/71   BP Location: Left arm   Patient Position: Sitting   Cuff Size: Adult   Pulse: 70   SpO2: 98%   Height: 157.5 cm (62\")     Body mass index is 42.07 kg/m².    Physical Exam   Constitutional: He is oriented to person, place, and time. He appears well-developed and " well-nourished. No distress.   HENT:   Head: Normocephalic and atraumatic.   Cardiovascular: Normal rate, regular rhythm, normal heart sounds and intact distal pulses.   Pulmonary/Chest: Effort normal and breath sounds normal. No respiratory distress.   Musculoskeletal: He exhibits no edema.   Neurological: He is alert and oriented to person, place, and time.   Skin: He is not diaphoretic.       Lab Results   Component Value Date     02/27/2019    K 4.6 02/27/2019     02/27/2019    CO2 21.6 (L) 02/27/2019    BUN 48 (H) 02/27/2019    CREATININE 3.66 (H) 02/27/2019    GLUCOSE 221 (H) 02/27/2019    CALCIUM 8.4 02/27/2019    AST 32 02/27/2019    ALT 23 02/27/2019    ALKPHOS 59 02/27/2019    LABIL2 1.0 01/08/2019    LABIL2 0.9 01/08/2019     Lab Results   Component Value Date    CKTOTAL 42 01/11/2019     Lab Results   Component Value Date    WBC 5.01 02/27/2019    HGB 8.1 (L) 02/27/2019    HCT 25.9 (L) 02/27/2019     02/27/2019     No results found for: INR  Lab Results   Component Value Date    MG 1.8 01/11/2019     Lab Results   Component Value Date    TSH 3.991 01/10/2019    PSA 7.300 (H) 01/22/2019    TRIG 1,214 (H) 08/09/2016    HDL 32 (L) 08/09/2016    LDL  08/09/2016      Comment:      Unable to calculate      Lab Results   Component Value Date    .0 (H) 01/11/2019       During this visit the following were done:  Labs Reviewed [x]    Labs Ordered []    Radiology Reports Reviewed [x]    Radiology Ordered []    PCP Records Reviewed []    Referring Provider Records Reviewed []    ER Records Reviewed []    Hospital Records Reviewed []    History Obtained From Family []    Radiology Images Reviewed []    Other Reviewed []    Records Requested []       Procedures    Assessment/Plan    Diagnosis Plan   1. Precordial pain  Stress Test With Myocardial Perfusion   2. Essential hypertension     3. Type 2 diabetes mellitus without complication, without long-term current use of insulin (CMS/[facility])      4. Dyslipidemia              Recommendations:  1. We will evaluate patient's chest pains with a Lexiscan sestamibi study.  2. Continue  Imdur, hydralazine, carvedilol, atorvastatin, aspirin, and amlodipine.  3. I will also start 500 mg of Ranexa.    Return in about 8 weeks (around 6/28/2019).    As always, I appreciate very much the opportunity to participate in the cardiovascular care of your patients.      With Best Regards,    Burt Floyd PA-C

## 2019-05-09 RX ORDER — ISOSORBIDE MONONITRATE 120 MG/1
TABLET, EXTENDED RELEASE ORAL
Qty: 30 TABLET | Refills: 1 | Status: SHIPPED | OUTPATIENT
Start: 2019-05-09 | End: 2019-09-13 | Stop reason: SDUPTHER

## 2019-06-25 ENCOUNTER — HOSPITAL ENCOUNTER (EMERGENCY)
Facility: HOSPITAL | Age: 64
Discharge: HOME | End: 2019-06-25
Attending: EMERGENCY MEDICINE | Admitting: EMERGENCY MEDICINE

## 2019-06-25 ENCOUNTER — APPOINTMENT (OUTPATIENT)
Dept: GENERAL RADIOLOGY | Facility: HOSPITAL | Age: 64
End: 2019-06-25

## 2019-06-25 VITALS
TEMPERATURE: 98.3 F | OXYGEN SATURATION: 100 % | BODY MASS INDEX: 43.24 KG/M2 | HEART RATE: 76 BPM | RESPIRATION RATE: 18 BRPM | WEIGHT: 235 LBS | DIASTOLIC BLOOD PRESSURE: 81 MMHG | HEIGHT: 62 IN | SYSTOLIC BLOOD PRESSURE: 174 MMHG

## 2019-06-25 DIAGNOSIS — N18.4 CHRONIC RENAL FAILURE, STAGE 4 (SEVERE) (HCC): Primary | ICD-10-CM

## 2019-06-25 LAB
ALBUMIN SERPL-MCNC: 4.04 G/DL (ref 3.5–5.2)
ALBUMIN/GLOB SERPL: 1.1 G/DL
ALP SERPL-CCNC: 58 U/L (ref 39–117)
ALT SERPL W P-5'-P-CCNC: 7 U/L (ref 1–41)
ANION GAP SERPL CALCULATED.3IONS-SCNC: 15.8 MMOL/L
AST SERPL-CCNC: 16 U/L (ref 1–40)
BACTERIA UR QL AUTO: NORMAL /HPF
BASOPHILS # BLD AUTO: 0.03 10*3/MM3 (ref 0–0.2)
BASOPHILS NFR BLD AUTO: 0.7 % (ref 0–1.5)
BILIRUB SERPL-MCNC: 0.4 MG/DL (ref 0.2–1.2)
BILIRUB UR QL STRIP: NEGATIVE
BUN BLD-MCNC: 35 MG/DL (ref 8–23)
BUN/CREAT SERPL: 9.7 (ref 7–25)
CALCIUM SPEC-SCNC: 9.1 MG/DL (ref 8.6–10.5)
CHLORIDE SERPL-SCNC: 102 MMOL/L (ref 98–107)
CLARITY UR: CLEAR
CO2 SERPL-SCNC: 19.2 MMOL/L (ref 22–29)
COLOR UR: YELLOW
CREAT BLD-MCNC: 3.6 MG/DL (ref 0.76–1.27)
DEPRECATED RDW RBC AUTO: 51.2 FL (ref 37–54)
EOSINOPHIL # BLD AUTO: 0.16 10*3/MM3 (ref 0–0.4)
EOSINOPHIL NFR BLD AUTO: 3.9 % (ref 0.3–6.2)
ERYTHROCYTE [DISTWIDTH] IN BLOOD BY AUTOMATED COUNT: 15.1 % (ref 12.3–15.4)
GFR SERPL CREATININE-BSD FRML MDRD: 17 ML/MIN/1.73
GLOBULIN UR ELPH-MCNC: 3.8 GM/DL
GLUCOSE BLD-MCNC: 285 MG/DL (ref 65–99)
GLUCOSE UR STRIP-MCNC: ABNORMAL MG/DL
HCT VFR BLD AUTO: 25.2 % (ref 37.5–51)
HGB BLD-MCNC: 8 G/DL (ref 13–17.7)
HGB UR QL STRIP.AUTO: NEGATIVE
HOLD SPECIMEN: NORMAL
HOLD SPECIMEN: NORMAL
HYALINE CASTS UR QL AUTO: NORMAL /LPF
IMM GRANULOCYTES # BLD AUTO: 0.02 10*3/MM3 (ref 0–0.05)
IMM GRANULOCYTES NFR BLD AUTO: 0.5 % (ref 0–0.5)
KETONES UR QL STRIP: NEGATIVE
LEUKOCYTE ESTERASE UR QL STRIP.AUTO: NEGATIVE
LYMPHOCYTES # BLD AUTO: 1.09 10*3/MM3 (ref 0.7–3.1)
LYMPHOCYTES NFR BLD AUTO: 26.5 % (ref 19.6–45.3)
MCH RBC QN AUTO: 29.6 PG (ref 26.6–33)
MCHC RBC AUTO-ENTMCNC: 31.7 G/DL (ref 31.5–35.7)
MCV RBC AUTO: 93.3 FL (ref 79–97)
MONOCYTES # BLD AUTO: 0.33 10*3/MM3 (ref 0.1–0.9)
MONOCYTES NFR BLD AUTO: 8 % (ref 5–12)
NEUTROPHILS # BLD AUTO: 2.49 10*3/MM3 (ref 1.7–7)
NEUTROPHILS NFR BLD AUTO: 60.4 % (ref 42.7–76)
NITRITE UR QL STRIP: NEGATIVE
NT-PROBNP SERPL-MCNC: 2008 PG/ML (ref 5–900)
PH UR STRIP.AUTO: <=5 [PH] (ref 5–8)
PLATELET # BLD AUTO: 218 10*3/MM3 (ref 140–450)
PMV BLD AUTO: 11 FL (ref 6–12)
POTASSIUM BLD-SCNC: 5.1 MMOL/L (ref 3.5–5.2)
PROT SERPL-MCNC: 7.8 G/DL (ref 6–8.5)
PROT UR QL STRIP: ABNORMAL
RBC # BLD AUTO: 2.7 10*6/MM3 (ref 4.14–5.8)
RBC # UR: NORMAL /HPF
REF LAB TEST METHOD: NORMAL
SODIUM BLD-SCNC: 137 MMOL/L (ref 136–145)
SP GR UR STRIP: 1.02 (ref 1–1.03)
SQUAMOUS #/AREA URNS HPF: NORMAL /HPF
TROPONIN T SERPL-MCNC: <0.01 NG/ML (ref 0–0.03)
UROBILINOGEN UR QL STRIP: ABNORMAL
WBC NRBC COR # BLD: 4.12 10*3/MM3 (ref 3.4–10.8)
WBC UR QL AUTO: NORMAL /HPF
WHOLE BLOOD HOLD SPECIMEN: NORMAL
WHOLE BLOOD HOLD SPECIMEN: NORMAL

## 2019-06-25 PROCEDURE — 99284 EMERGENCY DEPT VISIT MOD MDM: CPT

## 2019-06-25 PROCEDURE — 85025 COMPLETE CBC W/AUTO DIFF WBC: CPT | Performed by: EMERGENCY MEDICINE

## 2019-06-25 PROCEDURE — 81001 URINALYSIS AUTO W/SCOPE: CPT | Performed by: EMERGENCY MEDICINE

## 2019-06-25 PROCEDURE — 93005 ELECTROCARDIOGRAM TRACING: CPT | Performed by: EMERGENCY MEDICINE

## 2019-06-25 PROCEDURE — 80053 COMPREHEN METABOLIC PANEL: CPT | Performed by: EMERGENCY MEDICINE

## 2019-06-25 PROCEDURE — 71046 X-RAY EXAM CHEST 2 VIEWS: CPT | Performed by: RADIOLOGY

## 2019-06-25 PROCEDURE — 83880 ASSAY OF NATRIURETIC PEPTIDE: CPT | Performed by: EMERGENCY MEDICINE

## 2019-06-25 PROCEDURE — 93010 ELECTROCARDIOGRAM REPORT: CPT | Performed by: INTERNAL MEDICINE

## 2019-06-25 PROCEDURE — 71046 X-RAY EXAM CHEST 2 VIEWS: CPT

## 2019-06-25 PROCEDURE — 84484 ASSAY OF TROPONIN QUANT: CPT | Performed by: EMERGENCY MEDICINE

## 2019-06-25 RX ORDER — SODIUM CHLORIDE 0.9 % (FLUSH) 0.9 %
10 SYRINGE (ML) INJECTION AS NEEDED
Status: DISCONTINUED | OUTPATIENT
Start: 2019-06-25 | End: 2019-06-25 | Stop reason: HOSPADM

## 2019-06-25 RX ADMIN — SODIUM CHLORIDE 500 ML: 9 INJECTION, SOLUTION INTRAVENOUS at 13:47

## 2019-07-15 RX ORDER — ISOSORBIDE MONONITRATE 120 MG/1
TABLET, EXTENDED RELEASE ORAL
Qty: 30 TABLET | Refills: 1 | Status: SHIPPED | OUTPATIENT
Start: 2019-07-15 | End: 2019-09-13 | Stop reason: SDUPTHER

## 2019-07-20 NOTE — ANESTHESIA POSTPROCEDURE EVALUATION
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 urethral meatal stricture [N35.010]) MD Magno Toney MD          Anesthesia Type: general  Last vitals  BP   134/76 (12/13/17 1409)   Temp   98 °F (36.7 °C) (12/13/17 1354)   Pulse   55 (12/13/17 1409)   Resp   13 (12/13/17 1409)     SpO2   99 % (12/13/17 1409)     Post Anesthesia Care and Evaluation    Patient location during evaluation: bedside  Patient participation: complete - patient participated  Level of consciousness: awake and alert  Pain score: 1  Pain management: adequate  Airway patency: patent  Anesthetic complications: No anesthetic complications  PONV Status: none  Cardiovascular status: acceptable  Respiratory status: acceptable  Hydration status: acceptable       80 year old with relapsed B cell lymphoma (dx 2017, now relaplse)   Treated with rituximab and revlimid in 3/2019  Treated with Obintuzmab and Bendamustine iin 4/2019    Presents with shortness of breath  She had abnormal imaging with lung nodules    Aspergillosis    1) Fungal pneumonia  aspergillosis on culture    Continue voriconazole- tx of choice    Even with optimal tx, outcomes of pt with underlying heme malignancy is not always good    2) Increased SOB- better today  Stop meropenem today    3) Immunosuppressed secondary to cancer tx  PCP prophylaxis  Continue mepron  At some point, reasonable to re-challenge with Bactrim with close monitoring of WBC    4) Delirium: improved with sleep    Check voriconazole through (a send out)

## 2019-08-07 ENCOUNTER — TELEPHONE (OUTPATIENT)
Dept: UROLOGY | Facility: CLINIC | Age: 64
End: 2019-08-07

## 2019-08-26 ENCOUNTER — OFFICE VISIT (OUTPATIENT)
Dept: UROLOGY | Facility: CLINIC | Age: 64
End: 2019-08-26

## 2019-08-26 VITALS
BODY MASS INDEX: 42.69 KG/M2 | WEIGHT: 232 LBS | HEIGHT: 62 IN | DIASTOLIC BLOOD PRESSURE: 70 MMHG | SYSTOLIC BLOOD PRESSURE: 162 MMHG

## 2019-08-26 DIAGNOSIS — C61 PROSTATE CANCER (HCC): ICD-10-CM

## 2019-08-26 DIAGNOSIS — N13.8 BPH WITH OBSTRUCTION/LOWER URINARY TRACT SYMPTOMS: Primary | ICD-10-CM

## 2019-08-26 DIAGNOSIS — N18.6 ESRD (END STAGE RENAL DISEASE) (HCC): ICD-10-CM

## 2019-08-26 DIAGNOSIS — N40.1 BPH WITH OBSTRUCTION/LOWER URINARY TRACT SYMPTOMS: Primary | ICD-10-CM

## 2019-08-26 DIAGNOSIS — N99.115 POSTPROCEDURAL MALE FOSSA NAVICULARIS URETHRAL STRICTURE: ICD-10-CM

## 2019-08-26 LAB — PSA SERPL-MCNC: 0.86 NG/ML (ref 0–4)

## 2019-08-26 PROCEDURE — 99213 OFFICE O/P EST LOW 20 MIN: CPT | Performed by: UROLOGY

## 2019-08-26 PROCEDURE — 36415 COLL VENOUS BLD VENIPUNCTURE: CPT | Performed by: UROLOGY

## 2019-08-26 PROCEDURE — 53601 DILATE URETHRA STRICTURE: CPT | Performed by: UROLOGY

## 2019-08-26 PROCEDURE — 84153 ASSAY OF PSA TOTAL: CPT | Performed by: NURSE PRACTITIONER

## 2019-08-26 NOTE — PROGRESS NOTES
Chief Complaint:          Chief Complaint   Patient presents with   • Prostate Cancer     6 MONTH FU        HPI:   64 y.o. male with prostate cancer on intermittent Lupron therapy returns today his meatal stenosis is doing better he is having no complaints or problems he had the development of swollen testicles with pain last week but it has resolved and this was likely an episode of epididymitis that is completely resolved today he is gained a lot of weight he does not look good his repeat PSA shows growth and therefore he was given Lupron 45 mg today with follow-up in 3 months.  He returns today his urethra is shut down substantially I went ahead and recommend dilation he is now on end-stage renal disease 90% loss of function he is starting dialysis in 2 weeks.  I went ahead and dilated the distal meatus as he has an adequate volume of urine as yet and I went ahead and checked a PSA.      Past Medical History:        Past Medical History:   Diagnosis Date   • Chest pain    • CHF (congestive heart failure) (CMS/[facility]) 1/10/2019   • Chronic kidney disease    • CPAP (continuous positive airway pressure) dependence    • Diabetes mellitus (CMS/[facility])    • Elevated cholesterol    • GERD (gastroesophageal reflux disease)    • Heart murmur    • Hyperlipidemia    • Hypertension    • Irregular heart beat    • Polio    • Prostate cancer (CMS/[facility]) 05/2016    Dr. Khalif Kohler MD- [place], [place]   • Shortness of breath    • Sleep apnea          Current Meds:     Current Outpatient Medications   Medication Sig Dispense Refill   • amLODIPine (NORVASC) 10 MG tablet Take 10 mg by mouth Daily.     • ASPIRIN ADULT LOW STRENGTH 81 MG EC tablet TAKE 1 TABLET BY MOUTH EVERY DAY 30 tablet 0   • atorvastatin (LIPITOR) 80 MG tablet Take 80 mg by mouth daily.     • carvedilol (COREG) 25 MG tablet Take 1 tablet by mouth 2 (Two) Times a Day. 180 tablet 3   • cetirizine (zyrTEC) 10 MG tablet Take 0.5 tablets by mouth Daily.     •  cyclobenzaprine (FLEXERIL) 10 MG tablet Take 10 mg by mouth Daily.     • fenofibrate (TRICOR) 145 MG tablet Take 145 mg by mouth Daily.     • Ferrous Sulfate (IRON) 325 (65 Fe) MG tablet TAKE 1 TABLET DAILY WITH BREAKFAST 30 tablet 0   • furosemide (LASIX) 40 MG tablet Take 40 mg by mouth Take As Directed. Prior to [facility] Admission, Patient was on: Take 1 tablet every day except Wednesday and Sunday     • glimepiride (AMARYL) 4 MG tablet Take 4 mg by mouth Every Morning Before Breakfast.     • hydrALAZINE (APRESOLINE) 25 MG tablet Take 25 mg by mouth 3 (Three) Times a Day.     • HYDROcodone-acetaminophen (NORCO) 7.5-325 MG per tablet Take 1 tablet by mouth 2 (two) times a day as needed for moderate pain (4-6).     • hydrOXYzine (VISTARIL) 25 MG capsule Take 25 mg by mouth Every 6 (Six) Hours As Needed for Itching.     • Insulin Glargine (BASAGLAR KWIKPEN) 100 UNIT/ML injection pen Inject 30 Units under the skin into the appropriate area as directed Every Night.     • isosorbide mononitrate (IMDUR) 120 MG 24 hr tablet TAKE 1 TABLET BY MOUTH EVERY DAY 30 tablet 0   • isosorbide mononitrate (IMDUR) 120 MG 24 hr tablet TAKE 1 TABLET BY MOUTH EVERY DAY 30 tablet 0   • isosorbide mononitrate (IMDUR) 120 MG 24 hr tablet TAKE 1 TABLET EVERY DAY 30 tablet 1   • isosorbide mononitrate (IMDUR) 120 MG 24 hr tablet TAKE 1 TABLET BY MOUTH EVERY DAY 30 tablet 1   • levothyroxine (SYNTHROID, LEVOTHROID) 137 MCG tablet Take 137 mcg by mouth Daily.     • metOLazone (ZAROXOLYN) 5 MG tablet Take 5 mg by mouth 3 (Three) Times a Week.     • nitroglycerin (NITROSTAT) 0.4 MG SL tablet Place 1 tablet under the tongue Every 5 (Five) Minutes As Needed for Chest Pain. Take no more than 3 doses in 15 minutes. 25 tablet 0   • pantoprazole (PROTONIX) 40 MG EC tablet Take 40 mg by mouth Daily.     • ranolazine (RANEXA) 500 MG 12 hr tablet Take 1 tablet by mouth 2 (Two) Times a Day. 30 tablet 3   • senna-docusate (SENEXON-S) 8.6-50 MG per tablet  Take 2 tablets by mouth Every Night.     • SITagliptin (JANUVIA) 50 MG tablet Take ½ tablet by mouth Daily. 30 tablet 3   • sodium bicarbonate 650 MG tablet Take 650 mg by mouth 3 (Three) Times a Day.     • sulfamethoxazole-trimethoprim (BACTRIM,SEPTRA) 400-80 MG tablet Take 1 tablet by mouth 2 (Two) Times a Day.     • tamsulosin (FLOMAX) 0.4 MG capsule 24 hr capsule TAKE 1 CAPSULE EVERY EVENING. 30 capsule 11   • triamcinolone (KENALOG) 0.1 % cream Apply 1 application topically to the appropriate area as directed 2 (Two) Times a Day. Prior to [facility] Admission, Patient was on: applies to rash       No current facility-administered medications for this visit.         Allergies:      No Known Allergies     Past Surgical History:     Past Surgical History:   Procedure Laterality Date   • CARDIAC CATHETERIZATION  2008    50% diffuse LAD stenosis, 50% septal  branch   • COLONOSCOPY      Long time ago   • COLONOSCOPY N/A 5/9/2018    Procedure: COLONOSCOPY  CPTCODE:25555;  Surgeon: Bob Mcguire III, MD;  Location: [facility] OR;  Service: Gastroenterology   • COLONOSCOPY N/A 1/14/2019    Procedure: COLONOSCOPY;  Surgeon: Ottoniel Napier MD;  Location: [facility] OR;  Service: Gastroenterology   • ENDOSCOPY N/A 5/9/2018    Procedure: ESOPHAGOGASTRODUODENOSCOPY WITH BIOPSY  CPTCODE:95760;  Surgeon: Bob Mcguire III, MD;  Location: [facility] OR;  Service: Gastroenterology   • ENDOSCOPY N/A 1/14/2019    Procedure: ESOPHAGOGASTRODUODENOSCOPY;  Surgeon: Ottoniel Napier MD;  Location: [facility] OR;  Service: Gastroenterology   • HEMORRHOIDECTOMY     • HERNIA REPAIR     • UPPER GASTROINTESTINAL ENDOSCOPY      Long time ago   • URETHRAL DILATATION N/A 12/13/2017    Procedure: URETHRAL DILATATION;  Surgeon: Khalif Kohler MD;  Location: [facility] OR;  Service:          Social History:     Social History     Socioeconomic History   • Marital status:      Spouse name: Not on file   • Number of children: Not on  file   • Years of education: Not on file   • Highest education level: Not on file   Tobacco Use   • Smoking status: Never Smoker   • Smokeless tobacco: Current User     Types: Chew   • Tobacco comment: Chewed tobacco since he was 4 yrs old.   Substance and Sexual Activity   • Alcohol use: No   • Drug use: No   • Sexual activity: Defer       Family History:     Family History   Problem Relation Age of Onset   • Heart disease Brother    • Diabetes Brother    • Cancer Brother         not sure the kind   • Heart disease Brother    • Diabetes Brother    • Diabetes Sister    • Diabetes Daughter    • Heart disease Daughter    • Pancreatitis Daughter    • Crohn's disease Daughter    • Diabetes Son    • Heart disease Son        Review of Systems:     Review of Systems   Constitutional: Negative.    HENT: Negative.    Eyes: Negative.    Respiratory: Negative.    Cardiovascular: Negative.    Gastrointestinal: Negative.    Endocrine: Negative.    Genitourinary: Positive for difficulty urinating.   Musculoskeletal: Negative.    Allergic/Immunologic: Negative.    Neurological: Negative.    Hematological: Negative.    Psychiatric/Behavioral: Negative.        Physical Exam:     Physical Exam   Constitutional: He is oriented to person, place, and time. He appears well-developed and well-nourished.   HENT:   Head: Normocephalic and atraumatic.   Eyes: Conjunctivae and EOM are normal. Pupils are equal, round, and reactive to light.   Neck: Normal range of motion.   Cardiovascular: Normal rate, regular rhythm, normal heart sounds and intact distal pulses.   Pulmonary/Chest: Effort normal and breath sounds normal.   Abdominal: Soft. Bowel sounds are normal.   Genitourinary:   Genitourinary Comments: Obese soft abdomen severe meatal stenosis   Musculoskeletal: Normal range of motion.   Neurological: He is alert and oriented to person, place, and time. He has normal reflexes.   Skin: Skin is warm and dry.   Psychiatric: He has a normal  mood and affect. His behavior is normal. Judgment and thought content normal.   Nursing note and vitals reviewed.      I have reviewed the following portions of the patient's history: allergies, current medications, past family history, past medical history, past social history, past surgical history, problem list and ROS and confirm it's accurate.      Procedure:    Following, and informed consent including the risk of anesthesia, bleeding, infection, exacerbation of pain as well as the need for an additional procedure.  he brought to the procedure suite after unremarkable call anesthesia she was placed in the low dorsolithotomy position.  I gently dilated the urethra from 16-30 Martin[place] without complication.    Assessment/Plan:   End-stage renal disease-starting dialysis  Prostate cancer-currently on intermittent Lupron therapy doing well  Urethral meatal stenosis-status post unremarkable dilation            Patient's Body mass index is 42.43 kg/m². BMI is above normal parameters. Recommendations include: educational material.              This document has been electronically signed by CLAUDIO LEAL MD August 26, 2019 10:17 AM

## 2019-08-28 PROBLEM — N18.6 ESRD (END STAGE RENAL DISEASE) (HCC): Status: ACTIVE | Noted: 2019-08-28

## 2019-09-12 NOTE — TELEPHONE ENCOUNTER
No current follow up scheduled. x3 appts cancelled since 5/3/19. Stress test was ordered @ last office visit. Not completed.

## 2019-09-13 RX ORDER — ISOSORBIDE MONONITRATE 120 MG/1
TABLET, EXTENDED RELEASE ORAL
Qty: 30 TABLET | Refills: 0 | Status: ON HOLD | OUTPATIENT
Start: 2019-09-13 | End: 2021-01-01

## 2019-10-09 RX ORDER — ISOSORBIDE MONONITRATE 120 MG/1
TABLET, EXTENDED RELEASE ORAL
Qty: 30 TABLET | Refills: 0 | OUTPATIENT
Start: 2019-10-09

## 2019-10-11 RX ORDER — ISOSORBIDE MONONITRATE 120 MG/1
TABLET, EXTENDED RELEASE ORAL
Qty: 30 TABLET | Refills: 0 | OUTPATIENT
Start: 2019-10-11

## 2019-11-08 RX ORDER — ISOSORBIDE MONONITRATE 120 MG/1
TABLET, EXTENDED RELEASE ORAL
Qty: 30 TABLET | Refills: 0 | OUTPATIENT
Start: 2019-11-08

## 2020-01-01 ENCOUNTER — TRANSCRIBE ORDERS (OUTPATIENT)
Dept: ADMINISTRATIVE | Facility: HOSPITAL | Age: 65
End: 2020-01-01

## 2020-01-01 ENCOUNTER — LAB (OUTPATIENT)
Dept: LAB | Facility: HOSPITAL | Age: 65
End: 2020-01-01

## 2020-01-01 DIAGNOSIS — I10 ESSENTIAL HYPERTENSION, MALIGNANT: ICD-10-CM

## 2020-01-01 DIAGNOSIS — N18.30 STAGE 3 CHRONIC KIDNEY DISEASE, UNSPECIFIED WHETHER STAGE 3A OR 3B CKD (HCC): ICD-10-CM

## 2020-01-01 DIAGNOSIS — G93.32 CHRONIC FATIGUE SYNDROME: ICD-10-CM

## 2020-01-01 DIAGNOSIS — N18.9 CHRONIC KIDNEY DISEASE, UNSPECIFIED CKD STAGE: ICD-10-CM

## 2020-01-01 DIAGNOSIS — E78.5 HYPERLIPIDEMIA, UNSPECIFIED HYPERLIPIDEMIA TYPE: ICD-10-CM

## 2020-01-01 DIAGNOSIS — I10 ESSENTIAL HYPERTENSION, MALIGNANT: Primary | ICD-10-CM

## 2020-01-01 LAB
ALBUMIN SERPL-MCNC: 4.02 G/DL (ref 3.5–5.2)
ALBUMIN/GLOB SERPL: 1.3 G/DL
ALP SERPL-CCNC: 63 U/L (ref 39–117)
ALT SERPL W P-5'-P-CCNC: 8 U/L (ref 1–41)
ANION GAP SERPL CALCULATED.3IONS-SCNC: 11.3 MMOL/L (ref 5–15)
AST SERPL-CCNC: 16 U/L (ref 1–40)
BASOPHILS # BLD AUTO: 0.02 10*3/MM3 (ref 0–0.2)
BASOPHILS NFR BLD AUTO: 0.4 % (ref 0–1.5)
BILIRUB SERPL-MCNC: 0.4 MG/DL (ref 0–1.2)
BUN SERPL-MCNC: 29 MG/DL (ref 8–23)
BUN/CREAT SERPL: 9.3 (ref 7–25)
CALCIUM SPEC-SCNC: 8.5 MG/DL (ref 8.6–10.5)
CHLORIDE SERPL-SCNC: 113 MMOL/L (ref 98–107)
CHOLEST SERPL-MCNC: 188 MG/DL (ref 0–200)
CO2 SERPL-SCNC: 15.7 MMOL/L (ref 22–29)
CREAT SERPL-MCNC: 3.13 MG/DL (ref 0.76–1.27)
DEPRECATED RDW RBC AUTO: 51.2 FL (ref 37–54)
EOSINOPHIL # BLD AUTO: 0.16 10*3/MM3 (ref 0–0.4)
EOSINOPHIL NFR BLD AUTO: 3.4 % (ref 0.3–6.2)
ERYTHROCYTE [DISTWIDTH] IN BLOOD BY AUTOMATED COUNT: 14.6 % (ref 12.3–15.4)
FOLATE SERPL-MCNC: 7.32 NG/ML (ref 4.78–24.2)
GFR SERPL CREATININE-BSD FRML MDRD: 20 ML/MIN/1.73
GLOBULIN UR ELPH-MCNC: 3.1 GM/DL
GLUCOSE SERPL-MCNC: 115 MG/DL (ref 65–99)
HBA1C MFR BLD: 6.3 % (ref 4.8–5.6)
HCT VFR BLD AUTO: 24.9 % (ref 37.5–51)
HDLC SERPL-MCNC: 28 MG/DL (ref 40–60)
HGB BLD-MCNC: 7 G/DL (ref 13–17.7)
HYPOCHROMIA BLD QL: NORMAL
IMM GRANULOCYTES # BLD AUTO: 0.03 10*3/MM3 (ref 0–0.05)
IMM GRANULOCYTES NFR BLD AUTO: 0.6 % (ref 0–0.5)
LDLC SERPL CALC-MCNC: 123 MG/DL (ref 0–100)
LDLC/HDLC SERPL: 4.21 {RATIO}
LYMPHOCYTES # BLD AUTO: 1.01 10*3/MM3 (ref 0.7–3.1)
LYMPHOCYTES NFR BLD AUTO: 21.6 % (ref 19.6–45.3)
MCH RBC QN AUTO: 27.3 PG (ref 26.6–33)
MCHC RBC AUTO-ENTMCNC: 28.1 G/DL (ref 31.5–35.7)
MCV RBC AUTO: 97.3 FL (ref 79–97)
MONOCYTES # BLD AUTO: 0.3 10*3/MM3 (ref 0.1–0.9)
MONOCYTES NFR BLD AUTO: 6.4 % (ref 5–12)
NEUTROPHILS NFR BLD AUTO: 3.15 10*3/MM3 (ref 1.7–7)
NEUTROPHILS NFR BLD AUTO: 67.6 % (ref 42.7–76)
NRBC BLD AUTO-RTO: 0 /100 WBC (ref 0–0.2)
OVALOCYTES BLD QL SMEAR: NORMAL
PLAT MORPH BLD: NORMAL
PLATELET # BLD AUTO: 178 10*3/MM3 (ref 140–450)
PMV BLD AUTO: 11.2 FL (ref 6–12)
POTASSIUM SERPL-SCNC: 5.9 MMOL/L (ref 3.5–5.2)
PROT SERPL-MCNC: 7.1 G/DL (ref 6–8.5)
PSA SERPL-MCNC: 5.09 NG/ML (ref 0–4)
RBC # BLD AUTO: 2.56 10*6/MM3 (ref 4.14–5.8)
SODIUM SERPL-SCNC: 140 MMOL/L (ref 136–145)
STOMATOCYTES BLD QL SMEAR: NORMAL
T3FREE SERPL-MCNC: 2.45 PG/ML (ref 2–4.4)
T4 FREE SERPL-MCNC: 1.38 NG/DL (ref 0.93–1.7)
TRIGL SERPL-MCNC: 210 MG/DL (ref 0–150)
TSH SERPL DL<=0.05 MIU/L-ACNC: 2.3 UIU/ML (ref 0.27–4.2)
VIT B12 BLD-MCNC: 358 PG/ML (ref 211–946)
VLDLC SERPL-MCNC: 37 MG/DL (ref 5–40)
WBC # BLD AUTO: 4.67 10*3/MM3 (ref 3.4–10.8)

## 2020-01-01 PROCEDURE — 80053 COMPREHEN METABOLIC PANEL: CPT

## 2020-01-01 PROCEDURE — 84443 ASSAY THYROID STIM HORMONE: CPT

## 2020-01-01 PROCEDURE — 83036 HEMOGLOBIN GLYCOSYLATED A1C: CPT

## 2020-01-01 PROCEDURE — 85007 BL SMEAR W/DIFF WBC COUNT: CPT

## 2020-01-01 PROCEDURE — 80061 LIPID PANEL: CPT

## 2020-01-01 PROCEDURE — 82746 ASSAY OF FOLIC ACID SERUM: CPT

## 2020-01-01 PROCEDURE — 84439 ASSAY OF FREE THYROXINE: CPT

## 2020-01-01 PROCEDURE — 36415 COLL VENOUS BLD VENIPUNCTURE: CPT

## 2020-01-01 PROCEDURE — 82607 VITAMIN B-12: CPT

## 2020-01-01 PROCEDURE — 84481 FREE ASSAY (FT-3): CPT

## 2020-01-01 PROCEDURE — 84153 ASSAY OF PSA TOTAL: CPT

## 2020-01-01 PROCEDURE — 85025 COMPLETE CBC W/AUTO DIFF WBC: CPT

## 2020-01-07 DIAGNOSIS — R39.198 DIFFICULTY URINATING: ICD-10-CM

## 2020-01-07 RX ORDER — TAMSULOSIN HYDROCHLORIDE 0.4 MG/1
CAPSULE ORAL
Qty: 30 CAPSULE | Refills: 11 | Status: ON HOLD | OUTPATIENT
Start: 2020-01-07 | End: 2021-01-01

## 2020-02-28 ENCOUNTER — PRIOR AUTHORIZATION (OUTPATIENT)
Dept: UROLOGY | Facility: CLINIC | Age: 65
End: 2020-02-28

## 2020-03-02 ENCOUNTER — OFFICE VISIT (OUTPATIENT)
Dept: UROLOGY | Facility: CLINIC | Age: 65
End: 2020-03-02

## 2020-03-02 VITALS — BODY MASS INDEX: 42.43 KG/M2 | HEIGHT: 62 IN

## 2020-03-02 DIAGNOSIS — R39.198 DIFFICULTY URINATING: Primary | ICD-10-CM

## 2020-03-02 DIAGNOSIS — N99.115 POSTPROCEDURAL MALE FOSSA NAVICULARIS URETHRAL STRICTURE: ICD-10-CM

## 2020-03-02 DIAGNOSIS — C61 PROSTATE CANCER (HCC): ICD-10-CM

## 2020-03-02 LAB
BILIRUB BLD-MCNC: NEGATIVE MG/DL
CLARITY, POC: CLEAR
COLOR UR: YELLOW
GLUCOSE UR STRIP-MCNC: ABNORMAL MG/DL
KETONES UR QL: NEGATIVE
LEUKOCYTE EST, POC: NEGATIVE
NITRITE UR-MCNC: NEGATIVE MG/ML
PH UR: 6 [PH] (ref 5–8)
PROT UR STRIP-MCNC: ABNORMAL MG/DL
RBC # UR STRIP: NEGATIVE /UL
SP GR UR: 1.01 (ref 1–1.03)
UROBILINOGEN UR QL: NORMAL

## 2020-03-02 PROCEDURE — 36415 COLL VENOUS BLD VENIPUNCTURE: CPT | Performed by: UROLOGY

## 2020-03-02 PROCEDURE — 81003 URINALYSIS AUTO W/O SCOPE: CPT | Performed by: UROLOGY

## 2020-03-02 PROCEDURE — 99213 OFFICE O/P EST LOW 20 MIN: CPT | Performed by: UROLOGY

## 2020-03-02 PROCEDURE — 84153 ASSAY OF PSA TOTAL: CPT | Performed by: UROLOGY

## 2020-03-02 NOTE — PROGRESS NOTES
Chief Complaint:          Chief Complaint   Patient presents with   • Prostate Cancer     6 month f/u Lupron        HPI:   64 y.o. male with prostate cancer on intermittent Lupron therapy returns today his meatal stenosis is doing better he is having no complaints or problems he had the development of swollen testicles with pain last week but it has resolved and this was likely an episode of epididymitis that is completely resolved today he is gained a lot of weight he does not look good his repeat PSA shows growth and therefore he was given Lupron 45 mg today with follow-up in 3 months.  He returns today his urethra is shut down substantially I went ahead and recommend dilation he is now on end-stage renal disease 90% loss of function he is starting dialysis in 2 weeks.  I went ahead and dilated the distal meatus as he has an adequate volume of urine as yet and I went ahead and checked a PSA..  He returns today.  Actually he is having to catheterize his meatus more frequently.  About 4 times a week he had a couple of falls and he is really confined to a wheelchair he has dysuria but his urinalysis was negative other than significant glucosuria.  I would recheck a PSA before proceeding with treatment.  I am not sure he needs it at this juncture.  Other than that he seems to be doing stable I will follow-up with him based on this      Past Medical History:        Past Medical History:   Diagnosis Date   • Chest pain    • CHF (congestive heart failure) (CMS/[facility]) 1/10/2019   • Chronic kidney disease    • CPAP (continuous positive airway pressure) dependence    • Diabetes mellitus (CMS/[facility])    • Elevated cholesterol    • GERD (gastroesophageal reflux disease)    • Heart murmur    • Hyperlipidemia    • Hypertension    • Irregular heart beat    • Polio    • Prostate cancer (CMS/[facility]) 05/2016    Dr. Khalif Kohler MD- [place]   • Shortness of breath    • Sleep apnea          Current Meds:     Current Outpatient  Medications   Medication Sig Dispense Refill   • amLODIPine (NORVASC) 10 MG tablet Take 10 mg by mouth Daily.     • ASPIRIN ADULT LOW STRENGTH 81 MG EC tablet TAKE 1 TABLET BY MOUTH EVERY DAY 30 tablet 0   • atorvastatin (LIPITOR) 80 MG tablet Take 80 mg by mouth daily.     • carvedilol (COREG) 25 MG tablet Take 1 tablet by mouth 2 (Two) Times a Day. 180 tablet 3   • cetirizine (zyrTEC) 10 MG tablet Take 0.5 tablets by mouth Daily.     • cyclobenzaprine (FLEXERIL) 10 MG tablet Take 10 mg by mouth Daily.     • fenofibrate (TRICOR) 145 MG tablet Take 145 mg by mouth Daily.     • Ferrous Sulfate (IRON) 325 (65 Fe) MG tablet TAKE 1 TABLET DAILY WITH BREAKFAST 30 tablet 0   • furosemide (LASIX) 40 MG tablet Take 40 mg by mouth Take As Directed. Prior to [facility] Admission, Patient was on: Take 1 tablet every day except Wednesday and Sunday     • glimepiride (AMARYL) 4 MG tablet Take 4 mg by mouth Every Morning Before Breakfast.     • hydrALAZINE (APRESOLINE) 25 MG tablet Take 25 mg by mouth 3 (Three) Times a Day.     • HYDROcodone-acetaminophen (NORCO) 7.5-325 MG per tablet Take 1 tablet by mouth 2 (two) times a day as needed for moderate pain (4-6).     • hydrOXYzine (VISTARIL) 25 MG capsule Take 25 mg by mouth Every 6 (Six) Hours As Needed for Itching.     • Insulin Glargine (BASAGLAR KWIKPEN) 100 UNIT/ML injection pen Inject 30 Units under the skin into the appropriate area as directed Every Night.     • isosorbide mononitrate (IMDUR) 120 MG 24 hr tablet TAKE 1 TABLET EVERY DAY 30 tablet 0   • levothyroxine (SYNTHROID, LEVOTHROID) 137 MCG tablet Take 137 mcg by mouth Daily.     • metOLazone (ZAROXOLYN) 5 MG tablet Take 5 mg by mouth 3 (Three) Times a Week.     • nitroglycerin (NITROSTAT) 0.4 MG SL tablet Place 1 tablet under the tongue Every 5 (Five) Minutes As Needed for Chest Pain. Take no more than 3 doses in 15 minutes. 25 tablet 0   • pantoprazole (PROTONIX) 40 MG EC tablet Take 40 mg by mouth Daily.     •  ranolazine (RANEXA) 500 MG 12 hr tablet Take 1 tablet by mouth 2 (Two) Times a Day. 30 tablet 3   • senna-docusate (SENEXON-S) 8.6-50 MG per tablet Take 2 tablets by mouth Every Night.     • SITagliptin (JANUVIA) 50 MG tablet Take ½ tablet by mouth Daily. 30 tablet 3   • sodium bicarbonate 650 MG tablet Take 650 mg by mouth 3 (Three) Times a Day.     • sulfamethoxazole-trimethoprim (BACTRIM,SEPTRA) 400-80 MG tablet Take 1 tablet by mouth 2 (Two) Times a Day.     • tamsulosin (FLOMAX) 0.4 MG capsule 24 hr capsule TAKE 1 CAPSULE EVERY DAY 30 capsule 11   • triamcinolone (KENALOG) 0.1 % cream Apply 1 application topically to the appropriate area as directed 2 (Two) Times a Day. Prior to [facility] Admission, Patient was on: applies to rash       No current facility-administered medications for this visit.         Allergies:      No Known Allergies     Past Surgical History:     Past Surgical History:   Procedure Laterality Date   • CARDIAC CATHETERIZATION  2008    50% diffuse LAD stenosis, 50% septal  branch   • COLONOSCOPY      Long time ago   • COLONOSCOPY N/A 5/9/2018    Procedure: COLONOSCOPY  CPTCODE:91517;  Surgeon: Bob Mcguire III, MD;  Location: [facility] OR;  Service: Gastroenterology   • COLONOSCOPY N/A 1/14/2019    Procedure: COLONOSCOPY;  Surgeon: Ottoniel Napier MD;  Location: [facility] OR;  Service: Gastroenterology   • ENDOSCOPY N/A 5/9/2018    Procedure: ESOPHAGOGASTRODUODENOSCOPY WITH BIOPSY  CPTCODE:68266;  Surgeon: Bob Mcguire III, MD;  Location: [facility] OR;  Service: Gastroenterology   • ENDOSCOPY N/A 1/14/2019    Procedure: ESOPHAGOGASTRODUODENOSCOPY;  Surgeon: Ottoniel Napier MD;  Location: [facility] OR;  Service: Gastroenterology   • HEMORRHOIDECTOMY     • HERNIA REPAIR     • UPPER GASTROINTESTINAL ENDOSCOPY      Long time ago   • URETHRAL DILATATION N/A 12/13/2017    Procedure: URETHRAL DILATATION;  Surgeon: Khalif Kohler MD;  Location: [facility] OR;  Service:                   Social History:     Social History     Socioeconomic History   • Marital status:      Spouse name: Not on file   • Number of children: Not on file   • Years of education: Not on file   • Highest education level: Not on file   Tobacco Use   • Smoking status: Never Smoker   • Smokeless tobacco: Current User     Types: Chew   • Tobacco comment: Chewed tobacco since he was 4 yrs old.   Substance and Sexual Activity   • Alcohol use: No   • Drug use: No   • Sexual activity: Defer       Family History:     Family History   Problem Relation Age of Onset   • Heart disease Brother    • Diabetes Brother    • Cancer Brother         not sure the kind   • Heart disease Brother    • Diabetes Brother    • Diabetes Sister    • Diabetes Daughter    • Heart disease Daughter    • Pancreatitis Daughter    • Crohn's disease Daughter    • Diabetes Son    • Heart disease Son        Review of Systems:     Review of Systems   Constitutional: Negative.    HENT: Negative.    Eyes: Negative.    Respiratory: Negative.    Cardiovascular: Negative.    Gastrointestinal: Negative.    Endocrine: Negative.    Musculoskeletal: Negative.    Allergic/Immunologic: Negative.    Neurological: Negative.    Hematological: Negative.    Psychiatric/Behavioral: Negative.        Physical Exam:     Physical Exam   Constitutional: He is oriented to person, place, and time. He appears well-developed and well-nourished.   HENT:   Head: Normocephalic and atraumatic.   Eyes: Pupils are equal, round, and reactive to light. Conjunctivae and EOM are normal.   Neck: Normal range of motion.   Cardiovascular: Normal rate, regular rhythm, normal heart sounds and intact distal pulses.   Pulmonary/Chest: Effort normal and breath sounds normal.   Abdominal: Soft. Bowel sounds are normal.   Genitourinary:   Genitourinary Comments: Obese confined to a wheelchair   Musculoskeletal: Normal range of motion.   Neurological: He is alert and oriented to person, place, and  time. He has normal reflexes.   Skin: Skin is warm and dry.   Psychiatric: He has a normal mood and affect. His behavior is normal. Judgment and thought content normal.   Nursing note and vitals reviewed.      I have reviewed the following portions of the patient's history: allergies, current medications, past family history, past medical history, past social history, past surgical history, problem list and ROS and confirm it's accurate.      Procedure:       Assessment/Plan:   Prostate cancer:  He returns today status post biopsy for extensive discussion of prostate cancer.  We discussed staging, and grading of the disease.  I described with a Sanju system being from a 2-10 scale with the most common low-grade pattern being a Stevenson 6.  I discussed the staging workup including a total body bone scan and CT scan especially with a PSA is greater than 10.  We discussed the various options at length I discussed a radical retropubic prostatectomy done in the traditional fashion and using the robotic technique.  I then discussed radiation treatment both seed therapy and external beam therapy using Gold fiduciary markers.  We talked about some of the other alternatives such as a cryosurgical ablation at high intensity focused ultrasound as being viable alternatives but not recommended at this time.  He focused on aggressive watchful waiting and explained that currently low literature it's been discovered that a lot of men can actually observe it especially with numerous other comorbidities cannot have problems from the cancer by itself.  Talked about hormonal ablation.  In the side effects of creation of insulin resistance.  Overall, the patient was given appropriate literature is going to think about it and I'm going to revisit the topic with them after her next office visit  He is doing well with observation and intermittent Lupron his PSA still in the very low range I am recommending continued observation and a  recheck in 3 months  Meatal stenosis-still does self dilation with a Cook meatal dilator no change            Patient's Body mass index is 42.43 kg/m². BMI is above normal parameters. Recommendations include: educational material.              This document has been electronically signed by CLAUDIO LEAL MD March 2, 2020 1:25 PM

## 2020-03-03 ENCOUNTER — TELEPHONE (OUTPATIENT)
Dept: UROLOGY | Facility: CLINIC | Age: 65
End: 2020-03-03

## 2020-03-03 LAB — PSA SERPL-MCNC: 1.25 NG/ML (ref 0–4)

## 2020-03-03 NOTE — TELEPHONE ENCOUNTER
I called Astrid and left her a message that his PSA level was great and does not need Lupron injection and needs to come back on 3 mths to recheck and I made an appt and left that time and date.

## 2020-04-16 RX ORDER — RANOLAZINE 500 MG/1
TABLET, EXTENDED RELEASE ORAL
Qty: 60 TABLET | Refills: 0 | Status: SHIPPED | OUTPATIENT
Start: 2020-04-16

## 2020-05-15 RX ORDER — RANOLAZINE 500 MG/1
TABLET, EXTENDED RELEASE ORAL
Qty: 60 TABLET | Refills: 0 | OUTPATIENT
Start: 2020-05-15

## 2020-06-11 RX ORDER — RANOLAZINE 500 MG/1
TABLET, EXTENDED RELEASE ORAL
Qty: 60 TABLET | Refills: 0 | OUTPATIENT
Start: 2020-06-11

## 2020-07-08 ENCOUNTER — TRANSCRIBE ORDERS (OUTPATIENT)
Dept: ADMINISTRATIVE | Facility: HOSPITAL | Age: 65
End: 2020-07-08

## 2020-07-08 DIAGNOSIS — G44.59 OTHER COMPLICATED HEADACHE SYNDROME: Primary | ICD-10-CM

## 2020-07-15 ENCOUNTER — HOSPITAL ENCOUNTER (OUTPATIENT)
Dept: CT IMAGING | Facility: HOSPITAL | Age: 65
Discharge: HOME | End: 2020-07-15
Admitting: NURSE PRACTITIONER

## 2020-07-15 DIAGNOSIS — G44.59 OTHER COMPLICATED HEADACHE SYNDROME: ICD-10-CM

## 2020-07-15 PROCEDURE — 70450 CT HEAD/BRAIN W/O DYE: CPT | Performed by: RADIOLOGY

## 2020-07-15 PROCEDURE — 70450 CT HEAD/BRAIN W/O DYE: CPT

## 2020-08-10 ENCOUNTER — TELEPHONE (OUTPATIENT)
Dept: SURGERY | Facility: CLINIC | Age: 65
End: 2020-08-10

## 2020-08-10 NOTE — TELEPHONE ENCOUNTER
I SPOKE WITH PATIENT'S DAUGHTER WHO SAID HIS PHONE HAS BEEN DISCONNECTED BUT SHE WILL BE SEEING HIM TOMORROW AND WILL HAVE HIM RETURN MY CALL.

## 2020-08-21 ENCOUNTER — TELEPHONE (OUTPATIENT)
Dept: SURGERY | Facility: CLINIC | Age: 65
End: 2020-08-21

## 2020-08-24 ENCOUNTER — TELEPHONE (OUTPATIENT)
Dept: SURGERY | Facility: CLINIC | Age: 65
End: 2020-08-24

## 2021-01-01 ENCOUNTER — OFFICE VISIT (OUTPATIENT)
Dept: UROLOGY | Facility: CLINIC | Age: 66
End: 2021-01-01

## 2021-01-01 ENCOUNTER — APPOINTMENT (OUTPATIENT)
Dept: CT IMAGING | Facility: HOSPITAL | Age: 66
End: 2021-01-01

## 2021-01-01 ENCOUNTER — HOSPITAL ENCOUNTER (INPATIENT)
Facility: HOSPITAL | Age: 66
LOS: 2 days | End: 2021-12-20
Attending: STUDENT IN AN ORGANIZED HEALTH CARE EDUCATION/TRAINING PROGRAM | Admitting: STUDENT IN AN ORGANIZED HEALTH CARE EDUCATION/TRAINING PROGRAM

## 2021-01-01 ENCOUNTER — READMISSION MANAGEMENT (OUTPATIENT)
Dept: CALL CENTER | Facility: HOSPITAL | Age: 66
End: 2021-01-01

## 2021-01-01 ENCOUNTER — TRANSCRIBE ORDERS (OUTPATIENT)
Dept: ADMINISTRATIVE | Facility: HOSPITAL | Age: 66
End: 2021-01-01

## 2021-01-01 ENCOUNTER — PRIOR AUTHORIZATION (OUTPATIENT)
Dept: UROLOGY | Facility: CLINIC | Age: 66
End: 2021-01-01

## 2021-01-01 ENCOUNTER — HOSPITAL ENCOUNTER (EMERGENCY)
Facility: HOSPITAL | Age: 66
Discharge: HOME | End: 2021-11-29
Attending: STUDENT IN AN ORGANIZED HEALTH CARE EDUCATION/TRAINING PROGRAM | Admitting: EMERGENCY MEDICINE

## 2021-01-01 ENCOUNTER — HOSPITAL ENCOUNTER (EMERGENCY)
Facility: HOSPITAL | Age: 66
Discharge: HOME | End: 2021-08-10
Admitting: EMERGENCY MEDICINE

## 2021-01-01 ENCOUNTER — APPOINTMENT (OUTPATIENT)
Dept: NUCLEAR MEDICINE | Facility: HOSPITAL | Age: 66
End: 2021-01-01

## 2021-01-01 ENCOUNTER — HOSPITAL ENCOUNTER (OUTPATIENT)
Dept: GENERAL RADIOLOGY | Facility: HOSPITAL | Age: 66
Discharge: HOME | End: 2021-05-28
Admitting: NURSE PRACTITIONER

## 2021-01-01 ENCOUNTER — APPOINTMENT (OUTPATIENT)
Dept: GENERAL RADIOLOGY | Facility: HOSPITAL | Age: 66
End: 2021-01-01

## 2021-01-01 ENCOUNTER — APPOINTMENT (OUTPATIENT)
Dept: CARDIOLOGY | Facility: HOSPITAL | Age: 66
End: 2021-01-01

## 2021-01-01 ENCOUNTER — HOSPITAL ENCOUNTER (INPATIENT)
Facility: HOSPITAL | Age: 66
LOS: 10 days | Discharge: HOME | End: 2021-01-31
Attending: STUDENT IN AN ORGANIZED HEALTH CARE EDUCATION/TRAINING PROGRAM | Admitting: HOSPITALIST

## 2021-01-01 ENCOUNTER — TELEPHONE (OUTPATIENT)
Dept: SURGERY | Facility: CLINIC | Age: 66
End: 2021-01-01

## 2021-01-01 ENCOUNTER — APPOINTMENT (OUTPATIENT)
Dept: ULTRASOUND IMAGING | Facility: HOSPITAL | Age: 66
End: 2021-01-01

## 2021-01-01 ENCOUNTER — TELEPHONE (OUTPATIENT)
Dept: CARDIOLOGY | Facility: HOSPITAL | Age: 66
End: 2021-01-01

## 2021-01-01 VITALS
HEART RATE: 67 BPM | TEMPERATURE: 98.5 F | OXYGEN SATURATION: 99 % | BODY MASS INDEX: 42.33 KG/M2 | HEIGHT: 62 IN | RESPIRATION RATE: 18 BRPM | SYSTOLIC BLOOD PRESSURE: 218 MMHG | DIASTOLIC BLOOD PRESSURE: 105 MMHG | WEIGHT: 230 LBS

## 2021-01-01 VITALS
RESPIRATION RATE: 18 BRPM | OXYGEN SATURATION: 96 % | WEIGHT: 207.2 LBS | SYSTOLIC BLOOD PRESSURE: 154 MMHG | BODY MASS INDEX: 38.13 KG/M2 | DIASTOLIC BLOOD PRESSURE: 68 MMHG | HEART RATE: 78 BPM | TEMPERATURE: 97.2 F | HEIGHT: 62 IN

## 2021-01-01 VITALS
DIASTOLIC BLOOD PRESSURE: 88 MMHG | OXYGEN SATURATION: 100 % | RESPIRATION RATE: 18 BRPM | BODY MASS INDEX: 40.12 KG/M2 | WEIGHT: 218 LBS | TEMPERATURE: 97.6 F | SYSTOLIC BLOOD PRESSURE: 166 MMHG | HEART RATE: 58 BPM | HEIGHT: 62 IN

## 2021-01-01 VITALS — WEIGHT: 207.23 LBS | HEIGHT: 62 IN | BODY MASS INDEX: 38.14 KG/M2

## 2021-01-01 VITALS
SYSTOLIC BLOOD PRESSURE: 170 MMHG | OXYGEN SATURATION: 90 % | DIASTOLIC BLOOD PRESSURE: 69 MMHG | BODY MASS INDEX: 30.45 KG/M2 | RESPIRATION RATE: 16 BRPM | HEART RATE: 89 BPM | TEMPERATURE: 97.8 F | HEIGHT: 68 IN | WEIGHT: 200.9 LBS

## 2021-01-01 DIAGNOSIS — E87.5 HYPERKALEMIA: Primary | ICD-10-CM

## 2021-01-01 DIAGNOSIS — J96.01 ACUTE RESPIRATORY FAILURE WITH HYPOXIA: ICD-10-CM

## 2021-01-01 DIAGNOSIS — R39.198 DIFFICULTY URINATING: ICD-10-CM

## 2021-01-01 DIAGNOSIS — R57.0 CARDIOGENIC SHOCK: ICD-10-CM

## 2021-01-01 DIAGNOSIS — N30.00 ACUTE CYSTITIS WITHOUT HEMATURIA: ICD-10-CM

## 2021-01-01 DIAGNOSIS — I50.9 ACUTE ON CHRONIC CONGESTIVE HEART FAILURE, UNSPECIFIED HEART FAILURE TYPE (HCC): ICD-10-CM

## 2021-01-01 DIAGNOSIS — M54.2 CERVICALGIA: Primary | ICD-10-CM

## 2021-01-01 DIAGNOSIS — N18.9 CHRONIC RENAL FAILURE, UNSPECIFIED CKD STAGE: ICD-10-CM

## 2021-01-01 DIAGNOSIS — N39.0 URINARY TRACT INFECTION WITHOUT HEMATURIA, SITE UNSPECIFIED: Primary | ICD-10-CM

## 2021-01-01 DIAGNOSIS — R77.8 ELEVATED TROPONIN: ICD-10-CM

## 2021-01-01 DIAGNOSIS — N39.0 ACUTE UTI: ICD-10-CM

## 2021-01-01 DIAGNOSIS — N18.9 CRF (CHRONIC RENAL FAILURE), UNSPECIFIED STAGE: ICD-10-CM

## 2021-01-01 DIAGNOSIS — Z87.448 HISTORY OF URETHRAL STRICTURE: ICD-10-CM

## 2021-01-01 DIAGNOSIS — C61 PROSTATE CANCER (HCC): Primary | ICD-10-CM

## 2021-01-01 DIAGNOSIS — M54.2 CERVICALGIA: ICD-10-CM

## 2021-01-01 DIAGNOSIS — D64.9 ANEMIA, UNSPECIFIED TYPE: ICD-10-CM

## 2021-01-01 LAB
25(OH)D3 SERPL-MCNC: 11.8 NG/ML (ref 30–100)
A-A DO2: 297.6 MMHG (ref 0–300)
A-A DO2: 427.1 MMHG (ref 0–300)
A-A DO2: 457.8 MMHG (ref 0–300)
ABO GROUP BLD: NORMAL
ACTIN IGG SERPL-ACNC: 6 UNITS (ref 0–19)
ALBUMIN SERPL-MCNC: 3.09 G/DL (ref 3.5–5.2)
ALBUMIN SERPL-MCNC: 3.47 G/DL (ref 3.5–5.2)
ALBUMIN SERPL-MCNC: 3.65 G/DL (ref 3.5–5.2)
ALBUMIN SERPL-MCNC: 3.75 G/DL (ref 3.5–5.2)
ALBUMIN SERPL-MCNC: 3.8 G/DL (ref 3.5–5.2)
ALBUMIN SERPL-MCNC: 3.82 G/DL (ref 3.5–5.2)
ALBUMIN SERPL-MCNC: 3.86 G/DL (ref 3.5–5.2)
ALBUMIN SERPL-MCNC: 3.97 G/DL (ref 3.5–5.2)
ALBUMIN SERPL-MCNC: 4.08 G/DL (ref 3.5–5.2)
ALBUMIN SERPL-MCNC: 4.28 G/DL (ref 3.5–5.2)
ALBUMIN/GLOB SERPL: 1 G/DL
ALBUMIN/GLOB SERPL: 1.1 G/DL
ALBUMIN/GLOB SERPL: 1.2 G/DL
ALBUMIN/GLOB SERPL: 1.2 G/DL
ALP SERPL-CCNC: 49 U/L (ref 39–117)
ALP SERPL-CCNC: 49 U/L (ref 39–117)
ALP SERPL-CCNC: 51 U/L (ref 39–117)
ALP SERPL-CCNC: 53 U/L (ref 39–117)
ALP SERPL-CCNC: 55 U/L (ref 39–117)
ALP SERPL-CCNC: 56 U/L (ref 39–117)
ALP SERPL-CCNC: 64 U/L (ref 39–117)
ALP SERPL-CCNC: 67 U/L (ref 39–117)
ALP SERPL-CCNC: 70 U/L (ref 39–117)
ALP SERPL-CCNC: 80 U/L (ref 39–117)
ALT SERPL W P-5'-P-CCNC: 10 U/L (ref 1–41)
ALT SERPL W P-5'-P-CCNC: 11 U/L (ref 1–41)
ALT SERPL W P-5'-P-CCNC: 12 U/L (ref 1–41)
ALT SERPL W P-5'-P-CCNC: 13 U/L (ref 1–41)
ALT SERPL W P-5'-P-CCNC: 13 U/L (ref 1–41)
ALT SERPL W P-5'-P-CCNC: 14 U/L (ref 1–41)
AMPHET+METHAMPHET UR QL: NEGATIVE
AMPHETAMINES UR QL: NEGATIVE
ANION GAP SERPL CALCULATED.3IONS-SCNC: 10.4 MMOL/L (ref 5–15)
ANION GAP SERPL CALCULATED.3IONS-SCNC: 10.7 MMOL/L (ref 5–15)
ANION GAP SERPL CALCULATED.3IONS-SCNC: 10.9 MMOL/L (ref 5–15)
ANION GAP SERPL CALCULATED.3IONS-SCNC: 11 MMOL/L (ref 5–15)
ANION GAP SERPL CALCULATED.3IONS-SCNC: 11.2 MMOL/L (ref 5–15)
ANION GAP SERPL CALCULATED.3IONS-SCNC: 11.7 MMOL/L (ref 5–15)
ANION GAP SERPL CALCULATED.3IONS-SCNC: 11.7 MMOL/L (ref 5–15)
ANION GAP SERPL CALCULATED.3IONS-SCNC: 11.8 MMOL/L (ref 5–15)
ANION GAP SERPL CALCULATED.3IONS-SCNC: 11.9 MMOL/L (ref 5–15)
ANION GAP SERPL CALCULATED.3IONS-SCNC: 12.1 MMOL/L (ref 5–15)
ANION GAP SERPL CALCULATED.3IONS-SCNC: 12.5 MMOL/L (ref 5–15)
ANION GAP SERPL CALCULATED.3IONS-SCNC: 13.6 MMOL/L (ref 5–15)
ANION GAP SERPL CALCULATED.3IONS-SCNC: 14.2 MMOL/L (ref 5–15)
ANION GAP SERPL CALCULATED.3IONS-SCNC: 14.8 MMOL/L (ref 5–15)
ANION GAP SERPL CALCULATED.3IONS-SCNC: 8.3 MMOL/L (ref 5–15)
ANION GAP SERPL CALCULATED.3IONS-SCNC: 8.7 MMOL/L (ref 5–15)
ANION GAP SERPL CALCULATED.3IONS-SCNC: 9 MMOL/L (ref 5–15)
ANION GAP SERPL CALCULATED.3IONS-SCNC: 9.2 MMOL/L (ref 5–15)
ANION GAP SERPL CALCULATED.3IONS-SCNC: 9.5 MMOL/L (ref 5–15)
ANION GAP SERPL CALCULATED.3IONS-SCNC: 9.7 MMOL/L (ref 5–15)
ANISOCYTOSIS BLD QL: NORMAL
APAP SERPL-MCNC: <5 MCG/ML (ref 0–30)
APTT PPP: 33.5 SECONDS (ref 25.5–35.4)
ARTERIAL PATENCY WRIST A: ABNORMAL
AST SERPL-CCNC: 17 U/L (ref 1–40)
AST SERPL-CCNC: 18 U/L (ref 1–40)
AST SERPL-CCNC: 18 U/L (ref 1–40)
AST SERPL-CCNC: 20 U/L (ref 1–40)
AST SERPL-CCNC: 21 U/L (ref 1–40)
AST SERPL-CCNC: 22 U/L (ref 1–40)
AST SERPL-CCNC: 22 U/L (ref 1–40)
AST SERPL-CCNC: 23 U/L (ref 1–40)
AST SERPL-CCNC: 24 U/L (ref 1–40)
AST SERPL-CCNC: 30 U/L (ref 1–40)
ATMOSPHERIC PRESS: 726 MMHG
ATMOSPHERIC PRESS: 729 MMHG
ATMOSPHERIC PRESS: 730 MMHG
BACTERIA SPEC AEROBE CULT: ABNORMAL
BACTERIA SPEC AEROBE CULT: ABNORMAL
BACTERIA SPEC AEROBE CULT: NORMAL
BACTERIA SPEC AEROBE CULT: NORMAL
BACTERIA UR QL AUTO: ABNORMAL /HPF
BARBITURATES UR QL SCN: NEGATIVE
BASE EXCESS BLDA CALC-SCNC: -12.5 MMOL/L (ref 0–2)
BASE EXCESS BLDA CALC-SCNC: -4.3 MMOL/L (ref 0–2)
BASE EXCESS BLDA CALC-SCNC: -9.9 MMOL/L (ref 0–2)
BASOPHILS # BLD AUTO: 0.01 10*3/MM3 (ref 0–0.2)
BASOPHILS # BLD AUTO: 0.02 10*3/MM3 (ref 0–0.2)
BASOPHILS # BLD AUTO: 0.03 10*3/MM3 (ref 0–0.2)
BASOPHILS # BLD AUTO: 0.04 10*3/MM3 (ref 0–0.2)
BASOPHILS # BLD AUTO: 0.05 10*3/MM3 (ref 0–0.2)
BASOPHILS NFR BLD AUTO: 0.2 % (ref 0–1.5)
BASOPHILS NFR BLD AUTO: 0.2 % (ref 0–1.5)
BASOPHILS NFR BLD AUTO: 0.4 % (ref 0–1.5)
BASOPHILS NFR BLD AUTO: 0.6 % (ref 0–1.5)
BASOPHILS NFR BLD AUTO: 0.6 % (ref 0–1.5)
BASOPHILS NFR BLD AUTO: 0.7 % (ref 0–1.5)
BASOPHILS NFR BLD AUTO: 0.8 % (ref 0–1.5)
BASOPHILS NFR BLD AUTO: 1 % (ref 0–1.5)
BDY SITE: ABNORMAL
BENZODIAZ UR QL SCN: NEGATIVE
BH BB BLOOD EXPIRATION DATE: NORMAL
BH BB BLOOD EXPIRATION DATE: NORMAL
BH BB BLOOD TYPE BARCODE: 5100
BH BB BLOOD TYPE BARCODE: 5100
BH BB DISPENSE STATUS: NORMAL
BH BB DISPENSE STATUS: NORMAL
BH BB PRODUCT CODE: NORMAL
BH BB PRODUCT CODE: NORMAL
BH BB UNIT NUMBER: NORMAL
BH BB UNIT NUMBER: NORMAL
BH CV ECHO MEAS - ACS: 1.8 CM
BH CV ECHO MEAS - AO MAX PG: 15.1 MMHG
BH CV ECHO MEAS - AO MEAN PG: 9 MMHG
BH CV ECHO MEAS - AO ROOT AREA (BSA CORRECTED): 1.5
BH CV ECHO MEAS - AO ROOT AREA: 7.3 CM^2
BH CV ECHO MEAS - AO ROOT DIAM: 3.1 CM
BH CV ECHO MEAS - AO V2 MAX: 194 CM/SEC
BH CV ECHO MEAS - AO V2 MEAN: 137 CM/SEC
BH CV ECHO MEAS - AO V2 VTI: 44.7 CM
BH CV ECHO MEAS - BSA(HAYCOCK): 2.2 M^2
BH CV ECHO MEAS - BSA: 2 M^2
BH CV ECHO MEAS - BZI_BMI: 42.3 KILOGRAMS/M^2
BH CV ECHO MEAS - BZI_METRIC_HEIGHT: 157.5 CM
BH CV ECHO MEAS - BZI_METRIC_WEIGHT: 104.8 KG
BH CV ECHO MEAS - EDV(CUBED): 76.2 ML
BH CV ECHO MEAS - EDV(MOD-SP4): 43.5 ML
BH CV ECHO MEAS - EDV(TEICH): 80.4 ML
BH CV ECHO MEAS - EF(CUBED): 81.4 %
BH CV ECHO MEAS - EF(MOD-SP4): 69.9 %
BH CV ECHO MEAS - EF(TEICH): 74.4 %
BH CV ECHO MEAS - ESV(CUBED): 14.2 ML
BH CV ECHO MEAS - ESV(MOD-SP4): 13.1 ML
BH CV ECHO MEAS - ESV(TEICH): 20.6 ML
BH CV ECHO MEAS - FS: 42.9 %
BH CV ECHO MEAS - IVS/LVPW: 1
BH CV ECHO MEAS - IVSD: 1.2 CM
BH CV ECHO MEAS - LA DIMENSION: 4.1 CM
BH CV ECHO MEAS - LA/AO: 1.3
BH CV ECHO MEAS - LV DIASTOLIC VOL/BSA (35-75): 21.4 ML/M^2
BH CV ECHO MEAS - LV MASS(C)D: 175.3 GRAMS
BH CV ECHO MEAS - LV MASS(C)DI: 86.3 GRAMS/M^2
BH CV ECHO MEAS - LV SYSTOLIC VOL/BSA (12-30): 6.4 ML/M^2
BH CV ECHO MEAS - LVIDD: 4.2 CM
BH CV ECHO MEAS - LVIDS: 2.4 CM
BH CV ECHO MEAS - LVLD AP4: 7.8 CM
BH CV ECHO MEAS - LVLS AP4: 6.3 CM
BH CV ECHO MEAS - LVOT AREA (M): 2.3 CM^2
BH CV ECHO MEAS - LVOT AREA: 2.3 CM^2
BH CV ECHO MEAS - LVOT DIAM: 1.7 CM
BH CV ECHO MEAS - LVPWD: 1.2 CM
BH CV ECHO MEAS - MV A MAX VEL: 37.6 CM/SEC
BH CV ECHO MEAS - MV E MAX VEL: 125 CM/SEC
BH CV ECHO MEAS - MV E/A: 3.3
BH CV ECHO MEAS - PA ACC TIME: 0.1 SEC
BH CV ECHO MEAS - PA PR(ACCEL): 34.5 MMHG
BH CV ECHO MEAS - RAP SYSTOLE: 10 MMHG
BH CV ECHO MEAS - RVSP: 59.6 MMHG
BH CV ECHO MEAS - SI(AO): 160.7 ML/M^2
BH CV ECHO MEAS - SI(CUBED): 30.5 ML/M^2
BH CV ECHO MEAS - SI(MOD-SP4): 15 ML/M^2
BH CV ECHO MEAS - SI(TEICH): 29.4 ML/M^2
BH CV ECHO MEAS - SV(AO): 326.6 ML
BH CV ECHO MEAS - SV(CUBED): 62.1 ML
BH CV ECHO MEAS - SV(MOD-SP4): 30.4 ML
BH CV ECHO MEAS - SV(TEICH): 59.8 ML
BH CV ECHO MEAS - TR MAX VEL: 352 CM/SEC
BILIRUB BLD-MCNC: NEGATIVE MG/DL
BILIRUB CONJ SERPL-MCNC: <0.2 MG/DL (ref 0–0.3)
BILIRUB INDIRECT SERPL-MCNC: NORMAL MG/DL
BILIRUB SERPL-MCNC: 0.3 MG/DL (ref 0–1.2)
BILIRUB SERPL-MCNC: 0.4 MG/DL (ref 0–1.2)
BILIRUB SERPL-MCNC: 0.6 MG/DL (ref 0–1.2)
BILIRUB UR QL STRIP: NEGATIVE
BLD GP AB SCN SERPL QL: NEGATIVE
BLD GP AB SCN SERPL QL: NEGATIVE
BODY TEMPERATURE: 0 C
BUN SERPL-MCNC: 34 MG/DL (ref 8–23)
BUN SERPL-MCNC: 35 MG/DL (ref 8–23)
BUN SERPL-MCNC: 35 MG/DL (ref 8–23)
BUN SERPL-MCNC: 36 MG/DL (ref 8–23)
BUN SERPL-MCNC: 36 MG/DL (ref 8–23)
BUN SERPL-MCNC: 38 MG/DL (ref 8–23)
BUN SERPL-MCNC: 40 MG/DL (ref 8–23)
BUN SERPL-MCNC: 42 MG/DL (ref 8–23)
BUN SERPL-MCNC: 43 MG/DL (ref 8–23)
BUN SERPL-MCNC: 44 MG/DL (ref 8–23)
BUN SERPL-MCNC: 44 MG/DL (ref 8–23)
BUN SERPL-MCNC: 45 MG/DL (ref 8–23)
BUN SERPL-MCNC: 50 MG/DL (ref 8–23)
BUN/CREAT SERPL: 10.1 (ref 7–25)
BUN/CREAT SERPL: 10.3 (ref 7–25)
BUN/CREAT SERPL: 10.3 (ref 7–25)
BUN/CREAT SERPL: 10.4 (ref 7–25)
BUN/CREAT SERPL: 10.5 (ref 7–25)
BUN/CREAT SERPL: 10.8 (ref 7–25)
BUN/CREAT SERPL: 10.9 (ref 7–25)
BUN/CREAT SERPL: 11.1 (ref 7–25)
BUN/CREAT SERPL: 12 (ref 7–25)
BUN/CREAT SERPL: 13.2 (ref 7–25)
BUN/CREAT SERPL: 13.2 (ref 7–25)
BUN/CREAT SERPL: 13.5 (ref 7–25)
BUN/CREAT SERPL: 14.4 (ref 7–25)
BUN/CREAT SERPL: 8.1 (ref 7–25)
BUN/CREAT SERPL: 8.2 (ref 7–25)
BUN/CREAT SERPL: 8.4 (ref 7–25)
BUN/CREAT SERPL: 8.6 (ref 7–25)
BUN/CREAT SERPL: 8.6 (ref 7–25)
BUN/CREAT SERPL: 9.8 (ref 7–25)
BUN/CREAT SERPL: 9.8 (ref 7–25)
BUPRENORPHINE SERPL-MCNC: NEGATIVE NG/ML
CALCIUM SPEC-SCNC: 7.9 MG/DL (ref 8.6–10.5)
CALCIUM SPEC-SCNC: 8.1 MG/DL (ref 8.6–10.5)
CALCIUM SPEC-SCNC: 8.1 MG/DL (ref 8.6–10.5)
CALCIUM SPEC-SCNC: 8.5 MG/DL (ref 8.6–10.5)
CALCIUM SPEC-SCNC: 8.5 MG/DL (ref 8.6–10.5)
CALCIUM SPEC-SCNC: 8.7 MG/DL (ref 8.6–10.5)
CALCIUM SPEC-SCNC: 8.8 MG/DL (ref 8.6–10.5)
CALCIUM SPEC-SCNC: 8.9 MG/DL (ref 8.6–10.5)
CALCIUM SPEC-SCNC: 8.9 MG/DL (ref 8.6–10.5)
CALCIUM SPEC-SCNC: 9.1 MG/DL (ref 8.6–10.5)
CALCIUM SPEC-SCNC: 9.2 MG/DL (ref 8.6–10.5)
CALCIUM SPEC-SCNC: 9.4 MG/DL (ref 8.6–10.5)
CALCIUM SPEC-SCNC: 9.4 MG/DL (ref 8.6–10.5)
CALCIUM SPEC-SCNC: 9.6 MG/DL (ref 8.6–10.5)
CALCIUM SPEC-SCNC: 9.7 MG/DL (ref 8.6–10.5)
CANNABINOIDS SERPL QL: NEGATIVE
CERULOPLASMIN SERPL-MCNC: 23 MG/DL (ref 16–31)
CHLORIDE SERPL-SCNC: 100 MMOL/L (ref 98–107)
CHLORIDE SERPL-SCNC: 101 MMOL/L (ref 98–107)
CHLORIDE SERPL-SCNC: 102 MMOL/L (ref 98–107)
CHLORIDE SERPL-SCNC: 104 MMOL/L (ref 98–107)
CHLORIDE SERPL-SCNC: 104 MMOL/L (ref 98–107)
CHLORIDE SERPL-SCNC: 108 MMOL/L (ref 98–107)
CHLORIDE SERPL-SCNC: 109 MMOL/L (ref 98–107)
CHLORIDE SERPL-SCNC: 110 MMOL/L (ref 98–107)
CHLORIDE SERPL-SCNC: 110 MMOL/L (ref 98–107)
CHLORIDE SERPL-SCNC: 111 MMOL/L (ref 98–107)
CHLORIDE SERPL-SCNC: 111 MMOL/L (ref 98–107)
CHLORIDE SERPL-SCNC: 112 MMOL/L (ref 98–107)
CHLORIDE SERPL-SCNC: 112 MMOL/L (ref 98–107)
CHLORIDE SERPL-SCNC: 113 MMOL/L (ref 98–107)
CHLORIDE SERPL-SCNC: 96 MMOL/L (ref 98–107)
CHLORIDE SERPL-SCNC: 99 MMOL/L (ref 98–107)
CK SERPL-CCNC: 42 U/L (ref 20–200)
CK SERPL-CCNC: 77 U/L (ref 20–200)
CLARITY UR: ABNORMAL
CLARITY UR: CLEAR
CLARITY UR: CLEAR
CLARITY, POC: CLEAR
CO2 BLDA-SCNC: 13.9 MMOL/L (ref 22–33)
CO2 BLDA-SCNC: 16.5 MMOL/L (ref 22–33)
CO2 BLDA-SCNC: 21.3 MMOL/L (ref 22–33)
CO2 SERPL-SCNC: 10.5 MMOL/L (ref 22–29)
CO2 SERPL-SCNC: 12.4 MMOL/L (ref 22–29)
CO2 SERPL-SCNC: 12.8 MMOL/L (ref 22–29)
CO2 SERPL-SCNC: 16.2 MMOL/L (ref 22–29)
CO2 SERPL-SCNC: 18 MMOL/L (ref 22–29)
CO2 SERPL-SCNC: 18.2 MMOL/L (ref 22–29)
CO2 SERPL-SCNC: 18.5 MMOL/L (ref 22–29)
CO2 SERPL-SCNC: 18.6 MMOL/L (ref 22–29)
CO2 SERPL-SCNC: 18.8 MMOL/L (ref 22–29)
CO2 SERPL-SCNC: 19.1 MMOL/L (ref 22–29)
CO2 SERPL-SCNC: 19.1 MMOL/L (ref 22–29)
CO2 SERPL-SCNC: 20 MMOL/L (ref 22–29)
CO2 SERPL-SCNC: 20.3 MMOL/L (ref 22–29)
CO2 SERPL-SCNC: 21.3 MMOL/L (ref 22–29)
CO2 SERPL-SCNC: 22.9 MMOL/L (ref 22–29)
CO2 SERPL-SCNC: 23.3 MMOL/L (ref 22–29)
CO2 SERPL-SCNC: 23.8 MMOL/L (ref 22–29)
CO2 SERPL-SCNC: 25.3 MMOL/L (ref 22–29)
CO2 SERPL-SCNC: 26.3 MMOL/L (ref 22–29)
CO2 SERPL-SCNC: 27.7 MMOL/L (ref 22–29)
COCAINE UR QL: NEGATIVE
COHGB MFR BLD: 0.7 % (ref 0–5)
COHGB MFR BLD: 0.7 % (ref 0–5)
COHGB MFR BLD: 0.8 % (ref 0–5)
COLOR UR: YELLOW
CREAT SERPL-MCNC: 3.11 MG/DL (ref 0.76–1.27)
CREAT SERPL-MCNC: 3.15 MG/DL (ref 0.76–1.27)
CREAT SERPL-MCNC: 3.25 MG/DL (ref 0.76–1.27)
CREAT SERPL-MCNC: 3.33 MG/DL (ref 0.76–1.27)
CREAT SERPL-MCNC: 3.4 MG/DL (ref 0.76–1.27)
CREAT SERPL-MCNC: 3.47 MG/DL (ref 0.76–1.27)
CREAT SERPL-MCNC: 3.67 MG/DL (ref 0.76–1.27)
CREAT SERPL-MCNC: 3.89 MG/DL (ref 0.76–1.27)
CREAT SERPL-MCNC: 3.89 MG/DL (ref 0.76–1.27)
CREAT SERPL-MCNC: 3.93 MG/DL (ref 0.76–1.27)
CREAT SERPL-MCNC: 4.06 MG/DL (ref 0.76–1.27)
CREAT SERPL-MCNC: 4.08 MG/DL (ref 0.76–1.27)
CREAT SERPL-MCNC: 4.1 MG/DL (ref 0.76–1.27)
CREAT SERPL-MCNC: 4.13 MG/DL (ref 0.76–1.27)
CREAT SERPL-MCNC: 4.15 MG/DL (ref 0.76–1.27)
CREAT SERPL-MCNC: 4.17 MG/DL (ref 0.76–1.27)
CREAT SERPL-MCNC: 4.2 MG/DL (ref 0.76–1.27)
CREAT SERPL-MCNC: 4.33 MG/DL (ref 0.76–1.27)
CROSSMATCH INTERPRETATION: NORMAL
CROSSMATCH INTERPRETATION: NORMAL
CRP SERPL-MCNC: 1.23 MG/DL (ref 0–0.5)
CRP SERPL-MCNC: <0.3 MG/DL (ref 0–0.5)
D DIMER PPP FEU-MCNC: 2.72 MCGFEU/ML (ref 0–0.5)
D-LACTATE SERPL-SCNC: 1.3 MMOL/L (ref 0.5–2)
D-LACTATE SERPL-SCNC: 1.6 MMOL/L (ref 0.5–2)
D-LACTATE SERPL-SCNC: 3.1 MMOL/L (ref 0.5–2)
D-LACTATE SERPL-SCNC: 4.5 MMOL/L (ref 0.5–2)
DACRYOCYTES BLD QL SMEAR: NORMAL
DEPRECATED RDW RBC AUTO: 49.1 FL (ref 37–54)
DEPRECATED RDW RBC AUTO: 50.4 FL (ref 37–54)
DEPRECATED RDW RBC AUTO: 51.9 FL (ref 37–54)
DEPRECATED RDW RBC AUTO: 52.9 FL (ref 37–54)
DEPRECATED RDW RBC AUTO: 54.1 FL (ref 37–54)
DEPRECATED RDW RBC AUTO: 54.1 FL (ref 37–54)
DEPRECATED RDW RBC AUTO: 54.3 FL (ref 37–54)
DEPRECATED RDW RBC AUTO: 54.6 FL (ref 37–54)
DEPRECATED RDW RBC AUTO: 54.8 FL (ref 37–54)
DEPRECATED RDW RBC AUTO: 55.7 FL (ref 37–54)
DEPRECATED RDW RBC AUTO: 55.8 FL (ref 37–54)
DEPRECATED RDW RBC AUTO: 56.2 FL (ref 37–54)
DEPRECATED RDW RBC AUTO: 56.2 FL (ref 37–54)
DEPRECATED RDW RBC AUTO: 57.1 FL (ref 37–54)
EOSINOPHIL # BLD AUTO: 0.06 10*3/MM3 (ref 0–0.4)
EOSINOPHIL # BLD AUTO: 0.06 10*3/MM3 (ref 0–0.4)
EOSINOPHIL # BLD AUTO: 0.13 10*3/MM3 (ref 0–0.4)
EOSINOPHIL # BLD AUTO: 0.13 10*3/MM3 (ref 0–0.4)
EOSINOPHIL # BLD AUTO: 0.14 10*3/MM3 (ref 0–0.4)
EOSINOPHIL # BLD AUTO: 0.14 10*3/MM3 (ref 0–0.4)
EOSINOPHIL # BLD AUTO: 0.16 10*3/MM3 (ref 0–0.4)
EOSINOPHIL # BLD AUTO: 0.17 10*3/MM3 (ref 0–0.4)
EOSINOPHIL # BLD AUTO: 0.18 10*3/MM3 (ref 0–0.4)
EOSINOPHIL # BLD AUTO: 0.19 10*3/MM3 (ref 0–0.4)
EOSINOPHIL # BLD AUTO: 0.2 10*3/MM3 (ref 0–0.4)
EOSINOPHIL # BLD AUTO: 0.2 10*3/MM3 (ref 0–0.4)
EOSINOPHIL # BLD AUTO: 0.25 10*3/MM3 (ref 0–0.4)
EOSINOPHIL # BLD AUTO: 0.25 10*3/MM3 (ref 0–0.4)
EOSINOPHIL NFR BLD AUTO: 0.5 % (ref 0.3–6.2)
EOSINOPHIL NFR BLD AUTO: 1.3 % (ref 0.3–6.2)
EOSINOPHIL NFR BLD AUTO: 2 % (ref 0.3–6.2)
EOSINOPHIL NFR BLD AUTO: 2.8 % (ref 0.3–6.2)
EOSINOPHIL NFR BLD AUTO: 2.8 % (ref 0.3–6.2)
EOSINOPHIL NFR BLD AUTO: 3 % (ref 0.3–6.2)
EOSINOPHIL NFR BLD AUTO: 3.3 % (ref 0.3–6.2)
EOSINOPHIL NFR BLD AUTO: 3.7 % (ref 0.3–6.2)
EOSINOPHIL NFR BLD AUTO: 3.8 % (ref 0.3–6.2)
EOSINOPHIL NFR BLD AUTO: 3.8 % (ref 0.3–6.2)
EOSINOPHIL NFR BLD AUTO: 4 % (ref 0.3–6.2)
EOSINOPHIL NFR BLD AUTO: 4.2 % (ref 0.3–6.2)
EOSINOPHIL NFR BLD AUTO: 5.2 % (ref 0.3–6.2)
EOSINOPHIL NFR BLD AUTO: 6.4 % (ref 0.3–6.2)
ERYTHROCYTE [DISTWIDTH] IN BLOOD BY AUTOMATED COUNT: 13.7 % (ref 12.3–15.4)
ERYTHROCYTE [DISTWIDTH] IN BLOOD BY AUTOMATED COUNT: 13.8 % (ref 12.3–15.4)
ERYTHROCYTE [DISTWIDTH] IN BLOOD BY AUTOMATED COUNT: 14.5 % (ref 12.3–15.4)
ERYTHROCYTE [DISTWIDTH] IN BLOOD BY AUTOMATED COUNT: 14.6 % (ref 12.3–15.4)
ERYTHROCYTE [DISTWIDTH] IN BLOOD BY AUTOMATED COUNT: 14.6 % (ref 12.3–15.4)
ERYTHROCYTE [DISTWIDTH] IN BLOOD BY AUTOMATED COUNT: 15.4 % (ref 12.3–15.4)
ERYTHROCYTE [DISTWIDTH] IN BLOOD BY AUTOMATED COUNT: 15.5 % (ref 12.3–15.4)
ERYTHROCYTE [DISTWIDTH] IN BLOOD BY AUTOMATED COUNT: 15.6 % (ref 12.3–15.4)
ERYTHROCYTE [DISTWIDTH] IN BLOOD BY AUTOMATED COUNT: 15.7 % (ref 12.3–15.4)
ERYTHROCYTE [DISTWIDTH] IN BLOOD BY AUTOMATED COUNT: 15.7 % (ref 12.3–15.4)
ERYTHROCYTE [DISTWIDTH] IN BLOOD BY AUTOMATED COUNT: 15.8 % (ref 12.3–15.4)
ERYTHROCYTE [DISTWIDTH] IN BLOOD BY AUTOMATED COUNT: 15.8 % (ref 12.3–15.4)
ERYTHROCYTE [DISTWIDTH] IN BLOOD BY AUTOMATED COUNT: 15.9 % (ref 12.3–15.4)
ERYTHROCYTE [DISTWIDTH] IN BLOOD BY AUTOMATED COUNT: 15.9 % (ref 12.3–15.4)
ERYTHROCYTE [SEDIMENTATION RATE] IN BLOOD: 24 MM/HR (ref 0–20)
ERYTHROCYTE [SEDIMENTATION RATE] IN BLOOD: 7 MM/HR (ref 0–20)
ETHANOL BLD-MCNC: <10 MG/DL (ref 0–10)
ETHANOL UR QL: <0.01 %
FERRITIN SERPL-MCNC: 73.19 NG/ML (ref 30–400)
FLUAV SUBTYP SPEC NAA+PROBE: NOT DETECTED
FLUBV RNA ISLT QL NAA+PROBE: NOT DETECTED
FOLATE SERPL-MCNC: 6.18 NG/ML (ref 4.78–24.2)
GFR SERPL CREATININE-BSD FRML MDRD: 14 ML/MIN/1.73
GFR SERPL CREATININE-BSD FRML MDRD: 15 ML/MIN/1.73
GFR SERPL CREATININE-BSD FRML MDRD: 16 ML/MIN/1.73
GFR SERPL CREATININE-BSD FRML MDRD: 16 ML/MIN/1.73
GFR SERPL CREATININE-BSD FRML MDRD: 17 ML/MIN/1.73
GFR SERPL CREATININE-BSD FRML MDRD: 18 ML/MIN/1.73
GFR SERPL CREATININE-BSD FRML MDRD: 18 ML/MIN/1.73
GFR SERPL CREATININE-BSD FRML MDRD: 19 ML/MIN/1.73
GFR SERPL CREATININE-BSD FRML MDRD: 19 ML/MIN/1.73
GFR SERPL CREATININE-BSD FRML MDRD: 20 ML/MIN/1.73
GFR SERPL CREATININE-BSD FRML MDRD: 20 ML/MIN/1.73
GFR SERPL CREATININE-BSD FRML MDRD: ABNORMAL ML/MIN/{1.73_M2}
GLOBULIN UR ELPH-MCNC: 2.7 GM/DL
GLOBULIN UR ELPH-MCNC: 3 GM/DL
GLOBULIN UR ELPH-MCNC: 3.3 GM/DL
GLOBULIN UR ELPH-MCNC: 3.4 GM/DL
GLOBULIN UR ELPH-MCNC: 3.4 GM/DL
GLOBULIN UR ELPH-MCNC: 3.5 GM/DL
GLOBULIN UR ELPH-MCNC: 3.6 GM/DL
GLOBULIN UR ELPH-MCNC: 3.6 GM/DL
GLOBULIN UR ELPH-MCNC: 3.9 GM/DL
GLUCOSE BLDC GLUCOMTR-MCNC: 103 MG/DL (ref 70–130)
GLUCOSE BLDC GLUCOMTR-MCNC: 106 MG/DL (ref 70–130)
GLUCOSE BLDC GLUCOMTR-MCNC: 107 MG/DL (ref 70–130)
GLUCOSE BLDC GLUCOMTR-MCNC: 109 MG/DL (ref 70–130)
GLUCOSE BLDC GLUCOMTR-MCNC: 115 MG/DL (ref 70–130)
GLUCOSE BLDC GLUCOMTR-MCNC: 135 MG/DL (ref 70–130)
GLUCOSE BLDC GLUCOMTR-MCNC: 142 MG/DL (ref 70–130)
GLUCOSE BLDC GLUCOMTR-MCNC: 144 MG/DL (ref 70–130)
GLUCOSE BLDC GLUCOMTR-MCNC: 153 MG/DL (ref 70–130)
GLUCOSE BLDC GLUCOMTR-MCNC: 155 MG/DL (ref 70–130)
GLUCOSE BLDC GLUCOMTR-MCNC: 157 MG/DL (ref 70–130)
GLUCOSE BLDC GLUCOMTR-MCNC: 158 MG/DL (ref 70–130)
GLUCOSE BLDC GLUCOMTR-MCNC: 164 MG/DL (ref 70–130)
GLUCOSE BLDC GLUCOMTR-MCNC: 165 MG/DL (ref 70–130)
GLUCOSE BLDC GLUCOMTR-MCNC: 167 MG/DL (ref 70–130)
GLUCOSE BLDC GLUCOMTR-MCNC: 168 MG/DL (ref 70–130)
GLUCOSE BLDC GLUCOMTR-MCNC: 173 MG/DL (ref 70–130)
GLUCOSE BLDC GLUCOMTR-MCNC: 174 MG/DL (ref 70–130)
GLUCOSE BLDC GLUCOMTR-MCNC: 177 MG/DL (ref 70–130)
GLUCOSE BLDC GLUCOMTR-MCNC: 181 MG/DL (ref 70–130)
GLUCOSE BLDC GLUCOMTR-MCNC: 182 MG/DL (ref 70–130)
GLUCOSE BLDC GLUCOMTR-MCNC: 182 MG/DL (ref 70–130)
GLUCOSE BLDC GLUCOMTR-MCNC: 187 MG/DL (ref 70–130)
GLUCOSE BLDC GLUCOMTR-MCNC: 197 MG/DL (ref 70–130)
GLUCOSE BLDC GLUCOMTR-MCNC: 199 MG/DL (ref 70–130)
GLUCOSE BLDC GLUCOMTR-MCNC: 201 MG/DL (ref 70–130)
GLUCOSE BLDC GLUCOMTR-MCNC: 205 MG/DL (ref 70–130)
GLUCOSE BLDC GLUCOMTR-MCNC: 207 MG/DL (ref 70–130)
GLUCOSE BLDC GLUCOMTR-MCNC: 215 MG/DL (ref 70–130)
GLUCOSE BLDC GLUCOMTR-MCNC: 218 MG/DL (ref 70–130)
GLUCOSE BLDC GLUCOMTR-MCNC: 218 MG/DL (ref 70–130)
GLUCOSE BLDC GLUCOMTR-MCNC: 221 MG/DL (ref 70–130)
GLUCOSE BLDC GLUCOMTR-MCNC: 223 MG/DL (ref 70–130)
GLUCOSE BLDC GLUCOMTR-MCNC: 224 MG/DL (ref 70–130)
GLUCOSE BLDC GLUCOMTR-MCNC: 230 MG/DL (ref 70–130)
GLUCOSE BLDC GLUCOMTR-MCNC: 231 MG/DL (ref 70–130)
GLUCOSE BLDC GLUCOMTR-MCNC: 234 MG/DL (ref 70–130)
GLUCOSE BLDC GLUCOMTR-MCNC: 243 MG/DL (ref 70–130)
GLUCOSE BLDC GLUCOMTR-MCNC: 249 MG/DL (ref 70–130)
GLUCOSE BLDC GLUCOMTR-MCNC: 253 MG/DL (ref 70–130)
GLUCOSE BLDC GLUCOMTR-MCNC: 295 MG/DL (ref 70–130)
GLUCOSE BLDC GLUCOMTR-MCNC: 30 MG/DL (ref 70–130)
GLUCOSE BLDC GLUCOMTR-MCNC: 46 MG/DL (ref 70–130)
GLUCOSE BLDC GLUCOMTR-MCNC: 51 MG/DL (ref 70–130)
GLUCOSE BLDC GLUCOMTR-MCNC: 81 MG/DL (ref 70–130)
GLUCOSE BLDC GLUCOMTR-MCNC: 82 MG/DL (ref 70–130)
GLUCOSE BLDC GLUCOMTR-MCNC: 86 MG/DL (ref 70–130)
GLUCOSE BLDC GLUCOMTR-MCNC: 92 MG/DL (ref 70–130)
GLUCOSE BLDC GLUCOMTR-MCNC: 92 MG/DL (ref 70–130)
GLUCOSE BLDC GLUCOMTR-MCNC: 95 MG/DL (ref 70–130)
GLUCOSE SERPL-MCNC: 135 MG/DL (ref 65–99)
GLUCOSE SERPL-MCNC: 141 MG/DL (ref 65–99)
GLUCOSE SERPL-MCNC: 153 MG/DL (ref 65–99)
GLUCOSE SERPL-MCNC: 174 MG/DL (ref 65–99)
GLUCOSE SERPL-MCNC: 187 MG/DL (ref 65–99)
GLUCOSE SERPL-MCNC: 198 MG/DL (ref 65–99)
GLUCOSE SERPL-MCNC: 200 MG/DL (ref 65–99)
GLUCOSE SERPL-MCNC: 204 MG/DL (ref 65–99)
GLUCOSE SERPL-MCNC: 205 MG/DL (ref 65–99)
GLUCOSE SERPL-MCNC: 254 MG/DL (ref 65–99)
GLUCOSE SERPL-MCNC: 258 MG/DL (ref 65–99)
GLUCOSE SERPL-MCNC: 264 MG/DL (ref 65–99)
GLUCOSE SERPL-MCNC: 334 MG/DL (ref 65–99)
GLUCOSE SERPL-MCNC: 41 MG/DL (ref 65–99)
GLUCOSE SERPL-MCNC: 44 MG/DL (ref 65–99)
GLUCOSE SERPL-MCNC: 59 MG/DL (ref 65–99)
GLUCOSE SERPL-MCNC: 67 MG/DL (ref 65–99)
GLUCOSE SERPL-MCNC: 70 MG/DL (ref 65–99)
GLUCOSE SERPL-MCNC: 74 MG/DL (ref 65–99)
GLUCOSE SERPL-MCNC: 74 MG/DL (ref 65–99)
GLUCOSE UR STRIP-MCNC: ABNORMAL MG/DL
GLUCOSE UR STRIP-MCNC: ABNORMAL MG/DL
GLUCOSE UR STRIP-MCNC: NEGATIVE MG/DL
GLUCOSE UR STRIP-MCNC: NEGATIVE MG/DL
HAV IGM SERPL QL IA: NORMAL
HBA1C MFR BLD: 5.9 % (ref 4.8–5.6)
HBV CORE IGM SERPL QL IA: NORMAL
HBV SURFACE AG SERPL QL IA: NORMAL
HCO3 BLDA-SCNC: 13 MMOL/L (ref 20–26)
HCO3 BLDA-SCNC: 15.5 MMOL/L (ref 20–26)
HCO3 BLDA-SCNC: 20.2 MMOL/L (ref 20–26)
HCT VFR BLD AUTO: 22.7 % (ref 37.5–51)
HCT VFR BLD AUTO: 25.2 % (ref 37.5–51)
HCT VFR BLD AUTO: 25.3 % (ref 37.5–51)
HCT VFR BLD AUTO: 25.8 % (ref 37.5–51)
HCT VFR BLD AUTO: 26 % (ref 37.5–51)
HCT VFR BLD AUTO: 26.7 % (ref 37.5–51)
HCT VFR BLD AUTO: 26.7 % (ref 37.5–51)
HCT VFR BLD AUTO: 26.8 % (ref 37.5–51)
HCT VFR BLD AUTO: 27.2 % (ref 37.5–51)
HCT VFR BLD AUTO: 27.5 % (ref 37.5–51)
HCT VFR BLD AUTO: 28.3 % (ref 37.5–51)
HCT VFR BLD AUTO: 28.8 % (ref 37.5–51)
HCT VFR BLD AUTO: 29 % (ref 37.5–51)
HCT VFR BLD AUTO: 29 % (ref 37.5–51)
HCT VFR BLD AUTO: 29.1 % (ref 37.5–51)
HCT VFR BLD AUTO: 30.3 % (ref 37.5–51)
HCT VFR BLD CALC: 22.7 % (ref 38–51)
HCT VFR BLD CALC: 25.7 % (ref 38–51)
HCT VFR BLD CALC: 27.1 % (ref 38–51)
HCV AB SER DONR QL: NORMAL
HGB BLD-MCNC: 6.9 G/DL (ref 13–17.7)
HGB BLD-MCNC: 6.9 G/DL (ref 13–17.7)
HGB BLD-MCNC: 7 G/DL (ref 13–17.7)
HGB BLD-MCNC: 7.1 G/DL (ref 13–17.7)
HGB BLD-MCNC: 7.2 G/DL (ref 13–17.7)
HGB BLD-MCNC: 7.3 G/DL (ref 13–17.7)
HGB BLD-MCNC: 7.3 G/DL (ref 13–17.7)
HGB BLD-MCNC: 7.8 G/DL (ref 13–17.7)
HGB BLD-MCNC: 7.9 G/DL (ref 13–17.7)
HGB BLD-MCNC: 8 G/DL (ref 13–17.7)
HGB BLD-MCNC: 8 G/DL (ref 13–17.7)
HGB BLD-MCNC: 8.3 G/DL (ref 13–17.7)
HGB BLD-MCNC: 8.4 G/DL (ref 13–17.7)
HGB BLD-MCNC: 8.5 G/DL (ref 13–17.7)
HGB BLDA-MCNC: 7.4 G/DL (ref 14–18)
HGB BLDA-MCNC: 8.4 G/DL (ref 14–18)
HGB BLDA-MCNC: 8.8 G/DL (ref 14–18)
HGB UR QL STRIP.AUTO: ABNORMAL
HGB UR QL STRIP.AUTO: ABNORMAL
HGB UR QL STRIP.AUTO: NEGATIVE
HIV1+2 AB SER QL: NORMAL
HOLD SPECIMEN: NORMAL
HOLD SPECIMEN: NORMAL
HYALINE CASTS UR QL AUTO: ABNORMAL /LPF
HYPOCHROMIA BLD QL: NORMAL
IMM GRANULOCYTES # BLD AUTO: 0.02 10*3/MM3 (ref 0–0.05)
IMM GRANULOCYTES # BLD AUTO: 0.03 10*3/MM3 (ref 0–0.05)
IMM GRANULOCYTES # BLD AUTO: 0.04 10*3/MM3 (ref 0–0.05)
IMM GRANULOCYTES # BLD AUTO: 0.08 10*3/MM3 (ref 0–0.05)
IMM GRANULOCYTES NFR BLD AUTO: 0.4 % (ref 0–0.5)
IMM GRANULOCYTES NFR BLD AUTO: 0.5 % (ref 0–0.5)
IMM GRANULOCYTES NFR BLD AUTO: 0.6 % (ref 0–0.5)
IMM GRANULOCYTES NFR BLD AUTO: 0.7 % (ref 0–0.5)
IMM GRANULOCYTES NFR BLD AUTO: 0.8 % (ref 0–0.5)
IMM GRANULOCYTES NFR BLD AUTO: 0.9 % (ref 0–0.5)
INHALED O2 CONCENTRATION: 100 %
INHALED O2 CONCENTRATION: 100 %
INHALED O2 CONCENTRATION: 70 %
INR PPP: 1.1 (ref 0.9–1.1)
INR PPP: 1.13 (ref 0.9–1.1)
INR PPP: 1.18 (ref 0.9–1.1)
IRON 24H UR-MRATE: 38 MCG/DL (ref 59–158)
IRON SATN MFR SERPL: 9 % (ref 20–50)
KETONES UR QL STRIP: NEGATIVE
KETONES UR QL: NEGATIVE
LAB DIRECTOR NAME PROVIDER: NORMAL
LEUKOCYTE EST, POC: NEGATIVE
LEUKOCYTE ESTERASE UR QL STRIP.AUTO: ABNORMAL
LYMPHOCYTES # BLD AUTO: 0.88 10*3/MM3 (ref 0.7–3.1)
LYMPHOCYTES # BLD AUTO: 0.93 10*3/MM3 (ref 0.7–3.1)
LYMPHOCYTES # BLD AUTO: 0.98 10*3/MM3 (ref 0.7–3.1)
LYMPHOCYTES # BLD AUTO: 0.99 10*3/MM3 (ref 0.7–3.1)
LYMPHOCYTES # BLD AUTO: 1.02 10*3/MM3 (ref 0.7–3.1)
LYMPHOCYTES # BLD AUTO: 1.07 10*3/MM3 (ref 0.7–3.1)
LYMPHOCYTES # BLD AUTO: 1.09 10*3/MM3 (ref 0.7–3.1)
LYMPHOCYTES # BLD AUTO: 1.09 10*3/MM3 (ref 0.7–3.1)
LYMPHOCYTES # BLD AUTO: 1.13 10*3/MM3 (ref 0.7–3.1)
LYMPHOCYTES # BLD AUTO: 1.17 10*3/MM3 (ref 0.7–3.1)
LYMPHOCYTES # BLD AUTO: 1.21 10*3/MM3 (ref 0.7–3.1)
LYMPHOCYTES # BLD AUTO: 1.24 10*3/MM3 (ref 0.7–3.1)
LYMPHOCYTES # BLD AUTO: 1.31 10*3/MM3 (ref 0.7–3.1)
LYMPHOCYTES # BLD AUTO: 1.95 10*3/MM3 (ref 0.7–3.1)
LYMPHOCYTES NFR BLD AUTO: 20.3 % (ref 19.6–45.3)
LYMPHOCYTES NFR BLD AUTO: 20.4 % (ref 19.6–45.3)
LYMPHOCYTES NFR BLD AUTO: 21.1 % (ref 19.6–45.3)
LYMPHOCYTES NFR BLD AUTO: 21.5 % (ref 19.6–45.3)
LYMPHOCYTES NFR BLD AUTO: 21.6 % (ref 19.6–45.3)
LYMPHOCYTES NFR BLD AUTO: 22.4 % (ref 19.6–45.3)
LYMPHOCYTES NFR BLD AUTO: 22.7 % (ref 19.6–45.3)
LYMPHOCYTES NFR BLD AUTO: 23.5 % (ref 19.6–45.3)
LYMPHOCYTES NFR BLD AUTO: 25 % (ref 19.6–45.3)
LYMPHOCYTES NFR BLD AUTO: 25.6 % (ref 19.6–45.3)
LYMPHOCYTES NFR BLD AUTO: 26.3 % (ref 19.6–45.3)
LYMPHOCYTES NFR BLD AUTO: 27.2 % (ref 19.6–45.3)
LYMPHOCYTES NFR BLD AUTO: 28.5 % (ref 19.6–45.3)
LYMPHOCYTES NFR BLD AUTO: 8.6 % (ref 19.6–45.3)
Lab: ABNORMAL
MACROCYTES BLD QL SMEAR: NORMAL
MAGNESIUM SERPL-MCNC: 1.7 MG/DL (ref 1.6–2.4)
MAGNESIUM SERPL-MCNC: 1.7 MG/DL (ref 1.6–2.4)
MAGNESIUM SERPL-MCNC: 1.8 MG/DL (ref 1.6–2.4)
MAGNESIUM SERPL-MCNC: 1.9 MG/DL (ref 1.6–2.4)
MAXIMAL PREDICTED HEART RATE: 154 BPM
MCH RBC QN AUTO: 26.7 PG (ref 26.6–33)
MCH RBC QN AUTO: 26.8 PG (ref 26.6–33)
MCH RBC QN AUTO: 26.8 PG (ref 26.6–33)
MCH RBC QN AUTO: 27 PG (ref 26.6–33)
MCH RBC QN AUTO: 27.1 PG (ref 26.6–33)
MCH RBC QN AUTO: 27.2 PG (ref 26.6–33)
MCH RBC QN AUTO: 27.8 PG (ref 26.6–33)
MCH RBC QN AUTO: 27.8 PG (ref 26.6–33)
MCH RBC QN AUTO: 28 PG (ref 26.6–33)
MCH RBC QN AUTO: 28.1 PG (ref 26.6–33)
MCH RBC QN AUTO: 28.8 PG (ref 26.6–33)
MCH RBC QN AUTO: 28.8 PG (ref 26.6–33)
MCH RBC QN AUTO: 29.1 PG (ref 26.6–33)
MCH RBC QN AUTO: 29.5 PG (ref 26.6–33)
MCHC RBC AUTO-ENTMCNC: 27.2 G/DL (ref 31.5–35.7)
MCHC RBC AUTO-ENTMCNC: 27.3 G/DL (ref 31.5–35.7)
MCHC RBC AUTO-ENTMCNC: 27.3 G/DL (ref 31.5–35.7)
MCHC RBC AUTO-ENTMCNC: 27.5 G/DL (ref 31.5–35.7)
MCHC RBC AUTO-ENTMCNC: 27.7 G/DL (ref 31.5–35.7)
MCHC RBC AUTO-ENTMCNC: 28.1 G/DL (ref 31.5–35.7)
MCHC RBC AUTO-ENTMCNC: 28.3 G/DL (ref 31.5–35.7)
MCHC RBC AUTO-ENTMCNC: 28.8 G/DL (ref 31.5–35.7)
MCHC RBC AUTO-ENTMCNC: 28.9 G/DL (ref 31.5–35.7)
MCHC RBC AUTO-ENTMCNC: 29 G/DL (ref 31.5–35.7)
MCHC RBC AUTO-ENTMCNC: 29 G/DL (ref 31.5–35.7)
MCHC RBC AUTO-ENTMCNC: 29.1 G/DL (ref 31.5–35.7)
MCHC RBC AUTO-ENTMCNC: 29.2 G/DL (ref 31.5–35.7)
MCHC RBC AUTO-ENTMCNC: 30.4 G/DL (ref 31.5–35.7)
MCV RBC AUTO: 102.7 FL (ref 79–97)
MCV RBC AUTO: 95.5 FL (ref 79–97)
MCV RBC AUTO: 96.3 FL (ref 79–97)
MCV RBC AUTO: 96.4 FL (ref 79–97)
MCV RBC AUTO: 97 FL (ref 79–97)
MCV RBC AUTO: 97 FL (ref 79–97)
MCV RBC AUTO: 97.3 FL (ref 79–97)
MCV RBC AUTO: 97.7 FL (ref 79–97)
MCV RBC AUTO: 98.1 FL (ref 79–97)
MCV RBC AUTO: 98.1 FL (ref 79–97)
MCV RBC AUTO: 98.2 FL (ref 79–97)
MCV RBC AUTO: 98.5 FL (ref 79–97)
MCV RBC AUTO: 99.3 FL (ref 79–97)
MCV RBC AUTO: 99.6 FL (ref 79–97)
METHADONE UR QL SCN: NEGATIVE
METHGB BLD QL: 0.6 % (ref 0–3)
METHGB BLD QL: 0.7 % (ref 0–3)
METHGB BLD QL: 0.8 % (ref 0–3)
MITOCHONDRIA M2 IGG SER-ACNC: 22.6 UNITS (ref 0–20)
MODALITY: ABNORMAL
MONOCYTES # BLD AUTO: 0.35 10*3/MM3 (ref 0.1–0.9)
MONOCYTES # BLD AUTO: 0.36 10*3/MM3 (ref 0.1–0.9)
MONOCYTES # BLD AUTO: 0.36 10*3/MM3 (ref 0.1–0.9)
MONOCYTES # BLD AUTO: 0.39 10*3/MM3 (ref 0.1–0.9)
MONOCYTES # BLD AUTO: 0.39 10*3/MM3 (ref 0.1–0.9)
MONOCYTES # BLD AUTO: 0.41 10*3/MM3 (ref 0.1–0.9)
MONOCYTES # BLD AUTO: 0.42 10*3/MM3 (ref 0.1–0.9)
MONOCYTES # BLD AUTO: 0.47 10*3/MM3 (ref 0.1–0.9)
MONOCYTES # BLD AUTO: 0.47 10*3/MM3 (ref 0.1–0.9)
MONOCYTES # BLD AUTO: 0.5 10*3/MM3 (ref 0.1–0.9)
MONOCYTES # BLD AUTO: 0.54 10*3/MM3 (ref 0.1–0.9)
MONOCYTES # BLD AUTO: 0.56 10*3/MM3 (ref 0.1–0.9)
MONOCYTES # BLD AUTO: 0.56 10*3/MM3 (ref 0.1–0.9)
MONOCYTES # BLD AUTO: 1.5 10*3/MM3 (ref 0.1–0.9)
MONOCYTES NFR BLD AUTO: 10.1 % (ref 5–12)
MONOCYTES NFR BLD AUTO: 10.7 % (ref 5–12)
MONOCYTES NFR BLD AUTO: 11.4 % (ref 5–12)
MONOCYTES NFR BLD AUTO: 11.9 % (ref 5–12)
MONOCYTES NFR BLD AUTO: 12.4 % (ref 5–12)
MONOCYTES NFR BLD AUTO: 5.7 % (ref 5–12)
MONOCYTES NFR BLD AUTO: 7.8 % (ref 5–12)
MONOCYTES NFR BLD AUTO: 8.1 % (ref 5–12)
MONOCYTES NFR BLD AUTO: 8.5 % (ref 5–12)
MONOCYTES NFR BLD AUTO: 8.7 % (ref 5–12)
MONOCYTES NFR BLD AUTO: 8.9 % (ref 5–12)
MONOCYTES NFR BLD AUTO: 9 % (ref 5–12)
MONOCYTES NFR BLD AUTO: 9.4 % (ref 5–12)
MONOCYTES NFR BLD AUTO: 9.5 % (ref 5–12)
NEUTROPHILS NFR BLD AUTO: 2.4 10*3/MM3 (ref 1.7–7)
NEUTROPHILS NFR BLD AUTO: 2.42 10*3/MM3 (ref 1.7–7)
NEUTROPHILS NFR BLD AUTO: 2.6 10*3/MM3 (ref 1.7–7)
NEUTROPHILS NFR BLD AUTO: 2.68 10*3/MM3 (ref 1.7–7)
NEUTROPHILS NFR BLD AUTO: 2.75 10*3/MM3 (ref 1.7–7)
NEUTROPHILS NFR BLD AUTO: 2.97 10*3/MM3 (ref 1.7–7)
NEUTROPHILS NFR BLD AUTO: 3 10*3/MM3 (ref 1.7–7)
NEUTROPHILS NFR BLD AUTO: 3.06 10*3/MM3 (ref 1.7–7)
NEUTROPHILS NFR BLD AUTO: 3.21 10*3/MM3 (ref 1.7–7)
NEUTROPHILS NFR BLD AUTO: 3.35 10*3/MM3 (ref 1.7–7)
NEUTROPHILS NFR BLD AUTO: 3.38 10*3/MM3 (ref 1.7–7)
NEUTROPHILS NFR BLD AUTO: 3.42 10*3/MM3 (ref 1.7–7)
NEUTROPHILS NFR BLD AUTO: 4.3 10*3/MM3 (ref 1.7–7)
NEUTROPHILS NFR BLD AUTO: 56.9 % (ref 42.7–76)
NEUTROPHILS NFR BLD AUTO: 57.3 % (ref 42.7–76)
NEUTROPHILS NFR BLD AUTO: 57.5 % (ref 42.7–76)
NEUTROPHILS NFR BLD AUTO: 60.5 % (ref 42.7–76)
NEUTROPHILS NFR BLD AUTO: 61 % (ref 42.7–76)
NEUTROPHILS NFR BLD AUTO: 61.6 % (ref 42.7–76)
NEUTROPHILS NFR BLD AUTO: 62.8 % (ref 42.7–76)
NEUTROPHILS NFR BLD AUTO: 63.9 % (ref 42.7–76)
NEUTROPHILS NFR BLD AUTO: 63.9 % (ref 42.7–76)
NEUTROPHILS NFR BLD AUTO: 64.1 % (ref 42.7–76)
NEUTROPHILS NFR BLD AUTO: 65.5 % (ref 42.7–76)
NEUTROPHILS NFR BLD AUTO: 66.4 % (ref 42.7–76)
NEUTROPHILS NFR BLD AUTO: 70 % (ref 42.7–76)
NEUTROPHILS NFR BLD AUTO: 78.2 % (ref 42.7–76)
NEUTROPHILS NFR BLD AUTO: 9.86 10*3/MM3 (ref 1.7–7)
NITRITE UR QL STRIP: NEGATIVE
NITRITE UR QL STRIP: POSITIVE
NITRITE UR QL STRIP: POSITIVE
NITRITE UR-MCNC: NEGATIVE MG/ML
NOTE: ABNORMAL
NRBC BLD AUTO-RTO: 0 /100 WBC (ref 0–0.2)
NT-PROBNP SERPL-MCNC: 3521 PG/ML (ref 0–900)
NT-PROBNP SERPL-MCNC: 3613 PG/ML (ref 0–900)
OPIATES UR QL: NEGATIVE
OXYCODONE UR QL SCN: NEGATIVE
OXYHGB MFR BLDV: 98.2 % (ref 94–99)
OXYHGB MFR BLDV: 98.5 % (ref 94–99)
OXYHGB MFR BLDV: 98.5 % (ref 94–99)
PCO2 BLDA: 28 MM HG (ref 35–45)
PCO2 BLDA: 31.5 MM HG (ref 35–45)
PCO2 BLDA: 34 MM HG (ref 35–45)
PCO2 TEMP ADJ BLD: ABNORMAL MM[HG]
PCP UR QL SCN: NEGATIVE
PEEP RESPIRATORY: 5 CM[H2O]
PH BLDA: 7.28 PH UNITS (ref 7.35–7.45)
PH BLDA: 7.3 PH UNITS (ref 7.35–7.45)
PH BLDA: 7.38 PH UNITS (ref 7.35–7.45)
PH UR STRIP.AUTO: 5.5 [PH] (ref 5–8)
PH UR STRIP.AUTO: 6 [PH] (ref 5–8)
PH UR STRIP.AUTO: <=5 [PH] (ref 5–8)
PH UR: 5.5 [PH] (ref 5–8)
PH, TEMP CORRECTED: ABNORMAL
PHOSPHATE SERPL-MCNC: 4.1 MG/DL (ref 2.5–4.5)
PHOSPHATE SERPL-MCNC: 4.3 MG/DL (ref 2.5–4.5)
PHOSPHATE SERPL-MCNC: 4.5 MG/DL (ref 2.5–4.5)
PHOSPHATE SERPL-MCNC: 4.6 MG/DL (ref 2.5–4.5)
PLAT MORPH BLD: NORMAL
PLATELET # BLD AUTO: 132 10*3/MM3 (ref 140–450)
PLATELET # BLD AUTO: 147 10*3/MM3 (ref 140–450)
PLATELET # BLD AUTO: 155 10*3/MM3 (ref 140–450)
PLATELET # BLD AUTO: 155 10*3/MM3 (ref 140–450)
PLATELET # BLD AUTO: 163 10*3/MM3 (ref 140–450)
PLATELET # BLD AUTO: 168 10*3/MM3 (ref 140–450)
PLATELET # BLD AUTO: 168 10*3/MM3 (ref 140–450)
PLATELET # BLD AUTO: 174 10*3/MM3 (ref 140–450)
PLATELET # BLD AUTO: 175 10*3/MM3 (ref 140–450)
PLATELET # BLD AUTO: 183 10*3/MM3 (ref 140–450)
PLATELET # BLD AUTO: 183 10*3/MM3 (ref 140–450)
PLATELET # BLD AUTO: 188 10*3/MM3 (ref 140–450)
PLATELET # BLD AUTO: 199 10*3/MM3 (ref 140–450)
PLATELET # BLD AUTO: 227 10*3/MM3 (ref 140–450)
PMV BLD AUTO: 10.3 FL (ref 6–12)
PMV BLD AUTO: 10.4 FL (ref 6–12)
PMV BLD AUTO: 10.8 FL (ref 6–12)
PMV BLD AUTO: 10.9 FL (ref 6–12)
PMV BLD AUTO: 11 FL (ref 6–12)
PMV BLD AUTO: 11.1 FL (ref 6–12)
PMV BLD AUTO: 11.1 FL (ref 6–12)
PMV BLD AUTO: 11.2 FL (ref 6–12)
PMV BLD AUTO: 11.3 FL (ref 6–12)
PMV BLD AUTO: 11.4 FL (ref 6–12)
PMV BLD AUTO: 11.5 FL (ref 6–12)
PMV BLD AUTO: 11.6 FL (ref 6–12)
PMV BLD AUTO: 11.7 FL (ref 6–12)
PMV BLD AUTO: 9.8 FL (ref 6–12)
PO2 BLDA: 142 MM HG (ref 83–108)
PO2 BLDA: 193 MM HG (ref 83–108)
PO2 BLDA: 227 MM HG (ref 83–108)
PO2 TEMP ADJ BLD: ABNORMAL MM[HG]
POIKILOCYTOSIS BLD QL SMEAR: NORMAL
POTASSIUM SERPL-SCNC: 4.7 MMOL/L (ref 3.5–5.2)
POTASSIUM SERPL-SCNC: 4.9 MMOL/L (ref 3.5–5.2)
POTASSIUM SERPL-SCNC: 5.1 MMOL/L (ref 3.5–5.2)
POTASSIUM SERPL-SCNC: 5.2 MMOL/L (ref 3.5–5.2)
POTASSIUM SERPL-SCNC: 5.2 MMOL/L (ref 3.5–5.2)
POTASSIUM SERPL-SCNC: 5.3 MMOL/L (ref 3.5–5.2)
POTASSIUM SERPL-SCNC: 5.3 MMOL/L (ref 3.5–5.2)
POTASSIUM SERPL-SCNC: 5.4 MMOL/L (ref 3.5–5.2)
POTASSIUM SERPL-SCNC: 5.5 MMOL/L (ref 3.5–5.2)
POTASSIUM SERPL-SCNC: 5.6 MMOL/L (ref 3.5–5.2)
POTASSIUM SERPL-SCNC: 5.7 MMOL/L (ref 3.5–5.2)
POTASSIUM SERPL-SCNC: 5.9 MMOL/L (ref 3.5–5.2)
POTASSIUM SERPL-SCNC: 6.1 MMOL/L (ref 3.5–5.2)
POTASSIUM SERPL-SCNC: 6.1 MMOL/L (ref 3.5–5.2)
POTASSIUM SERPL-SCNC: 7.6 MMOL/L (ref 3.5–5.2)
POTASSIUM SERPL-SCNC: 9.5 MMOL/L (ref 3.5–5.2)
PROCALCITONIN SERPL-MCNC: 0.39 NG/ML (ref 0–0.25)
PROPOXYPH UR QL: NEGATIVE
PROT SERPL-MCNC: 5.8 G/DL (ref 6–8.5)
PROT SERPL-MCNC: 6.5 G/DL (ref 6–8.5)
PROT SERPL-MCNC: 7.1 G/DL (ref 6–8.5)
PROT SERPL-MCNC: 7.2 G/DL (ref 6–8.5)
PROT SERPL-MCNC: 7.2 G/DL (ref 6–8.5)
PROT SERPL-MCNC: 7.5 G/DL (ref 6–8.5)
PROT SERPL-MCNC: 7.6 G/DL (ref 6–8.5)
PROT SERPL-MCNC: 8.2 G/DL (ref 6–8.5)
PROT UR QL STRIP: ABNORMAL
PROT UR QL STRIP: ABNORMAL
PROT UR QL STRIP: NEGATIVE
PROT UR STRIP-MCNC: ABNORMAL MG/DL
PROTHROMBIN TIME: 14.3 SECONDS (ref 11.9–14.1)
PROTHROMBIN TIME: 14.6 SECONDS (ref 12.8–14.5)
PROTHROMBIN TIME: 15.4 SECONDS (ref 12.8–14.5)
PSA FREE MFR SERPL: 13.6 %
PSA FREE SERPL-MCNC: 1.95 NG/ML
PSA SERPL-MCNC: 14.3 NG/ML (ref 0–4)
PTH RELATED PROT SERPL-SCNC: <2 PMOL/L
PTH-INTACT SERPL-MCNC: 169.8 PG/ML (ref 15–65)
QT INTERVAL: 198 MS
QT INTERVAL: 370 MS
QT INTERVAL: 374 MS
QT INTERVAL: 390 MS
QT INTERVAL: 418 MS
QT INTERVAL: 424 MS
QT INTERVAL: 444 MS
QT INTERVAL: 444 MS
QT INTERVAL: 626 MS
QTC INTERVAL: 215 MS
QTC INTERVAL: 390 MS
QTC INTERVAL: 402 MS
QTC INTERVAL: 413 MS
QTC INTERVAL: 416 MS
QTC INTERVAL: 420 MS
QTC INTERVAL: 421 MS
QTC INTERVAL: 454 MS
QTC INTERVAL: 827 MS
RBC # BLD AUTO: 2.34 10*6/MM3 (ref 4.14–5.8)
RBC # BLD AUTO: 2.58 10*6/MM3 (ref 4.14–5.8)
RBC # BLD AUTO: 2.63 10*6/MM3 (ref 4.14–5.8)
RBC # BLD AUTO: 2.66 10*6/MM3 (ref 4.14–5.8)
RBC # BLD AUTO: 2.68 10*6/MM3 (ref 4.14–5.8)
RBC # BLD AUTO: 2.72 10*6/MM3 (ref 4.14–5.8)
RBC # BLD AUTO: 2.72 10*6/MM3 (ref 4.14–5.8)
RBC # BLD AUTO: 2.88 10*6/MM3 (ref 4.14–5.8)
RBC # BLD AUTO: 2.92 10*6/MM3 (ref 4.14–5.8)
RBC # BLD AUTO: 2.94 10*6/MM3 (ref 4.14–5.8)
RBC # BLD AUTO: 2.95 10*6/MM3 (ref 4.14–5.8)
RBC # BLD AUTO: 2.96 10*6/MM3 (ref 4.14–5.8)
RBC # BLD AUTO: 2.99 10*6/MM3 (ref 4.14–5.8)
RBC # BLD AUTO: 3.02 10*6/MM3 (ref 4.14–5.8)
RBC # UR STRIP: ABNORMAL /HPF
RBC # UR STRIP: NEGATIVE /UL
RBC # UR: ABNORMAL /HPF
RBC # UR: ABNORMAL /HPF
REF LAB TEST METHOD: ABNORMAL
RH BLD: POSITIVE
SALICYLATES SERPL-MCNC: <0.3 MG/DL
SAO2 % BLDCOA: >99.2 % (ref 94–99)
SARS-COV-2 RNA PNL SPEC NAA+PROBE: NOT DETECTED
SARS-COV-2 RNA RESP QL NAA+PROBE: NOT DETECTED
SERPINA1 GENE MUT ANL BLD/T: NORMAL
SERPINA1 GENE MUT TESTED BLD/T: NORMAL
SET MECH RESP RATE: 24
SODIUM SERPL-SCNC: 131 MMOL/L (ref 136–145)
SODIUM SERPL-SCNC: 134 MMOL/L (ref 136–145)
SODIUM SERPL-SCNC: 135 MMOL/L (ref 136–145)
SODIUM SERPL-SCNC: 136 MMOL/L (ref 136–145)
SODIUM SERPL-SCNC: 136 MMOL/L (ref 136–145)
SODIUM SERPL-SCNC: 137 MMOL/L (ref 136–145)
SODIUM SERPL-SCNC: 137 MMOL/L (ref 136–145)
SODIUM SERPL-SCNC: 139 MMOL/L (ref 136–145)
SODIUM SERPL-SCNC: 140 MMOL/L (ref 136–145)
SODIUM SERPL-SCNC: 141 MMOL/L (ref 136–145)
SODIUM SERPL-SCNC: 142 MMOL/L (ref 136–145)
SODIUM SERPL-SCNC: 142 MMOL/L (ref 136–145)
SODIUM SERPL-SCNC: 143 MMOL/L (ref 136–145)
SP GR UR STRIP: 1.01 (ref 1–1.03)
SP GR UR STRIP: 1.01 (ref 1–1.03)
SP GR UR STRIP: 1.02 (ref 1–1.03)
SP GR UR: 1.02 (ref 1–1.03)
SQUAMOUS #/AREA URNS HPF: ABNORMAL /HPF
STRESS TARGET HR: 131 BPM
T&S EXPIRATION DATE: NORMAL
T&S EXPIRATION DATE: NORMAL
T4 FREE SERPL-MCNC: 1.5 NG/DL (ref 0.93–1.7)
TIBC SERPL-MCNC: 446 MCG/DL (ref 298–536)
TRANSFERRIN SERPL-MCNC: 299 MG/DL (ref 200–360)
TRICYCLICS UR QL SCN: NEGATIVE
TROPONIN T SERPL-MCNC: 0.01 NG/ML (ref 0–0.03)
TROPONIN T SERPL-MCNC: 0.01 NG/ML (ref 0–0.03)
TROPONIN T SERPL-MCNC: 0.06 NG/ML (ref 0–0.03)
TROPONIN T SERPL-MCNC: 0.1 NG/ML (ref 0–0.03)
TROPONIN T SERPL-MCNC: <0.01 NG/ML (ref 0–0.03)
TSH SERPL DL<=0.05 MIU/L-ACNC: 2.13 UIU/ML (ref 0.27–4.2)
UNIT  ABO: NORMAL
UNIT  ABO: NORMAL
UNIT  RH: NORMAL
UNIT  RH: NORMAL
UROBILINOGEN UR QL STRIP: ABNORMAL
UROBILINOGEN UR QL: NORMAL
VENTILATOR MODE: ABNORMAL
VIT B12 BLD-MCNC: 238 PG/ML (ref 211–946)
VT ON VENT VENT: 500 ML
WBC # BLD AUTO: 3.99 10*3/MM3 (ref 3.4–10.8)
WBC # BLD AUTO: 4.52 10*3/MM3 (ref 3.4–10.8)
WBC # BLD AUTO: 4.58 10*3/MM3 (ref 3.4–10.8)
WBC # BLD AUTO: 4.64 10*3/MM3 (ref 3.4–10.8)
WBC # BLD AUTO: 4.72 10*3/MM3 (ref 3.4–10.8)
WBC # BLD AUTO: 4.81 10*3/MM3 (ref 3.4–10.8)
WBC # BLD AUTO: 4.97 10*3/MM3 (ref 3.4–10.8)
WBC # BLD AUTO: 5.08 10*3/MM3 (ref 3.4–10.8)
WBC # BLD AUTO: 5.24 10*3/MM3 (ref 3.4–10.8)
WBC # BLD AUTO: 5.34 10*3/MM3 (ref 3.4–10.8)
WBC # UR STRIP: ABNORMAL /HPF
WBC NRBC COR # BLD: 12.62 10*3/MM3 (ref 3.4–10.8)
WBC NRBC COR # BLD: 3.93 10*3/MM3 (ref 3.4–10.8)
WBC NRBC COR # BLD: 4.59 10*3/MM3 (ref 3.4–10.8)
WBC NRBC COR # BLD: 6.85 10*3/MM3 (ref 3.4–10.8)
WBC UR QL AUTO: ABNORMAL /HPF
WBC UR QL AUTO: ABNORMAL /HPF
WHOLE BLOOD HOLD SPECIMEN: NORMAL
WHOLE BLOOD HOLD SPECIMEN: NORMAL

## 2021-01-01 PROCEDURE — 85025 COMPLETE CBC W/AUTO DIFF WBC: CPT | Performed by: PHYSICIAN ASSISTANT

## 2021-01-01 PROCEDURE — G0432 EIA HIV-1/HIV-2 SCREEN: HCPCS | Performed by: INTERNAL MEDICINE

## 2021-01-01 PROCEDURE — 25010000002 HYDRALAZINE PER 20 MG: Performed by: PHYSICIAN ASSISTANT

## 2021-01-01 PROCEDURE — 85025 COMPLETE CBC W/AUTO DIFF WBC: CPT | Performed by: EMERGENCY MEDICINE

## 2021-01-01 PROCEDURE — 84484 ASSAY OF TROPONIN QUANT: CPT | Performed by: EMERGENCY MEDICINE

## 2021-01-01 PROCEDURE — 99231 SBSQ HOSP IP/OBS SF/LOW 25: CPT | Performed by: INTERNAL MEDICINE

## 2021-01-01 PROCEDURE — 84484 ASSAY OF TROPONIN QUANT: CPT | Performed by: STUDENT IN AN ORGANIZED HEALTH CARE EDUCATION/TRAINING PROGRAM

## 2021-01-01 PROCEDURE — 87086 URINE CULTURE/COLONY COUNT: CPT | Performed by: EMERGENCY MEDICINE

## 2021-01-01 PROCEDURE — 82550 ASSAY OF CK (CPK): CPT | Performed by: STUDENT IN AN ORGANIZED HEALTH CARE EDUCATION/TRAINING PROGRAM

## 2021-01-01 PROCEDURE — 25010000002 FUROSEMIDE PER 20 MG: Performed by: STUDENT IN AN ORGANIZED HEALTH CARE EDUCATION/TRAINING PROGRAM

## 2021-01-01 PROCEDURE — 25010000002 HYDRALAZINE PER 20 MG: Performed by: HOSPITALIST

## 2021-01-01 PROCEDURE — 83605 ASSAY OF LACTIC ACID: CPT | Performed by: STUDENT IN AN ORGANIZED HEALTH CARE EDUCATION/TRAINING PROGRAM

## 2021-01-01 PROCEDURE — 80143 DRUG ASSAY ACETAMINOPHEN: CPT | Performed by: EMERGENCY MEDICINE

## 2021-01-01 PROCEDURE — 96375 TX/PRO/DX INJ NEW DRUG ADDON: CPT

## 2021-01-01 PROCEDURE — 63710000001 INSULIN ASPART PER 5 UNITS: Performed by: PHYSICIAN ASSISTANT

## 2021-01-01 PROCEDURE — 36430 TRANSFUSION BLD/BLD COMPNT: CPT

## 2021-01-01 PROCEDURE — 94799 UNLISTED PULMONARY SVC/PX: CPT

## 2021-01-01 PROCEDURE — 85007 BL SMEAR W/DIFF WBC COUNT: CPT | Performed by: PHYSICIAN ASSISTANT

## 2021-01-01 PROCEDURE — 25010000002 HEPARIN (PORCINE) PER 1000 UNITS: Performed by: HOSPITALIST

## 2021-01-01 PROCEDURE — 87088 URINE BACTERIA CULTURE: CPT | Performed by: HOSPITALIST

## 2021-01-01 PROCEDURE — 25010000002 FUROSEMIDE PER 20 MG: Performed by: INTERNAL MEDICINE

## 2021-01-01 PROCEDURE — 80053 COMPREHEN METABOLIC PANEL: CPT | Performed by: NURSE PRACTITIONER

## 2021-01-01 PROCEDURE — 85014 HEMATOCRIT: CPT | Performed by: PHYSICIAN ASSISTANT

## 2021-01-01 PROCEDURE — 97116 GAIT TRAINING THERAPY: CPT

## 2021-01-01 PROCEDURE — 99233 SBSQ HOSP IP/OBS HIGH 50: CPT | Performed by: PHYSICIAN ASSISTANT

## 2021-01-01 PROCEDURE — 84154 ASSAY OF PSA FREE: CPT | Performed by: NURSE PRACTITIONER

## 2021-01-01 PROCEDURE — 82962 GLUCOSE BLOOD TEST: CPT

## 2021-01-01 PROCEDURE — 25010000002 CEFTRIAXONE PER 250 MG: Performed by: HOSPITALIST

## 2021-01-01 PROCEDURE — 85025 COMPLETE CBC W/AUTO DIFF WBC: CPT | Performed by: HOSPITALIST

## 2021-01-01 PROCEDURE — 0BH17EZ INSERTION OF ENDOTRACHEAL AIRWAY INTO TRACHEA, VIA NATURAL OR ARTIFICIAL OPENING: ICD-10-PCS | Performed by: INTERNAL MEDICINE

## 2021-01-01 PROCEDURE — 81003 URINALYSIS AUTO W/O SCOPE: CPT | Performed by: NURSE PRACTITIONER

## 2021-01-01 PROCEDURE — 85007 BL SMEAR W/DIFF WBC COUNT: CPT | Performed by: STUDENT IN AN ORGANIZED HEALTH CARE EDUCATION/TRAINING PROGRAM

## 2021-01-01 PROCEDURE — 80048 BASIC METABOLIC PNL TOTAL CA: CPT | Performed by: PHYSICIAN ASSISTANT

## 2021-01-01 PROCEDURE — 36600 WITHDRAWAL OF ARTERIAL BLOOD: CPT

## 2021-01-01 PROCEDURE — 80053 COMPREHEN METABOLIC PANEL: CPT | Performed by: INTERNAL MEDICINE

## 2021-01-01 PROCEDURE — 84100 ASSAY OF PHOSPHORUS: CPT | Performed by: EMERGENCY MEDICINE

## 2021-01-01 PROCEDURE — 80053 COMPREHEN METABOLIC PANEL: CPT | Performed by: PHYSICIAN ASSISTANT

## 2021-01-01 PROCEDURE — 99231 SBSQ HOSP IP/OBS SF/LOW 25: CPT | Performed by: STUDENT IN AN ORGANIZED HEALTH CARE EDUCATION/TRAINING PROGRAM

## 2021-01-01 PROCEDURE — 80053 COMPREHEN METABOLIC PANEL: CPT | Performed by: EMERGENCY MEDICINE

## 2021-01-01 PROCEDURE — 84145 PROCALCITONIN (PCT): CPT | Performed by: EMERGENCY MEDICINE

## 2021-01-01 PROCEDURE — 99291 CRITICAL CARE FIRST HOUR: CPT

## 2021-01-01 PROCEDURE — 82375 ASSAY CARBOXYHB QUANT: CPT

## 2021-01-01 PROCEDURE — 93010 ELECTROCARDIOGRAM REPORT: CPT | Performed by: INTERNAL MEDICINE

## 2021-01-01 PROCEDURE — 87040 BLOOD CULTURE FOR BACTERIA: CPT | Performed by: STUDENT IN AN ORGANIZED HEALTH CARE EDUCATION/TRAINING PROGRAM

## 2021-01-01 PROCEDURE — 85379 FIBRIN DEGRADATION QUANT: CPT | Performed by: PHYSICIAN ASSISTANT

## 2021-01-01 PROCEDURE — 72050 X-RAY EXAM NECK SPINE 4/5VWS: CPT

## 2021-01-01 PROCEDURE — 25010000002 PIPERACILLIN SOD-TAZOBACTAM PER 1 G: Performed by: EMERGENCY MEDICINE

## 2021-01-01 PROCEDURE — 86901 BLOOD TYPING SEROLOGIC RH(D): CPT | Performed by: EMERGENCY MEDICINE

## 2021-01-01 PROCEDURE — 83516 IMMUNOASSAY NONANTIBODY: CPT | Performed by: INTERNAL MEDICINE

## 2021-01-01 PROCEDURE — 99232 SBSQ HOSP IP/OBS MODERATE 35: CPT | Performed by: PHYSICIAN ASSISTANT

## 2021-01-01 PROCEDURE — 84466 ASSAY OF TRANSFERRIN: CPT | Performed by: PHYSICIAN ASSISTANT

## 2021-01-01 PROCEDURE — 78580 LUNG PERFUSION IMAGING: CPT

## 2021-01-01 PROCEDURE — 82390 ASSAY OF CERULOPLASMIN: CPT | Performed by: INTERNAL MEDICINE

## 2021-01-01 PROCEDURE — 72050 X-RAY EXAM NECK SPINE 4/5VWS: CPT | Performed by: RADIOLOGY

## 2021-01-01 PROCEDURE — 63710000001 INSULIN REGULAR HUMAN PER 5 UNITS: Performed by: STUDENT IN AN ORGANIZED HEALTH CARE EDUCATION/TRAINING PROGRAM

## 2021-01-01 PROCEDURE — 93005 ELECTROCARDIOGRAM TRACING: CPT | Performed by: STUDENT IN AN ORGANIZED HEALTH CARE EDUCATION/TRAINING PROGRAM

## 2021-01-01 PROCEDURE — 99232 SBSQ HOSP IP/OBS MODERATE 35: CPT | Performed by: SPECIALIST

## 2021-01-01 PROCEDURE — 51702 INSERT TEMP BLADDER CATH: CPT

## 2021-01-01 PROCEDURE — 93970 EXTREMITY STUDY: CPT

## 2021-01-01 PROCEDURE — 87186 SC STD MICRODIL/AGAR DIL: CPT | Performed by: HOSPITALIST

## 2021-01-01 PROCEDURE — 92610 EVALUATE SWALLOWING FUNCTION: CPT

## 2021-01-01 PROCEDURE — 97166 OT EVAL MOD COMPLEX 45 MIN: CPT

## 2021-01-01 PROCEDURE — 85610 PROTHROMBIN TIME: CPT | Performed by: NURSE PRACTITIONER

## 2021-01-01 PROCEDURE — P9612 CATHETERIZE FOR URINE SPEC: HCPCS

## 2021-01-01 PROCEDURE — 78580 LUNG PERFUSION IMAGING: CPT | Performed by: RADIOLOGY

## 2021-01-01 PROCEDURE — 80306 DRUG TEST PRSMV INSTRMNT: CPT | Performed by: EMERGENCY MEDICINE

## 2021-01-01 PROCEDURE — 51798 US URINE CAPACITY MEASURE: CPT | Performed by: NURSE PRACTITIONER

## 2021-01-01 PROCEDURE — 25010000002 SUCCINYLCHOLINE PER 20 MG: Performed by: STUDENT IN AN ORGANIZED HEALTH CARE EDUCATION/TRAINING PROGRAM

## 2021-01-01 PROCEDURE — 83735 ASSAY OF MAGNESIUM: CPT | Performed by: STUDENT IN AN ORGANIZED HEALTH CARE EDUCATION/TRAINING PROGRAM

## 2021-01-01 PROCEDURE — 82077 ASSAY SPEC XCP UR&BREATH IA: CPT | Performed by: EMERGENCY MEDICINE

## 2021-01-01 PROCEDURE — 25010000002 IRON SUCROSE PER 1 MG: Performed by: INTERNAL MEDICINE

## 2021-01-01 PROCEDURE — 86850 RBC ANTIBODY SCREEN: CPT | Performed by: PHYSICIAN ASSISTANT

## 2021-01-01 PROCEDURE — 71045 X-RAY EXAM CHEST 1 VIEW: CPT

## 2021-01-01 PROCEDURE — 87040 BLOOD CULTURE FOR BACTERIA: CPT | Performed by: PHYSICIAN ASSISTANT

## 2021-01-01 PROCEDURE — 82805 BLOOD GASES W/O2 SATURATION: CPT

## 2021-01-01 PROCEDURE — 85025 COMPLETE CBC W/AUTO DIFF WBC: CPT | Performed by: INTERNAL MEDICINE

## 2021-01-01 PROCEDURE — 83735 ASSAY OF MAGNESIUM: CPT | Performed by: INTERNAL MEDICINE

## 2021-01-01 PROCEDURE — 76705 ECHO EXAM OF ABDOMEN: CPT

## 2021-01-01 PROCEDURE — 83050 HGB METHEMOGLOBIN QUAN: CPT

## 2021-01-01 PROCEDURE — 86900 BLOOD TYPING SEROLOGIC ABO: CPT

## 2021-01-01 PROCEDURE — 93010 ELECTROCARDIOGRAM REPORT: CPT | Performed by: SPECIALIST

## 2021-01-01 PROCEDURE — 93306 TTE W/DOPPLER COMPLETE: CPT

## 2021-01-01 PROCEDURE — 99233 SBSQ HOSP IP/OBS HIGH 50: CPT | Performed by: INTERNAL MEDICINE

## 2021-01-01 PROCEDURE — 81332 SERPINA1 GENE: CPT | Performed by: INTERNAL MEDICINE

## 2021-01-01 PROCEDURE — 80179 DRUG ASSAY SALICYLATE: CPT | Performed by: EMERGENCY MEDICINE

## 2021-01-01 PROCEDURE — 74018 RADEX ABDOMEN 1 VIEW: CPT

## 2021-01-01 PROCEDURE — 99283 EMERGENCY DEPT VISIT LOW MDM: CPT

## 2021-01-01 PROCEDURE — 86923 COMPATIBILITY TEST ELECTRIC: CPT

## 2021-01-01 PROCEDURE — 99223 1ST HOSP IP/OBS HIGH 75: CPT | Performed by: PHYSICIAN ASSISTANT

## 2021-01-01 PROCEDURE — 93306 TTE W/DOPPLER COMPLETE: CPT | Performed by: INTERNAL MEDICINE

## 2021-01-01 PROCEDURE — 94640 AIRWAY INHALATION TREATMENT: CPT

## 2021-01-01 PROCEDURE — 31500 INSERT EMERGENCY AIRWAY: CPT

## 2021-01-01 PROCEDURE — 85007 BL SMEAR W/DIFF WBC COUNT: CPT | Performed by: INTERNAL MEDICINE

## 2021-01-01 PROCEDURE — 25010000002 AMIODARONE PER 30 MG: Performed by: STUDENT IN AN ORGANIZED HEALTH CARE EDUCATION/TRAINING PROGRAM

## 2021-01-01 PROCEDURE — 80074 ACUTE HEPATITIS PANEL: CPT | Performed by: INTERNAL MEDICINE

## 2021-01-01 PROCEDURE — 85610 PROTHROMBIN TIME: CPT | Performed by: STUDENT IN AN ORGANIZED HEALTH CARE EDUCATION/TRAINING PROGRAM

## 2021-01-01 PROCEDURE — 25010000002 LORAZEPAM PER 2 MG: Performed by: STUDENT IN AN ORGANIZED HEALTH CARE EDUCATION/TRAINING PROGRAM

## 2021-01-01 PROCEDURE — 85014 HEMATOCRIT: CPT | Performed by: INTERNAL MEDICINE

## 2021-01-01 PROCEDURE — 36415 COLL VENOUS BLD VENIPUNCTURE: CPT

## 2021-01-01 PROCEDURE — 93005 ELECTROCARDIOGRAM TRACING: CPT | Performed by: FAMILY MEDICINE

## 2021-01-01 PROCEDURE — 80053 COMPREHEN METABOLIC PANEL: CPT | Performed by: STUDENT IN AN ORGANIZED HEALTH CARE EDUCATION/TRAINING PROGRAM

## 2021-01-01 PROCEDURE — 93005 ELECTROCARDIOGRAM TRACING: CPT | Performed by: INTERNAL MEDICINE

## 2021-01-01 PROCEDURE — 99232 SBSQ HOSP IP/OBS MODERATE 35: CPT | Performed by: INTERNAL MEDICINE

## 2021-01-01 PROCEDURE — 84484 ASSAY OF TROPONIN QUANT: CPT | Performed by: INTERNAL MEDICINE

## 2021-01-01 PROCEDURE — 5A1945Z RESPIRATORY VENTILATION, 24-96 CONSECUTIVE HOURS: ICD-10-PCS | Performed by: INTERNAL MEDICINE

## 2021-01-01 PROCEDURE — 86900 BLOOD TYPING SEROLOGIC ABO: CPT | Performed by: PHYSICIAN ASSISTANT

## 2021-01-01 PROCEDURE — 99223 1ST HOSP IP/OBS HIGH 75: CPT | Performed by: STUDENT IN AN ORGANIZED HEALTH CARE EDUCATION/TRAINING PROGRAM

## 2021-01-01 PROCEDURE — 85018 HEMOGLOBIN: CPT | Performed by: PHYSICIAN ASSISTANT

## 2021-01-01 PROCEDURE — 82746 ASSAY OF FOLIC ACID SERUM: CPT | Performed by: PHYSICIAN ASSISTANT

## 2021-01-01 PROCEDURE — 99284 EMERGENCY DEPT VISIT MOD MDM: CPT

## 2021-01-01 PROCEDURE — 84484 ASSAY OF TROPONIN QUANT: CPT | Performed by: PHYSICIAN ASSISTANT

## 2021-01-01 PROCEDURE — 99223 1ST HOSP IP/OBS HIGH 75: CPT | Performed by: INTERNAL MEDICINE

## 2021-01-01 PROCEDURE — 74018 RADEX ABDOMEN 1 VIEW: CPT | Performed by: RADIOLOGY

## 2021-01-01 PROCEDURE — 84100 ASSAY OF PHOSPHORUS: CPT | Performed by: INTERNAL MEDICINE

## 2021-01-01 PROCEDURE — 97163 PT EVAL HIGH COMPLEX 45 MIN: CPT

## 2021-01-01 PROCEDURE — 82306 VITAMIN D 25 HYDROXY: CPT | Performed by: PHYSICIAN ASSISTANT

## 2021-01-01 PROCEDURE — 80076 HEPATIC FUNCTION PANEL: CPT | Performed by: INTERNAL MEDICINE

## 2021-01-01 PROCEDURE — 84100 ASSAY OF PHOSPHORUS: CPT | Performed by: STUDENT IN AN ORGANIZED HEALTH CARE EDUCATION/TRAINING PROGRAM

## 2021-01-01 PROCEDURE — 83970 ASSAY OF PARATHORMONE: CPT | Performed by: INTERNAL MEDICINE

## 2021-01-01 PROCEDURE — 25010000002 EPINEPHRINE 1 MG/ML SOLUTION

## 2021-01-01 PROCEDURE — 85018 HEMOGLOBIN: CPT | Performed by: INTERNAL MEDICINE

## 2021-01-01 PROCEDURE — 85652 RBC SED RATE AUTOMATED: CPT | Performed by: EMERGENCY MEDICINE

## 2021-01-01 PROCEDURE — 83880 ASSAY OF NATRIURETIC PEPTIDE: CPT | Performed by: STUDENT IN AN ORGANIZED HEALTH CARE EDUCATION/TRAINING PROGRAM

## 2021-01-01 PROCEDURE — C1751 CATH, INF, PER/CENT/MIDLINE: HCPCS

## 2021-01-01 PROCEDURE — 85025 COMPLETE CBC W/AUTO DIFF WBC: CPT | Performed by: STUDENT IN AN ORGANIZED HEALTH CARE EDUCATION/TRAINING PROGRAM

## 2021-01-01 PROCEDURE — 25010000002 ONDANSETRON PER 1 MG: Performed by: PHYSICIAN ASSISTANT

## 2021-01-01 PROCEDURE — 84439 ASSAY OF FREE THYROXINE: CPT | Performed by: EMERGENCY MEDICINE

## 2021-01-01 PROCEDURE — 36415 COLL VENOUS BLD VENIPUNCTURE: CPT | Performed by: NURSE PRACTITIONER

## 2021-01-01 PROCEDURE — 83605 ASSAY OF LACTIC ACID: CPT | Performed by: EMERGENCY MEDICINE

## 2021-01-01 PROCEDURE — 25010000002 ONDANSETRON PER 1 MG: Performed by: INTERNAL MEDICINE

## 2021-01-01 PROCEDURE — 76705 ECHO EXAM OF ABDOMEN: CPT | Performed by: RADIOLOGY

## 2021-01-01 PROCEDURE — 86140 C-REACTIVE PROTEIN: CPT | Performed by: STUDENT IN AN ORGANIZED HEALTH CARE EDUCATION/TRAINING PROGRAM

## 2021-01-01 PROCEDURE — 87077 CULTURE AEROBIC IDENTIFY: CPT | Performed by: EMERGENCY MEDICINE

## 2021-01-01 PROCEDURE — 63710000001 INSULIN REGULAR HUMAN PER 5 UNITS: Performed by: EMERGENCY MEDICINE

## 2021-01-01 PROCEDURE — 82728 ASSAY OF FERRITIN: CPT | Performed by: INTERNAL MEDICINE

## 2021-01-01 PROCEDURE — 80048 BASIC METABOLIC PNL TOTAL CA: CPT | Performed by: INTERNAL MEDICINE

## 2021-01-01 PROCEDURE — 85007 BL SMEAR W/DIFF WBC COUNT: CPT | Performed by: HOSPITALIST

## 2021-01-01 PROCEDURE — 93970 EXTREMITY STUDY: CPT | Performed by: RADIOLOGY

## 2021-01-01 PROCEDURE — 93005 ELECTROCARDIOGRAM TRACING: CPT | Performed by: PHYSICIAN ASSISTANT

## 2021-01-01 PROCEDURE — 87636 SARSCOV2 & INF A&B AMP PRB: CPT | Performed by: STUDENT IN AN ORGANIZED HEALTH CARE EDUCATION/TRAINING PROGRAM

## 2021-01-01 PROCEDURE — 25010000002 CALCIUM GLUCONATE PER 10 ML: Performed by: STUDENT IN AN ORGANIZED HEALTH CARE EDUCATION/TRAINING PROGRAM

## 2021-01-01 PROCEDURE — 87186 SC STD MICRODIL/AGAR DIL: CPT | Performed by: EMERGENCY MEDICINE

## 2021-01-01 PROCEDURE — A9540 TC99M MAA: HCPCS | Performed by: INTERNAL MEDICINE

## 2021-01-01 PROCEDURE — 94002 VENT MGMT INPAT INIT DAY: CPT

## 2021-01-01 PROCEDURE — 86901 BLOOD TYPING SEROLOGIC RH(D): CPT | Performed by: PHYSICIAN ASSISTANT

## 2021-01-01 PROCEDURE — U0003 INFECTIOUS AGENT DETECTION BY NUCLEIC ACID (DNA OR RNA); SEVERE ACUTE RESPIRATORY SYNDROME CORONAVIRUS 2 (SARS-COV-2) (CORONAVIRUS DISEASE [COVID-19]), AMPLIFIED PROBE TECHNIQUE, MAKING USE OF HIGH THROUGHPUT TECHNOLOGIES AS DESCRIBED BY CMS-2020-01-R: HCPCS | Performed by: STUDENT IN AN ORGANIZED HEALTH CARE EDUCATION/TRAINING PROGRAM

## 2021-01-01 PROCEDURE — 84153 ASSAY OF PSA TOTAL: CPT | Performed by: NURSE PRACTITIONER

## 2021-01-01 PROCEDURE — 83540 ASSAY OF IRON: CPT | Performed by: PHYSICIAN ASSISTANT

## 2021-01-01 PROCEDURE — 82397 CHEMILUMINESCENT ASSAY: CPT | Performed by: INTERNAL MEDICINE

## 2021-01-01 PROCEDURE — 93005 ELECTROCARDIOGRAM TRACING: CPT | Performed by: EMERGENCY MEDICINE

## 2021-01-01 PROCEDURE — 80053 COMPREHEN METABOLIC PANEL: CPT | Performed by: HOSPITALIST

## 2021-01-01 PROCEDURE — 86900 BLOOD TYPING SEROLOGIC ABO: CPT | Performed by: EMERGENCY MEDICINE

## 2021-01-01 PROCEDURE — 0 TECHNETIUM ALBUMIN AGGREGATED: Performed by: INTERNAL MEDICINE

## 2021-01-01 PROCEDURE — 86850 RBC ANTIBODY SCREEN: CPT | Performed by: EMERGENCY MEDICINE

## 2021-01-01 PROCEDURE — 02HV33Z INSERTION OF INFUSION DEVICE INTO SUPERIOR VENA CAVA, PERCUTANEOUS APPROACH: ICD-10-PCS | Performed by: INTERNAL MEDICINE

## 2021-01-01 PROCEDURE — 94003 VENT MGMT INPAT SUBQ DAY: CPT

## 2021-01-01 PROCEDURE — 25010000002 CALCIUM GLUCONATE-NACL 1-0.675 GM/50ML-% SOLUTION: Performed by: PHYSICIAN ASSISTANT

## 2021-01-01 PROCEDURE — 83036 HEMOGLOBIN GLYCOSYLATED A1C: CPT | Performed by: PHYSICIAN ASSISTANT

## 2021-01-01 PROCEDURE — 71250 CT THORAX DX C-: CPT

## 2021-01-01 PROCEDURE — 80050 GENERAL HEALTH PANEL: CPT | Performed by: STUDENT IN AN ORGANIZED HEALTH CARE EDUCATION/TRAINING PROGRAM

## 2021-01-01 PROCEDURE — 81001 URINALYSIS AUTO W/SCOPE: CPT | Performed by: NURSE PRACTITIONER

## 2021-01-01 PROCEDURE — 25010000002 VANCOMYCIN 5 G RECONSTITUTED SOLUTION

## 2021-01-01 PROCEDURE — 99239 HOSP IP/OBS DSCHRG MGMT >30: CPT | Performed by: INTERNAL MEDICINE

## 2021-01-01 PROCEDURE — 71045 X-RAY EXAM CHEST 1 VIEW: CPT | Performed by: RADIOLOGY

## 2021-01-01 PROCEDURE — 86901 BLOOD TYPING SEROLOGIC RH(D): CPT

## 2021-01-01 PROCEDURE — P9016 RBC LEUKOCYTES REDUCED: HCPCS

## 2021-01-01 PROCEDURE — 85730 THROMBOPLASTIN TIME PARTIAL: CPT | Performed by: NURSE PRACTITIONER

## 2021-01-01 PROCEDURE — 82550 ASSAY OF CK (CPK): CPT | Performed by: EMERGENCY MEDICINE

## 2021-01-01 PROCEDURE — 85025 COMPLETE CBC W/AUTO DIFF WBC: CPT | Performed by: NURSE PRACTITIONER

## 2021-01-01 PROCEDURE — 96365 THER/PROPH/DIAG IV INF INIT: CPT

## 2021-01-01 PROCEDURE — 25010000002 PROPOFOL 10 MG/ML EMULSION

## 2021-01-01 PROCEDURE — 99214 OFFICE O/P EST MOD 30 MIN: CPT | Performed by: NURSE PRACTITIONER

## 2021-01-01 PROCEDURE — 87086 URINE CULTURE/COLONY COUNT: CPT | Performed by: HOSPITALIST

## 2021-01-01 PROCEDURE — 74176 CT ABD & PELVIS W/O CONTRAST: CPT

## 2021-01-01 PROCEDURE — 81001 URINALYSIS AUTO W/SCOPE: CPT | Performed by: EMERGENCY MEDICINE

## 2021-01-01 PROCEDURE — 81001 URINALYSIS AUTO W/SCOPE: CPT | Performed by: STUDENT IN AN ORGANIZED HEALTH CARE EDUCATION/TRAINING PROGRAM

## 2021-01-01 PROCEDURE — 82607 VITAMIN B-12: CPT | Performed by: PHYSICIAN ASSISTANT

## 2021-01-01 PROCEDURE — 25010000002 CEFTRIAXONE: Performed by: STUDENT IN AN ORGANIZED HEALTH CARE EDUCATION/TRAINING PROGRAM

## 2021-01-01 PROCEDURE — 86140 C-REACTIVE PROTEIN: CPT | Performed by: EMERGENCY MEDICINE

## 2021-01-01 PROCEDURE — 63710000001 INSULIN REGULAR HUMAN PER 5 UNITS: Performed by: PHYSICIAN ASSISTANT

## 2021-01-01 RX ORDER — MIDAZOLAM IN NACL,ISO-OSMOT/PF 50 MG/50ML
1-10 INFUSION BOTTLE (ML) INTRAVENOUS
Status: DISCONTINUED | OUTPATIENT
Start: 2021-01-01 | End: 2021-01-01

## 2021-01-01 RX ORDER — ONDANSETRON 2 MG/ML
4 INJECTION INTRAMUSCULAR; INTRAVENOUS ONCE
Status: COMPLETED | OUTPATIENT
Start: 2021-01-01 | End: 2021-01-01

## 2021-01-01 RX ORDER — AMLODIPINE BESYLATE 10 MG/1
10 TABLET ORAL
Qty: 30 TABLET | Refills: 1 | Status: SHIPPED | OUTPATIENT
Start: 2021-01-01

## 2021-01-01 RX ORDER — HYDRALAZINE HYDROCHLORIDE 20 MG/ML
10 INJECTION INTRAMUSCULAR; INTRAVENOUS ONCE
Status: COMPLETED | OUTPATIENT
Start: 2021-01-01 | End: 2021-01-01

## 2021-01-01 RX ORDER — ATORVASTATIN CALCIUM 40 MG/1
80 TABLET, FILM COATED ORAL NIGHTLY
Status: CANCELLED | OUTPATIENT
Start: 2021-01-01

## 2021-01-01 RX ORDER — ASPIRIN 81 MG/1
81 TABLET ORAL DAILY
Status: DISCONTINUED | OUTPATIENT
Start: 2021-01-01 | End: 2021-01-01 | Stop reason: HOSPADM

## 2021-01-01 RX ORDER — BUMETANIDE 0.5 MG/1
1.5 TABLET ORAL 2 TIMES DAILY
Qty: 60 TABLET | Refills: 1 | Status: SHIPPED | OUTPATIENT
Start: 2021-01-01

## 2021-01-01 RX ORDER — DEXTROSE MONOHYDRATE 25 G/50ML
25 INJECTION, SOLUTION INTRAVENOUS ONCE
Status: COMPLETED | OUTPATIENT
Start: 2021-01-01 | End: 2021-01-01

## 2021-01-01 RX ORDER — SUCCINYLCHOLINE CHLORIDE 20 MG/ML
INJECTION INTRAMUSCULAR; INTRAVENOUS
Status: COMPLETED | OUTPATIENT
Start: 2021-01-01 | End: 2021-01-01

## 2021-01-01 RX ORDER — LANOLIN ALCOHOL/MO/W.PET/CERES
1000 CREAM (GRAM) TOPICAL DAILY
Status: DISCONTINUED | OUTPATIENT
Start: 2021-01-01 | End: 2021-01-01 | Stop reason: HOSPADM

## 2021-01-01 RX ORDER — ETOMIDATE 2 MG/ML
INJECTION INTRAVENOUS
Status: COMPLETED | OUTPATIENT
Start: 2021-01-01 | End: 2021-01-01

## 2021-01-01 RX ORDER — CARVEDILOL 6.25 MG/1
12.5 TABLET ORAL 2 TIMES DAILY WITH MEALS
Status: DISCONTINUED | OUTPATIENT
Start: 2021-01-01 | End: 2021-01-01 | Stop reason: HOSPADM

## 2021-01-01 RX ORDER — SODIUM CHLORIDE 0.9 % (FLUSH) 0.9 %
10 SYRINGE (ML) INJECTION AS NEEDED
Status: DISCONTINUED | OUTPATIENT
Start: 2021-01-01 | End: 2021-01-01 | Stop reason: HOSPADM

## 2021-01-01 RX ORDER — LIDOCAINE HYDROCHLORIDE 20 MG/ML
JELLY TOPICAL AS NEEDED
Status: DISCONTINUED | OUTPATIENT
Start: 2021-01-01 | End: 2021-01-01

## 2021-01-01 RX ORDER — RANOLAZINE 500 MG/1
500 TABLET, EXTENDED RELEASE ORAL 2 TIMES DAILY
Status: CANCELLED | OUTPATIENT
Start: 2021-01-01

## 2021-01-01 RX ORDER — SODIUM CHLORIDE 0.9 % (FLUSH) 0.9 %
10 SYRINGE (ML) INJECTION AS NEEDED
Status: DISCONTINUED | OUTPATIENT
Start: 2021-01-01 | End: 2021-01-01

## 2021-01-01 RX ORDER — CARVEDILOL 6.25 MG/1
6.25 TABLET ORAL 2 TIMES DAILY WITH MEALS
Status: DISCONTINUED | OUTPATIENT
Start: 2021-01-01 | End: 2021-01-01

## 2021-01-01 RX ORDER — IRON POLYSACCHARIDE COMPLEX 150 MG
150 CAPSULE ORAL DAILY
Status: DISCONTINUED | OUTPATIENT
Start: 2021-01-01 | End: 2021-01-01

## 2021-01-01 RX ORDER — CEPHALEXIN 500 MG/1
500 CAPSULE ORAL 3 TIMES DAILY
Qty: 21 CAPSULE | Refills: 0 | Status: SHIPPED | OUTPATIENT
Start: 2021-01-01 | End: 2021-01-01

## 2021-01-01 RX ORDER — SODIUM CHLORIDE 0.9 % (FLUSH) 0.9 %
10 SYRINGE (ML) INJECTION EVERY 12 HOURS SCHEDULED
Status: DISCONTINUED | OUTPATIENT
Start: 2021-01-01 | End: 2021-01-01 | Stop reason: HOSPADM

## 2021-01-01 RX ORDER — DEXTROSE MONOHYDRATE 25 G/50ML
50 INJECTION, SOLUTION INTRAVENOUS ONCE
Status: COMPLETED | OUTPATIENT
Start: 2021-01-01 | End: 2021-01-01

## 2021-01-01 RX ORDER — BISACODYL 10 MG
10 SUPPOSITORY, RECTAL RECTAL DAILY
Status: DISCONTINUED | OUTPATIENT
Start: 2021-01-01 | End: 2021-01-01 | Stop reason: HOSPADM

## 2021-01-01 RX ORDER — TAMSULOSIN HYDROCHLORIDE 0.4 MG/1
CAPSULE ORAL
Qty: 30 CAPSULE | Refills: 12 | Status: SHIPPED | OUTPATIENT
Start: 2021-01-01

## 2021-01-01 RX ORDER — LACTULOSE 10 G/15ML
10 SOLUTION ORAL ONCE
Status: COMPLETED | OUTPATIENT
Start: 2021-01-01 | End: 2021-01-01

## 2021-01-01 RX ORDER — FUROSEMIDE 10 MG/ML
40 INJECTION INTRAMUSCULAR; INTRAVENOUS ONCE
Status: COMPLETED | OUTPATIENT
Start: 2021-01-01 | End: 2021-01-01

## 2021-01-01 RX ORDER — AMLODIPINE BESYLATE 5 MG/1
5 TABLET ORAL ONCE
Status: COMPLETED | OUTPATIENT
Start: 2021-01-01 | End: 2021-01-01

## 2021-01-01 RX ORDER — NITROGLYCERIN 0.4 MG/1
0.4 TABLET SUBLINGUAL
Status: CANCELLED | OUTPATIENT
Start: 2021-01-01

## 2021-01-01 RX ORDER — PANTOPRAZOLE SODIUM 40 MG/1
40 TABLET, DELAYED RELEASE ORAL DAILY
Status: DISCONTINUED | OUTPATIENT
Start: 2021-01-01 | End: 2021-01-01 | Stop reason: HOSPADM

## 2021-01-01 RX ORDER — AMLODIPINE BESYLATE 5 MG/1
5 TABLET ORAL DAILY
Status: CANCELLED | OUTPATIENT
Start: 2021-01-01

## 2021-01-01 RX ORDER — TAMSULOSIN HYDROCHLORIDE 0.4 MG/1
0.4 CAPSULE ORAL DAILY
Status: DISCONTINUED | OUTPATIENT
Start: 2021-01-01 | End: 2021-01-01 | Stop reason: HOSPADM

## 2021-01-01 RX ORDER — SODIUM POLYSTYRENE SULFONATE 4.1 MEQ/G
30 POWDER, FOR SUSPENSION ORAL; RECTAL ONCE
Status: COMPLETED | OUTPATIENT
Start: 2021-01-01 | End: 2021-01-01

## 2021-01-01 RX ORDER — SODIUM CHLORIDE 0.9 % (FLUSH) 0.9 %
10 SYRINGE (ML) INJECTION AS NEEDED
Status: DISCONTINUED | OUTPATIENT
Start: 2021-01-01 | End: 2021-12-21 | Stop reason: HOSPADM

## 2021-01-01 RX ORDER — POLYETHYLENE GLYCOL 3350 17 G/17G
17 POWDER, FOR SOLUTION ORAL DAILY
Status: DISCONTINUED | OUTPATIENT
Start: 2021-01-01 | End: 2021-01-01 | Stop reason: HOSPADM

## 2021-01-01 RX ORDER — CARVEDILOL 25 MG/1
25 TABLET ORAL DAILY
COMMUNITY

## 2021-01-01 RX ORDER — CALCIUM GLUCONATE 20 MG/ML
1 INJECTION, SOLUTION INTRAVENOUS ONCE
Status: COMPLETED | OUTPATIENT
Start: 2021-01-01 | End: 2021-01-01

## 2021-01-01 RX ORDER — FUROSEMIDE 10 MG/ML
80 INJECTION INTRAMUSCULAR; INTRAVENOUS ONCE
Status: COMPLETED | OUTPATIENT
Start: 2021-01-01 | End: 2021-01-01

## 2021-01-01 RX ORDER — NOREPINEPHRINE BIT/0.9 % NACL 8 MG/250ML
.02-.3 INFUSION BOTTLE (ML) INTRAVENOUS
Status: DISCONTINUED | OUTPATIENT
Start: 2021-01-01 | End: 2021-01-01

## 2021-01-01 RX ORDER — HEPARIN SODIUM 5000 [USP'U]/ML
4000 INJECTION, SOLUTION INTRAVENOUS; SUBCUTANEOUS ONCE
Status: DISCONTINUED | OUTPATIENT
Start: 2021-01-01 | End: 2021-01-01

## 2021-01-01 RX ORDER — DEXTROSE MONOHYDRATE 25 G/50ML
INJECTION, SOLUTION INTRAVENOUS
Status: COMPLETED
Start: 2021-01-01 | End: 2021-01-01

## 2021-01-01 RX ORDER — INSULIN DEGLUDEC INJECTION 100 U/ML
70 INJECTION, SOLUTION SUBCUTANEOUS EVERY EVENING
COMMUNITY
Start: 2020-12-04

## 2021-01-01 RX ORDER — LORAZEPAM 2 MG/ML
1 INJECTION INTRAMUSCULAR
Status: DISCONTINUED | OUTPATIENT
Start: 2021-01-01 | End: 2021-12-21 | Stop reason: HOSPADM

## 2021-01-01 RX ORDER — CARVEDILOL 25 MG/1
25 TABLET ORAL DAILY
Status: CANCELLED | OUTPATIENT
Start: 2021-01-01

## 2021-01-01 RX ORDER — NICOTINE POLACRILEX 4 MG
15 LOZENGE BUCCAL
Status: DISCONTINUED | OUTPATIENT
Start: 2021-01-01 | End: 2021-01-01 | Stop reason: HOSPADM

## 2021-01-01 RX ORDER — PROPOFOL 10 MG/ML
VIAL (ML) INTRAVENOUS
Status: COMPLETED
Start: 2021-01-01 | End: 2021-01-01

## 2021-01-01 RX ORDER — GLYCOPYRROLATE 0.2 MG/ML
0.4 INJECTION INTRAMUSCULAR; INTRAVENOUS ONCE
Status: COMPLETED | OUTPATIENT
Start: 2021-01-01 | End: 2021-01-01

## 2021-01-01 RX ORDER — BUMETANIDE 1 MG/1
1.5 TABLET ORAL 2 TIMES DAILY
Status: DISCONTINUED | OUTPATIENT
Start: 2021-01-01 | End: 2021-01-01 | Stop reason: HOSPADM

## 2021-01-01 RX ORDER — L.ACID,PARA/B.BIFIDUM/S.THERM 8B CELL
1 CAPSULE ORAL DAILY
Status: DISCONTINUED | OUTPATIENT
Start: 2021-01-01 | End: 2021-01-01 | Stop reason: HOSPADM

## 2021-01-01 RX ORDER — CYCLOBENZAPRINE HCL 10 MG
10 TABLET ORAL 3 TIMES DAILY PRN
Status: CANCELLED | OUTPATIENT
Start: 2021-01-01

## 2021-01-01 RX ORDER — ALBUTEROL SULFATE 2.5 MG/3ML
2.5 SOLUTION RESPIRATORY (INHALATION) ONCE
Status: COMPLETED | OUTPATIENT
Start: 2021-01-01 | End: 2021-01-01

## 2021-01-01 RX ORDER — HEPARIN SODIUM 5000 [USP'U]/ML
60 INJECTION, SOLUTION INTRAVENOUS; SUBCUTANEOUS ONCE
Status: DISCONTINUED | OUTPATIENT
Start: 2021-01-01 | End: 2021-01-01

## 2021-01-01 RX ORDER — LANCING DEVICE
EACH MISCELLANEOUS
Status: ON HOLD | COMMUNITY
Start: 2020-12-09 | End: 2021-01-01

## 2021-01-01 RX ORDER — DOCUSATE SODIUM 100 MG/1
100 CAPSULE, LIQUID FILLED ORAL 2 TIMES DAILY
Status: DISCONTINUED | OUTPATIENT
Start: 2021-01-01 | End: 2021-01-01 | Stop reason: HOSPADM

## 2021-01-01 RX ORDER — DEXTROSE MONOHYDRATE 25 G/50ML
25 INJECTION, SOLUTION INTRAVENOUS
Status: DISCONTINUED | OUTPATIENT
Start: 2021-01-01 | End: 2021-01-01 | Stop reason: HOSPADM

## 2021-01-01 RX ORDER — MORPHINE SULFATE 2 MG/ML
2 INJECTION, SOLUTION INTRAMUSCULAR; INTRAVENOUS
Status: DISCONTINUED | OUTPATIENT
Start: 2021-01-01 | End: 2021-12-21 | Stop reason: HOSPADM

## 2021-01-01 RX ORDER — HYDROXYZINE HYDROCHLORIDE 25 MG/1
25 TABLET, FILM COATED ORAL EVERY 6 HOURS PRN
Status: DISCONTINUED | OUTPATIENT
Start: 2021-01-01 | End: 2021-01-01 | Stop reason: HOSPADM

## 2021-01-01 RX ORDER — NOREPINEPHRINE BIT/0.9 % NACL 8 MG/250ML
.02-.3 INFUSION BOTTLE (ML) INTRAVENOUS
Status: DISCONTINUED | OUTPATIENT
Start: 2021-01-01 | End: 2021-01-01 | Stop reason: SDUPTHER

## 2021-01-01 RX ORDER — GLYCOPYRROLATE 0.2 MG/ML
0.2 INJECTION INTRAMUSCULAR; INTRAVENOUS 3 TIMES DAILY PRN
Status: DISCONTINUED | OUTPATIENT
Start: 2021-01-01 | End: 2021-12-21 | Stop reason: HOSPADM

## 2021-01-01 RX ORDER — AMLODIPINE BESYLATE 5 MG/1
5 TABLET ORAL DAILY
COMMUNITY
End: 2021-01-01 | Stop reason: HOSPADM

## 2021-01-01 RX ORDER — DIPHENHYDRAMINE HCL 25 MG
25 CAPSULE ORAL EVERY 6 HOURS PRN
Status: DISCONTINUED | OUTPATIENT
Start: 2021-01-01 | End: 2021-01-01 | Stop reason: HOSPADM

## 2021-01-01 RX ORDER — NITROGLYCERIN 0.4 MG/1
0.4 TABLET SUBLINGUAL
Status: DISCONTINUED | OUTPATIENT
Start: 2021-01-01 | End: 2021-01-01 | Stop reason: HOSPADM

## 2021-01-01 RX ORDER — HEPARIN SODIUM 5000 [USP'U]/ML
5000 INJECTION, SOLUTION INTRAVENOUS; SUBCUTANEOUS EVERY 8 HOURS SCHEDULED
Status: CANCELLED | OUTPATIENT
Start: 2021-01-01

## 2021-01-01 RX ORDER — CEPHALEXIN 250 MG/1
500 CAPSULE ORAL ONCE
Status: COMPLETED | OUTPATIENT
Start: 2021-01-01 | End: 2021-01-01

## 2021-01-01 RX ORDER — HEPARIN SODIUM 5000 [USP'U]/ML
5000 INJECTION, SOLUTION INTRAVENOUS; SUBCUTANEOUS EVERY 12 HOURS SCHEDULED
Status: DISCONTINUED | OUTPATIENT
Start: 2021-01-01 | End: 2021-01-01 | Stop reason: HOSPADM

## 2021-01-01 RX ORDER — GLIPIZIDE 5 MG/1
10 TABLET ORAL
Status: CANCELLED | OUTPATIENT
Start: 2021-01-01

## 2021-01-01 RX ORDER — AMLODIPINE BESYLATE 5 MG/1
5 TABLET ORAL
Status: DISCONTINUED | OUTPATIENT
Start: 2021-01-01 | End: 2021-01-01

## 2021-01-01 RX ORDER — CARVEDILOL 3.12 MG/1
3.12 TABLET ORAL 2 TIMES DAILY WITH MEALS
Status: DISCONTINUED | OUTPATIENT
Start: 2021-01-01 | End: 2021-01-01

## 2021-01-01 RX ORDER — HYDRALAZINE HYDROCHLORIDE 20 MG/ML
10 INJECTION INTRAMUSCULAR; INTRAVENOUS EVERY 6 HOURS PRN
Status: DISCONTINUED | OUTPATIENT
Start: 2021-01-01 | End: 2021-01-01 | Stop reason: HOSPADM

## 2021-01-01 RX ORDER — HYDROCODONE BITARTRATE AND ACETAMINOPHEN 7.5; 325 MG/1; MG/1
1 TABLET ORAL EVERY 8 HOURS SCHEDULED
Status: DISCONTINUED | OUTPATIENT
Start: 2021-01-01 | End: 2021-01-01 | Stop reason: HOSPADM

## 2021-01-01 RX ORDER — ATORVASTATIN CALCIUM 40 MG/1
40 TABLET, FILM COATED ORAL NIGHTLY
Status: DISCONTINUED | OUTPATIENT
Start: 2021-01-01 | End: 2021-01-01 | Stop reason: HOSPADM

## 2021-01-01 RX ORDER — SODIUM CHLORIDE 0.9 % (FLUSH) 0.9 %
10 SYRINGE (ML) INJECTION EVERY 12 HOURS SCHEDULED
Status: DISCONTINUED | OUTPATIENT
Start: 2021-01-01 | End: 2021-12-21 | Stop reason: HOSPADM

## 2021-01-01 RX ORDER — AMLODIPINE BESYLATE 10 MG/1
10 TABLET ORAL
Status: DISCONTINUED | OUTPATIENT
Start: 2021-01-01 | End: 2021-01-01 | Stop reason: HOSPADM

## 2021-01-01 RX ORDER — ASPIRIN 81 MG/1
324 TABLET, CHEWABLE ORAL ONCE
Status: COMPLETED | OUTPATIENT
Start: 2021-01-01 | End: 2021-01-01

## 2021-01-01 RX ORDER — METOPROLOL TARTRATE 5 MG/5ML
5 INJECTION INTRAVENOUS ONCE
Status: COMPLETED | OUTPATIENT
Start: 2021-01-01 | End: 2021-01-01

## 2021-01-01 RX ADMIN — DOCUSATE SODIUM 100 MG: 100 CAPSULE ORAL at 20:55

## 2021-01-01 RX ADMIN — DOCUSATE SODIUM 100 MG: 100 CAPSULE ORAL at 08:35

## 2021-01-01 RX ADMIN — ATORVASTATIN CALCIUM 40 MG: 40 TABLET, FILM COATED ORAL at 20:54

## 2021-01-01 RX ADMIN — Medication 0.3 MCG/KG/MIN: at 18:10

## 2021-01-01 RX ADMIN — HYDROCODONE BITARTRATE AND ACETAMINOPHEN 1 TABLET: 7.5; 325 TABLET ORAL at 20:53

## 2021-01-01 RX ADMIN — SODIUM CHLORIDE 500 ML: 9 INJECTION, SOLUTION INTRAVENOUS at 21:49

## 2021-01-01 RX ADMIN — HYDROCODONE BITARTRATE AND ACETAMINOPHEN 1 TABLET: 7.5; 325 TABLET ORAL at 14:52

## 2021-01-01 RX ADMIN — ATORVASTATIN CALCIUM 40 MG: 40 TABLET, FILM COATED ORAL at 21:19

## 2021-01-01 RX ADMIN — DEXTROSE MONOHYDRATE 25 G: 25 INJECTION, SOLUTION INTRAVENOUS at 14:06

## 2021-01-01 RX ADMIN — FUROSEMIDE 80 MG: 10 INJECTION, SOLUTION INTRAMUSCULAR; INTRAVENOUS at 10:50

## 2021-01-01 RX ADMIN — ASPIRIN 81 MG: 81 TABLET, COATED ORAL at 08:44

## 2021-01-01 RX ADMIN — ASPIRIN 81 MG: 81 TABLET, COATED ORAL at 08:49

## 2021-01-01 RX ADMIN — CEFTRIAXONE 1 G: 1 INJECTION, POWDER, FOR SOLUTION INTRAMUSCULAR; INTRAVENOUS at 17:26

## 2021-01-01 RX ADMIN — ASPIRIN 81 MG: 81 TABLET, COATED ORAL at 09:01

## 2021-01-01 RX ADMIN — SODIUM CHLORIDE, PRESERVATIVE FREE 10 ML: 5 INJECTION INTRAVENOUS at 08:39

## 2021-01-01 RX ADMIN — HEPARIN SODIUM 5000 UNITS: 5000 INJECTION INTRAVENOUS; SUBCUTANEOUS at 20:31

## 2021-01-01 RX ADMIN — PANTOPRAZOLE SODIUM 40 MG: 40 TABLET, DELAYED RELEASE ORAL at 09:01

## 2021-01-01 RX ADMIN — HYDROCODONE BITARTRATE AND ACETAMINOPHEN 1 TABLET: 7.5; 325 TABLET ORAL at 13:03

## 2021-01-01 RX ADMIN — INSULIN ASPART 3 UNITS: 100 INJECTION, SOLUTION INTRAVENOUS; SUBCUTANEOUS at 11:45

## 2021-01-01 RX ADMIN — FUROSEMIDE 40 MG: 10 INJECTION, SOLUTION INTRAMUSCULAR; INTRAVENOUS at 08:07

## 2021-01-01 RX ADMIN — ATORVASTATIN CALCIUM 40 MG: 40 TABLET, FILM COATED ORAL at 20:56

## 2021-01-01 RX ADMIN — DOCUSATE SODIUM 100 MG: 100 CAPSULE ORAL at 09:10

## 2021-01-01 RX ADMIN — HYDROCODONE BITARTRATE AND ACETAMINOPHEN 1 TABLET: 7.5; 325 TABLET ORAL at 20:56

## 2021-01-01 RX ADMIN — KIT FOR THE PREPARATION OF TECHNETIUM TC 99M ALBUMIN AGGREGATED 1 DOSE: 2.5 INJECTION, POWDER, FOR SOLUTION INTRAVENOUS at 15:19

## 2021-01-01 RX ADMIN — ASPIRIN 324 MG: 81 TABLET, CHEWABLE ORAL at 22:16

## 2021-01-01 RX ADMIN — Medication 1 CAPSULE: at 08:27

## 2021-01-01 RX ADMIN — DOCUSATE SODIUM 100 MG: 100 CAPSULE ORAL at 08:45

## 2021-01-01 RX ADMIN — INSULIN ASPART 3 UNITS: 100 INJECTION, SOLUTION INTRAVENOUS; SUBCUTANEOUS at 11:00

## 2021-01-01 RX ADMIN — BISACODYL 10 MG: 10 SUPPOSITORY RECTAL at 13:30

## 2021-01-01 RX ADMIN — DOCUSATE SODIUM 100 MG: 100 CAPSULE ORAL at 20:53

## 2021-01-01 RX ADMIN — CARVEDILOL 12.5 MG: 6.25 TABLET, FILM COATED ORAL at 10:05

## 2021-01-01 RX ADMIN — DOCUSATE SODIUM 100 MG: 100 CAPSULE ORAL at 21:13

## 2021-01-01 RX ADMIN — LEVOTHYROXINE SODIUM 137.5 MCG: 125 TABLET ORAL at 08:51

## 2021-01-01 RX ADMIN — SODIUM BICARBONATE: 84 INJECTION, SOLUTION INTRAVENOUS at 21:22

## 2021-01-01 RX ADMIN — HEPARIN SODIUM 5000 UNITS: 5000 INJECTION INTRAVENOUS; SUBCUTANEOUS at 21:03

## 2021-01-01 RX ADMIN — DOCUSATE SODIUM 100 MG: 100 CAPSULE ORAL at 09:19

## 2021-01-01 RX ADMIN — DEXTROSE MONOHYDRATE 25 G: 500 INJECTION PARENTERAL at 00:50

## 2021-01-01 RX ADMIN — HYDROCODONE BITARTRATE AND ACETAMINOPHEN 1 TABLET: 7.5; 325 TABLET ORAL at 05:22

## 2021-01-01 RX ADMIN — DEXTROSE MONOHYDRATE 25 G: 25 INJECTION, SOLUTION INTRAVENOUS at 01:05

## 2021-01-01 RX ADMIN — ASPIRIN 81 MG: 81 TABLET, COATED ORAL at 08:34

## 2021-01-01 RX ADMIN — BUMETANIDE 1.5 MG: 1 TABLET ORAL at 21:03

## 2021-01-01 RX ADMIN — ATORVASTATIN CALCIUM 40 MG: 40 TABLET, FILM COATED ORAL at 20:31

## 2021-01-01 RX ADMIN — HYDROCODONE BITARTRATE AND ACETAMINOPHEN 1 TABLET: 7.5; 325 TABLET ORAL at 05:39

## 2021-01-01 RX ADMIN — GLYCOPYRROLATE 0.4 MG: 0.2 INJECTION INTRAMUSCULAR; INTRAVENOUS at 17:32

## 2021-01-01 RX ADMIN — HEPARIN SODIUM 5000 UNITS: 5000 INJECTION INTRAVENOUS; SUBCUTANEOUS at 21:19

## 2021-01-01 RX ADMIN — BISACODYL 10 MG: 10 SUPPOSITORY RECTAL at 09:18

## 2021-01-01 RX ADMIN — FUROSEMIDE 120 MG: 10 INJECTION, SOLUTION INTRAMUSCULAR; INTRAVENOUS at 14:21

## 2021-01-01 RX ADMIN — POLYETHYLENE GLYCOL (3350) 17 G: 17 POWDER, FOR SOLUTION ORAL at 08:26

## 2021-01-01 RX ADMIN — INSULIN ASPART 3 UNITS: 100 INJECTION, SOLUTION INTRAVENOUS; SUBCUTANEOUS at 12:05

## 2021-01-01 RX ADMIN — HYDROCODONE BITARTRATE AND ACETAMINOPHEN 1 TABLET: 7.5; 325 TABLET ORAL at 21:00

## 2021-01-01 RX ADMIN — SODIUM CHLORIDE, PRESERVATIVE FREE 10 ML: 5 INJECTION INTRAVENOUS at 08:54

## 2021-01-01 RX ADMIN — HYDRALAZINE HYDROCHLORIDE 10 MG: 20 INJECTION INTRAMUSCULAR; INTRAVENOUS at 23:13

## 2021-01-01 RX ADMIN — HEPARIN SODIUM 5000 UNITS: 5000 INJECTION INTRAVENOUS; SUBCUTANEOUS at 20:55

## 2021-01-01 RX ADMIN — POLYETHYLENE GLYCOL (3350) 17 G: 17 POWDER, FOR SOLUTION ORAL at 09:08

## 2021-01-01 RX ADMIN — TAMSULOSIN HYDROCHLORIDE 0.4 MG: 0.4 CAPSULE ORAL at 08:35

## 2021-01-01 RX ADMIN — SODIUM BICARBONATE 50 MEQ: 84 INJECTION INTRAVENOUS at 18:13

## 2021-01-01 RX ADMIN — INSULIN ASPART 3 UNITS: 100 INJECTION, SOLUTION INTRAVENOUS; SUBCUTANEOUS at 10:52

## 2021-01-01 RX ADMIN — AMLODIPINE BESYLATE 5 MG: 10 TABLET ORAL at 09:26

## 2021-01-01 RX ADMIN — SODIUM CHLORIDE, PRESERVATIVE FREE 10 ML: 5 INJECTION INTRAVENOUS at 08:48

## 2021-01-01 RX ADMIN — LEVOTHYROXINE SODIUM 137.5 MCG: 125 TABLET ORAL at 08:38

## 2021-01-01 RX ADMIN — PANTOPRAZOLE SODIUM 40 MG: 40 TABLET, DELAYED RELEASE ORAL at 08:46

## 2021-01-01 RX ADMIN — HYDROCODONE BITARTRATE AND ACETAMINOPHEN 1 TABLET: 7.5; 325 TABLET ORAL at 21:19

## 2021-01-01 RX ADMIN — SODIUM ZIRCONIUM CYCLOSILICATE 10 G: 10 POWDER, FOR SUSPENSION ORAL at 20:32

## 2021-01-01 RX ADMIN — Medication 1 CAPSULE: at 08:03

## 2021-01-01 RX ADMIN — DOCUSATE SODIUM 100 MG: 100 CAPSULE ORAL at 21:31

## 2021-01-01 RX ADMIN — INSULIN ASPART 2 UNITS: 100 INJECTION, SOLUTION INTRAVENOUS; SUBCUTANEOUS at 12:02

## 2021-01-01 RX ADMIN — INSULIN ASPART 2 UNITS: 100 INJECTION, SOLUTION INTRAVENOUS; SUBCUTANEOUS at 17:26

## 2021-01-01 RX ADMIN — PANTOPRAZOLE SODIUM 40 MG: 40 TABLET, DELAYED RELEASE ORAL at 09:07

## 2021-01-01 RX ADMIN — SODIUM CHLORIDE, PRESERVATIVE FREE 10 ML: 5 INJECTION INTRAVENOUS at 08:27

## 2021-01-01 RX ADMIN — INSULIN ASPART 3 UNITS: 100 INJECTION, SOLUTION INTRAVENOUS; SUBCUTANEOUS at 17:22

## 2021-01-01 RX ADMIN — NITROGLYCERIN 0.4 MG: 0.4 TABLET, ORALLY DISINTEGRATING SUBLINGUAL at 10:52

## 2021-01-01 RX ADMIN — HEPARIN SODIUM 5000 UNITS: 5000 INJECTION INTRAVENOUS; SUBCUTANEOUS at 08:26

## 2021-01-01 RX ADMIN — Medication 1 MG/HR: at 21:20

## 2021-01-01 RX ADMIN — ASPIRIN 81 MG: 81 TABLET, COATED ORAL at 08:38

## 2021-01-01 RX ADMIN — FUROSEMIDE 120 MG: 10 INJECTION, SOLUTION INTRAMUSCULAR; INTRAVENOUS at 10:02

## 2021-01-01 RX ADMIN — PANTOPRAZOLE SODIUM 40 MG: 40 TABLET, DELAYED RELEASE ORAL at 08:27

## 2021-01-01 RX ADMIN — DOCUSATE SODIUM 100 MG: 100 CAPSULE ORAL at 08:50

## 2021-01-01 RX ADMIN — HEPARIN SODIUM 5000 UNITS: 5000 INJECTION INTRAVENOUS; SUBCUTANEOUS at 09:11

## 2021-01-01 RX ADMIN — SODIUM BICARBONATE: 84 INJECTION, SOLUTION INTRAVENOUS at 03:50

## 2021-01-01 RX ADMIN — HYDROCODONE BITARTRATE AND ACETAMINOPHEN 1 TABLET: 7.5; 325 TABLET ORAL at 05:40

## 2021-01-01 RX ADMIN — HUMAN INSULIN 5 UNITS: 100 INJECTION, SOLUTION SUBCUTANEOUS at 14:15

## 2021-01-01 RX ADMIN — LEVOTHYROXINE SODIUM 137.5 MCG: 125 TABLET ORAL at 08:34

## 2021-01-01 RX ADMIN — Medication 1 CAPSULE: at 09:01

## 2021-01-01 RX ADMIN — HEPARIN SODIUM 5000 UNITS: 5000 INJECTION INTRAVENOUS; SUBCUTANEOUS at 08:03

## 2021-01-01 RX ADMIN — SODIUM CHLORIDE, PRESERVATIVE FREE 10 ML: 5 INJECTION INTRAVENOUS at 00:51

## 2021-01-01 RX ADMIN — LEVOTHYROXINE SODIUM 137.5 MCG: 125 TABLET ORAL at 09:01

## 2021-01-01 RX ADMIN — ASPIRIN 81 MG: 81 TABLET, COATED ORAL at 09:08

## 2021-01-01 RX ADMIN — SODIUM CHLORIDE, PRESERVATIVE FREE 10 ML: 5 INJECTION INTRAVENOUS at 20:54

## 2021-01-01 RX ADMIN — INSULIN ASPART 2 UNITS: 100 INJECTION, SOLUTION INTRAVENOUS; SUBCUTANEOUS at 17:27

## 2021-01-01 RX ADMIN — BUMETANIDE 1.5 MG: 1 TABLET ORAL at 11:45

## 2021-01-01 RX ADMIN — SODIUM CHLORIDE, PRESERVATIVE FREE 10 ML: 5 INJECTION INTRAVENOUS at 21:31

## 2021-01-01 RX ADMIN — CALCIUM GLUCONATE 2 G: 98 INJECTION, SOLUTION INTRAVENOUS at 18:26

## 2021-01-01 RX ADMIN — PANTOPRAZOLE SODIUM 40 MG: 40 TABLET, DELAYED RELEASE ORAL at 08:44

## 2021-01-01 RX ADMIN — Medication 1000 MCG: at 08:35

## 2021-01-01 RX ADMIN — SODIUM CHLORIDE, PRESERVATIVE FREE 10 ML: 5 INJECTION INTRAVENOUS at 21:11

## 2021-01-01 RX ADMIN — ASPIRIN 81 MG: 81 TABLET, COATED ORAL at 12:02

## 2021-01-01 RX ADMIN — SODIUM BICARBONATE: 84 INJECTION, SOLUTION INTRAVENOUS at 11:12

## 2021-01-01 RX ADMIN — BISACODYL 10 MG: 10 SUPPOSITORY RECTAL at 09:10

## 2021-01-01 RX ADMIN — INSULIN ASPART 3 UNITS: 100 INJECTION, SOLUTION INTRAVENOUS; SUBCUTANEOUS at 11:17

## 2021-01-01 RX ADMIN — HEPARIN SODIUM 5000 UNITS: 5000 INJECTION INTRAVENOUS; SUBCUTANEOUS at 20:53

## 2021-01-01 RX ADMIN — Medication 1 CAPSULE: at 08:47

## 2021-01-01 RX ADMIN — HYDROCODONE BITARTRATE AND ACETAMINOPHEN 1 TABLET: 7.5; 325 TABLET ORAL at 05:13

## 2021-01-01 RX ADMIN — INSULIN ASPART 2 UNITS: 100 INJECTION, SOLUTION INTRAVENOUS; SUBCUTANEOUS at 11:10

## 2021-01-01 RX ADMIN — DOCUSATE SODIUM 100 MG: 100 CAPSULE ORAL at 08:47

## 2021-01-01 RX ADMIN — CALCIUM GLUCONATE 1 G: 20 INJECTION, SOLUTION INTRAVENOUS at 17:56

## 2021-01-01 RX ADMIN — ATORVASTATIN CALCIUM 40 MG: 40 TABLET, FILM COATED ORAL at 21:31

## 2021-01-01 RX ADMIN — FUROSEMIDE 120 MG: 10 INJECTION, SOLUTION INTRAMUSCULAR; INTRAVENOUS at 10:23

## 2021-01-01 RX ADMIN — Medication 0.24 MCG/KG/MIN: at 00:26

## 2021-01-01 RX ADMIN — LEVOTHYROXINE SODIUM 137.5 MCG: 125 TABLET ORAL at 09:07

## 2021-01-01 RX ADMIN — ATORVASTATIN CALCIUM 40 MG: 40 TABLET, FILM COATED ORAL at 21:03

## 2021-01-01 RX ADMIN — HUMAN INSULIN 10 UNITS: 100 INJECTION, SOLUTION SUBCUTANEOUS at 18:14

## 2021-01-01 RX ADMIN — SODIUM CHLORIDE, PRESERVATIVE FREE 10 ML: 5 INJECTION INTRAVENOUS at 08:51

## 2021-01-01 RX ADMIN — CEFTRIAXONE 1 G: 1 INJECTION, POWDER, FOR SOLUTION INTRAMUSCULAR; INTRAVENOUS at 18:07

## 2021-01-01 RX ADMIN — SODIUM CHLORIDE, PRESERVATIVE FREE 10 ML: 5 INJECTION INTRAVENOUS at 10:50

## 2021-01-01 RX ADMIN — LEVOTHYROXINE SODIUM 137.5 MCG: 125 TABLET ORAL at 15:38

## 2021-01-01 RX ADMIN — HEPARIN SODIUM 5000 UNITS: 5000 INJECTION INTRAVENOUS; SUBCUTANEOUS at 09:18

## 2021-01-01 RX ADMIN — HYDROCODONE BITARTRATE AND ACETAMINOPHEN 1 TABLET: 7.5; 325 TABLET ORAL at 04:56

## 2021-01-01 RX ADMIN — Medication 0.3 MCG/KG/MIN: at 18:20

## 2021-01-01 RX ADMIN — HYDROCODONE BITARTRATE AND ACETAMINOPHEN 1 TABLET: 7.5; 325 TABLET ORAL at 14:04

## 2021-01-01 RX ADMIN — DOCUSATE SODIUM 100 MG: 100 CAPSULE ORAL at 20:56

## 2021-01-01 RX ADMIN — ASPIRIN 81 MG: 81 TABLET, COATED ORAL at 08:27

## 2021-01-01 RX ADMIN — SODIUM CHLORIDE, PRESERVATIVE FREE 10 ML: 5 INJECTION INTRAVENOUS at 09:19

## 2021-01-01 RX ADMIN — PIPERACILLIN SODIUM AND TAZOBACTAM SODIUM 4.5 G: 4; .5 INJECTION, POWDER, LYOPHILIZED, FOR SOLUTION INTRAVENOUS at 22:05

## 2021-01-01 RX ADMIN — HYDRALAZINE HYDROCHLORIDE 10 MG: 20 INJECTION INTRAMUSCULAR; INTRAVENOUS at 05:39

## 2021-01-01 RX ADMIN — Medication 1 CAPSULE: at 08:34

## 2021-01-01 RX ADMIN — TAMSULOSIN HYDROCHLORIDE 0.4 MG: 0.4 CAPSULE ORAL at 15:39

## 2021-01-01 RX ADMIN — SODIUM CHLORIDE, PRESERVATIVE FREE 10 ML: 5 INJECTION INTRAVENOUS at 21:12

## 2021-01-01 RX ADMIN — HYDROCODONE BITARTRATE AND ACETAMINOPHEN 1 TABLET: 7.5; 325 TABLET ORAL at 05:31

## 2021-01-01 RX ADMIN — AMLODIPINE BESYLATE 10 MG: 10 TABLET ORAL at 08:44

## 2021-01-01 RX ADMIN — PROPOFOL 50 MCG/KG/MIN: 10 INJECTION, EMULSION INTRAVENOUS at 22:14

## 2021-01-01 RX ADMIN — AMIODARONE HYDROCHLORIDE 150 MG: 50 INJECTION, SOLUTION INTRAVENOUS at 18:31

## 2021-01-01 RX ADMIN — TAMSULOSIN HYDROCHLORIDE 0.4 MG: 0.4 CAPSULE ORAL at 09:09

## 2021-01-01 RX ADMIN — DOCUSATE SODIUM 100 MG: 100 CAPSULE ORAL at 21:19

## 2021-01-01 RX ADMIN — HYDROCODONE BITARTRATE AND ACETAMINOPHEN 1 TABLET: 7.5; 325 TABLET ORAL at 21:13

## 2021-01-01 RX ADMIN — LACTULOSE 10 G: 10 SOLUTION ORAL at 17:11

## 2021-01-01 RX ADMIN — POLYETHYLENE GLYCOL (3350) 17 G: 17 POWDER, FOR SOLUTION ORAL at 13:03

## 2021-01-01 RX ADMIN — HYDROCODONE BITARTRATE AND ACETAMINOPHEN 1 TABLET: 7.5; 325 TABLET ORAL at 21:31

## 2021-01-01 RX ADMIN — CEFTRIAXONE 1 G: 1 INJECTION, POWDER, FOR SOLUTION INTRAMUSCULAR; INTRAVENOUS at 17:11

## 2021-01-01 RX ADMIN — FUROSEMIDE 40 MG: 10 INJECTION, SOLUTION INTRAMUSCULAR; INTRAVENOUS at 15:14

## 2021-01-01 RX ADMIN — HYDROCODONE BITARTRATE AND ACETAMINOPHEN 1 TABLET: 7.5; 325 TABLET ORAL at 13:55

## 2021-01-01 RX ADMIN — PROPOFOL 50 MCG/KG/MIN: 10 INJECTION, EMULSION INTRAVENOUS at 02:09

## 2021-01-01 RX ADMIN — Medication 1 CAPSULE: at 08:38

## 2021-01-01 RX ADMIN — HYDROCODONE BITARTRATE AND ACETAMINOPHEN 1 TABLET: 7.5; 325 TABLET ORAL at 15:38

## 2021-01-01 RX ADMIN — HYDRALAZINE HYDROCHLORIDE 10 MG: 20 INJECTION INTRAMUSCULAR; INTRAVENOUS at 18:24

## 2021-01-01 RX ADMIN — HEPARIN SODIUM 5000 UNITS: 5000 INJECTION INTRAVENOUS; SUBCUTANEOUS at 10:50

## 2021-01-01 RX ADMIN — HYDROCODONE BITARTRATE AND ACETAMINOPHEN 1 TABLET: 7.5; 325 TABLET ORAL at 06:01

## 2021-01-01 RX ADMIN — HYDRALAZINE HYDROCHLORIDE 10 MG: 20 INJECTION INTRAMUSCULAR; INTRAVENOUS at 20:32

## 2021-01-01 RX ADMIN — HEPARIN SODIUM 5000 UNITS: 5000 INJECTION INTRAVENOUS; SUBCUTANEOUS at 08:39

## 2021-01-01 RX ADMIN — CEFTRIAXONE 1 G: 1 INJECTION, POWDER, FOR SOLUTION INTRAMUSCULAR; INTRAVENOUS at 17:52

## 2021-01-01 RX ADMIN — CEFTRIAXONE 1 G: 1 INJECTION, POWDER, FOR SOLUTION INTRAMUSCULAR; INTRAVENOUS at 17:07

## 2021-01-01 RX ADMIN — ONDANSETRON 4 MG: 2 INJECTION INTRAMUSCULAR; INTRAVENOUS at 21:33

## 2021-01-01 RX ADMIN — HUMAN INSULIN 10 UNITS: 100 INJECTION, SOLUTION SUBCUTANEOUS at 17:54

## 2021-01-01 RX ADMIN — ALBUTEROL SULFATE 10 MG: 2.5 SOLUTION RESPIRATORY (INHALATION) at 17:52

## 2021-01-01 RX ADMIN — Medication 1000 MCG: at 09:09

## 2021-01-01 RX ADMIN — TAMSULOSIN HYDROCHLORIDE 0.4 MG: 0.4 CAPSULE ORAL at 09:19

## 2021-01-01 RX ADMIN — IRON SUCROSE 200 MG: 20 INJECTION, SOLUTION INTRAVENOUS at 10:23

## 2021-01-01 RX ADMIN — HYDROCODONE BITARTRATE AND ACETAMINOPHEN 1 TABLET: 7.5; 325 TABLET ORAL at 13:28

## 2021-01-01 RX ADMIN — Medication 1 CAPSULE: at 08:44

## 2021-01-01 RX ADMIN — VASOPRESSIN 0.03 UNITS/MIN: 20 INJECTION INTRAVENOUS at 18:28

## 2021-01-01 RX ADMIN — DOCUSATE SODIUM 100 MG: 100 CAPSULE ORAL at 09:01

## 2021-01-01 RX ADMIN — Medication 10 MG/HR: at 05:00

## 2021-01-01 RX ADMIN — GLYCOPYRROLATE 0.2 MG: 0.2 INJECTION INTRAMUSCULAR; INTRAVENOUS at 20:55

## 2021-01-01 RX ADMIN — HEPARIN SODIUM 5000 UNITS: 5000 INJECTION INTRAVENOUS; SUBCUTANEOUS at 09:01

## 2021-01-01 RX ADMIN — Medication 5000 UNITS: at 13:28

## 2021-01-01 RX ADMIN — PANTOPRAZOLE SODIUM 40 MG: 40 TABLET, DELAYED RELEASE ORAL at 08:38

## 2021-01-01 RX ADMIN — TAMSULOSIN HYDROCHLORIDE 0.4 MG: 0.4 CAPSULE ORAL at 08:47

## 2021-01-01 RX ADMIN — INSULIN ASPART 2 UNITS: 100 INJECTION, SOLUTION INTRAVENOUS; SUBCUTANEOUS at 17:22

## 2021-01-01 RX ADMIN — DOCUSATE SODIUM 100 MG: 100 CAPSULE ORAL at 21:03

## 2021-01-01 RX ADMIN — AMLODIPINE BESYLATE 5 MG: 10 TABLET ORAL at 08:46

## 2021-01-01 RX ADMIN — SODIUM CHLORIDE, PRESERVATIVE FREE 10 ML: 5 INJECTION INTRAVENOUS at 20:55

## 2021-01-01 RX ADMIN — PIPERACILLIN SODIUM AND TAZOBACTAM SODIUM 3.38 G: 3; .375 INJECTION, POWDER, LYOPHILIZED, FOR SOLUTION INTRAVENOUS at 11:12

## 2021-01-01 RX ADMIN — SODIUM POLYSTYRENE SULFONATE 30 G: 1 POWDER ORAL; RECTAL at 17:56

## 2021-01-01 RX ADMIN — CEFTRIAXONE 2 G: 2 INJECTION, POWDER, FOR SOLUTION INTRAMUSCULAR; INTRAVENOUS at 18:00

## 2021-01-01 RX ADMIN — DEXTROSE MONOHYDRATE 25 G: 25 INJECTION, SOLUTION INTRAVENOUS at 22:00

## 2021-01-01 RX ADMIN — HYDROCODONE BITARTRATE AND ACETAMINOPHEN 1 TABLET: 7.5; 325 TABLET ORAL at 06:37

## 2021-01-01 RX ADMIN — Medication 2000 MG: at 22:19

## 2021-01-01 RX ADMIN — FUROSEMIDE 120 MG: 10 INJECTION, SOLUTION INTRAMUSCULAR; INTRAVENOUS at 14:41

## 2021-01-01 RX ADMIN — HYDROCODONE BITARTRATE AND ACETAMINOPHEN 1 TABLET: 7.5; 325 TABLET ORAL at 20:54

## 2021-01-01 RX ADMIN — FUROSEMIDE 120 MG: 10 INJECTION, SOLUTION INTRAMUSCULAR; INTRAVENOUS at 11:42

## 2021-01-01 RX ADMIN — METOPROLOL TARTRATE 5 MG: 1 INJECTION, SOLUTION INTRAVENOUS at 18:31

## 2021-01-01 RX ADMIN — HEPARIN SODIUM 5000 UNITS: 5000 INJECTION INTRAVENOUS; SUBCUTANEOUS at 20:57

## 2021-01-01 RX ADMIN — Medication 1000 MCG: at 08:51

## 2021-01-01 RX ADMIN — Medication 0.3 MCG/KG/MIN: at 21:30

## 2021-01-01 RX ADMIN — PANTOPRAZOLE SODIUM 40 MG: 40 TABLET, DELAYED RELEASE ORAL at 08:02

## 2021-01-01 RX ADMIN — HYDROCODONE BITARTRATE AND ACETAMINOPHEN 1 TABLET: 7.5; 325 TABLET ORAL at 13:04

## 2021-01-01 RX ADMIN — DOCUSATE SODIUM 100 MG: 100 CAPSULE ORAL at 08:27

## 2021-01-01 RX ADMIN — Medication 1000 MCG: at 09:19

## 2021-01-01 RX ADMIN — AMLODIPINE BESYLATE 5 MG: 5 TABLET ORAL at 14:52

## 2021-01-01 RX ADMIN — IRON SUCROSE 200 MG: 20 INJECTION, SOLUTION INTRAVENOUS at 09:18

## 2021-01-01 RX ADMIN — BUMETANIDE 1.5 MG: 1 TABLET ORAL at 08:45

## 2021-01-01 RX ADMIN — LEVOTHYROXINE SODIUM 137.5 MCG: 125 TABLET ORAL at 09:19

## 2021-01-01 RX ADMIN — INSULIN ASPART 2 UNITS: 100 INJECTION, SOLUTION INTRAVENOUS; SUBCUTANEOUS at 17:52

## 2021-01-01 RX ADMIN — ASPIRIN 81 MG: 81 TABLET, COATED ORAL at 08:46

## 2021-01-01 RX ADMIN — HYDROCODONE BITARTRATE AND ACETAMINOPHEN 1 TABLET: 7.5; 325 TABLET ORAL at 06:26

## 2021-01-01 RX ADMIN — ATORVASTATIN CALCIUM 40 MG: 40 TABLET, FILM COATED ORAL at 21:00

## 2021-01-01 RX ADMIN — ONDANSETRON 4 MG: 2 INJECTION INTRAMUSCULAR; INTRAVENOUS at 18:52

## 2021-01-01 RX ADMIN — TAMSULOSIN HYDROCHLORIDE 0.4 MG: 0.4 CAPSULE ORAL at 08:02

## 2021-01-01 RX ADMIN — SODIUM CHLORIDE, PRESERVATIVE FREE 10 ML: 5 INJECTION INTRAVENOUS at 21:02

## 2021-01-01 RX ADMIN — TAMSULOSIN HYDROCHLORIDE 0.4 MG: 0.4 CAPSULE ORAL at 08:52

## 2021-01-01 RX ADMIN — Medication 1000 MCG: at 08:46

## 2021-01-01 RX ADMIN — SUCCINYLCHOLINE CHLORIDE 100 MG: 20 INJECTION, SOLUTION INTRAMUSCULAR; INTRAVENOUS at 18:23

## 2021-01-01 RX ADMIN — INSULIN ASPART 2 UNITS: 100 INJECTION, SOLUTION INTRAVENOUS; SUBCUTANEOUS at 17:35

## 2021-01-01 RX ADMIN — HEPARIN SODIUM 5000 UNITS: 5000 INJECTION INTRAVENOUS; SUBCUTANEOUS at 08:43

## 2021-01-01 RX ADMIN — ETOMIDATE 20 MG: 2 INJECTION, SOLUTION INTRAVENOUS at 18:21

## 2021-01-01 RX ADMIN — AMLODIPINE BESYLATE 5 MG: 10 TABLET ORAL at 08:49

## 2021-01-01 RX ADMIN — INSULIN ASPART 2 UNITS: 100 INJECTION, SOLUTION INTRAVENOUS; SUBCUTANEOUS at 08:46

## 2021-01-01 RX ADMIN — Medication 1000 MCG: at 09:08

## 2021-01-01 RX ADMIN — HEPARIN SODIUM 5000 UNITS: 5000 INJECTION INTRAVENOUS; SUBCUTANEOUS at 08:46

## 2021-01-01 RX ADMIN — FUROSEMIDE 120 MG: 10 INJECTION, SOLUTION INTRAMUSCULAR; INTRAVENOUS at 10:01

## 2021-01-01 RX ADMIN — FUROSEMIDE 120 MG: 10 INJECTION, SOLUTION INTRAMUSCULAR; INTRAVENOUS at 13:04

## 2021-01-01 RX ADMIN — INSULIN ASPART 3 UNITS: 100 INJECTION, SOLUTION INTRAVENOUS; SUBCUTANEOUS at 17:07

## 2021-01-01 RX ADMIN — LEVOTHYROXINE SODIUM 137.5 MCG: 125 TABLET ORAL at 08:46

## 2021-01-01 RX ADMIN — TAMSULOSIN HYDROCHLORIDE 0.4 MG: 0.4 CAPSULE ORAL at 09:01

## 2021-01-01 RX ADMIN — HYDRALAZINE HYDROCHLORIDE 10 MG: 20 INJECTION INTRAMUSCULAR; INTRAVENOUS at 14:56

## 2021-01-01 RX ADMIN — SODIUM CHLORIDE, PRESERVATIVE FREE 10 ML: 5 INJECTION INTRAVENOUS at 09:01

## 2021-01-01 RX ADMIN — HUMAN INSULIN 10 UNITS: 100 INJECTION, SOLUTION SUBCUTANEOUS at 22:01

## 2021-01-01 RX ADMIN — ATROPINE SULFATE 1 MG: 0.1 INJECTION INTRAVENOUS at 18:14

## 2021-01-01 RX ADMIN — HEPARIN SODIUM 5000 UNITS: 5000 INJECTION INTRAVENOUS; SUBCUTANEOUS at 08:53

## 2021-01-01 RX ADMIN — SODIUM CHLORIDE, PRESERVATIVE FREE 10 ML: 5 INJECTION INTRAVENOUS at 21:19

## 2021-01-01 RX ADMIN — Medication 1 CAPSULE: at 08:52

## 2021-01-01 RX ADMIN — TAMSULOSIN HYDROCHLORIDE 0.4 MG: 0.4 CAPSULE ORAL at 08:27

## 2021-01-01 RX ADMIN — LEVOTHYROXINE SODIUM 137.5 MCG: 125 TABLET ORAL at 08:02

## 2021-01-01 RX ADMIN — HEPARIN SODIUM 5000 UNITS: 5000 INJECTION INTRAVENOUS; SUBCUTANEOUS at 20:54

## 2021-01-01 RX ADMIN — CEPHALEXIN 500 MG: 250 CAPSULE ORAL at 21:46

## 2021-01-01 RX ADMIN — DOCUSATE SODIUM 100 MG: 100 CAPSULE ORAL at 20:31

## 2021-01-01 RX ADMIN — ATORVASTATIN CALCIUM 40 MG: 40 TABLET, FILM COATED ORAL at 20:55

## 2021-01-01 RX ADMIN — HEPARIN SODIUM 5000 UNITS: 5000 INJECTION INTRAVENOUS; SUBCUTANEOUS at 21:00

## 2021-01-01 RX ADMIN — DOCUSATE SODIUM 100 MG: 100 CAPSULE ORAL at 21:00

## 2021-01-01 RX ADMIN — TAMSULOSIN HYDROCHLORIDE 0.4 MG: 0.4 CAPSULE ORAL at 08:38

## 2021-01-01 RX ADMIN — PANTOPRAZOLE SODIUM 40 MG: 40 TABLET, DELAYED RELEASE ORAL at 15:38

## 2021-01-01 RX ADMIN — HYDRALAZINE HYDROCHLORIDE 10 MG: 20 INJECTION INTRAMUSCULAR; INTRAVENOUS at 09:58

## 2021-01-01 RX ADMIN — HYDROCODONE BITARTRATE AND ACETAMINOPHEN 1 TABLET: 7.5; 325 TABLET ORAL at 13:50

## 2021-01-01 RX ADMIN — HYDROCODONE BITARTRATE AND ACETAMINOPHEN 1 TABLET: 7.5; 325 TABLET ORAL at 13:17

## 2021-01-01 RX ADMIN — PANTOPRAZOLE SODIUM 40 MG: 40 TABLET, DELAYED RELEASE ORAL at 09:19

## 2021-01-01 RX ADMIN — HYDROCODONE BITARTRATE AND ACETAMINOPHEN 1 TABLET: 7.5; 325 TABLET ORAL at 21:03

## 2021-01-01 RX ADMIN — SODIUM CHLORIDE, PRESERVATIVE FREE 10 ML: 5 INJECTION INTRAVENOUS at 08:45

## 2021-01-01 RX ADMIN — DOCUSATE SODIUM 100 MG: 100 CAPSULE ORAL at 13:03

## 2021-01-01 RX ADMIN — Medication 1 CAPSULE: at 09:08

## 2021-01-01 RX ADMIN — LEVOTHYROXINE SODIUM 137.5 MCG: 125 TABLET ORAL at 08:44

## 2021-01-01 RX ADMIN — ATORVASTATIN CALCIUM 40 MG: 40 TABLET, FILM COATED ORAL at 21:13

## 2021-01-01 RX ADMIN — INSULIN ASPART 3 UNITS: 100 INJECTION, SOLUTION INTRAVENOUS; SUBCUTANEOUS at 08:41

## 2021-01-01 RX ADMIN — FUROSEMIDE 120 MG: 10 INJECTION, SOLUTION INTRAMUSCULAR; INTRAVENOUS at 09:13

## 2021-01-01 RX ADMIN — OFLOXACIN 50000 UNITS: 300 TABLET, COATED ORAL at 09:54

## 2021-01-01 RX ADMIN — POLYETHYLENE GLYCOL (3350) 17 G: 17 POWDER, FOR SOLUTION ORAL at 09:18

## 2021-01-01 RX ADMIN — PANTOPRAZOLE SODIUM 40 MG: 40 TABLET, DELAYED RELEASE ORAL at 08:35

## 2021-01-01 RX ADMIN — Medication 1000 MCG: at 08:44

## 2021-01-01 RX ADMIN — PROPOFOL 15 MCG/KG/MIN: 10 INJECTION, EMULSION INTRAVENOUS at 18:49

## 2021-01-01 RX ADMIN — HEPARIN SODIUM 5000 UNITS: 5000 INJECTION INTRAVENOUS; SUBCUTANEOUS at 08:35

## 2021-01-01 RX ADMIN — ATORVASTATIN CALCIUM 40 MG: 40 TABLET, FILM COATED ORAL at 20:53

## 2021-01-01 RX ADMIN — SODIUM CHLORIDE, PRESERVATIVE FREE 10 ML: 5 INJECTION INTRAVENOUS at 08:07

## 2021-01-01 RX ADMIN — IRON SUCROSE 200 MG: 20 INJECTION, SOLUTION INTRAVENOUS at 10:00

## 2021-01-01 RX ADMIN — SODIUM CHLORIDE, PRESERVATIVE FREE 10 ML: 5 INJECTION INTRAVENOUS at 09:13

## 2021-01-01 RX ADMIN — TAMSULOSIN HYDROCHLORIDE 0.4 MG: 0.4 CAPSULE ORAL at 08:45

## 2021-01-01 RX ADMIN — SODIUM BICARBONATE 50 MEQ: 84 INJECTION, SOLUTION INTRAVENOUS at 22:03

## 2021-01-01 RX ADMIN — Medication 1 CAPSULE: at 09:19

## 2021-01-01 RX ADMIN — PANTOPRAZOLE SODIUM 40 MG: 40 TABLET, DELAYED RELEASE ORAL at 09:08

## 2021-01-01 RX ADMIN — HYDROCODONE BITARTRATE AND ACETAMINOPHEN 1 TABLET: 7.5; 325 TABLET ORAL at 05:00

## 2021-01-01 RX ADMIN — LORAZEPAM 1 MG: 2 INJECTION INTRAMUSCULAR; INTRAVENOUS at 20:07

## 2021-01-01 RX ADMIN — LEVOTHYROXINE SODIUM 137.5 MCG: 125 TABLET ORAL at 08:27

## 2021-01-01 RX ADMIN — ASPIRIN 81 MG: 81 TABLET, COATED ORAL at 08:02

## 2021-01-01 RX ADMIN — LIDOCAINE HYDROCHLORIDE: 20 JELLY TOPICAL at 19:26

## 2021-01-01 RX ADMIN — HYDROCODONE BITARTRATE AND ACETAMINOPHEN 1 TABLET: 7.5; 325 TABLET ORAL at 16:12

## 2021-01-01 RX ADMIN — DOCUSATE SODIUM 100 MG: 100 CAPSULE ORAL at 08:38

## 2021-01-01 RX ADMIN — BISACODYL 10 MG: 10 SUPPOSITORY RECTAL at 08:35

## 2021-01-01 RX ADMIN — Medication 1 CAPSULE: at 12:03

## 2021-01-01 RX ADMIN — ATROPINE SULFATE 1 MG: 0.1 INJECTION INTRAVENOUS at 18:33

## 2021-01-01 RX ADMIN — SODIUM POLYSTYRENE SULFONATE 30 G: 1 POWDER ORAL; RECTAL at 21:46

## 2021-01-01 RX ADMIN — DEXTROSE MONOHYDRATE 50 ML: 25 INJECTION, SOLUTION INTRAVENOUS at 17:56

## 2021-01-01 RX ADMIN — SODIUM CHLORIDE, PRESERVATIVE FREE 10 ML: 5 INJECTION INTRAVENOUS at 08:43

## 2021-01-01 RX ADMIN — ASPIRIN 81 MG: 81 TABLET, COATED ORAL at 09:19

## 2021-01-01 RX ADMIN — SODIUM CHLORIDE 1000 ML: 9 INJECTION, SOLUTION INTRAVENOUS at 18:08

## 2021-01-01 RX ADMIN — ALBUTEROL SULFATE 2.5 MG: 2.5 SOLUTION RESPIRATORY (INHALATION) at 19:17

## 2021-01-01 RX ADMIN — IRON SUCROSE 200 MG: 20 INJECTION, SOLUTION INTRAVENOUS at 09:09

## 2021-01-21 PROBLEM — N39.0 URINARY TRACT INFECTION WITHOUT HEMATURIA: Status: ACTIVE | Noted: 2021-01-01

## 2021-01-31 PROBLEM — N39.0 URINARY TRACT INFECTION WITHOUT HEMATURIA: Status: RESOLVED | Noted: 2021-01-01 | Resolved: 2021-01-01

## 2021-02-01 NOTE — OUTREACH NOTE
Prep Survey      Responses   [facility] discharged from?  [facility]   Is LACE score < 7 ?  No   Emergency Room discharge w/ pulse ox?  No   Eligibility  Readm Mgmt   Discharge diagnosis  Uncontrolled hypertension,  Ascites with likely cirrhosis,  Acute on Chronic Diastolic CHF,  Paroxysmal Afib,  Acute Cystitis   Does the patient have one of the following disease processes/diagnoses(primary or secondary)?  CHF   Does the patient have Home health ordered?  No   Is there a DME ordered?  No   Prep survey completed?  Yes          Yue Goldman RN

## 2021-02-01 NOTE — PROGRESS NOTES
Discharge Planning Assessment   [facility]     Patient Name: Catarino Arvizu  MRN: [id number]  Today's Date: 2/1/2021    Admit Date: 1/20/2021        Discharge Plan     Row Name 02/01/21 0857       Plan    Final Discharge Disposition Code  01 - home or self-care    Final Note  Pt discharged home.          DIAMANTE MartellW

## 2021-02-02 NOTE — OUTREACH NOTE
CHF Week 1 Survey      Responses   [facility] patient discharged from?  [facility]   Does the patient have one of the following disease processes/diagnoses(primary or secondary)?  CHF   CHF Week 1 attempt successful?  Yes   Call start time  1639   Rescheduled  Rescheduled-pt requested [He was in the bathroom. ]   Call end time  1639   Discharge diagnosis  Uncontrolled hypertension,  Ascites with likely cirrhosis,  Acute on Chronic Diastolic CHF,  Paroxysmal Afib,  Acute Cystitis          Roseanne Balderas RN

## 2021-02-05 NOTE — OUTREACH NOTE
CHF Week 1 Survey      Responses   [facility] patient discharged from?  [facility]   Does the patient have one of the following disease processes/diagnoses(primary or secondary)?  CHF   CHF Week 1 attempt successful?  No   Call start time  1721   Rescheduled  Revoked   Discharge diagnosis  Uncontrolled hypertension,  Ascites with likely cirrhosis,  Acute on Chronic Diastolic CHF,  Paroxysmal Afib,  Acute Cystitis          Lenora Wilson RN

## 2021-02-08 NOTE — ED PROVIDER NOTES
Subjective   History of Present Illness    Review of Systems    Past Medical History:   Diagnosis Date   • Chest pain    • CHF (congestive heart failure) (CMS/[facility]) 1/10/2019   • Chronic kidney disease    • CPAP (continuous positive airway pressure) dependence    • Diabetes mellitus (CMS/[facility])    • Elevated cholesterol    • GERD (gastroesophageal reflux disease)    • Heart murmur    • Hyperlipidemia    • Hypertension    • Irregular heart beat    • Polio    • Prostate cancer (CMS/[facility]) 05/2016    Dr. Khalif Kohler MD- [place], [place]   • Shortness of breath    • Sleep apnea        No Known Allergies    Past Surgical History:   Procedure Laterality Date   • CARDIAC CATHETERIZATION  2008    50% diffuse LAD stenosis, 50% septal  branch   • COLONOSCOPY      Long time ago   • COLONOSCOPY N/A 5/9/2018    Procedure: COLONOSCOPY  CPTCODE:98135;  Surgeon: Bob Mcguire III, MD;  Location: [facility] OR;  Service: Gastroenterology   • COLONOSCOPY N/A 1/14/2019    Procedure: COLONOSCOPY;  Surgeon: Ottoniel Napier MD;  Location: [facility] OR;  Service: Gastroenterology   • ENDOSCOPY N/A 5/9/2018    Procedure: ESOPHAGOGASTRODUODENOSCOPY WITH BIOPSY  CPTCODE:41234;  Surgeon: Bob Mcguire III, MD;  Location: [facility] OR;  Service: Gastroenterology   • ENDOSCOPY N/A 1/14/2019    Procedure: ESOPHAGOGASTRODUODENOSCOPY;  Surgeon: Ottoniel Napier MD;  Location: [facility] OR;  Service: Gastroenterology   • HEMORRHOIDECTOMY     • HERNIA REPAIR     • UPPER GASTROINTESTINAL ENDOSCOPY      Long time ago   • URETHRAL DILATATION N/A 12/13/2017    Procedure: URETHRAL DILATATION;  Surgeon: Khalif Kohler MD;  Location: [facility] OR;  Service:        Family History   Problem Relation Age of Onset   • Heart disease Brother    • Diabetes Brother    • Cancer Brother         not sure the kind   • Heart disease Brother    • Diabetes Brother    • Diabetes Sister    • Diabetes Daughter    • Heart disease Daughter    • Pancreatitis  Daughter    • Crohn's disease Daughter    • Diabetes Son    • Heart disease Son        Social History     Socioeconomic History   • Marital status:      Spouse name: Not on file   • Number of children: Not on file   • Years of education: Not on file   • Highest education level: Not on file   Tobacco Use   • Smoking status: Never Smoker   • Smokeless tobacco: Current User     Types: Chew   • Tobacco comment: Chewed tobacco since he was 4 yrs old.   Substance and Sexual Activity   • Alcohol use: No   • Drug use: No   • Sexual activity: Defer           Objective   Physical Exam    Procedures           ED Course  ED Course as of Feb 07 2236 Wed Jan 20, 2021   1449 EKG shows sinus rhythm rate of 60 left axis deviation no clear ST changes noted borderline T wave inversions in aVL similar morphology to prior EKGs.    [JA]   1628 BNP elevated.  Potassium 5.5 creatinine 3.9.  CO2 near baseline.    [JA]   1643 Covid negative.  Does have elevated BNP.  Will give diuresis and reassess.    [JA]   1746 Patient is positive for UTI.  Numerous white blood cells positive nitrates.  Will give Rocephin.    [JA]   1842 Handed off to Dr. Carter. Pending treatment and reassessment. Given uti and worsening Cr with borderline K would plan for admission but if feels improved may be able to dc.     [JA]   2119 I assumed patient's care from Dr. Castaneda this evening at shift change.  Please see his documentation above.  I feel patient's chest x-ray to represent changes of CHF/volume overload.  Repeat troponin pending.  Patient's last echocardiogram on file was January 2019.    [CM]   4961 I was hopeful we will be able to admit the patient here, but all we have left is MedSurg, and the hospitalist will not admit him without a telemetry bed.  There are no beds available throughout our region.    [CM]   2223 No beds available from [facility] to [place] to [facility] to [place] to [place] to [place], anywhere in between.     [CM]   2244 House supervisor advises that she will look into the possibility of perhaps moving a telemetry patient into one of the 2 available MedSurg beds that we have, thereby accommodating Mr. Arvizu.  He is doing well, in no apparent distress.    [CM]   Thu Jan 21, 2021   0015 Dr. Skinner and house supervisor trying to make a bed for patient.  Patient doing well, no distress.  Dr. Skinner wants a CT of the abdomen pelvis without contrast also.    [CM]   0100 Fingerstick blood sugar 46.  I have ordered D50 and food.    [CM]   0103 Lab called blood sugar 44.    [CM]   0253 [facility]  There are no beds available anywhere throughout our region.  We will have to keep him here in the emergency department until morning when we can consult cardiology and nephrology to see him, and hopefully a bed will come open to admit him in the morning.    [CM]   0336 Endorsed to Dr. Riddle at the end of my shift.  Awaiting bed availability upstairs.  Patient doing well, no acute distress.    [CM]      ED Course User Index  [CM] Marco Antonio Carter MD  [JA] Sp Castaneda MD                                           [facility]    Final diagnoses:   Urinary tract infection without hematuria, site unspecified   Acute on chronic congestive heart failure, unspecified heart failure type (CMS/[facility])            Evelia Riddle DO  02/07/21 7312

## 2021-02-08 NOTE — TELEPHONE ENCOUNTER
I tried calling patient to reschedule him with Dr. Ortiz. The referral from Dr. Daniel (7/23/20) says the patient needs an AV Fistula. I called his phone and there was no answer or option to leave a message. I tried calling his daughter and the number is disconnected.

## 2021-03-03 NOTE — TELEPHONE ENCOUNTER
Called Catarino to schedule him for an initial evaluation in the [facility]. He agreed to come this Wednesday 2/3/21 at 2:30 PM. He has no questions at this time.    no

## 2021-06-02 NOTE — TELEPHONE ENCOUNTER
Donaldo Shaffer [phone]    Member ID: [id number]  Only able to auth 1 injection at this time  Castration sensitive, cancer is stable per CT chest, abd and pelvis  Initial or Continuation: Continuation    Approval Dates:   Authorization: ------------ 24 hr for fax request to come thru  Spoke with: Holly            Tried calling pt to go over results and make sure he was coming - no answer

## 2021-06-03 NOTE — TELEPHONE ENCOUNTER
Called pts insurance to verify if the Lupron was auth'd     Auth # [id number]  Date 6/3/2021 til 11/30/2021  Spoke to Lindsay OLIVARES

## 2021-06-08 NOTE — PROGRESS NOTES
"Chief Complaint  Prostate Cancer/BPH WITH Elevated PSA (FOLLOW UP)    Subjective          Catarino Arvizu presents to [facility]  History of Present Illness  Mr. Catarino Edwards is a pleasant 66-year-old male patient, with known post polio syndrome, with a significant history of prostate cancer, on intermittent Lupron therapy, chronic testalgia, who returns to clinic today for follow-up/reevaluation of his elevated PSA 8.94, on May 28 of 2021.  PSA was 5.090 in December 2020, it was 1.250 in March 2020.    He is now wheelchair dependent, due to significant weight gain and debility.  He reports numerous falls in the past, but states he is doing relatively better.  He has a Bear catheter in place due to inability to continue self catheterizations as prior.    Overall he reports doing well with observation and intermittent Lupron.  His PSA is still in the very low range, but has dramatically risen compared to his last 1.  Will get prior authorization today for his Lupron injection and see him back in 2 weeks.  Plan is to resume his intermittent administration of Lupron injections every 6 months.  Patient is agreeable plan of care.    Objective   Vital Signs:   Ht 157.5 cm (62.01\")   Wt 94 kg (207 lb 3.7 oz)   BMI 37.89 kg/m²     Physical Exam  Constitutional:       General: He is in acute distress.      Appearance: He is well-developed. He is obese. He is ill-appearing.   HENT:      Head: Normocephalic and atraumatic.      Right Ear: External ear normal.      Left Ear: External ear normal.   Eyes:      General:         Right eye: No discharge.         Left eye: No discharge.      Conjunctiva/sclera: Conjunctivae normal.      Pupils: Pupils are equal, round, and reactive to light.   Neck:      Thyroid: No thyromegaly.      Trachea: No tracheal deviation.   Cardiovascular:      Rate and Rhythm: Normal rate and regular rhythm.      Heart sounds: No murmur heard.   No friction " rub.   Pulmonary:      Effort: Pulmonary effort is normal. No respiratory distress.      Breath sounds: Normal breath sounds. No stridor.   Abdominal:      General: Bowel sounds are normal. There is distension.      Palpations: Abdomen is soft.      Tenderness: There is abdominal tenderness. There is guarding.   Genitourinary:     Penis: Normal and uncircumcised. No tenderness or discharge.       Testes: Normal.      Rectum: Normal. Guaiac result negative.   Musculoskeletal:         General: Tenderness present. No deformity. Normal range of motion.      Cervical back: Normal range of motion and neck supple.      Comments: Wheelchair confined/debility   Skin:     General: Skin is warm and dry.      Capillary Refill: Capillary refill takes less than 2 seconds.      Coloration: Skin is not pale.   Neurological:      Mental Status: He is alert and oriented to person, place, and time.      Cranial Nerves: No cranial nerve deficit.      Coordination: Coordination normal.   Psychiatric:         Behavior: Behavior normal.         Thought Content: Thought content normal.         Judgment: Judgment normal.        Result Review :                 Assessment and Plan    Diagnoses and all orders for this visit:    1. Prostate cancer (CMS/[facility]) (Primary)  -     POC Urinalysis Dipstick, Automated  -     Bladder Scan  -     PSA, Total & Free  -     Cancel: Basic Metabolic Panel  -     Cancel: CBC & Differential    2. Difficulty urinating    3. History of urethral stricture      PROSTATE CANCER/URETHRAL STRICTURE:He returns today for follow-up and evaluation of his rising PSA of 8.98 on May 28 of 2021.  He has known widely metastatic prostate cancer with a positive biopsy, but has been in remission with intermittent Lupron injections. We discussed reinitiating hormonal therapy.      He has Urinary retention secondary to above, and is worsening urethral meatal stricture.  He has had intermittent catheterizations in the past, but due to  his increased debility he now has a Bear catheter in place.  We discussed voiding trial, patient is adamant at this time.     We recheck his PSA today, I will call him with results.    We will see him back in 2 weeks for Lupron injection post prior authorization.    She is agreeable plan of care.        Follow Up   Return in about 12 days (around 6/9/2021) for Next scheduled follow up in clinic for Lupron injection, then follow-up in 6 months..  Patient was given instructions and counseling regarding his condition or for health maintenance advice. Please see specific information pulled into the AVS if appropriate.

## 2021-06-08 NOTE — PATIENT INSTRUCTIONS
Leuprolide depot injection  What is this medicine?  LEUPROLIDE (loo PROE lide) is a man-made protein that acts like a natural hormone in the body. It decreases testosterone in men and decreases estrogen in women. In men, this medicine is used to treat advanced prostate cancer. In women, some forms of this medicine may be used to treat endometriosis, uterine fibroids, or other female hormone-related problems.  This medicine may be used for other purposes; ask your health care provider or pharmacist if you have questions.  COMMON BRAND NAME(S): Eligard, Fensolv, Lupron Depot, Lupron Depot-Ped, Viadur  What should I tell my health care provider before I take this medicine?  They need to know if you have any of these conditions:  · diabetes  · heart disease or previous heart attack  · high blood pressure  · high cholesterol  · mental illness  · osteoporosis  · pain or difficulty passing urine  · seizures  · spinal cord metastasis  · stroke  · suicidal thoughts, plans, or attempt; a previous suicide attempt by you or a family member  · tobacco smoker  · unusual vaginal bleeding (women)  · an unusual or allergic reaction to leuprolide, benzyl alcohol, other medicines, foods, dyes, or preservatives  · pregnant or trying to get pregnant  · breast-feeding  How should I use this medicine?  This medicine is for injection into a muscle or for injection under the skin. It is given by a health care professional in a hospital or clinic setting. The specific product will determine how it will be given to you. Make sure you understand which product you receive and how often you will receive it.  Talk to your pediatrician regarding the use of this medicine in children. Special care may be needed.  Overdosage: If you think you have taken too much of this medicine contact a poison control center or emergency room at once.  NOTE: This medicine is only for you. Do not share this medicine with others.  What if I miss a dose?  It is  important not to miss a dose. Call your doctor or health care professional if you are unable to keep an appointment.  Depot injections: Depot injections are given either once-monthly, every 12 weeks, every 16 weeks, or every 24 weeks depending on the product you are prescribed. The product you are prescribed will be based on if you are male or female, and your condition. Make sure you understand your product and dosing.  What may interact with this medicine?  Do not take this medicine with any of the following medications:  · chasteberry  · cisapride  · dronedarone  · pimozide  · thioridazine  This medicine may also interact with the following medications:  · herbal or dietary supplements, like black cohosh or DHEA  · female hormones, like estrogens or progestins and birth control pills, patches, rings, or injections  · male hormones, like testosterone  · other medicines that prolong the QT interval (abnormal heart rhythm)  This list may not describe all possible interactions. Give your health care provider a list of all the medicines, herbs, non-prescription drugs, or dietary supplements you use. Also tell them if you smoke, drink alcohol, or use illegal drugs. Some items may interact with your medicine.  What should I watch for while using this medicine?  Visit your doctor or health care professional for regular checks on your progress. During the first weeks of treatment, your symptoms may get worse, but then will improve as you continue your treatment. You may get hot flashes, increased bone pain, increased difficulty passing urine, or an aggravation of nerve symptoms. Discuss these effects with your doctor or health care professional, some of them may improve with continued use of this medicine.  Female patients may experience a menstrual cycle or spotting during the first months of therapy with this medicine. If this continues, contact your doctor or health care professional.  This medicine may increase blood  sugar. Ask your healthcare provider if changes in diet or medicines are needed if you have diabetes.  What side effects may I notice from receiving this medicine?  Side effects that you should report to your doctor or health care professional as soon as possible:  · allergic reactions like skin rash, itching or hives, swelling of the face, lips, or tongue  · breathing problems  · chest pain  · depression or memory disorders  · pain in your legs or groin  · pain at site where injected or implanted  · seizures  · severe headache  · signs and symptoms of high blood sugar such as being more thirsty or hungry or having to urinate more than normal. You may also feel very tired or have blurry vision  · swelling of the feet and legs  · suicidal thoughts or other mood changes  · visual changes  · vomiting  Side effects that usually do not require medical attention (report to your doctor or health care professional if they continue or are bothersome):  · breast swelling or tenderness  · decrease in sex drive or performance  · diarrhea  · hot flashes  · loss of appetite  · muscle, joint, or bone pains  · nausea  · redness or irritation at site where injected or implanted  · skin problems or acne  This list may not describe all possible side effects. Call your doctor for medical advice about side effects. You may report side effects to FDA at 2-758-TST-1855.  Where should I keep my medicine?  This drug is given in a hospital or clinic and will not be stored at home.  NOTE: This sheet is a summary. It may not cover all possible information. If you have questions about this medicine, talk to your doctor, pharmacist, or health care provider.  © 2021 [employer]vier/Gold Standard (2020-11-18 10:35:13)  Prostate Cancer Screening    Prostate cancer screening is a test that is done to check for the presence of prostate cancer in men. The prostate gland is a walnut-sized gland that is located below the bladder and in front of the rectum in  males. The function of the prostate is to add fluid to semen during ejaculation. Prostate cancer is the second most common type of cancer in men.  Who should have prostate cancer screening?   Screening recommendations vary based on age and other risk factors.  Screening is recommended if:  · You are older than age 55. If you are age 55-69, talk with your health care provider about your need for screening and how often screening should be done. Because most prostate cancers are slow growing and will not cause death, screening is generally reserved in this age group for men who have a 10-15-year life expectancy.  · You are younger than age 55, and you have these risk factors:  ? Being a black male or a male of  descent.  ? Having a father, brother, or uncle who has been diagnosed with prostate cancer. The risk is higher if your family member's cancer occurred at an early age.  Screening is not recommended if:  · You are younger than age 40.  · You are between the ages of 40 and 54 and you have no risk factors.  · You are 70 years of age or older. At this age, the risks that screening can cause are greater than the benefits that it may provide.  If you are at high risk for prostate cancer, your health care provider may recommend that you have screenings more often or that you start screening at a younger age.  How is screening for prostate cancer done?  The recommended prostate cancer screening test is a blood test called the prostate-specific antigen (PSA) test. PSA is a protein that is made in the prostate. As you age, your prostate naturally produces more PSA. Abnormally high PSA levels may be caused by:  · Prostate cancer.  · An enlarged prostate that is not caused by cancer (benign prostatic hyperplasia, BPH). This condition is very common in older men.  · A prostate gland infection (prostatitis).  Depending on the PSA results, you may need more tests, such as:  · A physical exam to check the size of your  prostate gland.  · Blood and imaging tests.  · A procedure to remove tissue samples from your prostate gland for testing (biopsy).  What are the benefits of prostate cancer screening?  · Screening can help to identify cancer at an early stage, before symptoms start and when the cancer can be treated more easily.  · There is a small chance that screening may lower your risk of dying from prostate cancer. The chance is small because prostate cancer is a slow-growing cancer, and most men with prostate cancer die from a different cause.  What are the risks of prostate cancer screening?  The main risk of prostate cancer screening is diagnosing and treating prostate cancer that would never have caused any symptoms or problems. This is called overdiagnosisand overtreatment. PSA screening cannot tell you if your PSA is high due to cancer or a different cause. A prostate biopsy is the only procedure to diagnose prostate cancer. Even the results of a biopsy may not tell you if your cancer needs to be treated. Slow-growing prostate cancer may not need any treatment other than monitoring, so diagnosing and treating it may cause unnecessary stress or other side effects.  A prostate biopsy may also cause:  · Infection or fever.  · A false negative. This is a result that shows that you do not have prostate cancer when you actually do have prostate cancer.  Questions to ask your health care provider  · When should I start prostate cancer screening?  · What is my risk for prostate cancer?  · How often do I need screening?  · What type of screening tests do I need?  · How do I get my test results?  · What do my results mean?  · Do I need treatment?  Where to find more information  · The American Cancer Society: www.cancer.org  · American Urological Association: www.auanet.org  Contact a health care provider if:  · You have difficulty urinating.  · You have pain when you urinate or ejaculate.  · You have blood in your urine or  semen.  · You have pain in your back or in the area of your prostate.  Summary  · Prostate cancer is a common type of cancer in men. The prostate gland is located below the bladder and in front of the rectum. This gland adds fluid to semen during ejaculation.  · Prostate cancer screening may identify cancer at an early stage, when the cancer can be treated more easily.  · The prostate-specific antigen (PSA) test is the recommended screening test for prostate cancer.  · Discuss the risks and benefits of prostate cancer screening with your health care provider. If you are age 70 or older, the risks that screening can cause are greater than the benefits that it may provide.  This information is not intended to replace advice given to you by your health care provider. Make sure you discuss any questions you have with your health care provider.  Document Revised: 07/30/2020 Document Reviewed: 07/30/2020  [employer] Patient Education © 2021 [employer]  Prostate Cancer    The prostate is a male gland that helps make semen. It is located below a man's bladder, in front of the rectum. Prostate cancer is when abnormal cells grow in this gland.  What are the causes?  The cause of this condition is not known.  What increases the risk?  You are more likely to develop this condition if:  · You are 65 years of age or older.  · You are .  · You have a family history of prostate cancer.  · You have a family history of breast cancer.  What are the signs or symptoms?  Symptoms of this condition include:  · A need to pee often.  · Peeing that is weak, or pee that stops and starts.  · Trouble starting or stopping your pee.  · Inability to pee.  · Blood in your pee or semen.  · Pain in the lower back, lower belly (abdomen), hips, or upper thighs.  · Trouble getting an erection.  · Trouble emptying all of your pee.  How is this treated?  Treatment for this condition depends on your age, your health, the kind of treatment  you like, and how far the cancer has spread. Treatments include:  · Being watched. This is called observation. You will be tested from time to time, but you will not get treated. Tests are to make sure that the cancer is not growing.  · Surgery. This may be done to remove the prostate, to remove the testicles, or to freeze or kill cancer cells.  · Radiation. This uses a strong beam to kill cancer cells.  · Ultrasound energy. This uses strong sound waves to kill cancer cells.  · Chemotherapy. This uses medicines that stop cancer cells from increasing. This kills cancer cells and healthy cells.  · Targeted therapy. This kills cancer cells only. Healthy cells are not affected.  · Hormone treatment. This stops the body from making hormones that help the cancer cells to grow.  Follow these instructions at home:  · Take over-the-counter and prescription medicines only as told by your doctor.  · Eat a healthy diet.  · Get plenty of sleep.  · Ask your doctor for help to find a support group for men with prostate cancer.  · If you have to go to the hospital, let your cancer doctor (oncologist) know.  · Treatment may affect your ability to have sex. Touch, hold, hug, and caress your partner to have intimate moments.  · Keep all follow-up visits as told by your doctor. This is important.  Contact a doctor if:  · You have new or more trouble peeing.  · You have new or more blood in your pee.  · You have new or more pain in your hips, back, or chest.  Get help right away if:  · You have weakness in your legs.  · You lose feeling in your legs.  · You cannot control your pee or your poop (stool).  · You have chills or a fever.  Summary  · The prostate is a male gland that helps make semen.  · Prostate cancer is when abnormal cells grow in this gland.  · Treatment includes doing surgery, using medicines, using very strong beams, or watching without treatment.  · Ask your doctor for help to find a support group for men with prostate  cancer.  · Contact a doctor if you have problems peeing or have any new pain that you did not have before.  This information is not intended to replace advice given to you by your health care provider. Make sure you discuss any questions you have with your health care provider.  Document Revised: 12/01/2020 Document Reviewed: 12/01/2020  [employer] Patient Education © 2021 [employer]

## 2021-08-10 NOTE — ED NOTES
Orange fall bracelet placed on pt, educ. Given, pt verb understanding     Kristel Chaney RN  08/10/21 [id number]

## 2021-12-03 NOTE — ED PROVIDER NOTES
Subjective   Patient was sent here by his PCP because his potassium is elevated.  Patient does not have any symptoms of any illness.      History provided by:  Patient   used: No    Illness  Location:  Hyperkalemia   Severity:  Mild  Onset quality:  Sudden  Timing:  Constant  Progression:  Worsening  Chronicity:  New  Relieved by:  Nothing   Worsened by:  Nothing   Ineffective treatments:  None   Associated symptoms: no chest pain, no congestion, no cough, no diarrhea, no ear pain, no fever, no headaches, no nausea, no rash, no shortness of breath, no sore throat, no vomiting and no wheezing        Review of Systems   Constitutional: Negative for chills and fever.   HENT: Negative for congestion, ear pain and sore throat.    Respiratory: Negative for cough, shortness of breath and wheezing.    Cardiovascular: Negative for chest pain.   Gastrointestinal: Negative for diarrhea, nausea and vomiting.   Genitourinary: Negative for dysuria and flank pain.   Skin: Negative for rash.   Neurological: Positive for weakness. Negative for headaches.   Psychiatric/Behavioral: The patient is not nervous/anxious.    All other systems reviewed and are negative.      Past Medical History:   Diagnosis Date   • Chest pain    • CHF (congestive heart failure) (CMS/[facility]) 1/10/2019   • Chronic kidney disease    • CPAP (continuous positive airway pressure) dependence    • Diabetes mellitus (CMS/[facility])    • Elevated cholesterol    • GERD (gastroesophageal reflux disease)    • Heart murmur    • Hyperlipidemia    • Hypertension    • Irregular heart beat    • Polio    • Prostate cancer (CMS/[facility]) 05/2016    Dr. Khalif Kohler MD- [place], [place]   • Shortness of breath    • Sleep apnea        No Known Allergies    Past Surgical History:   Procedure Laterality Date   • CARDIAC CATHETERIZATION  2008    50% diffuse LAD stenosis, 50% septal  branch   • COLONOSCOPY      Long time ago   • COLONOSCOPY N/A 5/9/2018     Procedure: COLONOSCOPY  CPTCODE:35485;  Surgeon: Bob Mcguire III, MD;  Location: [facility] OR;  Service: Gastroenterology   • COLONOSCOPY N/A 1/14/2019    Procedure: COLONOSCOPY;  Surgeon: Ottoniel Napier MD;  Location: [facility] OR;  Service: Gastroenterology   • ENDOSCOPY N/A 5/9/2018    Procedure: ESOPHAGOGASTRODUODENOSCOPY WITH BIOPSY  CPTCODE:30761;  Surgeon: Bob Mcguire III, MD;  Location: [facility] OR;  Service: Gastroenterology   • ENDOSCOPY N/A 1/14/2019    Procedure: ESOPHAGOGASTRODUODENOSCOPY;  Surgeon: Ottoniel Napier MD;  Location: [facility] OR;  Service: Gastroenterology   • HEMORRHOIDECTOMY     • HERNIA REPAIR     • UPPER GASTROINTESTINAL ENDOSCOPY      Long time ago   • URETHRAL DILATATION N/A 12/13/2017    Procedure: URETHRAL DILATATION;  Surgeon: Khalif Kohler MD;  Location: [facility] OR;  Service:        Family History   Problem Relation Age of Onset   • Heart disease Brother    • Diabetes Brother    • Cancer Brother         not sure the kind   • Heart disease Brother    • Diabetes Brother    • Diabetes Sister    • Diabetes Daughter    • Heart disease Daughter    • Pancreatitis Daughter    • Crohn's disease Daughter    • Diabetes Son    • Heart disease Son        Social History     Socioeconomic History   • Marital status:    Tobacco Use   • Smoking status: Never Smoker   • Smokeless tobacco: Current User     Types: Chew   • Tobacco comment: Chewed tobacco since he was 4 yrs old.   Substance and Sexual Activity   • Alcohol use: No   • Drug use: No   • Sexual activity: Defer           Objective   Physical Exam  Vitals and nursing note reviewed.   Constitutional:       Appearance: He is well-developed.   HENT:      Head: Normocephalic.   Cardiovascular:      Rate and Rhythm: Normal rate and regular rhythm.   Pulmonary:      Effort: Pulmonary effort is normal.      Breath sounds: Normal breath sounds.   Abdominal:      General: Bowel sounds are normal.      Palpations: Abdomen is  soft.      Tenderness: There is no abdominal tenderness.   Musculoskeletal:         General: Normal range of motion.      Cervical back: Neck supple.   Skin:     General: Skin is warm and dry.   Neurological:      Mental Status: He is alert and oriented to person, place, and time.   Psychiatric:         Behavior: Behavior normal.         Thought Content: Thought content normal.         Judgment: Judgment normal.         Procedures           ED Course  ED Course as of 12/02/21 2159 Mon Nov 29, 2021   1728 ECG 17:29 NSR, rate 76. Septal infarct, undetermined age. QT/QTc 374/420 [FIORELLA]   2143 Per Dr Mccray, pt can be discharged, have potassium re checked in 2 days  [LC]      ED Course User Index  [FIORELLA] Rashid Mccray MD  [LC] Nicolasa Mccray PA                                                 [facility]    Final diagnoses:   Hyperkalemia   CRF (chronic renal failure), unspecified stage       ED Disposition  ED Disposition     ED Disposition Condition Comment    Discharge Stable           Eleno Chaney, APRN  [address] [id number] [phone]    In 2 days           Medication List      No changes were made to your prescriptions during this visit.          Nicolasa Mccray PA  12/02/21 2159

## 2021-12-18 PROBLEM — E87.5 HYPERKALEMIA: Status: ACTIVE | Noted: 2021-01-01

## 2021-12-18 PROBLEM — Z51.5 COMFORT MEASURES ONLY STATUS: Status: ACTIVE | Noted: 2021-01-01

## 2021-12-22 LAB
BACTERIA SPEC AEROBE CULT: NORMAL
BACTERIA SPEC AEROBE CULT: NORMAL

## 2023-06-19 NOTE — DISCHARGE INSTRUCTIONS
[facility]   Physical Therapy Treatment Note      Name: Alberto Zamudio Jr.  Clinic Number: [id number]    Therapy Diagnosis:   Encounter Diagnoses   Name Primary?    Dysequilibrium Yes    Vertigo      Physician: Dinesh Riddle MD    Visit Date: 6/19/2023    Physician Orders: PT Eval and Treat; vestibular rehab therapy    Medical Diagnosis from Referral: vertigo, aural, bilateral  Evaluation Date: 6/6/2023  Authorization Period Expiration: 12/31/2023  Plan of Care Expiration: 06/17/23  Visit # / Visits authorized: 3/ 20     Time In: 8:00 am  Time Out: 8:43 am  Total Billable Time: 43 minutes     Precautions: Fall and history of stroke       Subjective     Pt reports: He denies any dizziness upon arrival. Denies any falls. States compliance with HEP without increase in dizziness.      He was compliant with home exercise program.  Response to previous treatment: no changes  Functional change: none     Pain: no complaints of pain     Dizziness: none at rest      Objective      Objective Measures updated at progress report unless specified.     Treatment     Alberto received the treatments listed below:      neuromuscular re-education activities to improve: Balance and Vestibular Hypofunction for 43 minutes. The following activities were included:    X 10 sit to stand while hold target  X 10 sit to stands while holding target, standing on AirEx  X 10 lean over touch upright stool while hold target  X 45 seconds each full Romberg stance = eyes open/eyes closed*  X 25 each standing two-square saccades (random letters) = side to side  X 25 each standing VOR1 (random letters) = side to side, up and down  2 x 20 feet VOR1 gait (random numbers) = side to side, up and down*  2 x 20 feet gait with vision eliminated- deviated to R side  2 x 20 feet each balance gait = speed up/slow down, walking backwards  2 x 20 feet gait with self ball toss= up and down, side to side*  2 x 20 feet gait with ball toss  TAKE the following medications the morning of surgery:  All heart or blood pressure medications    Please discontinue all blood thinners and anticoagulants (except aspirin) prior to surgery as per your surgeon and cardiologist instructions.  Aspirin may be continued up to the day prior to surgery.    HOLD all diabetic medications the morning of surgery as order by physician.    Arrival time for surgery on 12/13/2017 is 1000 am    General Instructions:  • Do NOT eat or drink after midnight which includes water, mints, or gum.  • You may brush your teeth. Dental appliances that are removable must be taken out day of surgery.  • Do NOT smoke, chew tobacco, or drink alcohol within 24 hours prior to surgery.  • Bring medications in original bottles, any inhalers and if applicable your C-PAP/BI-PAP machine  • Bring any papers given to you in the doctor’s office  • Wear clean, comfortable clothes and socks  • Do NOT wear contact lenses or make-up or dark nail polish.  Bring a case for your glasses if applicable.  • Bring crutches or walker if applicable  • Leave all other valuables and jewelry at home  • If you were given a blood bank armband, continue to wear it until discharged.    Preventing a Surgical Site Infection:  • Shower on the morning of surgery using a fresh bar of anti-bacterial soap (such as Dial) and clean washcloth.  Dry with a clean towel and dress in clean clothing.  • For 2 to 3 days before surgery, avoid shaving with a razor near where you will have surgery because the razor can irritate skin and make it easier to develop an infection.  Ask your surgeon if you will be receiving antibiotics prior to surgery.  • Make sure you, your family, and all healthcare providers clean their hands with soap and water or an alcohol-based hand  before caring for you or your wound.  • If at all possible, quit smoking as many days before surgery as you can.    Day of Surgery:  Upon arrival, a pre-op nurse and  "to therapist as pt walks forward/backward  2 x 20 feet gait with 360 deg turns  2 x 20 feet gait with large circles with ball every 3 feet  2 x 30" standing Full Romberg, eyes closed, with head side to side*, up and down* initially when opening eyes  X 12 feet heel-toe gait  2 x 1 minute stand on bilateral BOSU*      * minimal difficulty  ** moderate difficulty      Patient Education and Home Exercises       Education provided:   - proper therapeutic exercise technique  - continue home exercise program     Written Home Exercises Provided: yes. Exercises were reviewed and Alberto was able to demonstrate them prior to the end of the session.  Alberto demonstrated fair  understanding of the education provided. See EMR under Patient Instructions for exercises provided during therapy sessions    Assessment     Patient was able to progress habituation and adaptation exercises today with gait, turns, and ball toss without significant increase in symptoms or loss of balance today. Most challenged on BOSU unsteady surface; but able to maintain balance with moderate sway. Some disequilibrium initially when opening eyes following full Romberg stance on AirEx. He remains appropriate for physical therapy at this time.    Alberto Is progressing fairly well towards his goals.   Pt prognosis is Fair.     Pt will continue to benefit from skilled outpatient physical therapy to address the deficits listed in the problem list box on initial evaluation, provide pt/family education and to maximize pt's level of independence in the home and community environment.     Pt's spiritual, cultural and educational needs considered and pt agreeable to plan of care and goals.     Anticipated barriers to physical therapy: severity of symptoms    Goals:     New Short Term Goals (3 Weeks):   Maintain patient's complaints of lightheadedness at less than 5/10 during activities of daily living. (MET)  Patient to tolerate x 45 seconds full Romberg stance, eyes " anesthesiologist will review your health history, obtain vital signs, and answer questions you may have.  The only belongings needed at this time will be your home medications and if applicable you C-PAP/BI-PAP machine.  If you are staying overnight, your family can leave the rest of your belongings in the car and bring them to your room later.  A pre-op nurse will start an IV and you may receive medication in preparation for surgery.  Due to patient privacy and limited space, only one member of your family will be able to accompany you in the pre-op area.  While you are in surgery your family should notify the waiting room  if they leave the waiting room area and provide a contact number.  Please be aware that surgery does come with discomfort.  We want to make every effort to control your discomfort so please discuss any uncontrolled symptoms with your nurse.  Your doctor will most likely have prescribed pain medications.  If you are going home after surgery you will receive individualized written care instructions before being discharged.  A responsible adult must drive you to and from the hospital on the day of surgery and stay with you for 24 hours.  If you are staying overnight following surgery, you will be transported to your hospital room following the recovery period.   closed to improve static stability. (PART MET)  Patient to begin adaptation home exercise program. (NOT MET)     New Long Term Goals (6 Weeks):   Patient to demonstrate competence with home exercise program to maintain therapeutic gains. (NOT MET)  2.   Patient to ambulate 20 feet in less than 5.5 seconds to improve annabella/symmetry. (NOT MET)  3.   Patient to report that bending over sometimes increases his problem. (NOT MET)      Plan     Continue to advance balance/vestibular training to patient's tolerance.      Tiffanie Lynn, PT

## 2025-06-11 NOTE — ANESTHESIA PREPROCEDURE EVALUATION
Anesthesia Evaluation     Patient summary reviewed and Nursing notes reviewed   no history of anesthetic complications:  NPO Solid Status: > 8 hours  NPO Liquid Status: > 8 hours           Airway   Mallampati: II  TM distance: >3 FB  Neck ROM: full  no difficulty expected  Dental    (+) edentulous    Pulmonary - normal exam   (+) shortness of breath, sleep apnea on CPAP, decreased breath sounds,   (-) not a smoker  Cardiovascular - normal exam  Exercise tolerance: poor (<4 METS)    NYHA Classification: II  Patient on routine beta blocker and Beta blocker given within 24 hours of surgery    (+) hypertension, valvular problems/murmurs, dysrhythmias, angina, CHF, hyperlipidemia,   (-) past MI      Neuro/Psych  (+) 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  Use of blood products discussed with patient  Consented to blood products.      warm

## (undated) DEVICE — TUBING, SUCTION, 1/4" X 20', STRAIGHT: Brand: MEDLINE INDUSTRIES, INC.

## (undated) DEVICE — COR CYSTO: Brand: MEDLINE INDUSTRIES, INC.

## (undated) DEVICE — Device: Brand: ENDOGATOR

## (undated) DEVICE — ENDOCUFF VISION PRP SM 10.4

## (undated) DEVICE — FRCP BX RADJAW4 NDL 2.8 240CM LG OG BX40

## (undated) DEVICE — SINGLE PORT MANIFOLD: Brand: NEPTUNE 2

## (undated) DEVICE — Device

## (undated) DEVICE — GOWN,REINF,POLY,ECL,PP SLV,XL: Brand: MEDLINE

## (undated) DEVICE — THE BITE BLOCK MAXI, LATEX FREE STRAP IS USED TO PROTECT THE ENDOSCOPE INSERTION TUBE FROM BEING BITTEN BY THE PATIENT.

## (undated) DEVICE — Device: Brand: DEFENDO AIR/WATER/SUCTION AND BIOPSY VALVE

## (undated) DEVICE — SUCTION CANISTER, 1500CC, RIGID: Brand: DEROYAL

## (undated) DEVICE — CONN Y IRR DISP 1P/U

## (undated) DEVICE — ENCORE® LATEX MICRO SIZE 8, STERILE LATEX POWDER-FREE SURGICAL GLOVE: Brand: ENCORE

## (undated) DEVICE — SYR LUERLOK 30CC

## (undated) DEVICE — 100% SILICONE FOLEY CATHETER,5-10 ML, 2-WAY: Brand: DOVER

## (undated) DEVICE — ENDOGATOR AUXILIARY WATER JET CONNECTOR: Brand: ENDOGATOR